# Patient Record
Sex: MALE | Race: WHITE | NOT HISPANIC OR LATINO | Employment: OTHER | URBAN - NONMETROPOLITAN AREA
[De-identification: names, ages, dates, MRNs, and addresses within clinical notes are randomized per-mention and may not be internally consistent; named-entity substitution may affect disease eponyms.]

---

## 2017-12-03 ENCOUNTER — APPOINTMENT (OUTPATIENT)
Dept: GENERAL RADIOLOGY | Facility: HOSPITAL | Age: 62
DRG: 292 | End: 2017-12-03
Attending: INTERNAL MEDICINE
Payer: MEDICARE

## 2017-12-03 ENCOUNTER — APPOINTMENT (OUTPATIENT)
Dept: ULTRASOUND IMAGING | Facility: HOSPITAL | Age: 62
DRG: 292 | End: 2017-12-03
Attending: INTERNAL MEDICINE
Payer: MEDICARE

## 2017-12-03 ENCOUNTER — HOSPITAL ENCOUNTER (INPATIENT)
Facility: HOSPITAL | Age: 62
LOS: 5 days | Discharge: HOME | DRG: 292 | End: 2017-12-08
Attending: INTERNAL MEDICINE | Admitting: INTERNAL MEDICINE
Payer: MEDICARE

## 2017-12-03 DIAGNOSIS — I48.91 ATRIAL FIBRILLATION WITH RAPID VENTRICULAR RESPONSE (H): ICD-10-CM

## 2017-12-03 DIAGNOSIS — I50.21 ACUTE SYSTOLIC CONGESTIVE HEART FAILURE (H): Primary | ICD-10-CM

## 2017-12-03 LAB
ALBUMIN SERPL-MCNC: 3.6 G/DL (ref 3.4–5)
ALBUMIN UR-MCNC: 30 MG/DL
ALP SERPL-CCNC: 134 U/L (ref 40–150)
ALT SERPL W P-5'-P-CCNC: 23 U/L (ref 0–70)
AMPHETAMINES UR QL SCN: NEGATIVE
AMYLASE SERPL-CCNC: 28 U/L (ref 30–110)
ANION GAP SERPL CALCULATED.3IONS-SCNC: 12 MMOL/L (ref 3–14)
APPEARANCE UR: CLEAR
AST SERPL W P-5'-P-CCNC: 23 U/L (ref 0–45)
BACTERIA #/AREA URNS HPF: ABNORMAL /HPF
BARBITURATES UR QL: NEGATIVE
BASOPHILS # BLD AUTO: 0.1 10E9/L (ref 0–0.2)
BASOPHILS NFR BLD AUTO: 0.5 %
BENZODIAZ UR QL: NEGATIVE
BILIRUB SERPL-MCNC: 1.2 MG/DL (ref 0.2–1.3)
BILIRUB UR QL STRIP: NEGATIVE
BUN SERPL-MCNC: 8 MG/DL (ref 7–30)
CALCIUM SERPL-MCNC: 8.9 MG/DL (ref 8.5–10.1)
CANNABINOIDS UR QL SCN: NEGATIVE
CHLORIDE SERPL-SCNC: 97 MMOL/L (ref 94–109)
CK SERPL-CCNC: 77 U/L (ref 30–300)
CO2 SERPL-SCNC: 22 MMOL/L (ref 20–32)
COCAINE UR QL: NEGATIVE
COLOR UR AUTO: YELLOW
CREAT SERPL-MCNC: 0.86 MG/DL (ref 0.66–1.25)
CRP SERPL-MCNC: 17.7 MG/L (ref 0–8)
DIFFERENTIAL METHOD BLD: ABNORMAL
EOSINOPHIL # BLD AUTO: 0.1 10E9/L (ref 0–0.7)
EOSINOPHIL NFR BLD AUTO: 0.9 %
ERYTHROCYTE [DISTWIDTH] IN BLOOD BY AUTOMATED COUNT: 13.6 % (ref 10–15)
ERYTHROCYTE [SEDIMENTATION RATE] IN BLOOD BY WESTERGREN METHOD: 10 MM/H (ref 0–20)
ETHANOL SERPL-MCNC: <0.01 G/DL
GFR SERPL CREATININE-BSD FRML MDRD: 90 ML/MIN/1.7M2
GLUCOSE SERPL-MCNC: 114 MG/DL (ref 70–99)
GLUCOSE UR STRIP-MCNC: NEGATIVE MG/DL
HCT VFR BLD AUTO: 42.4 % (ref 40–53)
HGB BLD-MCNC: 14.9 G/DL (ref 13.3–17.7)
HGB UR QL STRIP: NEGATIVE
HYALINE CASTS #/AREA URNS LPF: 4 /LPF
IMM GRANULOCYTES # BLD: 0.1 10E9/L (ref 0–0.4)
IMM GRANULOCYTES NFR BLD: 0.5 %
KETONES UR STRIP-MCNC: 5 MG/DL
LACTATE SERPL-SCNC: 2.3 MMOL/L (ref 0.4–2)
LACTATE SERPL-SCNC: 2.4 MMOL/L (ref 0.4–2)
LEUKOCYTE ESTERASE UR QL STRIP: NEGATIVE
LIPASE SERPL-CCNC: 164 U/L (ref 73–393)
LYMPHOCYTES # BLD AUTO: 1.1 10E9/L (ref 0.8–5.3)
LYMPHOCYTES NFR BLD AUTO: 10.6 %
MAGNESIUM SERPL-MCNC: 1.5 MG/DL (ref 1.6–2.3)
MAGNESIUM SERPL-MCNC: 3 MG/DL (ref 1.6–2.3)
MCH RBC QN AUTO: 31.4 PG (ref 26.5–33)
MCHC RBC AUTO-ENTMCNC: 35.1 G/DL (ref 31.5–36.5)
MCV RBC AUTO: 89 FL (ref 78–100)
METHADONE UR QL SCN: NEGATIVE
MONOCYTES # BLD AUTO: 1.4 10E9/L (ref 0–1.3)
MONOCYTES NFR BLD AUTO: 13.7 %
MUCOUS THREADS #/AREA URNS LPF: PRESENT /LPF
NEUTROPHILS # BLD AUTO: 7.6 10E9/L (ref 1.6–8.3)
NEUTROPHILS NFR BLD AUTO: 73.8 %
NITRATE UR QL: NEGATIVE
NRBC # BLD AUTO: 0 10*3/UL
NRBC BLD AUTO-RTO: 0 /100
NT-PROBNP SERPL-MCNC: 2021 PG/ML (ref 0–900)
OPIATES UR QL SCN: NEGATIVE
PCP UR QL SCN: NEGATIVE
PH UR STRIP: 5.5 PH (ref 4.7–8)
PLATELET # BLD AUTO: 272 10E9/L (ref 150–450)
POTASSIUM SERPL-SCNC: 4.2 MMOL/L (ref 3.4–5.3)
PROCALCITONIN SERPL-MCNC: <0.05 NG/ML
PROT SERPL-MCNC: 7.8 G/DL (ref 6.8–8.8)
RBC # BLD AUTO: 4.75 10E12/L (ref 4.4–5.9)
RBC #/AREA URNS AUTO: 2 /HPF (ref 0–2)
SODIUM SERPL-SCNC: 131 MMOL/L (ref 133–144)
SOURCE: ABNORMAL
SP GR UR STRIP: 1.01 (ref 1–1.03)
TROPONIN I SERPL-MCNC: 0.02 UG/L (ref 0–0.04)
TROPONIN I SERPL-MCNC: <0.015 UG/L (ref 0–0.04)
TSH SERPL DL<=0.005 MIU/L-ACNC: 1.45 MU/L (ref 0.4–4)
UROBILINOGEN UR STRIP-MCNC: 2 MG/DL (ref 0–2)
WBC # BLD AUTO: 10.3 10E9/L (ref 4–11)
WBC #/AREA URNS AUTO: 2 /HPF (ref 0–2)

## 2017-12-03 PROCEDURE — 25000125 ZZHC RX 250: Performed by: INTERNAL MEDICINE

## 2017-12-03 PROCEDURE — 84484 ASSAY OF TROPONIN QUANT: CPT | Performed by: INTERNAL MEDICINE

## 2017-12-03 PROCEDURE — 84145 PROCALCITONIN (PCT): CPT | Performed by: INTERNAL MEDICINE

## 2017-12-03 PROCEDURE — 94640 AIRWAY INHALATION TREATMENT: CPT

## 2017-12-03 PROCEDURE — 40000275 ZZH STATISTIC RCP TIME EA 10 MIN

## 2017-12-03 PROCEDURE — 96374 THER/PROPH/DIAG INJ IV PUSH: CPT

## 2017-12-03 PROCEDURE — 82150 ASSAY OF AMYLASE: CPT | Performed by: INTERNAL MEDICINE

## 2017-12-03 PROCEDURE — 83605 ASSAY OF LACTIC ACID: CPT | Performed by: INTERNAL MEDICINE

## 2017-12-03 PROCEDURE — 83880 ASSAY OF NATRIURETIC PEPTIDE: CPT | Performed by: INTERNAL MEDICINE

## 2017-12-03 PROCEDURE — 94640 AIRWAY INHALATION TREATMENT: CPT | Mod: 76

## 2017-12-03 PROCEDURE — 93005 ELECTROCARDIOGRAM TRACING: CPT

## 2017-12-03 PROCEDURE — 99285 EMERGENCY DEPT VISIT HI MDM: CPT | Mod: 25

## 2017-12-03 PROCEDURE — 71020 XR CHEST 2 VW: CPT | Mod: TC

## 2017-12-03 PROCEDURE — 25000128 H RX IP 250 OP 636: Performed by: INTERNAL MEDICINE

## 2017-12-03 PROCEDURE — 80307 DRUG TEST PRSMV CHEM ANLYZR: CPT | Performed by: INTERNAL MEDICINE

## 2017-12-03 PROCEDURE — 93970 EXTREMITY STUDY: CPT | Mod: TC

## 2017-12-03 PROCEDURE — 80320 DRUG SCREEN QUANTALCOHOLS: CPT | Performed by: INTERNAL MEDICINE

## 2017-12-03 PROCEDURE — 83690 ASSAY OF LIPASE: CPT | Performed by: INTERNAL MEDICINE

## 2017-12-03 PROCEDURE — A9270 NON-COVERED ITEM OR SERVICE: HCPCS | Mod: GY | Performed by: INTERNAL MEDICINE

## 2017-12-03 PROCEDURE — 99285 EMERGENCY DEPT VISIT HI MDM: CPT | Performed by: INTERNAL MEDICINE

## 2017-12-03 PROCEDURE — 99222 1ST HOSP IP/OBS MODERATE 55: CPT | Mod: AI | Performed by: INTERNAL MEDICINE

## 2017-12-03 PROCEDURE — 85652 RBC SED RATE AUTOMATED: CPT | Performed by: INTERNAL MEDICINE

## 2017-12-03 PROCEDURE — 80053 COMPREHEN METABOLIC PANEL: CPT | Performed by: INTERNAL MEDICINE

## 2017-12-03 PROCEDURE — 86140 C-REACTIVE PROTEIN: CPT | Performed by: INTERNAL MEDICINE

## 2017-12-03 PROCEDURE — 81001 URINALYSIS AUTO W/SCOPE: CPT | Performed by: INTERNAL MEDICINE

## 2017-12-03 PROCEDURE — 93010 ELECTROCARDIOGRAM REPORT: CPT | Performed by: INTERNAL MEDICINE

## 2017-12-03 PROCEDURE — 20000003 ZZH R&B ICU

## 2017-12-03 PROCEDURE — 36415 COLL VENOUS BLD VENIPUNCTURE: CPT | Performed by: INTERNAL MEDICINE

## 2017-12-03 PROCEDURE — 25000132 ZZH RX MED GY IP 250 OP 250 PS 637: Mod: GY | Performed by: INTERNAL MEDICINE

## 2017-12-03 PROCEDURE — 83735 ASSAY OF MAGNESIUM: CPT | Performed by: INTERNAL MEDICINE

## 2017-12-03 PROCEDURE — 85025 COMPLETE CBC W/AUTO DIFF WBC: CPT | Performed by: INTERNAL MEDICINE

## 2017-12-03 PROCEDURE — 76700 US EXAM ABDOM COMPLETE: CPT | Mod: TC

## 2017-12-03 PROCEDURE — 82550 ASSAY OF CK (CPK): CPT | Performed by: INTERNAL MEDICINE

## 2017-12-03 PROCEDURE — 84443 ASSAY THYROID STIM HORMONE: CPT | Performed by: INTERNAL MEDICINE

## 2017-12-03 RX ORDER — FUROSEMIDE 10 MG/ML
40 INJECTION INTRAMUSCULAR; INTRAVENOUS EVERY 6 HOURS
Status: COMPLETED | OUTPATIENT
Start: 2017-12-03 | End: 2017-12-05

## 2017-12-03 RX ORDER — METOPROLOL TARTRATE 25 MG/1
25 TABLET, FILM COATED ORAL 2 TIMES DAILY
Status: DISCONTINUED | OUTPATIENT
Start: 2017-12-03 | End: 2017-12-05

## 2017-12-03 RX ORDER — AMOXICILLIN 250 MG
2 CAPSULE ORAL 2 TIMES DAILY PRN
Status: DISCONTINUED | OUTPATIENT
Start: 2017-12-03 | End: 2017-12-08 | Stop reason: HOSPADM

## 2017-12-03 RX ORDER — IPRATROPIUM BROMIDE AND ALBUTEROL SULFATE 2.5; .5 MG/3ML; MG/3ML
3 SOLUTION RESPIRATORY (INHALATION) EVERY 4 HOURS PRN
Status: DISCONTINUED | OUTPATIENT
Start: 2017-12-03 | End: 2017-12-08 | Stop reason: HOSPADM

## 2017-12-03 RX ORDER — ONDANSETRON 2 MG/ML
4 INJECTION INTRAMUSCULAR; INTRAVENOUS EVERY 6 HOURS PRN
Status: DISCONTINUED | OUTPATIENT
Start: 2017-12-03 | End: 2017-12-08 | Stop reason: HOSPADM

## 2017-12-03 RX ORDER — ONDANSETRON 4 MG/1
4 TABLET, ORALLY DISINTEGRATING ORAL EVERY 6 HOURS PRN
Status: DISCONTINUED | OUTPATIENT
Start: 2017-12-03 | End: 2017-12-08 | Stop reason: HOSPADM

## 2017-12-03 RX ORDER — POTASSIUM CHLORIDE 7.45 MG/ML
10 INJECTION INTRAVENOUS
Status: DISCONTINUED | OUTPATIENT
Start: 2017-12-03 | End: 2017-12-08 | Stop reason: HOSPADM

## 2017-12-03 RX ORDER — NICOTINE 21 MG/24HR
1 PATCH, TRANSDERMAL 24 HOURS TRANSDERMAL DAILY
Status: DISCONTINUED | OUTPATIENT
Start: 2017-12-03 | End: 2017-12-08 | Stop reason: HOSPADM

## 2017-12-03 RX ORDER — METOPROLOL TARTRATE 25 MG/1
25 TABLET, FILM COATED ORAL 2 TIMES DAILY
Status: DISCONTINUED | OUTPATIENT
Start: 2017-12-03 | End: 2017-12-03

## 2017-12-03 RX ORDER — DILTIAZEM HYDROCHLORIDE 5 MG/ML
20 INJECTION INTRAVENOUS ONCE
Status: COMPLETED | OUTPATIENT
Start: 2017-12-03 | End: 2017-12-03

## 2017-12-03 RX ORDER — SODIUM CHLORIDE 9 MG/ML
INJECTION, SOLUTION INTRAVENOUS CONTINUOUS
Status: DISCONTINUED | OUTPATIENT
Start: 2017-12-03 | End: 2017-12-05

## 2017-12-03 RX ORDER — MORPHINE SULFATE 2 MG/ML
1 INJECTION, SOLUTION INTRAMUSCULAR; INTRAVENOUS
Status: DISCONTINUED | OUTPATIENT
Start: 2017-12-03 | End: 2017-12-05

## 2017-12-03 RX ORDER — IPRATROPIUM BROMIDE AND ALBUTEROL SULFATE 2.5; .5 MG/3ML; MG/3ML
3 SOLUTION RESPIRATORY (INHALATION) ONCE
Status: COMPLETED | OUTPATIENT
Start: 2017-12-03 | End: 2017-12-03

## 2017-12-03 RX ORDER — SODIUM CHLORIDE 9 MG/ML
1000 INJECTION, SOLUTION INTRAVENOUS CONTINUOUS
Status: DISCONTINUED | OUTPATIENT
Start: 2017-12-03 | End: 2017-12-04

## 2017-12-03 RX ORDER — POTASSIUM CL/LIDO/0.9 % NACL 10MEQ/0.1L
10 INTRAVENOUS SOLUTION, PIGGYBACK (ML) INTRAVENOUS
Status: DISCONTINUED | OUTPATIENT
Start: 2017-12-03 | End: 2017-12-08 | Stop reason: HOSPADM

## 2017-12-03 RX ORDER — DIAZEPAM 5 MG
5-20 TABLET ORAL EVERY 30 MIN PRN
Status: DISCONTINUED | OUTPATIENT
Start: 2017-12-03 | End: 2017-12-08 | Stop reason: HOSPADM

## 2017-12-03 RX ORDER — POTASSIUM CHLORIDE 1.5 G/1.58G
20-40 POWDER, FOR SOLUTION ORAL
Status: DISCONTINUED | OUTPATIENT
Start: 2017-12-03 | End: 2017-12-08 | Stop reason: HOSPADM

## 2017-12-03 RX ORDER — AMOXICILLIN 250 MG
1 CAPSULE ORAL 2 TIMES DAILY PRN
Status: DISCONTINUED | OUTPATIENT
Start: 2017-12-03 | End: 2017-12-08 | Stop reason: HOSPADM

## 2017-12-03 RX ORDER — PANTOPRAZOLE SODIUM 40 MG/1
40 TABLET, DELAYED RELEASE ORAL EVERY MORNING
Status: DISCONTINUED | OUTPATIENT
Start: 2017-12-03 | End: 2017-12-05

## 2017-12-03 RX ORDER — POTASSIUM CHLORIDE 1500 MG/1
20-40 TABLET, EXTENDED RELEASE ORAL
Status: DISCONTINUED | OUTPATIENT
Start: 2017-12-03 | End: 2017-12-08 | Stop reason: HOSPADM

## 2017-12-03 RX ORDER — FUROSEMIDE 10 MG/ML
40 INJECTION INTRAMUSCULAR; INTRAVENOUS ONCE
Status: COMPLETED | OUTPATIENT
Start: 2017-12-03 | End: 2017-12-03

## 2017-12-03 RX ORDER — MAGNESIUM SULFATE HEPTAHYDRATE 40 MG/ML
4 INJECTION, SOLUTION INTRAVENOUS EVERY 4 HOURS PRN
Status: DISCONTINUED | OUTPATIENT
Start: 2017-12-03 | End: 2017-12-08 | Stop reason: HOSPADM

## 2017-12-03 RX ORDER — NALOXONE HYDROCHLORIDE 0.4 MG/ML
.1-.4 INJECTION, SOLUTION INTRAMUSCULAR; INTRAVENOUS; SUBCUTANEOUS
Status: DISCONTINUED | OUTPATIENT
Start: 2017-12-03 | End: 2017-12-08 | Stop reason: HOSPADM

## 2017-12-03 RX ADMIN — SODIUM CHLORIDE 1000 ML: 9 INJECTION, SOLUTION INTRAVENOUS at 08:41

## 2017-12-03 RX ADMIN — FUROSEMIDE 40 MG: 10 INJECTION, SOLUTION INTRAVENOUS at 11:42

## 2017-12-03 RX ADMIN — FUROSEMIDE 40 MG: 10 INJECTION, SOLUTION INTRAVENOUS at 14:20

## 2017-12-03 RX ADMIN — THIAMINE HYDROCHLORIDE 250 MG: 100 INJECTION, SOLUTION INTRAMUSCULAR; INTRAVENOUS at 14:20

## 2017-12-03 RX ADMIN — METOPROLOL TARTRATE 25 MG: 25 TABLET ORAL at 20:35

## 2017-12-03 RX ADMIN — FUROSEMIDE 40 MG: 10 INJECTION, SOLUTION INTRAVENOUS at 20:35

## 2017-12-03 RX ADMIN — METOPROLOL TARTRATE 12.5 MG: 25 TABLET, FILM COATED ORAL at 12:53

## 2017-12-03 RX ADMIN — IPRATROPIUM BROMIDE AND ALBUTEROL SULFATE 3 ML: .5; 3 SOLUTION RESPIRATORY (INHALATION) at 06:51

## 2017-12-03 RX ADMIN — DILTIAZEM HYDROCHLORIDE 10 MG/HR: 5 INJECTION INTRAVENOUS at 20:02

## 2017-12-03 RX ADMIN — PANTOPRAZOLE SODIUM 40 MG: 40 TABLET, DELAYED RELEASE ORAL at 11:42

## 2017-12-03 RX ADMIN — NICOTINE 1 PATCH: 21 PATCH, EXTENDED RELEASE TRANSDERMAL at 12:54

## 2017-12-03 RX ADMIN — MAGNESIUM SULFATE IN WATER 4 G: 40 INJECTION, SOLUTION INTRAVENOUS at 12:00

## 2017-12-03 RX ADMIN — ENOXAPARIN SODIUM 40 MG: 40 INJECTION SUBCUTANEOUS at 11:42

## 2017-12-03 RX ADMIN — IPRATROPIUM BROMIDE AND ALBUTEROL SULFATE 3 ML: .5; 3 SOLUTION RESPIRATORY (INHALATION) at 19:54

## 2017-12-03 RX ADMIN — DILTIAZEM HYDROCHLORIDE 20 MG: 5 INJECTION INTRAVENOUS at 07:44

## 2017-12-03 RX ADMIN — DILTIAZEM HYDROCHLORIDE 5 MG/HR: 5 INJECTION INTRAVENOUS at 08:40

## 2017-12-03 NOTE — IP AVS SNAPSHOT
[facility]    [address] [place] [id number]    Phone:  [phone]    Fax:  [phone]                                       After Visit Summary   12/3/2017    Ramírez Hernández    MRN: [id number]           After Visit Summary Signature Page     I have received my discharge instructions, and my questions have been answered. I have discussed any challenges I see with this plan with the nurse or doctor.    ..........................................................................................................................................  Patient/Patient Representative Signature      ..........................................................................................................................................  Patient Representative Print Name and Relationship to Patient    ..................................................               ................................................  Date                                            Time    ..........................................................................................................................................  Reviewed by Signature/Title    ...................................................              ..............................................  Date                                                            Time

## 2017-12-03 NOTE — ED NOTES
DATE:  12/3/2017   TIME OF RECEIPT FROM LAB:  0658  LAB TEST: lactate  LAB VALUE: 2.4  RESULTS GIVEN WITH READ-BACK TO (PROVIDER):  Yoshi  TIME LAB VALUE REPORTED TO PROVIDER:  0659

## 2017-12-03 NOTE — PLAN OF CARE
[facility] Inpatient Admission Note:    Patient admitted to 3122/3122-1 at approximately 1000 via cart accompanied by nurse from emergency room . Report received from Melia TURCIOS  in SBAR format at 0945 via telephone. Patient transferred to bed via self.. Patient is alert and oriented X 3, denies pain; rates at 0 on 0-10 scale.  Patient oriented to room, unit, hourly rounding, and plan of care. Explained admission packet and patient handbook with patient bill of rights brochure. Will continue to monitor and document as needed.     Inpatient Nursing criteria listed below was met:      Health care directives status obtained and documented: Yes      Care Everywhere authorization obtained Yes      MRSA swab completed for patient 65 years and older: N/A      Patient identifies a surrogate decision maker: Yes If yes, who:Brother Chavez Contact Information:[phone]      Core Measure diagnosis present:No. If yes, state diagnosis: N/A       Is initial lactic acid >2.0? Yes.       Vaccination assessment and education completed: Yes   Vaccinations received prior to admission: Pneumovax no  Influenza(seasonal)  NO   Vaccination(s) ordered: influenza vaccine, pneumococcal vaccine      Clergy visit ordered if patient requests: N/A      Skin issues/needs documented: N/A      Isolation Patient: no Education given, correct sign in place and documentation row added to PCS:  No      Fall Prevention N/A: Care plan updated, education given and documented, sticker and magnet in place: Yes      Care Plan initiated: Yes      Education Documented (including assessment): Yes      Patient has discharge needs : Yes If yes, please explain:May need extra services at home

## 2017-12-03 NOTE — IP AVS SNAPSHOT
MRN:[id number]                      After Visit Summary   12/3/2017    Ramírez Hernández    MRN: [id number]           Thank you!     Thank you for choosing [facility] for your care. Our goal is always to provide you with excellent care. Hearing back from our patients is one way we can continue to improve our services. Please take a few minutes to complete the written survey that you may receive in the mail after you visit with us. Thank you!        Patient Information     Date Of Birth          1955        Designated Caregiver       Most Recent Value    Caregiver    Will someone help with your care after discharge? yes    Name of designated caregiver brother Rizzo    Phone number of caregiver chart    Caregiver address [place]      About your hospital stay     You were admitted on:  December 3, 2017 You last received care in the:  [facility]    You were discharged on:  December 8, 2017        Reason for your hospital stay       Congestive heart failure                  Who to Call     For medical emergencies, please call 911.  For non-urgent questions about your medical care, please call your primary care provider or clinic, None          Attending Provider     Provider Specialty    Godwin Belle MD Emergency Medicine    Carl South MD Internal Medicine    Megan Mccullough, NP Nurse Practitioner       Primary Care Provider Fax #    Physician No Ref-Primary [phone]       When to contact your care team       Call your primary doctor if you have any of the following: worsening shortness of breath, swelling of the legs, muscle cramping, chest pain, palpitations, bleeding.                  After Care Instructions     Activity       Your activity upon discharge: activity as tolerated, elevate legs while at rest.            Diet       Follow this diet upon discharge: Orders Placed This Encounter     2 gram sodium diet            Monitor and record       weight every day. Notify  your doctor if you gain over 2 pounds overnight or 5 pounds in a week. Take log of daily weights into doctor's appointment.                  Follow-up Appointments     Follow-up and recommended labs and tests        Follow up with primary care provider, Physician No Ref-Primary, within 7 days for hospital follow- up.  The following labs/tests are recommended: CBC,BMP.    Follow-up with [facility] on Monday for lab and Coumadin dosing.                  Your next 10 appointments already scheduled     Dec 11, 2017  2:00 PM CST   Anticoagulation Visit with  ANTI COAGULATION   [facility] ([facility] [place] )    [address] [address]   [phone]            Dec 14, 2017  1:45 PM CST   (Arrive by 1:30 PM)   Office Visit with CLAUDIO Crum MD   [facility] ([facility] [place] )    [address]  [place] [address]   [phone]           Bring a current list of meds and any records pertaining to this visit.  For Physicals, please bring immunization records and any forms needing to be filled out.  Please arrive 15 minutes early to complete paperwork and register.            Dec 18, 2017  1:15 PM CST   (Arrive by 1:00 PM)   Office Visit with CLAUDIO Crum MD   [facility] ([facility] [place] )    [address] [address]   [phone]           Bring a current list of meds and any records pertaining to this visit.  For Physicals, please bring immunization records and any forms needing to be filled out.  Please arrive 15 minutes early to complete paperwork and register.              Additional Services     INR CLINIC REFERRAL       Your provider has referred you to INR Services.    Please be aware that coverage of these services is subject to the terms and limitations of your health insurance plan.  Call member services at your health plan with any benefit or coverage questions.      Patient prefers to get  lab checks at [facility].    Indication for Anticoagulation: Atrial Fibrillation  If nonstandard INR is desired, indicate goal range and explanation:   Expected Duration of Therapy: Other: Potential lifetime depending on A-fib status                  Future tests that were ordered for you     INR                 Further instructions from your care team       You have an appointment at the [facility] on Monday, December 11th @ 2:00pm. This is an hour long visit.    You have a hospital follow up appointment scheduled with Dr. Crum on Thursday, December 14th @ 1:45PM. Please arrive 15 minutes early for registration.    You also have an appointment to establish care with your new primary Dr. Crum on Monday, December 18th @ 1:15PM. Please arrive 15 minutes early for registration.    Warfarin Instruction     You have started taking a medicine called warfarin. This is a blood-thinning medicine (anticoagulant). It helps prevent and treat blood clots.      Before leaving the hospital, make sure you know how much to take and how long to take it.      You will need regular blood tests to make sure your blood is clotting safely. It is very important to see your doctor for regular blood tests.    Talk to your doctor before taking any new medicine (this includes over-the-counter drugs and herbal products). Many medicines can interact with warfarin. This may cause more bleeding or too much clotting.     Eating a lot of vitamin K--found in green, leafy vegetables--can change the way warfarin works in your body. Do NOT avoid these foods. Instead, try to eat the same amount each day.     Bleeding is the most common side-effect of warfarin. You may notice bleeding gums, a bloody nose, bruises and bleeding longer when you cut yourself. See a doctor at once if:   o You cough up blood  o You find blood in your stool (poop)  o You have a deep cut, or a cut that bleeds longer than 10 minutes   o You have a bad cut,  "hard fall, accident or hit your head (go to urgent care or the emergency room).    For women who can get pregnant: This medicine can harm an unborn baby. Be very careful not to get pregnant while taking this medicine. If you think you might be pregnant, call your doctor right away.    For more information, read \"Guide to Warfarin Therapy,  the booklet you received in the hospital.        General Recommendations To Control Heart Failure When You Get Home     Instructions To Patients and Families: Please read and check off each of these important instructions as you do them when you get home.           Weight and symptoms      ___ Put a scale in your bathroom  ___ Post a weight chart or calendar next to the scale  ___Weigh yourself every day as soon as you get up in the morning. You should only be wearing your pajamas. Write your weight on the chart/calendar.  ___ Bring your weight chart/calendar with you to all appointments    ___Call your doctor if you gain 2 pounds in 1 day or 5 pounds in 1 week from your \"dry\" weight (baseline weight). Also call your doctor if you have shortness of breath that gets worse over time, leg swelling or fatigue.         Medicines and diet     ___ Make sure to take your medicines as prescribed.    ___Bring a current list of your medicines and all of your medicine bottles with you to all appointments.    ___ Limit fluids if you still have swelling or shortness of breath, or if your doctor tells you to do so.  ___ Eat less than 2000 mg of sodium (salt) every day. Read food labels, and do not add salt to meals.   ___ Heart healthy diet with low fat and low cholesterol          Activity and suggested lifestyle changes    ___ Stay active. Talk to your doctor about an exercise program that is safe for your heart.    ___ Stop smoking. Reduce alcohol use.      ___ Lose weight if you are overweight. Extra weight puts a lot of stress on the heart.          Control for Leg Swelling   ___ Keep your " "legs elevated (raised) as needed for swelling. If swelling is uncomfortable or elevation doesn t help, ask your doctor about using ACE wrap or Jobst stockings.          Follow-up appointments   ____ Follow up with your doctor within 7-10 days after discharge.    ___ Make sure to take your medications as prescribed and bring an accurate list of your medications and your weight chart/calendar to your follow up appointment.           Pending Results     No orders found from 2017 to 2017.            Statement of Approval     Ordered          17 1233  I have reviewed and agree with all the recommendations and orders detailed in this document.  EFFECTIVE NOW     Approved and electronically signed by:  Megan Mccullough NP             Admission Information     Date & Time Department Dept. Phone    12/3/2017 [facility] [phone]      Your Vitals Were     Blood Pressure Pulse Temperature Respirations Height Weight    137/87 92 96.6  F (35.9  C) (Tympanic) 16 1.727 m (5' 8\") 113.4 kg (250 lb)    Pulse Oximetry BMI (Body Mass Index)                94% 38.01 kg/m2          [facility] Information     [facility] lets you send messages to your doctor, view your test results, renew your prescriptions, schedule appointments and more. To sign up, go to [email or web address] . Click on \"Log in\" on the left side of the screen, which will take you to the Welcome page. Then click on \"Sign up Now\" on the right side of the page.     You will be asked to enter the access code listed below, as well as some personal information. Please follow the directions to create your username and password.     Your access code is: [email or web address]  Expires: 3/8/2018 12:32 PM     Your access code will  in 90 days. If you need help or a new code, please call your [place] clinic or [phone].        Care EveryWhere ID     This is your Care EveryWhere ID. This could be used by other organizations to access your [place] " medical records  [id number]        Equal Access to Services     JHON BILLINGSLEY : Hadii nolan doss tyrese Soraulali, waaxda luqadaha, qaybta kalukasjean isbelldamonjean, georgina sanchezjaradgema finn. So [facility] [phone].    ATENCIÓN: Si habla español, tiene a kay disposición servicios gratuitos de asistencia lingüística. Llame al [phone].    We comply with applicable federal civil rights laws and [place] laws. We do not discriminate on the basis of race, color, national origin, age, disability, sex, sexual orientation, or gender identity.               Review of your medicines      START taking        Dose / Directions    furosemide 40 MG tablet   Commonly known as:  LASIX        Dose:  40 mg   Start taking on:  12/9/2017   Take 1 tablet (40 mg) by mouth daily   Quantity:  30 tablet   Refills:  0       lisinopril 10 MG tablet   Commonly known as:  PRINIVIL/ZESTRIL        Dose:  10 mg   Start taking on:  12/9/2017   Take 1 tablet (10 mg) by mouth daily   Quantity:  30 tablet   Refills:  0       metoprolol 50 MG tablet   Commonly known as:  LOPRESSOR   Used for:  Atrial fibrillation with rapid ventricular response (H)        Dose:  50 mg   Take 1 tablet (50 mg) by mouth 2 times daily   Quantity:  60 tablet   Refills:  0       warfarin 5 MG tablet   Commonly known as:  COUMADIN   Used for:  Atrial fibrillation with rapid ventricular response (H)        Dose:  5 mg   Take 1 tablet (5 mg) by mouth daily Until otherwise instructed by coumadin clinic.   Quantity:  30 tablet   Refills:  0            Where to get your medicines      These medications were sent to [id number] - [place], [place] - [address] AT [facility] [address]  [address], [place] [address]     Phone:  [phone]     furosemide 40 MG tablet    lisinopril 10 MG tablet    metoprolol 50 MG tablet    warfarin 5 MG tablet                Protect others around you: Learn how to safely use, store and throw away your medicines at  www.disposemymeds.org.             Medication List: This is a list of all your medications and when to take them. Check marks below indicate your daily home schedule. Keep this list as a reference.      Medications           Morning Afternoon Evening Bedtime As Needed    furosemide 40 MG tablet   Commonly known as:  LASIX   Take 1 tablet (40 mg) by mouth daily   Start taking on:  12/9/2017   Last time this was given:  40 mg on 12/8/2017  9:53 AM                                lisinopril 10 MG tablet   Commonly known as:  PRINIVIL/ZESTRIL   Take 1 tablet (10 mg) by mouth daily   Start taking on:  12/9/2017   Last time this was given:  10 mg on 12/8/2017  9:53 AM                                metoprolol 50 MG tablet   Commonly known as:  LOPRESSOR   Take 1 tablet (50 mg) by mouth 2 times daily   Last time this was given:  50 mg on 12/8/2017  9:53 AM                                warfarin 5 MG tablet   Commonly known as:  COUMADIN   Take 1 tablet (5 mg) by mouth daily Until otherwise instructed by coumadin clinic.   Last time this was given:  7.5 mg on 12/7/2017  6:31 PM                                          More Information        Understanding Atrial Fibrillation    An arrhythmia is any problem with the speed or pattern of the heartbeat. Atrial fibrillation (AFib) is a common type of arrhythmia. It causes fast, chaotic electrical signals in the atria. This leads to poor functioning of the heart. It also affects how much blood your heart can pump out to the body.  Afib may occur once in a while and go away on its own. Or it may continue for longer periods and need treatment.  AFib can lead to serious problems, such as stroke. Your healthcare provider will need to monitor and manage it.  What happens during atrial fibrillation?   The heart has an electrical system that sends signals to control the heartbeat. As signals move through the heart, they tell the heart s upper chambers (atria) and lower chambers  (ventricles) when to squeeze (contract) and relax. This lets blood move through the heart and out to the body and lungs.  With AFib, the atria receive abnormal signals. This causes them to contract in a fast and irregular way, and out of sync with the ventricles. When this happens, the atria also have a harder time moving blood into the ventricles. Blood may then pool in the atria, which increases the risk for blood clots and stroke. The ventricles also may contract too quickly and irregularly. As a result, they may not pump blood to the body and lungs as well as they should. This can weaken the heart muscle over time and cause heart failure.  What causes atrial fibrillation?  AFib is more common in older adults. It has many possible causes including:    Coronary artery disease    Heart valve disease    Heart attack    Heart surgery    High blood pressure    Thyroid disease    Diabetes    Lung disease    Sleep apnea    Heavy alcohol use  In some cases of AFib, doctors do not know the cause.  What are the symptoms of atrial fibrillation?  AFib may or may not cause symptoms. If symptoms do occur, they may include:    A fast, pounding, irregular heartbeat    Shortness of breath    Tiredness    Dizziness or fainting    Chest pain  How is atrial fibrillation treated?  Treatments for AFib can include any of the options below.    Medicines. You may be prescribed:    Heart rate medicines to help slow down the heartbeat    Heart rhythm medicines to help the heart beat more regularly    Anti-clotting medicines to help reduce the risk for blood clots and stroke    Electrical cardioversion. Your healthcare provider uses special pads or paddles to send one or more brief electrical shocks to the heart. This can help reset the heartbeat to normal.    Ablation. Long, thin tubes called catheters are threaded through a blood vessel to the heart. There, the catheters send out hot or cold energy to the areas causing the abnormal  signals. This energy destroys the problem tissue or cells. This improves the chances that your heart will stay in normal rhythm without using medicines. If your heart rate and rhythm can t be controlled, you may need ablation and a pacemaker. These will help control the heart rate and regularity of the heartbeat.    Surgery. During surgery, your healthcare provider may use different methods to create scar tissue in the areas of the heart causing the abnormal signals. The scar tissue disrupts the abnormal signals and may stop AFib from occurring.  What are the complications of atrial fibrillation?  These can include:    Blood clots    Stroke    Heart failure. This problem occurs when the heart muscle weakens so much that it can no longer pump blood well.  When should I call my healthcare provider?  Call your healthcare provider right away if you have any of these:    Symptoms that don t get better with treatment, or get worse    New symptoms   Date Last Reviewed: 5/1/2016 2000-2017 [employer]. [address] [place] [address]. All rights reserved. This information is not intended as a substitute for professional medical care. Always follow your healthcare professional's instructions.                What Is Atrial Flutter/Atrial Fibrillation?      The heart has its own electrical system. This system makes the signals that start each heartbeat. The heartbeat begins in 1 of the 2 upper chambers of the heart (atria). A problem can make the atria beat faster than normal. The atria may beat fast but still evenly. This problem is called atrial flutter. If the atria beat very fast and also unevenly, it is called atrial fibrillation (AFib).  Causes of Atrial Flutter and Atrial Fibrillation  Causes of these problems can include:    Previous heart attack    High blood pressure    Thyroid problems  In many cases, the cause is unknown.  When the Atria Beat Too Fast  The atria may beat fast only once in  a while. This is called a paroxysmal heart rhythm problem. If they beat fast all the time, it is a chronic problem.  Atrial Flutter  With atrial flutter, electrical signals travel around and around inside the atria. These circling signals make the atria beat too fast:    Atrial flutter can cause symptoms similar to AFib. It can also lead to the even faster, uneven rhythms of AFib.  Atrial Fibrillation (AFib)  With AFib, cells in the atria send extra electrical signals. These extra signals make the atria beat very fast. They also beat unevenly:    The atria beat so fast and unevenly that they may quiver instead of derrick. If the atria don t contract, they don t move enough blood into the 2 lower chambers of the heart (ventricles). This can cause you to feel dizzy or weak.    Blood that doesn t keep moving can pool and form clots in the atria. These clots can move into other parts of the body and cause serious problems such as a stroke.   Symptoms of Atrial Flutter and AFib  These symptoms include the following:    Palpitations (a fluttering, fast heartbeat)    Weakness or tiredness    Shortness of breath    Chest pain or tightness    Dizziness or lightheadedness    Fainting spells   Date Last Reviewed: 2/26/2014 2000-2017 [employer]. [address] [place] [address]. All rights reserved. This information is not intended as a substitute for professional medical care. Always follow your healthcare professional's instructions.              Coping with Edema  What is edema?  Edema is the build-up of fluid in the body, which causes swelling. Swelling most commonly occurs in the feet, ankles, lower legs or hands.  Swelling can occur in the belly or chest may be a sign of a more severe problem.  Certain medicines or conditions can make the swelling worse.  Symptoms include:    Feet and lower legs get larger when you sit or walk.    Hands feel tight when you make a fist.    When you push on  the skin, skin stays dented.    Shiny, tight skin.    Fast weight gain.  How is it treated?  Your care team may give you a medicine to reduce the swelling.  They may also suggest that you meet with a dietitian. He or she can help with food choices to reduce the swelling.  What can I do about the swelling?    Place your feet above your heart 3 times a day: Sit with your feet up on a stool with a pillow. Sit on the bed or couch with two pillows under your feet.    Do not stand for long periods of time.    Wear loose-fitting clothes.    Do not cross your legs.    Reduce the salt in your diet.   These foods are high in salt:    Chips, soup    Frozen meals, TV dinners    Gutierrez, lunch meat, ham    Sauces (soy, canned spaghetti sauce)    Walk or do other exercise.    Wear compression stockings.    Drink water as normal.    Weigh yourself every day at the same time to keep track of weight gain.  When should I call my care team?  Call your care team if:    You have a hard time breathing.    You gained 5 pounds or more in 1 week.    Your hands or feet feel cold when you touch them.    You are peeing very little or not at all.    Swelling is moving up your arms or legs.    Your tongue is swelling.    You cannot eat for more than a day  If you have any side effects, call us. We can help you manage these problems.  For more information, see:  www.[facility].com  www.cancer.org/treatment/treatmentsandsideeffects/physicalsideeffects/dealingwithsymptomsathome  www.cancer.gov/cancertopics/coping/chemotherapy-and-you  Comments:  __________________________________________  __________________________________________  __________________________________________  __________________________________________  __________________________________________  __________________________________________  __________________________________________  For informational purposes only. Not to replace the advice of your health care provider.  Copyright    2014 [facility]. All rights reserved. [employer] 393211 - REV 03/16.          Diet Guidelines for Patients Taking Warfarin (Coumadin)  The foods you eat and drink can affect how your medicine works. Here is what you should know about warfarin and your diet.  Vitamin K in foods  While you are on warfarin, your intake of vitamin K should stay the same from day to day. Your warfarin dose is based on your daily intake of vitamin K foods. So it is important to get the same amount of vitamin K each day.  Warfarin helps to thin your blood. Vitamin K helps to clot your blood. When there is a sudden increase or decrease in vitamin K intake, the warfarin may not work as well.  If you ate vitamin K foods before starting warfarin, you should continue to eat them. If you plan to eat more or less of the vitamin K foods, you must see your doctor. Your doctor will change your dose to match your vitamin K intake.   Dietary supplements  Some dietary supplements have vitamin K. We do not know how these interact with warfarin. To be safe, don't take dietary supplements (including your daily multi-vitamin) unless your doctor approves.  Vitamin E and fish oil are often taken by people with heart problems. Both of these have blood-thinning effects. If you take these, be sure to tell your doctor.  These foods are high in vitamin K   (serving size is   cup) These foods are medium-high in Vitamin K  (serving size is 1 cup, except brussels sprouts =   cup)   Collards (cooked) Adams sprouts (cooked)   Kale (cooked) Broccoli   Mustard greens (cooked) Endive (raw)   Parsley (raw) Green leaf lettuce   Spinach (cooked) Jacky lettuce   Swiss chard (cooked) Spinach (raw)   Turnip greens (cooked) Turnip greens (raw)   Alcohol   If you choose to drink, have no more than 1 to 2 drinks in 24 hours. One drink equals:     5 ounces of wine    12 ounces of beer    1 1/2 ounces of hard liquor  Drinking too much alcohol will increase your  risk for bleeding. Ask your doctor how much alcohol is safe for you. Some doctors advise no alcohol while taking warfarin.  Cranberry juice  You may wish to limit how much cranberry juice you drink each day.  The makers of warfarin state that cranberry juice may increase your risk of bleeding. Studies do not support this. If you have questions about cranberry juice, please speak with your doctor or pharmacist.  Green tea  Green tea is often found on lists of foods that are high in Vitamin K. The tea leaves themselves are high in Vitamin K, but the tea provides only a small amount. You can drink a couple cups of green tea--just do not drink gallons of it.  Resources  American Dietetic Association. ADA Nutrition Care Manual. Available at http://nutritioncaremanual.org. Accessed 2005.  [employer] Patient's Guide to Using Coumadin, [place], [place]: 2003.  [employer] [place] [employer]. Drug-nutrient interactions: Coumadin (warfarin) and vitamin K. Last updated: December 2003. Accessed October 2006.  [employer], Human Nutrition Information Service. Provisional table on the vitamin K content of foods. HNIS/PT-104. Revised 1994.  For informational purposes only. Not to replace the advice of your health care provider.   Copyright   2006 [facility]. All rights reserved. [employer] 671600 - REV 09/15.            Taking Coumadin - CORE MEASURES  Coumadin (warfarin) helps keep your blood from clotting. It is used to reduce the risk for stroke, heart attack, or a blood clot passing to your lung. Coumadin also increases your risk of bleeding. Because of this, it must be taken exactly as directed by your doctor. You also need to protect yourself from injury.    Follow These Tips    Take this medicine at the same time each day. Take it with a full glass of water, with or without food. If you miss a dose, contact your doctor immediately to find out how much to take. Never take a double dose.    Warfarin is an  effective drug, but it can be dangerous if not taken properly. It makes your blood less likely to form clots. If you take too much, it can cause too much internal or external bleeding.    Be sure to tell all of your doctors that you take Coumadin. If you will be taking Coumadin for quite a while, carry an ID card or get a Medic-Alert bracelet. This will alert medical staff in case you aren t able to do so yourself. Also carry with you the name and number of the person to contact in case of an emergency.    You will need to have regular blood tests to measure the effects of the warfarin. Keep your scheduled test appointment and be sure to talk with your doctor afterward to find out your results. Your doctor may need to change your dose. Follow your doctor s advice exactly about how to take this medicine. Do not stop the medicine without talking with your doctor.  Monitoring Your PT/INR Blood Levels After Discharge  Two tests are used to find out how your blood is clotting. One is protime (PT) and the other is the international normalized ratio (INR).    Go for your blood (PT/INR) tests as often as directed. Note that diet and medication can affect your PT/INR level.    Your INR was between _____ and _____ .    Ask your doctor what your goal INR is. My goal INR is between _____ and _____.    My next PT/INR blood draw is due on _________________ (date) at ________________ (time) by ____________________ (name of doctor or clinic).    The name of the doctor who is monitoring my anticoagulation therapy is ______________________ and the phone number is ___________________.    Follow up with your doctor or as advised by his or her staff. It usually takes a few hours for your doctor to get the results of your clotting tests. Please call to get your lab results and find out if your doctor needs to make further changes to your Coumadin dose.    If your labs (PT/INR) are drawn at a location other than your doctor's office,  please remember to tell your doctor as soon as you get your lab results.      What to Do at Home  1. Adjust your Coumadin dose as directed by your doctor, ____________________ (name of doctor).  2. Have another clotting test done every _______ days at _____________________ (name of clinic) by ____________________ (name of doctor).  3. Do not go barefoot. Always wear shoes.  4. Do not trim corns or calluses yourself.  5. Always talk with your doctor before taking any herbs, vitamins, or prescription or over-the-counter (OTC) medications, especially aspirin and nonsteroidal anti-inflammatory drugs.  6. Always talk with your doctor before stopping any medication or changing the dose of any of your medications.  7. Use an electric razor instead of a manual one.  8. Use a soft-bristled toothbrush and waxed floss.  9. Avoid major changes in your diet.      When to Call Your Health Care Provider  Coumadin increases your risk of bleeding. Call your health care provider right away before you take your next dose of Coumadin if you have any of these problems:    Bleeding that doesn t stop in 10 minutes    A heavier-than-normal period or bleeding between periods    Coughing or throwing up blood or something that looks like coffee grounds    Nausea, bloating, diarrhea, or bleeding hemorrhoids    Bleeding hemorrhoids    Dark red or brown urine    Red or black tarry stools    Red or black-and-blue marks on the skin that get larger    A fever or an illness that gets worse    Dizziness, headache, weakness, or fatigue    Chest pain or trouble breathing    A serious fall or a blow to the head    Swelling or pain after an injury or at an injection site    Bleeding gums after brushing your teeth  Keep Your Diet Steady  Keep your diet pretty much the same each day. That s because many foods contain vitamin K. Vitamin K helps your blood clot. So eating foods that contain vitamin K can affect the way Coumadin works. You don t need to avoid  foods that have vitamin K. But you do need to keep the amount of them you eat steady (about the same day to day). If you change your diet for any reason, such as due to illness or to lose weight, be sure to tell your doctor.    Examples of foods high in vitamin K are asparagus, avocado, broccoli, cabbage, kale, spinach, and some other leafy green vegetables. Oils, such as soybean, canola, and olive oils, are also high in vitamin K.    Other food products can affect the way Coumadin works in your body:    Food products that may affect blood clotting include cranberries and cranberry juice, fish oil supplements, garlic, kendall, licorice, and turmeric.    Herbs used in herbal teas or supplements can also affect blood clotting. Keep the amount of herbal teas and supplements you use steady.    Alcohol can increase the effect of Coumadin in your body.  Talk with your health care provider if you have concerns about these or other food products and their effects on Coumadin.  What to Watch For  If you have any of these signs or reactions, call your doctor right away or go to the hospital.  Signs of too much bleeding:    More bruising than normal    Prolonged bleeding from cuts    Bleeding from the nose or gums    Blood in your urine, vomit, or stools (red or black color)    Coughing up blood    Unusually heavy menstrual bleeding    Sudden change to a dark or purplish color in your toes or any other area of your body  Allergic Reactions:    Rash    Itching    Swelling    Trouble swallowing or breathing  ---------- IMPORTANT ----------  Medical Conditions  Before starting this medicine, be sure your doctor knows if you have any of these conditions:    Stomach ulcer now or in the past    Vomited blood or had bloody stools (black or red color)    Aneurysm, pericarditis, or pericardial effusion    Blood disorder    Recent surgery, stroke, mini-stroke, or spinal puncture    Kidney or liver disease, uncontrolled high blood  pressure, diabetes, vasculitis, congestive heart failure, lupus or other collagen-vascular disease, or high cholesterol    Pregnancy or breastfeeding    Younger than 18 years old    Recent or planned dental procedure  Drug Interactions  Many medicines interfere with the effect of Coumadin. Before starting this medicine, be sure your doctor knows about any prescription, OTC, or herbal drugs you are taking. This is especially true if you are taking:    Antibiotics    Heart medicines    Cimetidine (Tagamet)    Aspirin or other anti-inflammatory drugs such as ibuprofen (Advil, Motrin, or Nuprin), naproxen (Aleve, Naprosyn, Anaprox), ketoprofen (Orudis, Oruvail), or other arthritis medicines    Drugs for depression, cancer, HIV (protease inhibitors), diabetes, seizures, gout, high cholesterol, or thyroid replacement    Vitamins containing Vitamin K or herbal products such as ginkgo, Q10, garlic, or Gen's wort  [NOTE: This information topic may not include all directions, precautions, medical conditions, drug/food interactions, and warnings for this drug. Check with your doctor, nurse, or pharmacist for any questions that you may have.]          4551-4349 [employer]. [address] [place] [address]. All rights reserved. This information is not intended as a substitute for professional medical care. Always follow your healthcare professional's instructions.  This information has been modified by your health care provider with permission from the publisher.                Patient Education    Warfarin Sodium Oral tablet    Warfarin Sodium Solution for injection  Warfarin Sodium Oral tablet  What is this medicine?  WARFARIN (WAR far in) is an anticoagulant. It is used to treat or prevent clots in the veins, arteries, lungs, or heart.  This medicine may be used for other purposes; ask your health care provider or pharmacist if you have questions.  What should I tell my health care provider before  I take this medicine?  They need to know if you have any of these conditions:    alcoholism    anemia    bleeding disorders    cancer    diabetes    heart disease    high blood pressure    history of bleeding in the gastrointestinal tract    history of stroke or other brain injury or disease    kidney or liver disease    protein C deficiency    protein S deficiency    psychosis or dementia    recent injury, recent or planned surgery or procedure    an unusual or allergic reaction to warfarin, other medicines, foods, dyes, or preservatives    pregnant or trying to get pregnant    breast-feeding  How should I use this medicine?  Take this medicine by mouth with a glass of water. Follow the directions on the prescription label. You can take this medicine with or without food. Take your medicine at the same time each day. Do not take it more often than directed. Do not stop taking except on your doctor's advice. Stopping this medicine may increase your risk of a blood clot. Be sure to refill your prescription before you run out of medicine.  If your doctor or healthcare professional calls to change your dose, write down the dose and any other instructions. Always read the dose and instructions back to him or her to make sure you understand them. Tell your doctor or healthcare professional what strength of tablets you have on hand. Ask how many tablets you should take to equal your new dose. Write the date on the new instructions and keep them near your medicine. If you are told to stop taking your medicine until your next blood test, call your doctor or healthcare professional if you do not hear anything within 24 hours of the test to find out your new dose or when to restart your prior dose.  A special MedGuide will be given to you by the pharmacist with each prescription and refill. Be sure to read this information carefully each time.  Talk to your pediatrician regarding the use of this medicine in children. Special  care may be needed.  Overdosage: If you think you have taken too much of this medicine contact a poison control center or emergency room at once.  NOTE: This medicine is only for you. Do not share this medicine with others.  What if I miss a dose?  It is important not to miss a dose. If you miss a dose, call your healthcare provider. Take the dose as soon as possible on the same day. If it is almost time for your next dose, take only that dose. Do not take double or extra doses to make up for a missed dose.  What may interact with this medicine?  Do not take this medicine with any of the following medications:    agents that prevent or dissolve blood clots    aspirin or other salicylates    danshen    dextrothyroxine    mifepristone    Long Ne[place]'s Wort    red yeast rice  This medicine may also interact with the following medications:    acetaminophen    agents that lower cholesterol    alcohol    allopurinol    amiodarone    antibiotics or medicines for treating bacterial, fungal or viral infections    azathioprine    barbiturate medicines for inducing sleep or treating seizures    certain medicines for diabetes    certain medicines for heart rhythm problems    certain medicines for high blood pressure    chloral hydrate    cisapride    disulfiram    female hormones, including contraceptive or birth control pills    general anesthetics    herbal or dietary products like garlic, ginkgo, ginseng, green tea, or kava kava    influenza virus vaccine    male hormones    medicines for mental depression or psychosis    medicines for some types of cancer    medicines for stomach problems    methylphenidate    NSAIDs, medicines for pain and inflammation, like ibuprofen or naproxen    propoxyphene    quinidine, quinine    raloxifene    seizure or epilepsy medicine like carbamazepine, phenytoin, and valproic acid    steroids like cortisone and prednisone    tamoxifen    thyroid medicine    tramadol    vitamin c, vitamin e, and  vitamin K    kaye ochoa  This list may not describe all possible interactions. Give your health care provider a list of all the medicines, herbs, non-prescription drugs, or dietary supplements you use. Also tell them if you smoke, drink alcohol, or use illegal drugs. Some items may interact with your medicine.  What should I watch for while using this medicine?  Visit your doctor or health care professional for regular checks on your progress. You will need to have a blood test called a PT/INR regularly. The PT/INR blood test is done to make sure you are getting the right dose of this medicine. It is important to not miss your appointment for the blood tests. When you first start taking this medicine, these tests are done often. Once the correct dose is determined and you take your medicine properly, these tests can be done less often.  Notify your doctor or health care professional and seek emergency treatment if you develop breathing problems; changes in vision; chest pain; severe, sudden headache; pain, swelling, warmth in the leg; trouble speaking; sudden numbness or weakness of the face, arm or leg. These can be signs that your condition has gotten worse.  While you are taking this medicine, carry an identification card with your name, the name and dose of medicine(s) being used, and the name and phone number of your doctor or health care professional or person to contact in an emergency.  Do not start taking or stop taking any medicines or over-the-counter medicines except on the advice of your doctor or health care professional.  You should discuss your diet with your doctor or health care professional. Do not make major changes in your diet. Vitamin K can affect how well this medicine works. Many foods contain vitamin K. It is important to eat a consistent amount of foods with vitamin K. Other foods with vitamin K that you should eat in consistent amounts are asparagus, basil, beef or pork  liver, black eyed peas, broccoli, brussel sprouts, cabbage, chickpeas, cucumber with peel, green onions, green tea, okra, parsley, peas, thyme, and green leafy vegetables like beet greens, dixie greens, endive, kale, mustard greens, spinach, turnip greens, watercress, or certain lettuces like green leaf or talia.  This medicine can cause birth defects or bleeding in an unborn child. Women of childbearing age should use effective birth control while taking this medicine. If a woman becomes pregnant while taking this medicine, she should discuss the potential risks and her options with her health care professional.  Avoid sports and activities that might cause injury while you are using this medicine. Severe falls or injuries can cause unseen bleeding. Be careful when using sharp tools or knives. Consider using an electric razor. Take special care brushing or flossing your teeth. Report any injuries, bruising, or red spots on the skin to your doctor or health care professional.  If you have an illness that causes vomiting, diarrhea, or fever for more than a few days, contact your doctor. Also check with your doctor if you are unable to eat for several days. These problems can change the effect of this medicine.  Even after you stop taking this medicine, it takes several days before your body recovers its normal ability to clot blood. Ask your doctor or health care professional how long you need to be careful. If you are going to have surgery or dental work, tell your doctor or health care professional that you have been taking this medicine.  What side effects may I notice from receiving this medicine?  Side effects that you should report to your doctor or health care professional as soon as possible:    back pain    chills    dizziness    fever    heavy menstrual bleeding or vaginal bleeding    painful, blue, or purple toes    painful, prolonged erection    signs and symptoms of bleeding such as bloody or black,  tarry stools; red or dark-brown urine; spitting up blood or brown material that looks like coffee grounds; red spots on the skin; unusual bruising or bleeding from the eye, gums, or noseskin rash, itching or skin damage    stomach pain    unusually weak or tired    yellowing of skin or eyes  Side effects that usually do not require medical attention (report to your doctor or health care professional if they continue or are bothersome):    diarrhea    hair loss  This list may not describe all possible side effects. Call your doctor for medical advice about side effects. You may report side effects to FDA at 7-194-NYN-8801.  Where should I keep my medicine?  Keep out of the reach of children.  Store at room temperature between 15 and 30 degrees C (59 and 86 degrees F). Protect from light. Throw away any unused medicine after the expiration date. Do not flush down the toilet.  NOTE: This sheet is a summary. It may not cover all possible information. If you have questions about this medicine, talk to your doctor, pharmacist, or health care provider.  NOTE:This sheet is a summary. It may not cover all possible information. If you have questions about this medicine, talk to your doctor, pharmacist, or health care provider. Copyright  2016 [employer]                Left- or Right- Side Congestive Heart Failure (CHF)    The heart is a large muscle. It is a pump that circulates blood throughout the body. Blood carries oxygen to all of the organs, including the brain, muscles, and skin. After your body takes the oxygen out of the blood, the blood returns to the heart. The right side of the heart collects the blood from the body and pumps it to the lungs. In the lungs, it gets fresh oxygen and gives up carbon dioxide. The oxygen-rich blood from the lungs then returns to the left side of the heart, where it is pumped back out to the rest of your body, starting the process all over.  Congestive heart failure (CHF) occurs when  the heart muscle is weakened. This affects the pumping action of the heart. Heart failure can affect the right side of the heart or the left side. But heart failure may affect not only the right side of the heart or only the left side. Although it may have started on one side, it can and often eventually does affect both sides.  Right-side heart failure  When the right side of the heart is weakened, it can t handle the blood it is getting from the rest of the body. This blood returns to the heart through veins. When too much pressure builds up in the veins, fluid leaks out into the tissues. Gravity then causes that fluid to move to those parts of the body that are the lowest. So one of the first symptoms of right-side CHF can include swelling in the feet and ankles. If the condition gets worse, the swelling can even go up past the knees. Sometimes it gets so severe, the liver can get congested as well.  Left-side heart failure  When the left side of the heart is weakened, it can t handle the blood it gets from the lungs. Pressure then builds up in the veins of the lungs, causing fluid to leak into the lung tissues. This may cause CHF and pulmonary edema. This causes you to feel short of breath, weak, or dizzy. These symptoms are often worse with exertion, such as when climbing stairs or walking up hills. Lying with your head flat is uncomfortable and can make your breathing worse. This may make sleeping difficult. You may need to use extra pillows to elevate your upper body to sleep well. The same is true when just resting during the daytime.  There are many causes of heart failure including:    Coronary artery disease    Past heart attack (also known as acute myocardial infarction, or AMI)    High blood pressure    Damaged heart valve    Diabetes    Obesity    Cigarette smoking    Alcohol abuse  Heart failure is a chronic condition. There is no cure. The purpose of medical treatment is to improve the pumping action  of the heart. The main way to do this is to remove excess water from the body. A number of medicines can help reach this goal, improve symptoms, and prevent the heart from becoming weaker. Sometimes, heart failure can become so severe that a device is placed in the heart to help with pumping. Another major goal is to better treat the causes of heart failure, such as diabetes and high blood pressure, by making changes in your lifestyle and maximizing medical control when needed.  Home care  Follow these guidelines when caring for yourself at home:    Check your weight every day. This is very important because a sudden increase in weight gain could mean worsening heart failure. Keep these things in mind:    Use the same scale every day.    Weigh yourself at the same time every day.    Make sure the scale is on a hard floor surface, not on a rug or carpet.    Keep a record of your weight every day so your healthcare provider can see it. If you are not given a log sheet for this, keep a separate journal for this purpose.     Cut back on the amount of salt (sodium) you eat. Follow your healthcare provider's recommendation on how much salt or sodium you should have each day.    Avoid high-salt foods. These include olives, pickles, smoked meats, salted potato chips, and most prepared foods.    Don't add salt to your food at the table. Use only small amounts of salt when cooking.    Read the labels carefully on food packages to learn how much salt or sodium is in each serving in the package. Remember, a can or package of food may contain more than 1 serving. So if you eat all the food in the package, you may be getting more salt than you think.    Follow your healthcare provider's recommendations about how much fluid you should have. Be aware that some foods, such as soup, pudding, and juicy fruits like oranges or melons, contain liquid. You'll need to count the liquid in those foods as part of your daily fluid intake. Your  provider can help you with this.    Stop smoking.    Cut back on how much alcohol you drink.    Lose weight if you are overweight. The excess weight adds a lot of stress on the workload of the heart.    Stay active. Talk with your provider about an exercise program that is safe for your heart.    Keep your feet elevated to reduce swelling. Ask your provider about support hose as a preventive treatment for daytime leg swelling.  Besides taking your medicine as instructed, an important part of treatment is lifestyle changes. These include diet, physical activity, stopping smoking, and weight control.  Improve your diet by including more fresh foods, cutting back on how much sugar and saturated fat you eat, and eating fewer processed foods and less salt.  Follow-up care  Follow up with your healthcare provider, or as advised.  Make sure to keep any appointments that were made for you. These can help better control your congestive heart failure. You will need to follow up with your provider on a routine basis to make sure your heart failure is well managed.  If an X-ray, ECG, or other tests were done, you will be told of any new findings that may affect your care.  Call 911  Call 911 if you:    Become severely short of breath    Feel lightheaded, or feel like you might pass out or faint    Have chest pain or discomfort that is different than usual, the medicines your doctor told you to use for this don't help, or the pain lasts longer than 10 to 15 minutes    You suddenly develop a rapid heart rate  When to seek medical advice  The following may be signs that your heart failure is getting worse. Call your healthcare provider right away if any of these happen:    Sudden weight gain. This means 3 or more pounds in one day, or 5 or more pounds in 1 week.    Trouble breathing not related to being active    New or increased swelling of your legs or ankles    Swelling or pain in your abdomen    Breathing trouble at night.  This means waking up short of breath or needing more pillows to breathe.    Frequent coughing that doesn t go away    Feeling much more tired than usual  Date Last Reviewed: 1/4/2016 2000-2017 [employer]. [address] [place] [address], [place] [address]. All rights reserved. This information is not intended as a substitute for professional medical care. Always follow your healthcare professional's instructions.                Discharge Instructions for Heart Failure  The heart is a muscle that pumps oxygen-rich blood to all parts of the body. When you have heart failure, the heart is not able to pump as well as it should. Blood and fluid may back up into the lungs (congestive heart failure), and some parts of the body don t get enough oxygen-rich blood to work normally. These problems lead to the symptoms of heart failure. Heart failure can occur due to an injury to the heart or from natural processes.  You can control symptoms of heart failure with some lifestyle changes and by following your doctor's advice.  Home care  Activity  Ask your healthcare provider about an exercise program. You can benefit from simple activities such as walking or gardening. Exercising most days of the week can make you feel better. Don't be discouraged if your progress is slow at first. Rest as needed. Stop activity if you develop symptoms such as chest pain, lightheadedness, or significant shortness of breath. Find activities that you enjoy, such as brisk walking, dancing, swimming, or gardening. These will help you stay active and strengthen your heart.  Diet  Follow a heart healthy diet. And make sure to limit the salt (sodium) in your diet. Salt causes your body to hold water. This makes your heart work harder as there is more fluid for the heart to pump. Limit your salt by doing the following:    Limit canned, dried, packaged, and fast foods.    Don't add salt to your food.    Season foods with herbs instead of  salt.    Watch how much liquids you drink. Drinking too much can make heart failure worse. Talk with your health care provider about how much you should drink each day.    Limit the amount of alcohol you drink. It may harm your heart. Women should have no more than 1 drink a day and men should have no more than 2.    When you eat out, request that your meals have no added salt.  Tobacco  If you smoke, it's very important to quit. Smoking increases your chances of having a heart attack by harming the blood vessels that provide oxygen to your heart. This makes heart failure worse. Quitting smoking is the number one thing you can do to improve your health. Enroll in a stop-smoking program to improve your chances of success. Talk with your healthcare provider about medicines or nicotine replacement therapy to help you quit smoking. Ask your healthcare provider about smoking cessation support groups.  Medicine  Take your medicines exactly as prescribed. Learn the names and purpose of each of your medicines. Keep an accurate medicine list and current dosages with you at all times. Don't skip doses. If you miss a dose of your medicine, take it as soon as you remember. If you miss a dose and it's almost time for your next dose, just wait and take your next dose at the normal time. Don't take a double dose. If you are unsure, call your doctor's office. Make sure not to mix up your medicines or forget what you've taken the same day.  Weight monitoring  Weigh yourself every day. A sudden weight gain can mean your heart failure is getting worse. Weigh yourself at the same time of day and in the same kind of clothes. Ideally, weigh yourself first thing in the morning after you empty your bladder, but before you eat breakfast. Your healthcare provider will show you how to track your weight. He or she will also discuss with you when you should call if you have a sudden, unexpected increase in your weight.  In general, your  healthcare provider may ask you to report if your weight goes up by more than 2 pounds in 1 day,  5 pounds in 1 week, or whatever weight gain you were told by your doctor. This is a sign that you are retaining more fluid than you should be. Clues to weight gain include checking your ankles for swelling, or noticing you are short of breath when you lie down.  Follow-up care  Make a follow-up appointment as directed. Depending on the type and severity of heart failure you have, you may need follow-up as early as 7 days from hospital discharge. Keep appointments for checkups and lab tests that are needed to check your medicines and condition.  Recognize that your health and even survival depend on your following medical recommendations.  Symptoms  Heart failure can cause a variety of symptoms, including:    Shortness of breath    Trouble breathing at night, especially when you lie down    Swelling in the legs and feet or in the belly (abdomen)    Becoming easily fatigued    Irregular or rapid heartbeat    Weakness or lightheadedness    Swelling of the neck veins  It is important to know what to do if symptoms get worse or if you develop signs of worsening heart failure.     When to see your healthcare provider  Call your doctor right away if you have any of these signs of worsening heart failure:    Sudden weight gain (more than 2 pounds in 1 day or 5 pounds in 1 week, or whatever weight gain you were told to report by your doctor)    Trouble breathing not related to being active    New or increased swelling of your legs or ankles    Swelling or pain in your abdomen    Breathing trouble at night (waking up short of breath, needing more pillows to breathe)    Frequent coughing that doesn't go away    Feeling much more tired than usual  Call 911  Call 911 right away if you have:    Severe shortness of breath, such that you can't catch your breath even while resting    Severe chest pain that does not resolve with rest or  nitroglycerin    Pink, foamy mucus with cough and shortness of breath    A continuous rapid or irregular heartbeat    Passing out or fainting    Stroke symptoms such as sudden numbness or weakness on one side of your face, arm, or leg or sudden confusion, trouble speaking or vision changes   Date Last Reviewed: 3/21/2016    9112-7378 [employer]. [address] [place] [address]. All rights reserved. This information is not intended as a substitute for professional medical care. Always follow your healthcare professional's instructions.                Patient Education    Furosemide Oral solution    Furosemide Oral tablet    Furosemide Solution for injection  Furosemide Oral tablet  What is this medicine?  FUROSEMIDE (fyoor [place] se mide) is a diuretic. It helps you make more urine and to lose salt and excess water from your body. This medicine is used to treat high blood pressure, and edema or swelling from heart, kidney, or liver disease.  This medicine may be used for other purposes; ask your health care provider or pharmacist if you have questions.  What should I tell my health care provider before I take this medicine?  They need to know if you have any of these conditions:    abnormal blood electrolytes    diarrhea or vomiting    gout    heart disease    kidney disease, small amounts of urine, or difficulty passing urine    liver disease    an unusual or allergic reaction to furosemide, sulfa drugs, other medicines, foods, dyes, or preservatives    pregnant or trying to get pregnant    breast-feeding  How should I use this medicine?  Take this medicine by mouth with a glass of water. Follow the directions on the prescription label. You may take this medicine with or without food. If it upsets your stomach, take it with food or milk. Do not take your medicine more often than directed. Remember that you will need to pass more urine after taking this medicine. Do not take your medicine at a  time of day that will cause you problems. Do not take at bedtime.  Talk to your pediatrician regarding the use of this medicine in children. While this drug may be prescribed for selected conditions, precautions do apply.  Overdosage: If you think you have taken too much of this medicine contact a poison control center or emergency room at once.  NOTE: This medicine is only for you. Do not share this medicine with others.  What if I miss a dose?  If you miss a dose, take it as soon as you can. If it is almost time for your next dose, take only that dose. Do not take double or extra doses.  What may interact with this medicine?    aspirin and aspirin-like medicines    certain antibiotics    chloral hydrate    cisplatin    cyclosporine    digoxin    diuretics    laxatives    lithium    medicines for blood pressure    medicines that relax muscles for surgery    methotrexate    NSAIDs, medicines for pain and inflammation like ibuprofen, naproxen, or indomethacin    phenytoin    steroid medicines like prednisone or cortisone    sucralfate  This list may not describe all possible interactions. Give your health care provider a list of all the medicines, herbs, non-prescription drugs, or dietary supplements you use. Also tell them if you smoke, drink alcohol, or use illegal drugs. Some items may interact with your medicine.  What should I watch for while using this medicine?  Visit your doctor or health care professional for regular checks on your progress. Check your blood pressure regularly. Ask your doctor or health care professional what your blood pressure should be, and when you should contact him or her. If you are a diabetic, check your blood sugar as directed.  You may need to be on a special diet while taking this medicine. Check with your doctor. Also, ask how many glasses of fluid you need to drink a day. You must not get dehydrated.  You may get drowsy or dizzy. Do not drive, use machinery, or do anything that  needs mental alertness until you know how this drug affects you. Do not stand or sit up quickly, especially if you are an older patient. This reduces the risk of dizzy or fainting spells. Alcohol can make you more drowsy and dizzy. Avoid alcoholic drinks.  This medicine can make you more sensitive to the sun. Keep out of the sun. If you cannot avoid being in the sun, wear protective clothing and use sunscreen. Do not use sun lamps or tanning beds/booths.  What side effects may I notice from receiving this medicine?  Side effects that you should report to your doctor or health care professional as soon as possible:    blood in urine or stools    dry mouth    fever or chills    hearing loss or ringing in the ears    irregular heartbeat    muscle pain or weakness, cramps    skin rash    stomach upset, pain, or nausea    tingling or numbness in the hands or feet    unusually weak or tired    vomiting or diarrhea    yellowing of the eyes or skin  Side effects that usually do not require medical attention (report to your doctor or health care professional if they continue or are bothersome):    headache    loss of appetite    unusual bleeding or bruising  This list may not describe all possible side effects. Call your doctor for medical advice about side effects. You may report side effects to FDA at 0-912-FDA-4803.  Where should I keep my medicine?  Keep out of the reach of children.  Store at room temperature between 15 and 30 degrees C (59 and 86 degrees F). Protect from light. Throw away any unused medicine after the expiration date.  NOTE:This sheet is a summary. It may not cover all possible information. If you have questions about this medicine, talk to your doctor, pharmacist, or health care provider. Copyright  2016 [employer]

## 2017-12-03 NOTE — H&P
[facility]    History and Physical  Hospitalist       Date of Admission:  12/3/2017  Date of Service (when I saw the patient): 12/03/17    Assessment & Plan   Ramírez Hernández is a 62 year old man who presentllower extremity and abdominal swelling for at least the past several weeks.  He recalls this worsens somewhat abruptly.  Evaluation in emergency department confirmed abdominal wall edema and lower extremity edema.  Laboratories relatively unrevealing in emergency department.  Atrial fibrillation with rapid ventricular response was noted.  He responded to a bolus dose of intravenous diltiazem.  After my discussion with Dr. Ross infusion was begun.  He is admitted to the ICU for further evaluation and management with a clinical impression of atrial fibrillation and acute heart failure with likely portal hypertension.    1. Probable acute heart failure.  Lower extremity edema and abdominal wall edema is quite suggestive.  Ultrasound does not confirm the presence of any significant ascitic fluid.  He has orthopneic and has been for some time.  No PND.  Some degree of cough and difficulty in secretion clearance.  History is a bit difficult to obtain with any precision, but it appears that while symptoms have been markedly worse over the past 3-4 weeks.  They have been present for a longer period of time.  Marked edema is suggestive of diastolic heart failure and epidemiologically he would certainly be a candidate for this.  Other considerations include pericardial disease.  Trials of diuresis with furosemide.  Close monitoring.  Electrolyte repletion as needed.  Echocardiogram when this is available in the morning.  As below.  Atrial fibrillation is not markedly responsive to diltiazem infusion.  On the basis that heart failure likely is present.  We will begin oral metoprolol in addition.  Withhold lisinopril as he is undergoing active diuresis, although renal function.  Currently, is  preserved.  In addition to usual sources of heart failure.  Could consider acute heart failure on the basis of continuous ethanol abuse.  Thyroid axis is within expected limits.  2. Atrial fibrillation with rapid ventricular response.  Fair response to diltiazem, although heart rate remains elevated.  I am hopeful that this may improve further with diuresis and improvement in his excessive preload.  As above.  Add metoprolol with close monitoring on an empiric basis.  Echocardiogram in the morning to assess valvular function and RV and RV size and function.  Electrolyte repletion as needed.  Continuous ethanol abuse and atrial fibrillation.  On this basis must be considered..  He is does not report recent use of what had been regular, although not daily use of beer as he reports.  Nevertheless, high output heart failure could be considered.  Intravenous thiamine.  Anticipate that he will show response to combination of diltiazem and beta blockade, although if weight continues to be poorly controlled, may need to consider other options.  3.  Portal hypertension.  Few spider angiomata on the anterior chest.  No palmar erythema.  Ultrasound does show a nodular liver and some degree of cirrhosis may be present.  Depending on other findings on echocardiogram, may need Doppler investigation of portal and hepatic blood flow.  Abdominal wall edema is quite present, but on ultrasound imaging.  No significant ascites.  Unlikely that a diagnostic paracentesis will be of benefit.  4.  Continuous ethanol abuse.  Regular use of beer.  2/4 on CAGE questionaire.  As above, he does show some evidence of portal hypertension, quite consistent with alcoholic Cirrhosis. Usual withdrawal protocol.  Hypomagnesemia supports continuous ethanol use.  I discussed with him at some length harmful effects of alcohol on both hepatic and heart function.  He is somewhat skeptical that alcohol can account for this.  5.  Tobacco abuse.  Usual use of  alcohol with a 50-70-pack-year history.  Nicotine replacement.  Discussed with him harmful effects of tobacco.  He acknowledges this, but currently does not believe he can stop smoking.  He now is his understanding that he is not able to smoke while in hospital.  Unfortunately, given multiple other issues, perhaps tobacco abuse is not the most prominent issue to address at the present time.    DVT Prophylaxis: enoxaparin subcutaneously  Code Status: full resuscitation at the present time.  Extensive discussion with him.  He is not considered these issues in the past.  No POA.  He believes his brother would be the most appropriate person.  I discussed with him that sign documentation of this would be advantageous.  Also discussed with him multiple issues in terms of resuscitation and life support intervention.  He would be willing to have life support interventions such as mechanical ventilation for up to several weeks, but not for an extended period of time. He would not wish resuscitation if he would not be expected to return to his current level of functioning, although does acknowledge that if absolutely required placement such as in a nursing home would not necessarily be an option he would reject out of hand.  Further discussion will be needed.  [facility] social workers to provide the patient with appropriate information, handouts, and further discussion.    Disposition: Expected discharge in 2-4 days depending on his course.    Carl Olivier    Primary Care Physician   No physician for many years    Chief Complaint   Increasing lower extremity edema    History is obtained from the patient    History of Present Illness   Ramírez Hernández is a 62 year old man who presents with 3-4 weeks of increasing lower extremity and abdominal wall swelling.  He relates that he came to the emergency department today on the urging of his brother.  As the patient relates he had a little lower extremity edema and not any  significant abdominal swelling until 3-4 weeks ago, although on further discussion with him.  It is more likely it has been present for a longer period of time.  He also notes some cough and difficulty in clearing secretions.  Approximately a month ago he tried Mucinex, which in fact he is concerned may have caused his symptoms.  He also recently took what he believed to be an antibiotic, although he recalls the name to be Sherry.  He did not seek medical attention for this.  He has not had any medical attention for approximately 10 years when he underwent lumbar spinal surgery, probably including both bone graft and hardware placement approximately 1995.  He recalls some mention of cadiac dysfunction but was told, as he recalls, that no meaningful findings were iidentified.  In other respects.  He has not had any medical attention for many years.  Regular smoker, usually approximately 1 pack per day.  Regular use of beer, which he indicates is less than daily.  Alcohol level below detectable level on presentation to emergency Department.  Appetite has not been changed.  He has noted occasionally that his urine becomes dark, but not consistently.  No change in stool color, although he does not examine it closely.  No dysuria.  No unilateral weakness or paresthesias.  No fat intolerance.    Past Medical History    I have reviewed this patient's medical history and updated it with pertinent information if needed.   Back pain with prior lumbar spinal procedures including placement of hardware approximately 10 years ago.  [facility].  Unsuccessful placements of subcutaneous nerve stimulation for back pain.  Possible evaluation for cardiac condition.  Prior to back surgery 10 years ago apparently without significant findings    Past Surgical History   I have reviewed this patient's surgical history and updated it with pertinent information if needed.  See above  Prior to Admission Medications   None     Allergies    No Known Allergies    Social History   I have reviewed this patient's social history and updated it with pertinent information if needed. Ramírez Hernández  27-21-nzhg-year history of cigarettes, approximately one pack per day currently, began at age 10.  Regular use of beer on a nearly daily basis.  CAGE questionaire 2/4.  No hospitalizations for ethanol use.  No other drug use or injection drug use.  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  No overseas travel.    Family History   I have reviewed this patient's family history and updated it with pertinent information if needed.   No good recall of any family health issues.  Not aware of cause of death of either mother or father.  No known cardiac, pulmonary, endocrine, renal, or malignancies.  Review of Systems   The 10 point Review of Systems is negative other than noted in the HPI.    Physical Exam   Temp: 98  F (36.7  C) Temp src: Oral BP: 143/99 Pulse: 115 Heart Rate: 96 Resp: 20 SpO2: 95 % O2 Device: None (Room air)    Vital Signs with Ranges  Temp:  [97  F (36.1  C)-98  F (36.7  C)] 98  F (36.7  C)  Pulse:  [115] 115  Heart Rate:  [] 96  Resp:  [18-38] 20  BP: (135-183)/() 143/99  SpO2:  [92 %-95 %] 95 %  0 lbs 0 oz      Awake, alert, fatigued, mildly distractible man lying in bed on ICU.  Speech is clear.  Oriented ×3.  Conversational dyspnea.   HEENT: Pupils equal, conjugate. No icterus with mildly dirty sclera.  No nystagmus. Oral mucosa moist. No facial asymmetry.   Neck: Supple, jugular veins difficult to determine. Trachea midline   Chest: No chest wall movement asymmetry. Aeration diminished at bases. Accessory muscles not in use. Expiratory time not increased. No tidal wheezes. Basilar medium to coarse. Scattered basilar medium crackles.   Cardiac: PMI not displaced. S1, S2 unremarkable. No S3, question S4. P2 questionably accentuated. No murmurs. No rubs.Carotid upstroke  preserved. Carotid amplitude preserved.   Abdomen: Soft, obese. No palpation or percussion tenderness. Pitting edema to level of mid abdomen. No distention. Hypo-active bowel sounds. Liver and spleen difficult to palpate but not apparently increased in size. No bruits, masses, or pulsations.   Extremities: moderate bilaterally equal lower extremity edema with bilaterally equal.  Leg stasis dermatitis. No eccymoses, equivocal clubbing.   Neurologic: Mental state above. Motor 5/5 and bilaterally equal. Tone preserved. No fasiculations or tremors. Sensation intact to light touch. DTR 2/4 and bilaterally equal.   Data   Data reviewed today:  I personally reviewed electrocardiogram shows atrial fibrillation with rapid ventricular response.  Chest radiograph shows right basilar small effusion and atelectasis.  No airspace infiltrate. Lower extremity duplex reviewed on computer.  No evidence of thromboembolic disease.  Ultrasound of abdomen reviewed on computer.  Nodular liver.  Small amounts of ascites with prominent edema of abdominal wall.  Renal size unremarkable.  I do not appreciate any hydronephrosis.    Recent Labs  Lab 12/03/17  0926 12/03/17  0630   WBC  --  10.3   HGB  --  14.9   MCV  --  89   PLT  --  272   NA  --  131*   POTASSIUM  --  4.2   CHLORIDE  --  97   CO2  --  22   BUN  --  8   CR  --  0.86   ANIONGAP  --  12   HCETOR  --  8.9   GLC  --  114*   ALBUMIN  --  3.6   PROTTOTAL  --  7.8   BILITOTAL  --  1.2   ALKPHOS  --  134   ALT  --  23   AST  --  23   LIPASE  --  164   TROPI <0.015 <0.015       Lactic Acid   Date Value Ref Range Status   12/03/2017 2.3 (H) 0.4 - 2.0 mmol/L Final     Comment:     Significant value called to and read back by  BRYLEIGH STARICH ON 12/3/17 AT 0959 BY [facility]     12/03/2017 2.4 (H) 0.4 - 2.0 mmol/L Final     Comment:     Significant value called to and read back by  PEPE PRITCHARD ON 12/3/17 AT 0657 BY [facility]         Recent Results (from the past 24 hour(s))   XR Chest 2 Views     Narrative    PROCEDURE:  XR CHEST 2 VW    HISTORY:  sob; .     COMPARISON:  None.    FINDINGS:   The cardiac silhouette is enlarged. The pulmonary vasculature is  prominent. There are airspace opacities in the right mid and lower  lung and a small right pleural effusion. The left lung is clear. No  pneumothorax.      Impression    IMPRESSION:  Right lower lung infiltrate and pleural effusion.      LLOYD TORRES MD   US Lower Extremity Venous Duplex Bilateral    Narrative    PROCEDURE: US LOWER EXTREMITY VENOUS DUPLEX BILATERAL 12/3/2017 10:50  AM    HISTORY: subacute LE edema, clinical suspicion for DVT;     COMPARISONS: None.    TECHNIQUE: Venous ultrasound of both lower extremities    FINDINGS: Is normal compressibility augmentation and respiratory  phasicity seen in the common femoral superficial femoral and popliteal  veins. There is no evidence of deep venous thrombosis in either lower  extremity.         Impression    IMPRESSION: No deep venous thrombosis    KLAUS MANCIA MD   US Abdomen Complete    Narrative    PROCEDURE: US ABDOMEN COMPLETE 12/3/2017 11:41 AM    HISTORY: clinical ascites ?portal hypertension. Eval Aorta, kidney  size/hydrnephrosis;     COMPARISONS: None.    TECHNIQUE: Ultrasound of the abdomen    FINDINGS: There is a small right-sided pleural effusion. There is  ascites seen. The liver is free of masses. There is no biliary ductal  enlargement. Gallbladder kidneys were not well visualized. Spleen  length is normal.         Impression    IMPRESSION: Ascites. Largely nondiagnostic exam of the abdomen.    KLAUS MANCIA MD

## 2017-12-03 NOTE — ED NOTES
"Pt states he has not been to see a doctor \"in years and years...there's been nothing wrong with me\". Dr. Bean was been a provider in the past. Is very dyspneic with minimal exertion. Had to rest between removing articles of clothing. I. and e. wheezing bilaterally. Sclera are yellow. States he is \"a big beer drinker\", also smokes.  Has a good appetite. Abdomen is obese and taut, denies nausea or vomiting. Lower extremities are with swelling, redness and scaling skin. Soles of feet are caked with stool. Pt was brought here by his brother, since he does not drive.  Report to ANDRIA Rahman RN.  "

## 2017-12-03 NOTE — ED PROVIDER NOTES
History     Chief Complaint   Patient presents with     Leg Swelling     States his legs have been sweling up for months, but it's not getting better so he thought he should come in. Does not have a regular provider.     Patient is a 62 year old male presenting with shortness of breath. The history is provided by the patient.   Shortness of Breath   Severity:  Mild  Onset quality:  Gradual  Timing:  Constant  Progression:  Worsening  Chronicity:  New  Context: activity    Associated symptoms: cough    Associated symptoms: no abdominal pain, no chest pain, no diaphoresis, no fever, no headaches, no neck pain, no PND, no rash, no sputum production, no vomiting and no wheezing          Problem List:    Patient Active Problem List    Diagnosis Date Noted     Heart failure with acute decompensation, type unknown (H) 12/03/2017     Priority: Medium        Past Medical History:    No past medical history on file.    Past Surgical History:    No past surgical history on file.    Family History:    No family history on file.    Social History:  Marital Status:   [2]  Social History   Substance Use Topics     Smoking status: Not on file     Smokeless tobacco: Not on file     Alcohol use Not on file        Medications:      No current outpatient prescriptions on file.      Review of Systems   Constitutional: Negative for chills, diaphoresis and fever.   HENT: Negative for voice change.    Eyes: Negative for visual disturbance.   Respiratory: Positive for cough and shortness of breath. Negative for sputum production, chest tightness and wheezing.    Cardiovascular: Positive for leg swelling. Negative for chest pain, palpitations and PND.   Gastrointestinal: Positive for abdominal distention. Negative for abdominal pain, anal bleeding, blood in stool, nausea and vomiting.   Genitourinary: Negative for decreased urine volume, dysuria, flank pain and frequency.   Musculoskeletal: Negative for back pain, gait problem,  "myalgias and neck pain.   Skin: Negative for color change, pallor and rash.   Neurological: Negative for dizziness, syncope, weakness, light-headedness, numbness and headaches.   Psychiatric/Behavioral: Negative for confusion, sleep disturbance and suicidal ideas.       Physical Exam   BP: (!) 170/104  Pulse: 115  Heart Rate: (!) 136  Temp: 97  F (36.1  C)  Resp: (!) 38  Height: 172.7 cm (5' 8\")  Weight: 122.7 kg (270 lb 8.1 oz)  SpO2: 94 %      Physical Exam   Constitutional: He is oriented to person, place, and time. He appears well-developed and well-nourished.   HENT:   Head: Normocephalic and atraumatic.   Mouth/Throat: No oropharyngeal exudate.   Eyes: Conjunctivae are normal. Pupils are equal, round, and reactive to light.   Neck: Normal range of motion. Neck supple. No JVD present. No tracheal deviation present. No thyromegaly present.   Cardiovascular: Normal heart sounds and intact distal pulses.  An irregularly irregular rhythm present. Tachycardia present.  Exam reveals no gallop and no friction rub.    No murmur heard.  Pulmonary/Chest: Effort normal. No stridor. No respiratory distress. He has decreased breath sounds in the right upper field and the right middle field. He has no wheezes. He has rales. He exhibits no tenderness.   Abdominal: Soft. Bowel sounds are normal. He exhibits distension and ascites. He exhibits no mass. There is no tenderness. There is no rebound and no guarding.       Musculoskeletal: Normal range of motion. He exhibits edema. He exhibits no tenderness.   B/l leg edema, 3+  To above knees   Lymphadenopathy:     He has no cervical adenopathy.   Neurological: He is alert and oriented to person, place, and time.   Skin: Skin is warm and dry. No rash noted. No erythema. No pallor.   Psychiatric: His behavior is normal.   Nursing note and vitals reviewed.      ED Course     ED Course     Procedures                   Labs Ordered and Resulted from Time of ED Arrival Up to the Time of " Departure from the ED   CBC WITH PLATELETS DIFFERENTIAL - Abnormal; Notable for the following:        Result Value    Absolute Monocytes 1.4 (*)     All other components within normal limits   COMPREHENSIVE METABOLIC PANEL - Abnormal; Notable for the following:     Sodium 131 (*)     Glucose 114 (*)     All other components within normal limits   LACTIC ACID - Abnormal; Notable for the following:     Lactic Acid 2.4 (*)     All other components within normal limits   AMYLASE - Abnormal; Notable for the following:     Amylase 28 (*)     All other components within normal limits   NT PROBNP INPATIENT - Abnormal; Notable for the following:     N-Terminal Pro BNP Inpatient 2021 (*)     All other components within normal limits   CRP INFLAMMATION - Abnormal; Notable for the following:     CRP Inflammation 17.7 (*)     All other components within normal limits   TROPONIN I   LIPASE   CK TOTAL   ERYTHROCYTE SEDIMENTATION RATE AUTO   ALCOHOL ETHYL       Assessments & Plan (with Medical Decision Making)   Came for progressive b/l leg and abominal edema over past 2 month, also feeling SOB due pressure from abdoman  Hx of ETOH abuse , smoker  Has not seek medical attention for about 10 years  EKG: Afib with RVR @ 140    CXR: R pleural effuson with atelectasia, elevated R hemidiaphragm    Likely  CHF  cardizem IV push and drip started  Spoke to DR Stratton, accepted for admision to ICU  I have reviewed the nursing notes.    I have reviewed the findings, diagnosis, plan and need for follow up with the patient.      There are no discharge medications for this patient.      Final diagnoses:   Atrial fibrillation with rapid ventricular response (H)       12/3/2017   [facility]     Godwin Belle MD  12/04/17 0151

## 2017-12-03 NOTE — PROVIDER NOTIFICATION
SIGNIFICANT LAB VALUE    DATE: 12/3/2017    TIME OF RECEIPT FROM LAB: 0958    Lactic Acid   Date Value Ref Range Status   12/03/2017 2.3 (H) 0.4 - 2.0 mmol/L Final     Comment:     Significant value called to and read back by  BRYLEIGH STARICH ON 12/3/17 AT 0959 BY [facility]         RESULTS GIVEN WITH READ BACK TO (PROVIDER): Carl South MD    TIME LAB VALUE REPORTED TO PROVIDER: 1001

## 2017-12-04 ENCOUNTER — HOSPITAL ENCOUNTER (INPATIENT)
Dept: CARDIOLOGY | Facility: HOSPITAL | Age: 62
Discharge: HOME | DRG: 292 | End: 2017-12-04
Attending: INTERNAL MEDICINE
Payer: MEDICARE

## 2017-12-04 LAB
ANION GAP SERPL CALCULATED.3IONS-SCNC: 11 MMOL/L (ref 3–14)
BASOPHILS # BLD AUTO: 0.1 10E9/L (ref 0–0.2)
BASOPHILS NFR BLD AUTO: 0.6 %
BUN SERPL-MCNC: 10 MG/DL (ref 7–30)
CALCIUM SERPL-MCNC: 8.5 MG/DL (ref 8.5–10.1)
CHLORIDE SERPL-SCNC: 98 MMOL/L (ref 94–109)
CHOLEST SERPL-MCNC: 121 MG/DL
CO2 SERPL-SCNC: 25 MMOL/L (ref 20–32)
CREAT SERPL-MCNC: 0.96 MG/DL (ref 0.66–1.25)
DIFFERENTIAL METHOD BLD: NORMAL
EOSINOPHIL # BLD AUTO: 0.1 10E9/L (ref 0–0.7)
EOSINOPHIL NFR BLD AUTO: 0.6 %
ERYTHROCYTE [DISTWIDTH] IN BLOOD BY AUTOMATED COUNT: 13.7 % (ref 10–15)
GFR SERPL CREATININE-BSD FRML MDRD: 79 ML/MIN/1.7M2
GLUCOSE SERPL-MCNC: 109 MG/DL (ref 70–99)
HCT VFR BLD AUTO: 40.5 % (ref 40–53)
HDLC SERPL-MCNC: 53 MG/DL
HGB BLD-MCNC: 13.9 G/DL (ref 13.3–17.7)
IMM GRANULOCYTES # BLD: 0 10E9/L (ref 0–0.4)
IMM GRANULOCYTES NFR BLD: 0.4 %
INR PPP: 1.34 (ref 0.8–1.2)
LDLC SERPL CALC-MCNC: 53 MG/DL
LYMPHOCYTES # BLD AUTO: 1.2 10E9/L (ref 0.8–5.3)
LYMPHOCYTES NFR BLD AUTO: 14.9 %
MAGNESIUM SERPL-MCNC: 2.5 MG/DL (ref 1.6–2.3)
MCH RBC QN AUTO: 31.1 PG (ref 26.5–33)
MCHC RBC AUTO-ENTMCNC: 34.3 G/DL (ref 31.5–36.5)
MCV RBC AUTO: 91 FL (ref 78–100)
MONOCYTES # BLD AUTO: 1.3 10E9/L (ref 0–1.3)
MONOCYTES NFR BLD AUTO: 16 %
NEUTROPHILS # BLD AUTO: 5.6 10E9/L (ref 1.6–8.3)
NEUTROPHILS NFR BLD AUTO: 67.5 %
NONHDLC SERPL-MCNC: 68 MG/DL
NRBC # BLD AUTO: 0 10*3/UL
NRBC BLD AUTO-RTO: 0 /100
PLATELET # BLD AUTO: 249 10E9/L (ref 150–450)
POTASSIUM SERPL-SCNC: 3.6 MMOL/L (ref 3.4–5.3)
RBC # BLD AUTO: 4.47 10E12/L (ref 4.4–5.9)
SODIUM SERPL-SCNC: 134 MMOL/L (ref 133–144)
TRIGL SERPL-MCNC: 73 MG/DL
WBC # BLD AUTO: 8.3 10E9/L (ref 4–11)

## 2017-12-04 PROCEDURE — 25000125 ZZHC RX 250: Performed by: INTERNAL MEDICINE

## 2017-12-04 PROCEDURE — 20000003 ZZH R&B ICU

## 2017-12-04 PROCEDURE — 25000132 ZZH RX MED GY IP 250 OP 250 PS 637: Mod: GY | Performed by: INTERNAL MEDICINE

## 2017-12-04 PROCEDURE — 93306 TTE W/DOPPLER COMPLETE: CPT | Mod: TC

## 2017-12-04 PROCEDURE — 85610 PROTHROMBIN TIME: CPT | Performed by: INTERNAL MEDICINE

## 2017-12-04 PROCEDURE — A9270 NON-COVERED ITEM OR SERVICE: HCPCS | Mod: GY | Performed by: INTERNAL MEDICINE

## 2017-12-04 PROCEDURE — 85025 COMPLETE CBC W/AUTO DIFF WBC: CPT | Performed by: INTERNAL MEDICINE

## 2017-12-04 PROCEDURE — 80048 BASIC METABOLIC PNL TOTAL CA: CPT | Performed by: INTERNAL MEDICINE

## 2017-12-04 PROCEDURE — 94640 AIRWAY INHALATION TREATMENT: CPT

## 2017-12-04 PROCEDURE — 93306 TTE W/DOPPLER COMPLETE: CPT | Mod: 26 | Performed by: INTERNAL MEDICINE

## 2017-12-04 PROCEDURE — 40000275 ZZH STATISTIC RCP TIME EA 10 MIN

## 2017-12-04 PROCEDURE — 36415 COLL VENOUS BLD VENIPUNCTURE: CPT | Performed by: INTERNAL MEDICINE

## 2017-12-04 PROCEDURE — 99232 SBSQ HOSP IP/OBS MODERATE 35: CPT | Mod: 25 | Performed by: INTERNAL MEDICINE

## 2017-12-04 PROCEDURE — 90686 IIV4 VACC NO PRSV 0.5 ML IM: CPT | Performed by: INTERNAL MEDICINE

## 2017-12-04 PROCEDURE — 94640 AIRWAY INHALATION TREATMENT: CPT | Mod: 76

## 2017-12-04 PROCEDURE — 83735 ASSAY OF MAGNESIUM: CPT | Performed by: INTERNAL MEDICINE

## 2017-12-04 PROCEDURE — 80061 LIPID PANEL: CPT | Performed by: INTERNAL MEDICINE

## 2017-12-04 PROCEDURE — 25000128 H RX IP 250 OP 636: Performed by: INTERNAL MEDICINE

## 2017-12-04 PROCEDURE — 90732 PPSV23 VACC 2 YRS+ SUBQ/IM: CPT | Performed by: INTERNAL MEDICINE

## 2017-12-04 RX ORDER — LANOLIN ALCOHOL/MO/W.PET/CERES
100 CREAM (GRAM) TOPICAL DAILY
Status: DISCONTINUED | OUTPATIENT
Start: 2017-12-04 | End: 2017-12-08 | Stop reason: HOSPADM

## 2017-12-04 RX ORDER — LISINOPRIL 2.5 MG/1
2.5 TABLET ORAL DAILY
Status: DISCONTINUED | OUTPATIENT
Start: 2017-12-04 | End: 2017-12-04

## 2017-12-04 RX ORDER — WARFARIN SODIUM 5 MG/1
5 TABLET ORAL
Status: COMPLETED | OUTPATIENT
Start: 2017-12-04 | End: 2017-12-04

## 2017-12-04 RX ORDER — LISINOPRIL 2.5 MG/1
2.5 TABLET ORAL DAILY
Status: DISCONTINUED | OUTPATIENT
Start: 2017-12-05 | End: 2017-12-05

## 2017-12-04 RX ADMIN — FUROSEMIDE 40 MG: 10 INJECTION, SOLUTION INTRAVENOUS at 13:52

## 2017-12-04 RX ADMIN — FUROSEMIDE 40 MG: 10 INJECTION, SOLUTION INTRAVENOUS at 02:50

## 2017-12-04 RX ADMIN — INFLUENZA A VIRUS A/MICHIGAN/45/2015 X-275 (H1N1) ANTIGEN (FORMALDEHYDE INACTIVATED), INFLUENZA A VIRUS A/HONG KONG/4801/2014 X-263B (H3N2) ANTIGEN (FORMALDEHYDE INACTIVATED), INFLUENZA B VIRUS B/PHUKET/3073/2013 ANTIGEN (FORMALDEHYDE INACTIVATED), AND INFLUENZA B VIRUS B/BRISBANE/60/2008 ANTIGEN (FORMALDEHYDE INACTIVATED) 0.5 ML: 15; 15; 15; 15 INJECTION, SUSPENSION INTRAMUSCULAR at 10:28

## 2017-12-04 RX ADMIN — METOPROLOL TARTRATE 25 MG: 25 TABLET ORAL at 20:42

## 2017-12-04 RX ADMIN — IPRATROPIUM BROMIDE AND ALBUTEROL SULFATE 3 ML: .5; 3 SOLUTION RESPIRATORY (INHALATION) at 15:24

## 2017-12-04 RX ADMIN — FUROSEMIDE 40 MG: 10 INJECTION, SOLUTION INTRAVENOUS at 08:29

## 2017-12-04 RX ADMIN — NICOTINE 1 PATCH: 21 PATCH, EXTENDED RELEASE TRANSDERMAL at 08:29

## 2017-12-04 RX ADMIN — SODIUM CHLORIDE: 9 INJECTION, SOLUTION INTRAVENOUS at 04:10

## 2017-12-04 RX ADMIN — FUROSEMIDE 40 MG: 10 INJECTION, SOLUTION INTRAVENOUS at 20:42

## 2017-12-04 RX ADMIN — IPRATROPIUM BROMIDE AND ALBUTEROL SULFATE 3 ML: .5; 3 SOLUTION RESPIRATORY (INHALATION) at 07:32

## 2017-12-04 RX ADMIN — IPRATROPIUM BROMIDE AND ALBUTEROL SULFATE 3 ML: .5; 3 SOLUTION RESPIRATORY (INHALATION) at 11:05

## 2017-12-04 RX ADMIN — PNEUMOCOCCAL VACCINE POLYVALENT 0.5 ML
25; 25; 25; 25; 25; 25; 25; 25; 25; 25; 25; 25; 25; 25; 25; 25; 25; 25; 25; 25; 25; 25; 25 INJECTION, SOLUTION INTRAMUSCULAR; SUBCUTANEOUS at 10:29

## 2017-12-04 RX ADMIN — WARFARIN SODIUM 5 MG: 5 TABLET ORAL at 20:42

## 2017-12-04 RX ADMIN — ENOXAPARIN SODIUM 40 MG: 40 INJECTION SUBCUTANEOUS at 12:28

## 2017-12-04 RX ADMIN — PANTOPRAZOLE SODIUM 40 MG: 40 TABLET, DELAYED RELEASE ORAL at 08:29

## 2017-12-04 RX ADMIN — METOPROLOL TARTRATE 25 MG: 25 TABLET ORAL at 08:29

## 2017-12-04 RX ADMIN — Medication 100 MG: at 15:54

## 2017-12-04 ASSESSMENT — ENCOUNTER SYMPTOMS
COLOR CHANGE: 0
VOMITING: 0
BLOOD IN STOOL: 0
NUMBNESS: 0
FREQUENCY: 0
ANAL BLEEDING: 0
ABDOMINAL DISTENTION: 1
CHEST TIGHTNESS: 0
FLANK PAIN: 0
ABDOMINAL PAIN: 0
NECK PAIN: 0
DIZZINESS: 0
SHORTNESS OF BREATH: 1
WEAKNESS: 0
DIAPHORESIS: 0
COUGH: 1
FEVER: 0
MYALGIAS: 0
DYSURIA: 0
CHILLS: 0
WHEEZING: 0
LIGHT-HEADEDNESS: 0
SLEEP DISTURBANCE: 0
BACK PAIN: 0
HEADACHES: 0
CONFUSION: 0
SPUTUM PRODUCTION: 0
PND: 0
NAUSEA: 0
PALPITATIONS: 0
VOICE CHANGE: 0

## 2017-12-04 NOTE — PLAN OF CARE
Face to face report given with opportunity to observe patient.    Report given to Adrian B. RN Bryleigh O. Starich   12/3/2017  8:44 PM

## 2017-12-04 NOTE — PLAN OF CARE
"Problem: Cardiac: Heart Failure (Adult)  Goal: Signs and Symptoms of Listed Potential Problems Will be Absent, Minimized or Managed (Cardiac: Heart Failure)  Signs and symptoms of listed potential problems will be absent, minimized or managed by discharge/transition of care (reference Cardiac: Heart Failure (Adult) CPG).  Pt admitted to ICU after increased swelling in legs and a \"chest cold\" not getting better. Pt arrived with cardizem drip at 5 mg/hr, 's. Through morning drip increased to 15 mg, then titrated back down to 7.5mg/hr. Pt A&O, exhibits minmal knowledge of heart failure or other cardiac conditions. Education on going through shift. Pt standing at bedside to void using walker. Pt states that with the increased swelling going up to his abdomen, his appetite has decreased. Pt admits to smoking about 1 ppd, and regular Beer consumption. Pt denies issues with alcohol withdrawal. 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 Abd U/S performed at bedside today, no indication for paracentesis at the moment. Pt's dyspnea and edema improving after IV lasix doses. Awaiting Echo for further evaluation.      "

## 2017-12-04 NOTE — PLAN OF CARE
Problem: Patient Care Overview  Goal: Plan of Care/Patient Progress Review  Outcome: No Change   12/04/17 1510   OTHER   Plan Of Care Reviewed With patient   Plan of Care Review   Progress improving       Pt alert and oriented, making needs known. Using call light. Up independent at bedside to use urinal. Output adequate this shift. Lung sounds clear. Bowels active. Received flu and pneumonia vaccine this shift. Eating 100% of meals. Continue to monitor this shift. Face to face report given with opportunity to observe patient.    Report given to Deshawn Aguilar   12/4/2017  3:19 PM        Problem: Cardiac: Heart Failure (Adult)  Goal: Signs and Symptoms of Listed Potential Problems Will be Absent, Minimized or Managed (Cardiac: Heart Failure)  Signs and symptoms of listed potential problems will be absent, minimized or managed by discharge/transition of care (reference Cardiac: Heart Failure (Adult) CPG).   Outcome: No Change   12/04/17 1510   Cardiac: Heart Failure   Problems Assessed (Heart Failure) all   Problems Present (Heart Failure) dysrhythmia/arrhythmia;functional decline/self-care deficit;situational response       Problem: Arrhythmia/Dysrhythmia (Symptomatic) (Adult)  Goal: Signs and Symptoms of Listed Potential Problems Will be Absent, Minimized or Managed (Arrhythmia/Dysrhythmia)  Signs and symptoms of listed potential problems will be absent, minimized or managed by discharge/transition of care (reference Arrhythmia/Dysrhythmia (Symptomatic) (Adult) CPG).   Outcome: No Change   12/04/17 1510   Arrhythmia/Dysrhythmia (Symptomatic)   Problems Assessed (Arrhythmia/Dysrhythmia) all   Problems Present (Dysrhythmia) electrophysiological conduction

## 2017-12-04 NOTE — PROGRESS NOTES
[facility]    Hospitalist Progress Note    Date of Service (when I saw the patient): 12/04/2017    Assessment & Plan   Ramírez Hernández is a 62 year old man who presented with lower extremity and abdominal swelling for at least the past several weeks up to perhaps several months.  He recalls this worsened somewhat abruptly in the past several weeks. He presented to ED on urging of his brother.  Evaluation in emergency department confirmed abdominal wall edema and lower extremity edema.  Laboratories relatively unrevealing in emergency department.  Atrial fibrillation with rapid ventricular response was noted.  He responded to a bolus dose of intravenous diltiazem.  After my discussion with ED staff, diltiazem infusion was begun.  Clinical impression is of atrial fibrillation and acute heart failure with likely portal hypertension.    1. Acute systolic and diastolic heart failure.  Possible cor pulmonale  Review of echocardiogram at bedside shows mildly reduced left ventricular systolic function, probably 35-40 percent.  Difficult to assess echocardiographic diastolic function in atrial fibrillation, but suspect there is some degree of this present as well.  Right chamber size and function.  On my review appears reasonable.  No pericardial effusion and pericardial metastases as well as left/right chambers did not suggest pericardial disease strongly. Right chamber size on my review of several windows highly unremarkable, but suspect, given morbid obesity, even in consideration of cardiac explanation for his edema, as well as probable chronic airflow obstruction.  He likely has at least a degree of right heart failure as well.  Over the day since admission.  He does have slight decrease in the degree of edema, although it still severe.  Dyspnea is improved.  We will continue diuresis.  Continuing low-dose beta-blockade, particularly in the context of his systolic heart failure.  Will not accelerate this at  the present time, however.  Cautious low-dose of lisinopril beginning tomorrow.  Although with continuing active diuresis not accelerating at this dose at the present.  Anticipate he is going to require several days of continued IV diuresis with careful attention to and points including renal function, blood pressure, and metabolic alkalosis.    Lower extremity edema and abdominal wall edema is quite suggestive.  Ultrasound does not confirm the presence of any significant ascitic fluid.  He has orthopneic and has been for some time.  No PND.  Some degree of cough and difficulty in secretion clearance.  History is a bit difficult to obtain with any precision, but it appears that while symptoms have been markedly worse over the past 3-4 weeks.  They have been present for a longer period of time.  Marked edema is suggestive of diastolic heart failure and epidemiologically he would certainly be a candidate for this.  Other considerations include pericardial disease.  Trials of diuresis with furosemide.  Close monitoring.  Electrolyte repletion as needed.  Echocardiogram when this is available in the morning.  As below.  Atrial fibrillation is not markedly responsive to diltiazem infusion.  On the basis that heart failure likely is present.  We will begin oral metoprolol in addition.  Withhold lisinopril as he is undergoing active diuresis, although renal function.  Currently, is preserved.  In addition to usual sources of heart failure.  Could consider acute heart failure on the basis of continuous ethanol abuse.  Thyroid axis is within expected limits.  2. Atrial fibrillation with rapid ventricular response.  Intravenous diltiazem discontinued this afternoon.  Currently heart rate is preserved, although he continues to be in persistent atrial fibrillation.  He will need a period of observation to determine whether this is persistent/permanent or may improve with improvement in his heart failure.  In any event, rate  control is adequate now.  It is not unlikely he will require further periods of intravenous diltiazem.  However, an observation in the ICU would be reasonable.  Discussed with the patient.  Anticoagulation.  He is a regular user of alcohol, but indicates no significant falls.  At least initially.  We will begin anticoagulation with warfarin.  When he is closer to discharge.  We will investigate  Other options, although I be inclined to believe that  Ready reversal of anticoagulation may be valuable for this patient.  3.  Portal hypertension.  Abdominal ultrasound yesterday suggesting only lobular liver, quite consistent with cirrhosis.  Doubt any intrahepatic thrombotic process. Few spider angiomata on the anterior chest.  No palmar erythema. iin any event, it does not appear the portal hypertension is severe, but likely a degree is present.  On initial ultrasound not enough ascites to prompt paracentesis.  4.  Continuous ethanol abuse.  Regular use of beer.  2/4 on CAGE questionaire.  He does show some evidence of portal hypertension, quite consistent with alcoholic Cirrhosis. Usual withdrawal protocol.  Hypomagnesemia supports continuous ethanol use.  I discussed with him at some length harmful effects of alcohol on both hepatic and heart function.  He is somewhat skeptical that alcohol can account for this.  5.  Hypomagnesemia.  Borderline hypokalemia.  On presentation in large part related to ethanol abuse.  Active diuresis will also contribute.  He is responding to usual repletion.  5.  Tobacco abuse.  Usual use of alcohol with a 50-70-pack-year history.  Nicotine replacement.  Discussed with him harmful effects of tobacco.  He acknowledges this, but currently does not believe he can stop smoking.  He now is his understanding that he is not able to smoke while in hospital.  Unfortunately, given multiple other issues, perhaps tobacco abuse is not the most prominent issue to address at the present time.    Active  Problems:    Heart failure with acute decompensation, type unknown (H)    DVT Prophylaxis: Continue subcutaneous enoxaparin until anticoagulated with warfarin  Code Status: Full Code    Disposition: Expected discharge in 2-4 days depending on his course    Carl Olivier    Interval History   Awake and alert.  Less dyspnea.improved rate control of atrial fibrillation    -Data reviewed today: I reviewed all new labs and imaging results over the last 24 hours.     Peripheral IV 12/03/17 Left Lower forearm (Active)   Site Assessment [facility] 12/4/2017 12:00 PM   Line Status Infusing 12/4/2017 12:00 PM   Phlebitis Scale 0-->no symptoms 12/4/2017 12:00 PM   Infiltration Scale 0 12/4/2017 12:00 PM   Extravasation? No 12/4/2017  4:00 AM   Number of days:1       Peripheral IV 12/03/17 Left Lower forearm (Active)   Site Assessment [facility] 12/4/2017 12:00 PM   Line Status Saline locked 12/4/2017 12:00 PM   Phlebitis Scale 0-->no symptoms 12/4/2017 12:00 PM   Infiltration Scale 0 12/4/2017 12:00 PM   Extravasation? No 12/4/2017  4:00 AM   Number of days:1     Line/device assessment(s) completed for medical necessity December 4    Physical Exam   Temp: 97.4  F (36.3  C) Temp src: Tympanic BP: 121/93 Pulse: 108 Heart Rate: 95 Resp: 13 SpO2: 96 % O2 Device: None (Room air)    Vitals:    12/03/17 1015 12/04/17 0609   Weight: 122.7 kg (270 lb 8.1 oz) 121.2 kg (267 lb 3.2 oz)     Vital Signs with Ranges  Temp:  [97.4  F (36.3  C)-98.8  F (37.1  C)] 97.4  F (36.3  C)  Pulse:  [108-121] 108  Heart Rate:  [] 95  Resp:  [11-32] 13  BP: (111-165)/() 121/93  SpO2:  [95 %-100 %] 96 %  I/O last 3 completed shifts:  In: 510 [P.O.:460; I.V.:50]  Out: 4275 [Urine:4275]  Awake, alert, obese man lying in bed on ICU.  HEENT: Pupils equal, conjugate. No icterus or nystagmus. Oral mucosa moist. No facial asymmetry.   Neck: Supple, jugular veins difficult to determine. Trachea midline   Chest: few spider angiomata on anterior chest wall. No  chest wall movement asymmetry. Aeration diminished at bases with aeration, slightly improved over the past 24 hours.. Accessory muscles not in use. Expiratory time not increased. No tidal wheezes. Predominantly basilar medium to coarse rhonchi. Few basilar medium crackles.   Cardiac: PMI not displaced. S1, S2 unremarkable. No S3, S4. P2 questionably accentuated. No murmurs. Carotid upstroke preserved.   Abdomen: Soft, obese. Marked but improved abdominal wall edema extending half way up the abdominal wall. No palpation or percussion tenderness. Bowel sounds in all quadrants. Liver and spleen difficult to palpate but not clearly increased in size. No bruits, masses, or pulsations.   Extremities: brawny edema to legs bilaterally with stasis dermatitis.  Pedal edema to thighs and extending to abdominal wall. eextremities warm distally.   Neurologic: Mental state above. Motor 5/5 and bilaterally equal. Tone preserved. No fasiculations or tremors.     Medications     Warfarin Therapy Reminder       diltiazem (CARDIZEM) infusion ADULT Stopped (12/04/17 1144)     NaCl 50 mL/hr at 12/04/17 0410       thiamine  100 mg Oral Daily     [START ON 12/5/2017] lisinopril  2.5 mg Oral Daily     sodium chloride 0.9%  1,000 mL Intravenous Once     enoxaparin  40 mg Subcutaneous Q24H     pantoprazole  40 mg Oral QAM     furosemide  40 mg Intravenous Q6H     metoprolol  25 mg Oral BID     nicotine  1 patch Transdermal Daily     nicotine   Transdermal Q8H     nicotine   Transdermal Daily           Data     Recent Labs  Lab 12/04/17  0430 12/03/17  2155 12/03/17  1605 12/03/17  0926 12/03/17  0630   WBC 8.3  --   --   --  10.3   HGB 13.9  --   --   --  14.9   MCV 91  --   --   --  89     --   --   --  272     --   --   --  131*   POTASSIUM 3.6  --   --   --  4.2   CHLORIDE 98  --   --   --  97   CO2 25  --   --   --  22   BUN 10  --   --   --  8   CR 0.96  --   --   --  0.86   ANIONGAP 11  --   --   --  12   HECTOR 8.5  --   --    --  8.9   *  --   --   --  114*   ALBUMIN  --   --   --   --  3.6   PROTTOTAL  --   --   --   --  7.8   BILITOTAL  --   --   --   --  1.2   ALKPHOS  --   --   --   --  134   ALT  --   --   --   --  23   AST  --   --   --   --  23   LIPASE  --   --   --   --  164   TROPI  --  0.020 <0.015 <0.015 <0.015       Lactic Acid   Date Value Ref Range Status   12/03/2017 2.3 (H) 0.4 - 2.0 mmol/L Final     Comment:     Significant value called to and read back by  BRYLEIGH STARICH ON 12/3/17 AT 0959 BY [facility]     12/03/2017 2.4 (H) 0.4 - 2.0 mmol/L Final     Comment:     Significant value called to and read back by  PEPE PRITCHARD ON 12/3/17 AT 0657 BY [facility]         No results found for this or any previous visit (from the past 24 hour(s)).

## 2017-12-04 NOTE — PROGRESS NOTES
Assessment completed see flow sheet.    Trip was sitting on the edge of the bed. He does not have a PCP, an establish care and hospital follow up appointment has been set up with Dr Joe Crum. He does not have a dentist, a list was provided, he goes to [facility] for his eyecare. He does not have a POA or a Healthcare Directive, information has been provided. He gets [employer] heating assistance. He uses [facility]. He is not a .     Trip lives at home alone, he states he feels safe and his home is well maintained. He has to go down the basement 1X monthly to do laundry. His neighbor drives him to go grocery shopping and Trip does his own food prep. He does not drive, his transportation is provided by his brother or neighbor. He does not work.     Trip sleeps well and has no mood concerns. He smokes a pack of cigarettes a day and does not consumed alcohol. He will speak to Dr Crum about smoking cessation. His vj is not important to him. His support system is his brothers, neighbor and friend. His stressors are his current illness. He enjoys listening to the radio, watching TV, fishing and working in the yard.     Trip plans to go home at discharge. Discussed home vs. SNF, Trip does not want to go to rehab. CM will remain available.     LOC: alert    Others present: Patient    Dx: acute heart failure    Lives with: Lives With: alone    Living at: Living Arrangements: house    Equipment used:  Nebulizer    Support System: brothers, neighbors, friends    Primary PCP:     POA/Guardian: No    Health Care Directive: None    Pharmacy: Joe    :  No    Homecare/County Services:  [employer] for heating assistance    Adequate Resources for needs (housing, utilities, food/med): YES    Meds and appointments management: YES    Work: NO    Transportation: YES     ADLs: independent    Falls: No    Able to Return to Prior Living Arrangements: NO    Nash: NO    Goals: get rid of the swelling    Barriers:  unknown    Needs: Needs Instruction/Review    SILVANO: Low    ED Visits: 0

## 2017-12-04 NOTE — PHARMACY-ANTICOAGULATION SERVICE
Clinical Pharmacy - Warfarin Dosing Consult  Pharmacy has been consulted to manage this patient s warfarin therapy.  Indication: Atrial Fibrillation  Therapy Goal: INR 2-3  Warfarin Prior to Admission: No  Significant drug interactions: Alcohol (increases INR)    INR   Date Value Ref Range Status   12/04/2017 1.34 (H) 0.80 - 1.20 Final     Recommend warfarin 5 mg today.  Pharmacy will monitor Ramírez D Edgar daily and order warfarin doses to achieve specified goal.      Please contact pharmacy as soon as possible if the warfarin needs to be held for a procedure or if the warfarin goals change.

## 2017-12-05 LAB
ANION GAP SERPL CALCULATED.3IONS-SCNC: 9 MMOL/L (ref 3–14)
BASOPHILS # BLD AUTO: 0.1 10E9/L (ref 0–0.2)
BASOPHILS NFR BLD AUTO: 0.7 %
BUN SERPL-MCNC: 13 MG/DL (ref 7–30)
CALCIUM SERPL-MCNC: 8.1 MG/DL (ref 8.5–10.1)
CHLORIDE SERPL-SCNC: 99 MMOL/L (ref 94–109)
CO2 SERPL-SCNC: 28 MMOL/L (ref 20–32)
CREAT SERPL-MCNC: 0.89 MG/DL (ref 0.66–1.25)
DIFFERENTIAL METHOD BLD: ABNORMAL
EOSINOPHIL # BLD AUTO: 0.1 10E9/L (ref 0–0.7)
EOSINOPHIL NFR BLD AUTO: 0.8 %
ERYTHROCYTE [DISTWIDTH] IN BLOOD BY AUTOMATED COUNT: 13.7 % (ref 10–15)
GFR SERPL CREATININE-BSD FRML MDRD: 87 ML/MIN/1.7M2
GLUCOSE SERPL-MCNC: 99 MG/DL (ref 70–99)
HCT VFR BLD AUTO: 38 % (ref 40–53)
HGB BLD-MCNC: 13.1 G/DL (ref 13.3–17.7)
IMM GRANULOCYTES # BLD: 0 10E9/L (ref 0–0.4)
IMM GRANULOCYTES NFR BLD: 0.2 %
INR PPP: 1.26 (ref 0.8–1.2)
LYMPHOCYTES # BLD AUTO: 1.2 10E9/L (ref 0.8–5.3)
LYMPHOCYTES NFR BLD AUTO: 13.5 %
MAGNESIUM SERPL-MCNC: 2.3 MG/DL (ref 1.6–2.3)
MCH RBC QN AUTO: 31.2 PG (ref 26.5–33)
MCHC RBC AUTO-ENTMCNC: 34.5 G/DL (ref 31.5–36.5)
MCV RBC AUTO: 91 FL (ref 78–100)
MONOCYTES # BLD AUTO: 1.4 10E9/L (ref 0–1.3)
MONOCYTES NFR BLD AUTO: 15.7 %
NEUTROPHILS # BLD AUTO: 6.1 10E9/L (ref 1.6–8.3)
NEUTROPHILS NFR BLD AUTO: 69.1 %
NRBC # BLD AUTO: 0 10*3/UL
NRBC BLD AUTO-RTO: 0 /100
PLATELET # BLD AUTO: 228 10E9/L (ref 150–450)
POTASSIUM SERPL-SCNC: 3.1 MMOL/L (ref 3.4–5.3)
POTASSIUM SERPL-SCNC: 3.2 MMOL/L (ref 3.4–5.3)
POTASSIUM SERPL-SCNC: 3.3 MMOL/L (ref 3.4–5.3)
RBC # BLD AUTO: 4.2 10E12/L (ref 4.4–5.9)
SODIUM SERPL-SCNC: 136 MMOL/L (ref 133–144)
WBC # BLD AUTO: 8.9 10E9/L (ref 4–11)

## 2017-12-05 PROCEDURE — 85610 PROTHROMBIN TIME: CPT | Performed by: INTERNAL MEDICINE

## 2017-12-05 PROCEDURE — 94640 AIRWAY INHALATION TREATMENT: CPT | Mod: 76

## 2017-12-05 PROCEDURE — A9270 NON-COVERED ITEM OR SERVICE: HCPCS | Mod: GY | Performed by: INTERNAL MEDICINE

## 2017-12-05 PROCEDURE — 85025 COMPLETE CBC W/AUTO DIFF WBC: CPT | Performed by: INTERNAL MEDICINE

## 2017-12-05 PROCEDURE — 80048 BASIC METABOLIC PNL TOTAL CA: CPT | Performed by: INTERNAL MEDICINE

## 2017-12-05 PROCEDURE — 25000132 ZZH RX MED GY IP 250 OP 250 PS 637: Mod: GY | Performed by: INTERNAL MEDICINE

## 2017-12-05 PROCEDURE — 83735 ASSAY OF MAGNESIUM: CPT | Performed by: INTERNAL MEDICINE

## 2017-12-05 PROCEDURE — 25000125 ZZHC RX 250: Performed by: INTERNAL MEDICINE

## 2017-12-05 PROCEDURE — 25000128 H RX IP 250 OP 636: Performed by: INTERNAL MEDICINE

## 2017-12-05 PROCEDURE — 25000128 H RX IP 250 OP 636

## 2017-12-05 PROCEDURE — 40000275 ZZH STATISTIC RCP TIME EA 10 MIN

## 2017-12-05 PROCEDURE — 94640 AIRWAY INHALATION TREATMENT: CPT

## 2017-12-05 PROCEDURE — 84132 ASSAY OF SERUM POTASSIUM: CPT | Performed by: INTERNAL MEDICINE

## 2017-12-05 PROCEDURE — 36415 COLL VENOUS BLD VENIPUNCTURE: CPT | Performed by: INTERNAL MEDICINE

## 2017-12-05 PROCEDURE — 20000003 ZZH R&B ICU

## 2017-12-05 RX ORDER — LISINOPRIL 5 MG/1
5 TABLET ORAL DAILY
Status: DISCONTINUED | OUTPATIENT
Start: 2017-12-06 | End: 2017-12-06

## 2017-12-05 RX ORDER — FUROSEMIDE 10 MG/ML
40 INJECTION INTRAMUSCULAR; INTRAVENOUS EVERY 6 HOURS
Status: COMPLETED | OUTPATIENT
Start: 2017-12-05 | End: 2017-12-06

## 2017-12-05 RX ORDER — WARFARIN SODIUM 5 MG/1
5 TABLET ORAL
Status: COMPLETED | OUTPATIENT
Start: 2017-12-05 | End: 2017-12-05

## 2017-12-05 RX ORDER — FUROSEMIDE 10 MG/ML
INJECTION INTRAMUSCULAR; INTRAVENOUS
Status: COMPLETED
Start: 2017-12-05 | End: 2017-12-05

## 2017-12-05 RX ADMIN — METOPROLOL TARTRATE 37.5 MG: 25 TABLET, FILM COATED ORAL at 19:54

## 2017-12-05 RX ADMIN — FUROSEMIDE 40 MG: 10 INJECTION, SOLUTION INTRAVENOUS at 23:48

## 2017-12-05 RX ADMIN — PANTOPRAZOLE SODIUM 40 MG: 40 TABLET, DELAYED RELEASE ORAL at 06:23

## 2017-12-05 RX ADMIN — SODIUM CHLORIDE: 9 INJECTION, SOLUTION INTRAVENOUS at 00:34

## 2017-12-05 RX ADMIN — IPRATROPIUM BROMIDE AND ALBUTEROL SULFATE 3 ML: .5; 3 SOLUTION RESPIRATORY (INHALATION) at 19:53

## 2017-12-05 RX ADMIN — POTASSIUM CHLORIDE 20 MEQ: 20 TABLET, EXTENDED RELEASE ORAL at 16:37

## 2017-12-05 RX ADMIN — Medication 100 MG: at 08:08

## 2017-12-05 RX ADMIN — IPRATROPIUM BROMIDE AND ALBUTEROL SULFATE 3 ML: .5; 3 SOLUTION RESPIRATORY (INHALATION) at 11:04

## 2017-12-05 RX ADMIN — POTASSIUM CHLORIDE 20 MEQ: 20 TABLET, EXTENDED RELEASE ORAL at 23:53

## 2017-12-05 RX ADMIN — FUROSEMIDE 40 MG: 10 INJECTION, SOLUTION INTRAVENOUS at 11:28

## 2017-12-05 RX ADMIN — FUROSEMIDE 40 MG: 10 INJECTION, SOLUTION INTRAVENOUS at 03:47

## 2017-12-05 RX ADMIN — POTASSIUM CHLORIDE 40 MEQ: 20 TABLET, EXTENDED RELEASE ORAL at 06:23

## 2017-12-05 RX ADMIN — POTASSIUM CHLORIDE 40 MEQ: 20 TABLET, EXTENDED RELEASE ORAL at 13:39

## 2017-12-05 RX ADMIN — POTASSIUM CHLORIDE 20 MEQ: 20 TABLET, EXTENDED RELEASE ORAL at 08:08

## 2017-12-05 RX ADMIN — IPRATROPIUM BROMIDE AND ALBUTEROL SULFATE 3 ML: .5; 3 SOLUTION RESPIRATORY (INHALATION) at 07:11

## 2017-12-05 RX ADMIN — WARFARIN SODIUM 5 MG: 5 TABLET ORAL at 19:54

## 2017-12-05 RX ADMIN — LISINOPRIL 2.5 MG: 2.5 TABLET ORAL at 08:08

## 2017-12-05 RX ADMIN — ENOXAPARIN SODIUM 40 MG: 40 INJECTION SUBCUTANEOUS at 11:27

## 2017-12-05 RX ADMIN — METOPROLOL TARTRATE 25 MG: 25 TABLET ORAL at 08:08

## 2017-12-05 RX ADMIN — POTASSIUM CHLORIDE 40 MEQ: 20 TABLET, EXTENDED RELEASE ORAL at 21:43

## 2017-12-05 RX ADMIN — IPRATROPIUM BROMIDE AND ALBUTEROL SULFATE 3 ML: .5; 3 SOLUTION RESPIRATORY (INHALATION) at 15:09

## 2017-12-05 RX ADMIN — FUROSEMIDE 40 MG: 10 INJECTION, SOLUTION INTRAVENOUS at 16:32

## 2017-12-05 RX ADMIN — NICOTINE 1 PATCH: 21 PATCH, EXTENDED RELEASE TRANSDERMAL at 08:08

## 2017-12-05 NOTE — PLAN OF CARE
Ambulated patient approximately 100 feet in hallway. HR reached a highest of 130's briefly but maintained 110's-120's with activity, sat's maintained mid 90's. Shortness of breath with activity minimal.

## 2017-12-05 NOTE — PLAN OF CARE
Patient has remained off of cardizem drip this afternoon. HR 90s-110s at rest, 120s-130s with activity. Continues to receive PO metoprolol. BP hypertensive. RR 20s, non labored. O2 sats 96% on RA. Lungs clear bilaterally, diminished in bases. Mild edema to bilateral feet and legs. Bilateral legs red/kaitlynn and skin hard.     Face to face report given with opportunity to observe patient.    Report given to Sarah P. RN Annelyse J. Stromberg   12/4/2017  6:57 PM

## 2017-12-05 NOTE — PROGRESS NOTES
[facility]    Hospitalist Progress Note    Date of Service (when I saw the patient): 12/05/2017    Assessment & Plan   Ramírez Hernández is a 62 year old man who presented with lower extremity and abdominal swelling for at least the past several weeks up to perhaps several months.  He recalls this worsened somewhat abruptly in the past several weeks. He presented to ED on urging of his brother.  Evaluation in emergency department confirmed abdominal wall edema and lower extremity edema.  Laboratories relatively unrevealing in emergency department.  Atrial fibrillation with rapid ventricular response was noted.  He responded to a bolus dose of intravenous diltiazem.  After my discussion with ED staff, diltiazem infusion was begun.  Clinical impression is of atrial fibrillation and acute heart failure with likely portal hypertension.    1. Acute systolic and diastolic heart failure.  Possible cor pulmonale  Continued good diuresis, 2-3L in last 24 hours with some discrepancy between weight and I/O balance.  Echocardiogram shows LV dilation, moderate reduction in systolic function, and RV dilation. Consistent with left systolic and diastolic heart failure with some cor pulmonale.  No pericardial effusion and pericardial metastases as well as left/right chambers did not suggest pericardial disease strongly.   Dyspnea continues to improve, with some increase in mobilization.  He is able to walk about in his room at least several times in the last day.  We will continue diuresis continuing IV furosemide.  Given his persistent abdominal wall edema.  He likely has some got edema, which will limit absorption as well..  Cautious increase in dose of metoprolol to 75 mg, slight increase in lisinopril beginning tomorrow, with close monitoring for effect on blood pressure and renal function.  Anticipate he is going to require several days of continued IV diuresis with careful attention to and points including renal  function, blood pressure, and metabolic alkalosis.    Lower extremity edema and abdominal wall edema is quite suggestive of combined left and right sided heart failure.  Ultrasound does not confirm the presence of any significant ascitic fluid.  He has orthopneic and has been for some time.  No PND.  Some degree of cough and difficulty in secretion clearance.  History is a bit difficult to obtain with any precision, but it appears that while symptoms have been markedly worse over the past 3-4 weeks.  They have been present for a longer period of time.  Marked edema is suggestive of diastolic heart failure and epidemiologically he would certainly be a candidate for this.  Other considerations include pericardial disease.  Trials of diuresis with furosemide.  Close monitoring.  Electrolyte repletion as needed.  Echocardiogram when this is available in the morning.  As below.  Atrial fibrillation is not markedly responsive to diltiazem infusion.  On the basis that heart failure likely is present.  We will begin oral metoprolol in addition.  Withhold lisinopril as he is undergoing active diuresis, although renal function.  Currently, is preserved.  In addition to usual sources of heart failure.  Could consider acute heart failure on the basis of continuous ethanol abuse.  Thyroid axis is within expected limits.  2. Atrial fibrillation with rapid ventricular response.  No intravenous diltiazem for 24 hours. Continue to try rate control with metoprolol alone. Some increase in rate with activity.  He will need a period of observation to determine whether this is persistent/permanent or may improve with improvement in his heart failure.  In any event, rate control is adequate now.    At least initially.  We will begin anticoagulation with warfarin.  When he is closer to discharge.  We will investigate  Other options, although I be inclined to believe that  Ready reversal of anticoagulation may be valuable for this  patient.  3.  Portal hypertension.  Abdominal ultrasound yesterday suggesting only lobular liver, quite consistent with cirrhosis.  Doubt any intrahepatic thrombotic process. Few spider angiomata on the anterior chest.  No palmar erythema. iin any event, it does not appear the portal hypertension is severe, but likely a degree is present.  On initial ultrasound not enough ascites to prompt paracentesis.  4.  Continuous ethanol abuse.  No evidence of withdrawal.  Continue oral thiamine. Regular use of beer.  2/4 on CAGE questionaire.  He does show some evidence of portal hypertension, quite consistent with alcoholic Cirrhosis. Usual withdrawal protocol.  Hypomagnesemia supports continuous ethanol use.  I discussed with him at some length harmful effects of alcohol on both hepatic and heart function.  He is somewhat skeptical that alcohol can account for this.  5.  Hypomagnesemia.  hypokalemia.  Hypomagnesemia, resolved.  Likely largely related to ethanol abuse.  Hypokalemia may be more related to continued diuresis.  Easily repleted with usual protocol.  5.  Tobacco abuse.  Usual use of alcohol with a 50-70-pack-year history.  Nicotine replacement.  Discussed with him harmful effects of tobacco.  He acknowledges this, but currently does not believe he can stop smoking.  He now is his understanding that he is not able to smoke while in hospital.  Unfortunately, given multiple other issues, perhaps tobacco abuse is not the most prominent issue to address at the present time.    Active Problems:    Heart failure with acute decompensation, type unknown (H)    DVT Prophylaxis: Continue subcutaneous enoxaparin until anticoagulated with warfarin  Code Status: Full Code    Disposition: Expected discharge in 2-3 days depending on his course    Carl Olivier    Interval History   Awake and alert.  Less dyspnea. No chest discomfort.  Fair appetite.    -Data reviewed today: I reviewed all new labs and imaging results over  the last 24 hours.     Peripheral IV 12/03/17 Left Lower forearm (Active)   Site Assessment WDL 12/5/2017  8:00 AM   Line Status Saline locked 12/5/2017  8:00 AM   Phlebitis Scale 0-->no symptoms 12/5/2017  8:00 AM   Infiltration Scale 0 12/5/2017  8:00 AM   Extravasation? No 12/4/2017  4:00 AM   Number of days:2     Line/device assessment(s) completed for medical necessity December 5    Physical Exam   Temp: 98  F (36.7  C) Temp src: Tympanic BP: 130/82   Heart Rate: (!) 126 Resp: 22 SpO2: 100 % O2 Device: None (Room air)    Vitals:    12/03/17 1015 12/04/17 0609 12/05/17 0500   Weight: 122.7 kg (270 lb 8.1 oz) 121.2 kg (267 lb 3.2 oz) 118.3 kg (260 lb 12.9 oz)     Vital Signs with Ranges  Temp:  [97.8  F (36.6  C)-98.5  F (36.9  C)] 98  F (36.7  C)  Heart Rate:  [] 126  Resp:  [13-25] 22  BP: (115-151)/() 130/82  SpO2:  [94 %-100 %] 100 %  I/O last 3 completed shifts:  In: 3020 [P.O.:900; I.V.:2120]  Out: 5950 [Urine:5950]  Awake, alert, obese man lying in bed on ICU.  HEENT: Pupils equal, conjugate. No icterus or nystagmus. Oral mucosa moist. No facial asymmetry.   Neck: Supple, jugular veins difficult to determine. Trachea midline   Chest: few spider angiomata on anterior chest wall. No chest wall movement asymmetry. Aeration diminished at bases with aeration, slightly improved over the past 24 hours.. Accessory muscles not in use. Expiratory time not increased. No tidal wheezes. Basilar medium to coarse rhonchi. Few basilar medium crackles.   Cardiac: PMI not displaced. S1, S2 unremarkable. No S3, S4. P2 questionably accentuated. No murmurs. Carotid upstroke preserved.   Abdomen: Soft, obese. Marked but improved abdominal wall edema extending half way up the abdominal wall. No palpation or percussion tenderness. Bowel sounds in all quadrants. Liver and spleen difficult to palpate but not clearly increased in size. No bruits, masses, or pulsations.   Extremities: brawny edema to legs bilaterally with  stasis dermatitis.  Pedal edema to thighs and extending to abdominal wall. eextremities warm distally.   Neurologic: Mental state above. Motor 5/5 and bilaterally equal. Tone preserved. No fasiculations or tremors.     Medications     Warfarin Therapy Reminder         warfarin  5 mg Oral ONCE at 18:00     [START ON 12/6/2017] lisinopril  5 mg Oral Daily     metoprolol  37.5 mg Oral BID     furosemide  40 mg Intravenous Q6H     thiamine  100 mg Oral Daily     enoxaparin  40 mg Subcutaneous Q24H     nicotine  1 patch Transdermal Daily     nicotine   Transdermal Q8H     nicotine   Transdermal Daily           Data     Recent Labs  Lab 12/05/17  1210 12/05/17  0441 12/04/17  1508 12/04/17  0430 12/03/17  2155 12/03/17  1605 12/03/17  0926 12/03/17  0630   WBC  --  8.9  --  8.3  --   --   --  10.3   HGB  --  13.1*  --  13.9  --   --   --  14.9   MCV  --  91  --  91  --   --   --  89   PLT  --  228  --  249  --   --   --  272   INR  --  1.26* 1.34*  --   --   --   --   --    NA  --  136  --  134  --   --   --  131*   POTASSIUM 3.3* 3.1*  --  3.6  --   --   --  4.2   CHLORIDE  --  99  --  98  --   --   --  97   CO2  --  28  --  25  --   --   --  22   BUN  --  13  --  10  --   --   --  8   CR  --  0.89  --  0.96  --   --   --  0.86   ANIONGAP  --  9  --  11  --   --   --  12   HECTOR  --  8.1*  --  8.5  --   --   --  8.9   GLC  --  99  --  109*  --   --   --  114*   ALBUMIN  --   --   --   --   --   --   --  3.6   PROTTOTAL  --   --   --   --   --   --   --  7.8   BILITOTAL  --   --   --   --   --   --   --  1.2   ALKPHOS  --   --   --   --   --   --   --  134   ALT  --   --   --   --   --   --   --  23   AST  --   --   --   --   --   --   --  23   LIPASE  --   --   --   --   --   --   --  164   TROPI  --   --   --   --  0.020 <0.015 <0.015 <0.015       Lactic Acid   Date Value Ref Range Status   12/03/2017 2.3 (H) 0.4 - 2.0 mmol/L Final     Comment:     Significant value called to and read back by  BRYLEIGH STARICH ON 12/3/17  AT 0959 BY [facility]     12/03/2017 2.4 (H) 0.4 - 2.0 mmol/L Final     Comment:     Significant value called to and read back by  PEPE PRITCHARD ON 12/3/17 AT 0657 BY [facility]         No results found for this or any previous visit (from the past 24 hour(s)).

## 2017-12-05 NOTE — PROGRESS NOTES
Met with Ramírez, he states he has [employer] Medicare part D, his brother will bring in the card so he has it to fill medications at discharge. Advised that F/U and establish care appointments have been set up with Dr Joe Crum. Ramírez is planning to have his lab draws done at [facility] for his INR. CM will remain available.

## 2017-12-05 NOTE — PHARMACY-ANTICOAGULATION SERVICE
INR = 1.26. Will give another 5 mg dose tonight. If there is a <0.3 increase in INR by tomorrow, will increase dose by 25-50%.

## 2017-12-05 NOTE — PROVIDER NOTIFICATION
Dr. Olivier updated HR with activity highest of 130's, sitting at edge of bed 110's-120's, 80's-100's while sleeping. No new orders at this time.

## 2017-12-05 NOTE — PLAN OF CARE
"Problem: Patient Care Overview  Goal: Plan of Care/Patient Progress Review  Outcome: No Change  Pt has remained stable and off of Cardizem drip this shift. Remains in A-Fib, HR has been 's while at rest and 100's-120's with exertion. Lasix IV continues with good diuresis. BLE's remain red/kaitlynn, taut and shiny. Edema persists but is improving. Pt has a general red/kaitlynn appearance. Sats adequate on RA. Mild LIU persists, LS dim.  Alert and oriented, pleasant and cooperative. MSSA scores have been minimal, pt does admit to drinking \"probably about 6 or 10 beers every day or so,\" at home. Potassium replacement initiated this AM as per orders.       Face to face report given with opportunity to observe patient.    Report given to LAURY Sultana   12/5/2017  7:25 AM      "

## 2017-12-05 NOTE — PROGRESS NOTES
Pt. Has taken 3 nebs this shift- BS- crackles in right base- states the nebs help him to breath bettter- BCO225% on RA

## 2017-12-06 LAB
CREAT SERPL-MCNC: 0.94 MG/DL (ref 0.66–1.25)
GFR SERPL CREATININE-BSD FRML MDRD: 81 ML/MIN/1.7M2
INR PPP: 1.33 (ref 0.8–1.2)
PLATELET # BLD AUTO: 230 10E9/L (ref 150–450)
POTASSIUM SERPL-SCNC: 3.3 MMOL/L (ref 3.4–5.3)
POTASSIUM SERPL-SCNC: 3.7 MMOL/L (ref 3.4–5.3)
POTASSIUM SERPL-SCNC: 3.7 MMOL/L (ref 3.4–5.3)

## 2017-12-06 PROCEDURE — 82565 ASSAY OF CREATININE: CPT | Performed by: INTERNAL MEDICINE

## 2017-12-06 PROCEDURE — 36415 COLL VENOUS BLD VENIPUNCTURE: CPT | Performed by: INTERNAL MEDICINE

## 2017-12-06 PROCEDURE — 36415 COLL VENOUS BLD VENIPUNCTURE: CPT | Performed by: HOSPITALIST

## 2017-12-06 PROCEDURE — 25000128 H RX IP 250 OP 636: Performed by: INTERNAL MEDICINE

## 2017-12-06 PROCEDURE — 40000275 ZZH STATISTIC RCP TIME EA 10 MIN

## 2017-12-06 PROCEDURE — 12000000 ZZH R&B MED SURG/OB

## 2017-12-06 PROCEDURE — 84132 ASSAY OF SERUM POTASSIUM: CPT | Performed by: HOSPITALIST

## 2017-12-06 PROCEDURE — 25000125 ZZHC RX 250: Performed by: INTERNAL MEDICINE

## 2017-12-06 PROCEDURE — 84132 ASSAY OF SERUM POTASSIUM: CPT | Performed by: INTERNAL MEDICINE

## 2017-12-06 PROCEDURE — 94640 AIRWAY INHALATION TREATMENT: CPT

## 2017-12-06 PROCEDURE — A9270 NON-COVERED ITEM OR SERVICE: HCPCS | Mod: GY | Performed by: INTERNAL MEDICINE

## 2017-12-06 PROCEDURE — 85610 PROTHROMBIN TIME: CPT | Performed by: INTERNAL MEDICINE

## 2017-12-06 PROCEDURE — 99232 SBSQ HOSP IP/OBS MODERATE 35: CPT | Performed by: INTERNAL MEDICINE

## 2017-12-06 PROCEDURE — 25000132 ZZH RX MED GY IP 250 OP 250 PS 637: Mod: GY | Performed by: INTERNAL MEDICINE

## 2017-12-06 PROCEDURE — 85049 AUTOMATED PLATELET COUNT: CPT | Performed by: INTERNAL MEDICINE

## 2017-12-06 PROCEDURE — 94640 AIRWAY INHALATION TREATMENT: CPT | Mod: 76

## 2017-12-06 RX ORDER — LISINOPRIL 10 MG/1
10 TABLET ORAL DAILY
Status: DISCONTINUED | OUTPATIENT
Start: 2017-12-07 | End: 2017-12-08 | Stop reason: HOSPADM

## 2017-12-06 RX ORDER — FUROSEMIDE 10 MG/ML
40 INJECTION INTRAMUSCULAR; INTRAVENOUS EVERY 6 HOURS
Status: COMPLETED | OUTPATIENT
Start: 2017-12-06 | End: 2017-12-06

## 2017-12-06 RX ORDER — METOPROLOL TARTRATE 50 MG
50 TABLET ORAL 2 TIMES DAILY
Status: DISCONTINUED | OUTPATIENT
Start: 2017-12-06 | End: 2017-12-08 | Stop reason: HOSPADM

## 2017-12-06 RX ADMIN — WARFARIN SODIUM 7.5 MG: 2.5 TABLET ORAL at 17:19

## 2017-12-06 RX ADMIN — METOPROLOL TARTRATE 37.5 MG: 25 TABLET, FILM COATED ORAL at 08:53

## 2017-12-06 RX ADMIN — IPRATROPIUM BROMIDE AND ALBUTEROL SULFATE 3 ML: .5; 3 SOLUTION RESPIRATORY (INHALATION) at 08:45

## 2017-12-06 RX ADMIN — FUROSEMIDE 40 MG: 10 INJECTION, SOLUTION INTRAVENOUS at 11:18

## 2017-12-06 RX ADMIN — IPRATROPIUM BROMIDE AND ALBUTEROL SULFATE 3 ML: .5; 3 SOLUTION RESPIRATORY (INHALATION) at 13:20

## 2017-12-06 RX ADMIN — LISINOPRIL 5 MG: 5 TABLET ORAL at 08:54

## 2017-12-06 RX ADMIN — NICOTINE 1 PATCH: 21 PATCH, EXTENDED RELEASE TRANSDERMAL at 08:55

## 2017-12-06 RX ADMIN — ENOXAPARIN SODIUM 40 MG: 40 INJECTION SUBCUTANEOUS at 11:18

## 2017-12-06 RX ADMIN — METOPROLOL TARTRATE 50 MG: 50 TABLET, FILM COATED ORAL at 21:13

## 2017-12-06 RX ADMIN — Medication 100 MG: at 08:53

## 2017-12-06 RX ADMIN — FUROSEMIDE 40 MG: 10 INJECTION, SOLUTION INTRAVENOUS at 05:56

## 2017-12-06 RX ADMIN — FUROSEMIDE 40 MG: 10 INJECTION, SOLUTION INTRAVENOUS at 17:21

## 2017-12-06 ASSESSMENT — ACTIVITIES OF DAILY LIVING (ADL)
AMBULATION: 0-->INDEPENDENT
DRESS: 0-->INDEPENDENT
FALL_HISTORY_WITHIN_LAST_SIX_MONTHS: NO
BATHING: 0-->INDEPENDENT
RETIRED_EATING: 0-->INDEPENDENT
SWALLOWING: 0-->SWALLOWS FOODS/LIQUIDS WITHOUT DIFFICULTY
COGNITION: 0 - NO COGNITION ISSUES REPORTED
TOILETING: 0-->INDEPENDENT
WHICH_OF_THE_ABOVE_FUNCTIONAL_RISKS_HAD_A_RECENT_ONSET_OR_CHANGE?: AMBULATION
RETIRED_COMMUNICATION: 0-->UNDERSTANDS/COMMUNICATES WITHOUT DIFFICULTY
TRANSFERRING: 0-->INDEPENDENT

## 2017-12-06 NOTE — PROGRESS NOTES
[facility]    Hospitalist Progress Note    Date of Service (when I saw the patient): 12/06/2017    Assessment & Plan   Ramírez Hernández is a 62 year old man who presented with lower extremity and abdominal swelling for at least the past several weeks up to perhaps several months.  He recalls this worsened somewhat abruptly in the past several weeks. He presented to ED on urging of his brother.  Evaluation in emergency department confirmed abdominal wall edema and lower extremity edema.  Laboratories relatively unrevealing in emergency department.  Atrial fibrillation with rapid ventricular response was noted.  He responded to a bolus dose of intravenous diltiazem.  After my discussion with ED staff, diltiazem infusion was begun.  Clinical impression is of atrial fibrillation and acute heart failure with likely portal hypertension.    1. Acute systolic and diastolic heart failure.  Possible cor pulmonale  Continued good diuresis. Declining both weight and fluid balance, although some discrepancy between the 2.  Decrease intensity of IV furosemide to 3 times daily but continue IV.  He still has significant abdominal wall edema and I suspect  That wall edema.  Echocardiogram shows LV dilation, moderate reduction in systolic function, and RV dilation. Consistent with left systolic and diastolic heart failure with some cor pulmonale. No pericardial effusion and pericardial images as well as left/right chambers did not suggest pericardial disease strongly. Because of the right heart failure, even though he has a markedly improved pulmonary exam, his persistent edema, suggests that with caution continued diuresis may be possible.      Dyspnea continues to improve, with some increase in mobilization.  Still walking a minimal amount but has been over walking the halls at least daily.  Encouraged increasing his activity.  Increase in dose of metoprolol to 150 mg daily, as well as further increase in lisinopril,  with close monitoring for effect on blood pressure and renal function.  Anticipate he is going to require several days of continued IV diuresis with careful attention to and points including renal function, blood pressure, and metabolic alkalosis.    Lower extremity edema and abdominal wall edema is quite suggestive of combined left and right sided heart failure.  Ultrasound does not confirm the presence of any significant ascitic fluid.  He has orthopneic and has been for some time.  No PND.  Some degree of cough and difficulty in secretion clearance.  History is a bit difficult to obtain with any precision, but it appears that while symptoms have been markedly worse over the past 3-4 weeks.  They have been present for a longer period of time.  Marked edema is suggestive of diastolic heart failure and epidemiologically he would certainly be a candidate for this.  Other considerations include pericardial disease.  Trials of diuresis with furosemide.  Close monitoring.  Electrolyte repletion as needed.  Echocardiogram when this is available in the morning.  As below.  Atrial fibrillation is not markedly responsive to diltiazem infusion.  On the basis that heart failure likely is present.  We will begin oral metoprolol in addition.  Withhold lisinopril as he is undergoing active diuresis, although renal function.  Currently, is preserved.  In addition to usual sources of heart failure.  Could consider acute heart failure on the basis of continuous ethanol abuse.  Thyroid axis is within expected limits.  2. Atrial fibrillation with rapid ventricular response.  No need for intravenous diltiazem for several days.  Rate control is reasonable, although increases with activity.  As above, increased metoprolol, both for treatment of heart failure as well as rate control.  He will need a period of observation to determine whether this is persistent/permanent or may improve with improvement in his heart failure.  In any event,  rate control is adequate now.    At least initially.  We will begin anticoagulation with warfarin.  When he is closer to discharge.  We will investigate  Other options, although I be inclined to believe that  Ready reversal of anticoagulation may be valuable for this patient.  3.  Portal hypertension.  Abdominal ultrasound yesterday suggesting only lobular liver, quite consistent with cirrhosis.  Doubt any intrahepatic thrombotic process. Few spider angiomata on the anterior chest.  No palmar erythema. iin any event, it does not appear the portal hypertension is severe, but likely a degree is present.  On initial ultrasound not enough ascites to prompt paracentesis.  4.  Continuous ethanol abuse.  No evidence of withdrawal.  Continue oral thiamine. Regular use of beer.  2/4 on CAGE questionaire.  He does show some evidence of portal hypertension, quite consistent with alcoholic Cirrhosis. Usual withdrawal protocol.  Hypomagnesemia supports continuous ethanol use.  I discussed with him at some length harmful effects of alcohol on both hepatic and heart function.  He is somewhat skeptical that alcohol can account for this.  5.  Hypomagnesemia.  hypokalemia.  Hypomagnesemia, resolved.  Likely largely related to ethanol abuse.  Hypokalemia may be more related to continued diuresis.  Easily repleted with usual protocol.  5.  Tobacco abuse.  Usual use of alcohol with a 50-70-pack-year history.  Nicotine replacement.  Discussed with him harmful effects of tobacco.  He acknowledges this, but currently does not believe he can stop smoking.  He now is his understanding that he is not able to smoke while in hospital.  Unfortunately, given multiple other issues, perhaps tobacco abuse is not the most prominent issue to address at the present time.    Active Problems:    Heart failure with acute decompensation, type unknown (H)    DVT Prophylaxis: Continue subcutaneous enoxaparin until anticoagulated with warfarin  Code Status:  Full Code    Disposition: Expected discharge in 2-3 days depending on his course    Carl Olivier    Interval History   Awake, alert interactive. Little resting dyspnea.    -Data reviewed today: I reviewed all new labs and imaging results over the last 24 hours.     Peripheral IV 12/03/17 Left Lower forearm (Active)   Site Assessment WDL 12/6/2017  9:00 AM   Line Status Infusing;Checked every 1-2 hour 12/6/2017  9:00 AM   Phlebitis Scale 0-->no symptoms 12/6/2017  9:00 AM   Infiltration Scale 0 12/6/2017  9:00 AM   Extravasation? No 12/4/2017  4:00 AM   Number of days:3     Line/device assessment(s) completed for medical necessity December 5    Physical Exam   Temp: 98  F (36.7  C) Temp src: Tympanic BP: 120/70   Heart Rate: 115 Resp: 18 SpO2: 98 % O2 Device: None (Room air)    Vitals:    12/03/17 1015 12/04/17 0609 12/05/17 0500   Weight: 122.7 kg (270 lb 8.1 oz) 121.2 kg (267 lb 3.2 oz) 118.3 kg (260 lb 12.9 oz)     Vital Signs with Ranges  Temp:  [98  F (36.7  C)-98.1  F (36.7  C)] 98  F (36.7  C)  Heart Rate:  [106-144] 115  Resp:  [18-22] 18  BP: (120-127)/(70-95) 120/70  SpO2:  [95 %-98 %] 98 %  I/O last 3 completed shifts:  In: 890 [P.O.:890]  Out: 3725 [Urine:3725]  Awake, alert, obese man lying in bed on ICU.  HEENT: Pupils equal, conjugate. No icterus or nystagmus. Oral mucosa moist. No facial asymmetry.   Neck: Supple, jugular veins difficult to determine. Trachea midline   Chest: few spider angiomata on anterior chest wall. No chest wall movement asymmetry. Aeration diminished at bases with aeration, slightly improved over the past 24 hours.. Accessory muscles not in use. Expiratory time not increased. No tidal wheezes. Basilar medium to coarse rhonchi. Few basilar medium crackles.   Cardiac: PMI not displaced. S1, S2 unremarkable. No S3, S4. P2 questionably accentuated. No murmurs. Carotid upstroke preserved.   Abdomen: Soft, obese. Improved abdominal wall edema extending half way up the abdominal  wall. No palpation or percussion tenderness. Bowel sounds in all quadrants. Liver and spleen difficult to palpate but not clearly increased in size. No bruits, masses, or pulsations.   Extremities: brawny edema to legs bilaterally with stasis dermatitis.  Pitting edema to thighs and extending to abdominal wall. eextremities warm distally.   Neurologic: Mental state above. Motor 5/5 and bilaterally equal. Tone preserved. No fasiculations or tremors.     Medications     Warfarin Therapy Reminder         warfarin  7.5 mg Oral ONCE at 18:00     furosemide  40 mg Intravenous Q6H     metoprolol  50 mg Oral BID     [START ON 12/7/2017] lisinopril  10 mg Oral Daily     thiamine  100 mg Oral Daily     enoxaparin  40 mg Subcutaneous Q24H     nicotine  1 patch Transdermal Daily     nicotine   Transdermal Q8H     nicotine   Transdermal Daily           Data     Recent Labs  Lab 12/06/17  0913 12/06/17  0234 12/05/17 2019 12/05/17  0441 12/04/17  1508 12/04/17  0430 12/03/17  2155 12/03/17  1605 12/03/17  0926 12/03/17  0630   WBC  --   --   --   --  8.9  --  8.3  --   --   --  10.3   HGB  --   --   --   --  13.1*  --  13.9  --   --   --  14.9   MCV  --   --   --   --  91  --  91  --   --   --  89   PLT  --  230  --   --  228  --  249  --   --   --  272   INR  --  1.33*  --   --  1.26* 1.34*  --   --   --   --   --    NA  --   --   --   --  136  --  134  --   --   --  131*   POTASSIUM 3.3* 3.7 3.2*  < > 3.1*  --  3.6  --   --   --  4.2   CHLORIDE  --   --   --   --  99  --  98  --   --   --  97   CO2  --   --   --   --  28  --  25  --   --   --  22   BUN  --   --   --   --  13  --  10  --   --   --  8   CR  --  0.94  --   --  0.89  --  0.96  --   --   --  0.86   ANIONGAP  --   --   --   --  9  --  11  --   --   --  12   HECTOR  --   --   --   --  8.1*  --  8.5  --   --   --  8.9   GLC  --   --   --   --  99  --  109*  --   --   --  114*   ALBUMIN  --   --   --   --   --   --   --   --   --   --  3.6   PROTTOTAL  --   --   --   --    --   --   --   --   --   --  7.8   BILITOTAL  --   --   --   --   --   --   --   --   --   --  1.2   ALKPHOS  --   --   --   --   --   --   --   --   --   --  134   ALT  --   --   --   --   --   --   --   --   --   --  23   AST  --   --   --   --   --   --   --   --   --   --  23   LIPASE  --   --   --   --   --   --   --   --   --   --  164   TROPI  --   --   --   --   --   --   --  0.020 <0.015 <0.015 <0.015   < > = values in this interval not displayed.    Lactic Acid   Date Value Ref Range Status   12/03/2017 2.3 (H) 0.4 - 2.0 mmol/L Final     Comment:     Significant value called to and read back by  BRYLEIGH STARICH ON 12/3/17 AT 0959 BY [facility]     12/03/2017 2.4 (H) 0.4 - 2.0 mmol/L Final     Comment:     Significant value called to and read back by  PEPE PRITCHARD ON 12/3/17 AT 0657 BY [facility]         No results found for this or any previous visit (from the past 24 hour(s)).

## 2017-12-06 NOTE — PHARMACY-ANTICOAGULATION SERVICE
Warfarin Education Note  Spoke with the patient at length yesterday regarding warfarin therapy, afib and prevention of stroke, compliance, follow-up monitoring, drug-drug and drug-alcohol interactions, diet interactions, adverse effects, and provided him with a packet of educational material. Patient was very receptive to education. He told me that he will likely still drink more than 2 alcoholic drinks/day but is aware of the risks of combining alcohol and warfarin. He also mentioned he takes Aleve occasionally but discussed the increased bleeding and cardiovascular risks when taking Aleve. Patient had no further questions/concerns.     Anticoagulation Dose History     Recent Dosing and Labs Latest Ref Rng & Units 12/4/2017 12/5/2017 12/6/2017    Warfarin 5 mg - 5 mg 5 mg -    INR 0.80 - 1.20 1.34(H) 1.26(H) 1.33(H)          Pina Colunga [facility]

## 2017-12-06 NOTE — PLAN OF CARE
Writer of this note off floor from [id number], patient was cared for, monitored, and assess by other qualified floor staff at that time.

## 2017-12-06 NOTE — PLAN OF CARE
"Problem: Patient Care Overview  Goal: Plan of Care/Patient Progress Review  Outcome: No Change  VSS this shift, voids in urinal at bedside. Continues to diurese from IV lasix. Potassium replaced this shift as per protocol. Sats remain adequate on RA. HR continues to vary depending on activity level, 100's at rest and 120-130's with activity. Denies any pain. Pt has a general kaitlynn appearance. BLE's and abdomen continue with edema but are slowly improving. Pt has refused any hygiene care this shift despite encouragement. Alert and oriented, pleasant, but does become frustrated with frequent voiding due to lasix and with swallowing potassium tablets. Pt does verbalize understanding of importance of treatment plan and states he would rather do this than \"feel like I did before.\" Uses call light appropriately.     Face to face report given with opportunity to observe patient.    Report given to LAURY Redmond   12/6/2017  7:37 AM      "

## 2017-12-06 NOTE — PLAN OF CARE
Problem: Patient Care Overview  Goal: Plan of Care/Patient Progress Review  Outcome: Improving  Patient made step down today. Telemetry monitoring continues to show A-fib. HR continues to fluctuate with activity. Currently patient remains tachycardic up to the 130's with activity. PO meds adjusted per MD for tonight. Ambulated in hallway x2 today. Patient is steady on feet. HR did jump to a highest of 150's briefly on second walk of the day. Majority of walking 's-130's. Remains on RA but is LIU. Lungs clear bilaterally. Lasix q6h IV continues, urine output adequate. Abdominal and lower leg edema continues. K replaced twice this shift, recheck scheduled for 2030 tonight. Makes needs known.     Face to face report given with opportunity to observe patient.    Report given to Ashley Balderas   12/5/2017  8:10 PM

## 2017-12-07 LAB
ALBUMIN SERPL-MCNC: 3.1 G/DL (ref 3.4–5)
ALP SERPL-CCNC: 124 U/L (ref 40–150)
ALT SERPL W P-5'-P-CCNC: 22 U/L (ref 0–70)
ANION GAP SERPL CALCULATED.3IONS-SCNC: 7 MMOL/L (ref 3–14)
AST SERPL W P-5'-P-CCNC: 26 U/L (ref 0–45)
BASOPHILS # BLD AUTO: 0.1 10E9/L (ref 0–0.2)
BASOPHILS NFR BLD AUTO: 0.9 %
BILIRUB SERPL-MCNC: 1 MG/DL (ref 0.2–1.3)
BUN SERPL-MCNC: 19 MG/DL (ref 7–30)
CALCIUM SERPL-MCNC: 8.7 MG/DL (ref 8.5–10.1)
CHLORIDE SERPL-SCNC: 100 MMOL/L (ref 94–109)
CO2 SERPL-SCNC: 30 MMOL/L (ref 20–32)
CREAT SERPL-MCNC: 1.03 MG/DL (ref 0.66–1.25)
DIFFERENTIAL METHOD BLD: ABNORMAL
EOSINOPHIL # BLD AUTO: 0.1 10E9/L (ref 0–0.7)
EOSINOPHIL NFR BLD AUTO: 1.2 %
ERYTHROCYTE [DISTWIDTH] IN BLOOD BY AUTOMATED COUNT: 14 % (ref 10–15)
GFR SERPL CREATININE-BSD FRML MDRD: 73 ML/MIN/1.7M2
GLUCOSE SERPL-MCNC: 96 MG/DL (ref 70–99)
HCT VFR BLD AUTO: 38 % (ref 40–53)
HGB BLD-MCNC: 12.8 G/DL (ref 13.3–17.7)
IMM GRANULOCYTES # BLD: 0 10E9/L (ref 0–0.4)
IMM GRANULOCYTES NFR BLD: 0.2 %
INR PPP: 1.57 (ref 0.8–1.2)
LYMPHOCYTES # BLD AUTO: 1.4 10E9/L (ref 0.8–5.3)
LYMPHOCYTES NFR BLD AUTO: 16.8 %
MAGNESIUM SERPL-MCNC: 2.3 MG/DL (ref 1.6–2.3)
MCH RBC QN AUTO: 30.8 PG (ref 26.5–33)
MCHC RBC AUTO-ENTMCNC: 33.7 G/DL (ref 31.5–36.5)
MCV RBC AUTO: 92 FL (ref 78–100)
MONOCYTES # BLD AUTO: 1.4 10E9/L (ref 0–1.3)
MONOCYTES NFR BLD AUTO: 17 %
NEUTROPHILS # BLD AUTO: 5.1 10E9/L (ref 1.6–8.3)
NEUTROPHILS NFR BLD AUTO: 63.9 %
NRBC # BLD AUTO: 0 10*3/UL
NRBC BLD AUTO-RTO: 0 /100
PLATELET # BLD AUTO: 234 10E9/L (ref 150–450)
POTASSIUM SERPL-SCNC: 3.5 MMOL/L (ref 3.4–5.3)
PROT SERPL-MCNC: 7.2 G/DL (ref 6.8–8.8)
RBC # BLD AUTO: 4.15 10E12/L (ref 4.4–5.9)
SODIUM SERPL-SCNC: 137 MMOL/L (ref 133–144)
WBC # BLD AUTO: 8 10E9/L (ref 4–11)

## 2017-12-07 PROCEDURE — 85025 COMPLETE CBC W/AUTO DIFF WBC: CPT | Performed by: INTERNAL MEDICINE

## 2017-12-07 PROCEDURE — 99232 SBSQ HOSP IP/OBS MODERATE 35: CPT | Performed by: INTERNAL MEDICINE

## 2017-12-07 PROCEDURE — 94640 AIRWAY INHALATION TREATMENT: CPT | Mod: 76

## 2017-12-07 PROCEDURE — 40000275 ZZH STATISTIC RCP TIME EA 10 MIN

## 2017-12-07 PROCEDURE — 80053 COMPREHEN METABOLIC PANEL: CPT | Performed by: INTERNAL MEDICINE

## 2017-12-07 PROCEDURE — 94640 AIRWAY INHALATION TREATMENT: CPT

## 2017-12-07 PROCEDURE — 12000000 ZZH R&B MED SURG/OB

## 2017-12-07 PROCEDURE — 25000128 H RX IP 250 OP 636: Performed by: INTERNAL MEDICINE

## 2017-12-07 PROCEDURE — A9270 NON-COVERED ITEM OR SERVICE: HCPCS | Mod: GY | Performed by: INTERNAL MEDICINE

## 2017-12-07 PROCEDURE — 94664 DEMO&/EVAL PT USE INHALER: CPT

## 2017-12-07 PROCEDURE — 83735 ASSAY OF MAGNESIUM: CPT | Performed by: INTERNAL MEDICINE

## 2017-12-07 PROCEDURE — 85610 PROTHROMBIN TIME: CPT | Performed by: INTERNAL MEDICINE

## 2017-12-07 PROCEDURE — 36415 COLL VENOUS BLD VENIPUNCTURE: CPT | Performed by: INTERNAL MEDICINE

## 2017-12-07 PROCEDURE — 25000132 ZZH RX MED GY IP 250 OP 250 PS 637: Mod: GY | Performed by: INTERNAL MEDICINE

## 2017-12-07 RX ORDER — ALBUTEROL SULFATE 90 UG/1
2 AEROSOL, METERED RESPIRATORY (INHALATION) EVERY 6 HOURS PRN
Status: DISCONTINUED | OUTPATIENT
Start: 2017-12-07 | End: 2017-12-08 | Stop reason: HOSPADM

## 2017-12-07 RX ORDER — FUROSEMIDE 10 MG/ML
40 INJECTION INTRAMUSCULAR; INTRAVENOUS EVERY 6 HOURS
Status: COMPLETED | OUTPATIENT
Start: 2017-12-07 | End: 2017-12-07

## 2017-12-07 RX ADMIN — WARFARIN SODIUM 7.5 MG: 2.5 TABLET ORAL at 18:31

## 2017-12-07 RX ADMIN — ENOXAPARIN SODIUM 40 MG: 40 INJECTION SUBCUTANEOUS at 12:38

## 2017-12-07 RX ADMIN — METOPROLOL TARTRATE 50 MG: 50 TABLET, FILM COATED ORAL at 08:06

## 2017-12-07 RX ADMIN — METOPROLOL TARTRATE 50 MG: 50 TABLET, FILM COATED ORAL at 21:22

## 2017-12-07 RX ADMIN — FUROSEMIDE 40 MG: 10 INJECTION, SOLUTION INTRAVENOUS at 14:13

## 2017-12-07 RX ADMIN — FLUTICASONE FUROATE AND VILANTEROL TRIFENATATE 1 PUFF: 200; 25 POWDER RESPIRATORY (INHALATION) at 09:05

## 2017-12-07 RX ADMIN — ALBUTEROL SULFATE 2 PUFF: 90 AEROSOL, METERED RESPIRATORY (INHALATION) at 11:12

## 2017-12-07 RX ADMIN — LISINOPRIL 10 MG: 10 TABLET ORAL at 08:16

## 2017-12-07 RX ADMIN — Medication 100 MG: at 08:16

## 2017-12-07 RX ADMIN — FUROSEMIDE 40 MG: 10 INJECTION, SOLUTION INTRAVENOUS at 08:07

## 2017-12-07 RX ADMIN — NICOTINE 1 PATCH: 21 PATCH, EXTENDED RELEASE TRANSDERMAL at 08:16

## 2017-12-07 NOTE — PROGRESS NOTES
"CLINICAL NUTRITION SERVICES  -  ASSESSMENT NOTE    REASON FOR ASSESSMENT  Ramírez Hernández is a seen by Registered Dietitian for Provider Order - Heart failure diet education and LOS    Patient reports good appetite and no recent weight changes that are not fluid related.     CURRENT NUTRITION ORDERS  Diet Order:  Regular   Current Intake/Tolerance: 100% of most meals      ANTHROPOMETRICS  Height: 5' 8\"  Weight: 251 lbs 12.25 oz  Body mass index is 38.28 kg/(m^2).  Weight Status:  Obesity Grade II BMI 35-39.9  IBW: 154 lbs  Weight History: Weight loss fluid related-  Per I/Os -12 liter  12/3/17- 270 lbs  12/4/17 - 267 lbs  12/5/17 - 260 lbs      ASSESSED NUTRITION NEEDS PER APPROVED PRACTICE GUIDELINES:  Estimated Energy Needs: 1900- 2100 kcals ([place] St Jeor- stress factor 1-1.1)  Estimated Protein Needs:  55-70 grams protein (0.8-1 g pro/Kg using IBW)      Malnutrition Diagnosis:  None suspected      NUTRITION DIAGNOSIS:  Food and nutrition related knowledge deficit related to heart failure/low sodium diet as evidenced by heart failure diet education.      NUTRITION INTERVENTIONS  Heart failure diet education with handout. Looking at the nutrition facts label for the amount of sodium in food. Seasoning foods without salt. Patient does his own cooking at home. Doesn't eat fast food or at restaurants often.      MONITORING AND EVALUATION:  Weight, intake, labs      Diandra Sam RD, LD                "

## 2017-12-07 NOTE — PLAN OF CARE
Problem: Patient Care Overview  Goal: Plan of Care/Patient Progress Review  Outcome: No Change  A-fib 90's-110's.  Lungs are clear to auscultation.  Afebrile.  2+ edema to RLE and LLE, red/kaitlynn.  Denies pain.      Face to face report given with opportunity to observe patient.    Report given to LAURY Downey.    Nicolasa Yang   12/7/2017  7:32 AM

## 2017-12-07 NOTE — PROGRESS NOTES
[facility]    Spiritual Health Progress Note    Date of Service (when I saw the patient): 12/07/2017     Assessment & Plan   Ramírez Hernández is a 62 year old male who was admitted on 12/3/2017.  Introduced the patient as an initial visit to Spiritual Health Services and to see if I could connect the person into his identified vj community. We had a short visit. Ramírez did not have any Spiritual needs at this time.    Rev. Niles Garcia  Volunteer

## 2017-12-07 NOTE — PROGRESS NOTES
[facility]    Hospitalist Progress Note    Date of Service (when I saw the patient): 12/07/2017    Assessment & Plan   Ramírez Hernández is a 62 year old man who presented with lower extremity and abdominal swelling for at least the past several weeks up to perhaps several months.  He recalls this worsened somewhat abruptly in the past several weeks. He presented to ED on urging of his brother.  Evaluation in emergency department confirmed abdominal wall edema and lower extremity edema.  Laboratories relatively unrevealing in emergency department.  Atrial fibrillation with rapid ventricular response was noted.  He responded to a bolus dose of intravenous diltiazem.  After my discussion with ED staff, diltiazem infusion was begun.  Clinical impression is of atrial fibrillation and acute heart failure with likely portal hypertension.    1. Acute systolic and diastolic heart failure.  Possible cor pulmonale  Continued diuresis. Mild metabolic else closes but no evidence of ischemia.  Continue diuresis with intravenous furosemide as he likely still has some gut wall edema.  Decrease frequency to twice daily.  If he continues to show progress over the next 1-2 days may be able to make a transition to oral dosing. Declining both weight and fluid balance, although some discrepancy between the 2.  Echocardiogram showed LV dilation, moderate reduction in systolic function, and RV dilation. Consistent with left systolic and diastolic heart failure with some cor pulmonale. No pericardial effusion and pericardial images as well as left/right chambers did not suggest pericardial disease strongly. Because of the right heart failure, even though he has a markedly improved pulmonary exam, his persistent edema, suggests that with caution continued diuresis may be possible.      Dyspnea continues to improve, with some increase in mobilization.  Still walking with encouragement.  Encouraged at least 3-4 walks daily with  nursing staff.  Increase in dose of metoprolol to 100 mg daily, as well as further increase in lisinopril, with close monitoring for effect on blood pressure and renal function.  Anticipate he is going to require several days of continued IV diuresis with careful attention to and points including renal function, blood pressure, and metabolic alkalosis.    Lower extremity edema and abdominal wall edema is quite suggestive of combined left and right sided heart failure.  Ultrasound does not confirm the presence of any significant ascitic fluid.  He has orthopneic and has been for some time.  No PND.  Some degree of cough and difficulty in secretion clearance.  History is a bit difficult to obtain with any precision, but it appears that while symptoms have been markedly worse over the past 3-4 weeks.  They have been present for a longer period of time.  Marked edema is suggestive of diastolic heart failure and epidemiologically he would certainly be a candidate for this.  Other considerations include pericardial disease.  Trials of diuresis with furosemide.  Close monitoring.  Electrolyte repletion as needed.  Echocardiogram when this is available in the morning.  As below.  Atrial fibrillation is not markedly responsive to diltiazem infusion.  On the basis that heart failure likely is present.  We will begin oral metoprolol in addition.  Withhold lisinopril as he is undergoing active diuresis, although renal function.  Currently, is preserved.  In addition to usual sources of heart failure.  Could consider acute heart failure on the basis of continuous ethanol abuse.  Thyroid axis is within expected limits.  2. Atrial fibrillation with rapid ventricular response.  Overall reasonable rate control.  Some worsening with activity.  Would not aim for tight rate control, at least initially. No need for intravenous diltiazem for several days.    As above, increased metoprolol 2/6, both for treatment of heart failure as well  as rate control.  He will need a period of observation to determine whether this is persistent/permanent or may improve with improvement in his heart failure.  In any event, rate control is adequate now.    At least initially.  We will begin anticoagulation with warfarin.  When he is closer to discharge.  We will investigate  Other options, although I be inclined to believe that  Ready reversal of anticoagulation may be valuable for this patient.  3.  Portal hypertension.  Abdominal ultrasound yesterday suggesting only lobular liver, quite consistent with cirrhosis.  Doubt any intrahepatic thrombotic process. Few spider angiomata on the anterior chest.  No palmar erythema. iin any event, it does not appear the portal hypertension is severe, but likely a degree is present.  On initial ultrasound not enough ascites to prompt paracentesis.  4.  Continuous ethanol abuse.  No evidence of withdrawal.  Continue oral thiamine. Regular use of beer.  2/4 on CAGE questionaire.  He does show some evidence of portal hypertension, quite consistent with alcoholic Cirrhosis. Usual withdrawal protocol.  Hypomagnesemia supports continuous ethanol use.  I discussed with him at some length harmful effects of alcohol on both hepatic and heart function.  He is somewhat skeptical that alcohol can account for this.  5.  Hypomagnesemia.  hypokalemia.  Hypomagnesemia resolved.  Likely largely related to ethanol abuse.  Hypokalemia may be more related to continued diuresis.  Easily repleted with usual protocol.  5.  Tobacco abuse.  Usual use of alcohol with a 50-70-pack-year history.  Nicotine replacement.  Discussed with him harmful effects of tobacco.  He acknowledges this, but currently does not believe he can stop smoking.  He now is his understanding that he is not able to smoke while in hospital.  Unfortunately, given multiple other issues, perhaps tobacco abuse is not the most prominent issue to address at the present time.  6.   Warfarin anticoagulation.  Continuing warfarin anticoagulation. INR 1.6 today.  Even if still slightly therapeutic in the next 1-2 days may be reasonable to discontinue enoxaparin at that time.  Active Problems:    Heart failure with acute decompensation, type unknown (H)    DVT Prophylaxis: Continue subcutaneous enoxaparin until anticoagulated with warfarin  Code Status: Full Code    Disposition: Expected discharge in 2-3 days depending on his course    Carl Olivier    Interval History   Awake, alert interactive. No resting dyspnea. Still poor exercise tolerance    -Data reviewed today: I reviewed all new labs and imaging results over the last 24 hours.     Peripheral IV 12/03/17 Left Lower forearm (Active)   Site Assessment WDL 12/7/2017  8:04 AM   Line Status Infusing;Checked every 1-2 hour 12/7/2017  8:04 AM   Phlebitis Scale 0-->no symptoms 12/7/2017  8:04 AM   Infiltration Scale 0 12/7/2017  8:04 AM   Extravasation? No 12/4/2017  4:00 AM   Number of days:4     Line/device assessment(s) completed for medical necessity December 5    Physical Exam   Temp: 97.9  F (36.6  C) Temp src: Tympanic BP: 120/74 Pulse: 92 Heart Rate: 111 Resp: 20 SpO2: 95 % O2 Device: None (Room air)    Vitals:    12/04/17 0609 12/05/17 0500 12/07/17 0500   Weight: 121.2 kg (267 lb 3.2 oz) 118.3 kg (260 lb 12.9 oz) 114.2 kg (251 lb 12.3 oz)     Vital Signs with Ranges  Temp:  [97.9  F (36.6  C)-99  F (37.2  C)] 97.9  F (36.6  C)  Pulse:  [92] 92  Heart Rate:  [] 111  Resp:  [18-20] 20  BP: (116-122)/(65-74) 120/74  SpO2:  [92 %-97 %] 95 %  I/O last 3 completed shifts:  In: 240 [P.O.:240]  Out: 2875 [Urine:2875]  Awake, alert, obese man lying in bed on medical wards.  HEENT: Pupils equal, conjugate. No icterus or nystagmus. Oral mucosa moist. No facial asymmetry.   Neck: Supple, jugular veins difficult to determine. Trachea midline   Chest: few spider angiomata on anterior chest wall. No chest wall movement asymmetry. Aeration  diminished at bases with aeration, improved over past several days. Accessory muscles not in use. Expiratory time not increased. No tidal wheezes. Basilar medium to coarse rhonchi. Few basilar medium crackles.   Cardiac: PMI not displaced. S1, S2 unremarkable. No S3, S4. P2 questionably accentuated. No murmurs. Carotid upstroke preserved.   Abdomen: Soft, obese. Persistent abdominal wall edema extending half way up the abdominal wall. No palpation or percussion tenderness. Bowel sounds in all quadrants. Liver and spleen difficult to palpate but not clearly increased in size. No bruits, masses, or pulsations.   Extremities: brawny edema to legs bilaterally with stasis dermatitis.  Pitting edema to thighs and extending to abdominal wall. eextremities warm distally.   Neurologic: Mental state above. Motor 5/5 and bilaterally equal. Tone preserved. No fasiculations or tremors.     Medications     Warfarin Therapy Reminder         furosemide  40 mg Intravenous Q6H     fluticasone-vilanterol  1 puff Inhalation Daily     warfarin  7.5 mg Oral ONCE at 18:00     metoprolol  50 mg Oral BID     lisinopril  10 mg Oral Daily     thiamine  100 mg Oral Daily     enoxaparin  40 mg Subcutaneous Q24H     nicotine  1 patch Transdermal Daily     nicotine   Transdermal Q8H     nicotine   Transdermal Daily           Data     Recent Labs  Lab 12/07/17  0525 12/06/17  2120 12/06/17  0913 12/06/17  0234  12/05/17  0441  12/04/17  0430 12/03/17  2155 12/03/17  1605 12/03/17  0926 12/03/17  0630   WBC 8.0  --   --   --   --  8.9  --  8.3  --   --   --  10.3   HGB 12.8*  --   --   --   --  13.1*  --  13.9  --   --   --  14.9   MCV 92  --   --   --   --  91  --  91  --   --   --  89     --   --  230  --  228  --  249  --   --   --  272   INR 1.57*  --   --  1.33*  --  1.26*  < >  --   --   --   --   --      --   --   --   --  136  --  134  --   --   --  131*   POTASSIUM 3.5 3.7 3.3* 3.7  < > 3.1*  --  3.6  --   --   --  4.2    CHLORIDE 100  --   --   --   --  99  --  98  --   --   --  97   CO2 30  --   --   --   --  28  --  25  --   --   --  22   BUN 19  --   --   --   --  13  --  10  --   --   --  8   CR 1.03  --   --  0.94  --  0.89  --  0.96  --   --   --  0.86   ANIONGAP 7  --   --   --   --  9  --  11  --   --   --  12   HECTOR 8.7  --   --   --   --  8.1*  --  8.5  --   --   --  8.9   GLC 96  --   --   --   --  99  --  109*  --   --   --  114*   ALBUMIN 3.1*  --   --   --   --   --   --   --   --   --   --  3.6   PROTTOTAL 7.2  --   --   --   --   --   --   --   --   --   --  7.8   BILITOTAL 1.0  --   --   --   --   --   --   --   --   --   --  1.2   ALKPHOS 124  --   --   --   --   --   --   --   --   --   --  134   ALT 22  --   --   --   --   --   --   --   --   --   --  23   AST 26  --   --   --   --   --   --   --   --   --   --  23   LIPASE  --   --   --   --   --   --   --   --   --   --   --  164   TROPI  --   --   --   --   --   --   --   --  0.020 <0.015 <0.015 <0.015   < > = values in this interval not displayed.    Lactic Acid   Date Value Ref Range Status   12/03/2017 2.3 (H) 0.4 - 2.0 mmol/L Final     Comment:     Significant value called to and read back by  BRYLEIGH STARICH ON 12/3/17 AT 0959 BY [facility]     12/03/2017 2.4 (H) 0.4 - 2.0 mmol/L Final     Comment:     Significant value called to and read back by  PEPE PRITCHARD ON 12/3/17 AT 0657 BY [facility]         No results found for this or any previous visit (from the past 24 hour(s)).

## 2017-12-07 NOTE — PLAN OF CARE
Problem: Arrhythmia/Dysrhythmia (Symptomatic) (Adult)  Goal: Signs and Symptoms of Listed Potential Problems Will be Absent, Minimized or Managed (Arrhythmia/Dysrhythmia)  Signs and symptoms of listed potential problems will be absent, minimized or managed by discharge/transition of care (reference Arrhythmia/Dysrhythmia (Symptomatic) (Adult) CPG).   Outcome: Improving  VSS. Afebrile. Alert and oriented x4. Edema continues to legs and feet. Lasix given with good urine output. Up stand by assist.  Using call light appropriately.

## 2017-12-07 NOTE — PLAN OF CARE
"Problem: Patient Care Overview  Goal: Plan of Care/Patient Progress Review  Outcome: Improving  Reason for hospital stay:  Heart Failure    Most recent vitals: /64 (BP Location: Right arm)  Pulse 92  Temp 98  F (36.7  C) (Tympanic)  Resp 20  Ht 1.727 m (5' 8\")  Wt 114.2 kg (251 lb 12.3 oz)  SpO2 97%  BMI 38.28 kg/m2  Pain Management:  Denies pain  Orientation:  A&O  Cardiac:  Tele reading A-fib 110's per ICU. Irr HR noted.  Respiratory:  LS diminished. Initally reports dyspnea on exertion and then declines any breathing difficulty during ambulation  GI:  BS active. Abd round and firm  :  Voids without difficulty in urinal for I&0  Diet: 3gm NA  Skin Issues:  Redness noted to BLE and abd. +2 BLE edema noted  IVF:  Saline locked  ABX:  NOne  Mobility:  Ind in room. Stand by assist ambulating in hallway x2 this shift  Safety:  Call light with in reach    Comments: Potassium 3.5 and Magnesium 2.3, no replacement per protocol    12/7/2017  2:41 PM  Nasreen Asher    Face to face report given with opportunity to observe patient.    Report given to LAURY Talley   12/7/2017  3:28 PM      "

## 2017-12-07 NOTE — PROGRESS NOTES
Checked in with Trip.  He denies questions or concerns.  Feeling well today.  CTS will remain available.

## 2017-12-08 VITALS
RESPIRATION RATE: 16 BRPM | TEMPERATURE: 96.6 F | OXYGEN SATURATION: 94 % | HEIGHT: 68 IN | WEIGHT: 250 LBS | SYSTOLIC BLOOD PRESSURE: 137 MMHG | HEART RATE: 92 BPM | DIASTOLIC BLOOD PRESSURE: 87 MMHG | BODY MASS INDEX: 37.89 KG/M2

## 2017-12-08 LAB
ALBUMIN SERPL-MCNC: 3.1 G/DL (ref 3.4–5)
ALP SERPL-CCNC: 124 U/L (ref 40–150)
ALT SERPL W P-5'-P-CCNC: 24 U/L (ref 0–70)
ANION GAP SERPL CALCULATED.3IONS-SCNC: 8 MMOL/L (ref 3–14)
AST SERPL W P-5'-P-CCNC: 26 U/L (ref 0–45)
BILIRUB SERPL-MCNC: 1.1 MG/DL (ref 0.2–1.3)
BUN SERPL-MCNC: 22 MG/DL (ref 7–30)
CALCIUM SERPL-MCNC: 9 MG/DL (ref 8.5–10.1)
CHLORIDE SERPL-SCNC: 99 MMOL/L (ref 94–109)
CO2 SERPL-SCNC: 29 MMOL/L (ref 20–32)
CREAT SERPL-MCNC: 1.03 MG/DL (ref 0.66–1.25)
ERYTHROCYTE [DISTWIDTH] IN BLOOD BY AUTOMATED COUNT: 13.9 % (ref 10–15)
GFR SERPL CREATININE-BSD FRML MDRD: 73 ML/MIN/1.7M2
GLUCOSE SERPL-MCNC: 125 MG/DL (ref 70–99)
HCT VFR BLD AUTO: 37.5 % (ref 40–53)
HGB BLD-MCNC: 12.7 G/DL (ref 13.3–17.7)
INR PPP: 1.89 (ref 0.8–1.2)
MCH RBC QN AUTO: 31.1 PG (ref 26.5–33)
MCHC RBC AUTO-ENTMCNC: 33.9 G/DL (ref 31.5–36.5)
MCV RBC AUTO: 92 FL (ref 78–100)
NT-PROBNP SERPL-MCNC: 4160 PG/ML (ref 0–900)
PLATELET # BLD AUTO: 240 10E9/L (ref 150–450)
POTASSIUM SERPL-SCNC: 3.6 MMOL/L (ref 3.4–5.3)
PROT SERPL-MCNC: 7.1 G/DL (ref 6.8–8.8)
RBC # BLD AUTO: 4.09 10E12/L (ref 4.4–5.9)
SODIUM SERPL-SCNC: 136 MMOL/L (ref 133–144)
WBC # BLD AUTO: 7.3 10E9/L (ref 4–11)

## 2017-12-08 PROCEDURE — 83880 ASSAY OF NATRIURETIC PEPTIDE: CPT | Performed by: INTERNAL MEDICINE

## 2017-12-08 PROCEDURE — A9270 NON-COVERED ITEM OR SERVICE: HCPCS | Mod: GY | Performed by: INTERNAL MEDICINE

## 2017-12-08 PROCEDURE — 94640 AIRWAY INHALATION TREATMENT: CPT

## 2017-12-08 PROCEDURE — 99239 HOSP IP/OBS DSCHRG MGMT >30: CPT | Performed by: NURSE PRACTITIONER

## 2017-12-08 PROCEDURE — 80053 COMPREHEN METABOLIC PANEL: CPT | Performed by: INTERNAL MEDICINE

## 2017-12-08 PROCEDURE — 36415 COLL VENOUS BLD VENIPUNCTURE: CPT | Performed by: INTERNAL MEDICINE

## 2017-12-08 PROCEDURE — A9270 NON-COVERED ITEM OR SERVICE: HCPCS | Mod: GY | Performed by: NURSE PRACTITIONER

## 2017-12-08 PROCEDURE — 85610 PROTHROMBIN TIME: CPT | Performed by: INTERNAL MEDICINE

## 2017-12-08 PROCEDURE — 25000132 ZZH RX MED GY IP 250 OP 250 PS 637: Mod: GY | Performed by: NURSE PRACTITIONER

## 2017-12-08 PROCEDURE — 25000132 ZZH RX MED GY IP 250 OP 250 PS 637: Mod: GY | Performed by: INTERNAL MEDICINE

## 2017-12-08 PROCEDURE — 85027 COMPLETE CBC AUTOMATED: CPT | Performed by: INTERNAL MEDICINE

## 2017-12-08 PROCEDURE — 40000275 ZZH STATISTIC RCP TIME EA 10 MIN

## 2017-12-08 RX ORDER — WARFARIN SODIUM 5 MG/1
5 TABLET ORAL
Status: DISCONTINUED | OUTPATIENT
Start: 2017-12-08 | End: 2017-12-08 | Stop reason: HOSPADM

## 2017-12-08 RX ORDER — METOPROLOL TARTRATE 50 MG
50 TABLET ORAL 2 TIMES DAILY
Qty: 60 TABLET | Refills: 0 | Status: SHIPPED | OUTPATIENT
Start: 2017-12-08 | End: 2018-01-10

## 2017-12-08 RX ORDER — FUROSEMIDE 40 MG
40 TABLET ORAL DAILY
Status: DISCONTINUED | OUTPATIENT
Start: 2017-12-08 | End: 2017-12-08 | Stop reason: HOSPADM

## 2017-12-08 RX ORDER — FUROSEMIDE 40 MG
40 TABLET ORAL DAILY
Qty: 30 TABLET | Refills: 0 | Status: SHIPPED | OUTPATIENT
Start: 2017-12-09 | End: 2018-01-10

## 2017-12-08 RX ORDER — WARFARIN SODIUM 5 MG/1
5 TABLET ORAL DAILY
Qty: 30 TABLET | Refills: 0 | Status: SHIPPED | OUTPATIENT
Start: 2017-12-08 | End: 2017-12-19

## 2017-12-08 RX ORDER — LISINOPRIL 10 MG/1
10 TABLET ORAL DAILY
Qty: 30 TABLET | Refills: 0 | Status: SHIPPED | OUTPATIENT
Start: 2017-12-09 | End: 2018-01-10

## 2017-12-08 RX ADMIN — LISINOPRIL 10 MG: 10 TABLET ORAL at 09:53

## 2017-12-08 RX ADMIN — METOPROLOL TARTRATE 50 MG: 50 TABLET, FILM COATED ORAL at 09:53

## 2017-12-08 RX ADMIN — FUROSEMIDE 40 MG: 40 TABLET ORAL at 09:53

## 2017-12-08 RX ADMIN — FLUTICASONE FUROATE AND VILANTEROL TRIFENATATE 1 PUFF: 200; 25 POWDER RESPIRATORY (INHALATION) at 08:08

## 2017-12-08 RX ADMIN — NICOTINE 1 PATCH: 21 PATCH, EXTENDED RELEASE TRANSDERMAL at 09:53

## 2017-12-08 RX ADMIN — Medication 100 MG: at 09:53

## 2017-12-08 NOTE — PROGRESS NOTES
Offered home care to [facility], denies need for that.  Feels confident in setting up his own medications and getting out to get INR checks.  Updated Megan Mccullough NP.

## 2017-12-08 NOTE — DISCHARGE INSTRUCTIONS
You have an appointment at the [facility] on Monday, December 11th @ 2:00pm. This is an hour long visit.    You have a hospital follow up appointment scheduled with Dr. Crum on Thursday, December 14th @ 1:45PM. Please arrive 15 minutes early for registration.    You also have an appointment to establish care with your new primary Dr. Crum on Monday, December 18th @ 1:15PM. Please arrive 15 minutes early for registration.

## 2017-12-08 NOTE — DISCHARGE SUMMARY
[facility]    Discharge Summary  Hospitalist    Date of Admission:  12/3/2017  Date of Discharge:  12/8/2017  2:30 PM  Discharging Provider: Megan Mccullough CNP  Date of Service (when I saw the patient): 12/8/17    Discharge Diagnoses   Principal Problem:    Acute systolic congestive heart failure (H)    A-fib with RVR  Active Problems:    Persistent atrial fibrillation (H)    Alcohol abuse    Tobacco abuse    History of Present Illness   Ramírez Hernández is a 62 year old man who presents with 3-4 weeks of increasing lower extremity and abdominal wall swelling.  He relates that he came to the emergency department today on the urging of his brother.  As the patient relates he had a little lower extremity edema and not any significant abdominal swelling until 3-4 weeks ago, although on further discussion with him, it is more likely it has been present for a longer period of time.  He also notes some cough and difficulty in clearing secretions.  Approximately a month ago he tried Mucinex, which in fact he is concerned may have caused his symptoms.  He also recently took what he believed to be an antibiotic, although he recalls the name to be Aleve.  He did not seek medical attention for this.  He has not had any medical attention for approximately 10 years when he underwent lumbar spinal surgery, probably including both bone graft and hardware placement approximately 1995.  He recalls some mention of cadiac dysfunction but was told, as he recalls, that no meaningful findings were identified. He has not had any medical attention for many years.  Regular smoker, usually approximately 1 pack per day.  Regular use of beer, which he indicates is less than daily.  Alcohol level below detectable level on presentation to emergency Department.  Appetite has not changed.  He has noted occasionally that his urine becomes dark, but not consistently.  No change in stool color, although he does not examine it  closely.  No dysuria.  No unilateral weakness or paresthesias.  No fat intolerance.    Hospital Course   Ramírez Hernández was admitted on 12/3/2017.  The following problems were addressed during his hospitalization:      Acute systolic congestive heart failure (H): On admission patient BNP >2000, he was started on aggressive IV diuresis and responded well to Lasix. ECHO confirmed systolic congestive heart failure with EF of 40-45%, diastolic function could not be assessed due to A-fib. He also has mild to moderate valve disease of all four valves with possible apical hypokinesis as well. During his stay he diuresed nearly 13 liters and 20 pounds. His lungs are clear, his edema is now limited to lower extremities and has improved significantly and is now non-pitting. His BUN/creatinine have started to creep up (though still normal), urine has darkened and output decreased so he is likely at dry weight now. He did not need or receive any potassium supplementation even with high dose diuresis. He is discharged on 40mg oral lasix daily and will need recheck in the next 5 days with BMP as he may tolerate lower dose. He is set up with an appointment to establish and follow with Dr. Crum.  I discussed wit him importance of daily weights, when to notify provider of weight gain, watching sodium intake etc. He was also started on Metoprolol and Lisinopril and was titrated up to current dosages without any adverse effects. Due to his multiple new medications and new diagnoses I do think he would benefit from home health for instruction and close monitoring particularly in the next few weeks but he refused any services.       Persistent atrial fibrillation (H): Noted to be in A-fib with RVR in ED with rates in the 130's and no palpitations or chest pain. He was given IV bolus cardizem and placed on cardizem gtt. His rates were controlled and cardizem discontinued in favor of starting beta blocker therapy. During the rest of  his stay his rates have been controlled in the 70-90's with increases to the low 110's with activity though persistently in A-fib. Blood pressures have tolerated BB well. He was started on Coumadin 12/4 and will continue with coumadin clinic for close monitoring with next INR set up for 12/11. Today INR was 1.89      Alcohol abuse: Advised to abstain from alcohol, of particular importance considering evidence of possible portal hypertension, mld abdominal ascites and new chronic anticoagulation.       Tobacco abuse: Advised on smoking cessation and effects on heart health. He state he is not ready to quit at this time but will think about it.       Megan Mccullough, CNP    Code Status   Full Code       Primary Care Physician   Physician No Ref-Primary      Discharge Disposition   Discharged to home  Condition at discharge: Stable    Consultations This Hospital Stay   SOCIAL WORK IP CONSULT  PHARMACY TO DOSE WARFARIN  NUTRITION SERVICES ADULT IP CONSULT    Time Spent on this Encounter   IMegan, personally saw the patient today and spent greater than 30 minutes discharging this patient.    Discharge Orders     INR     INR CLINIC REFERRAL     Reason for your hospital stay   Congestive heart failure     Follow-up and recommended labs and tests    Follow up with primary care provider, Physician No Ref-Primary, within 7 days for hospital follow- up.  The following labs/tests are recommended: CBC,BMP.    Follow-up with Coumadin Clinic on Monday for lab and Coumadin dosing.     Activity   Your activity upon discharge: activity as tolerated, elevate legs while at rest.     Monitor and record   weight every day. Notify your doctor if you gain over 2 pounds overnight or 5 pounds in a week. Take log of daily weights into doctor's appointment.     When to contact your care team   Call your primary doctor if you have any of the following: worsening shortness of breath, swelling of the legs, muscle cramping, chest  pain, palpitations, bleeding.     Full Code     Diet   Follow this diet upon discharge: Orders Placed This Encounter    2 gram sodium diet       Discharge Medications   Current Discharge Medication List      START taking these medications    Details   warfarin (COUMADIN) 5 MG tablet Take 1 tablet (5 mg) by mouth daily Until otherwise instructed by coumadin clinic.  Qty: 30 tablet, Refills: 0    Associated Diagnoses: Atrial fibrillation with rapid ventricular response (H)      lisinopril (PRINIVIL/ZESTRIL) 10 MG tablet Take 1 tablet (10 mg) by mouth daily  Qty: 30 tablet, Refills: 0    Associated Diagnoses: Acute systolic congestive heart failure (H)      metoprolol (LOPRESSOR) 50 MG tablet Take 1 tablet (50 mg) by mouth 2 times daily  Qty: 60 tablet, Refills: 0    Associated Diagnoses: Atrial fibrillation with rapid ventricular response (H); Acute systolic congestive heart failure (H)      furosemide (LASIX) 40 MG tablet Take 1 tablet (40 mg) by mouth daily  Qty: 30 tablet, Refills: 0    Associated Diagnoses: Acute systolic congestive heart failure (H)           Allergies   No Known Allergies  Data   Most Recent 3 CBC's:  Recent Labs   Lab Test  12/08/17 0519 12/07/17 0525 12/06/17 0234  12/05/17   0441   WBC  7.3  8.0   --   8.9   HGB  12.7*  12.8*   --   13.1*   MCV  92  92   --   91   PLT  240  234  230  228      Most Recent 3 BMP's:  Recent Labs   Lab Test  12/08/17 0519 12/07/17 0525 12/06/17 2120   12/06/17   0234   12/05/17   0441   NA  136  137   --    --    --    --   136   POTASSIUM  3.6  3.5  3.7   < >  3.7   < >  3.1*   CHLORIDE  99  100   --    --    --    --   99   CO2  29  30   --    --    --    --   28   BUN  22  19   --    --    --    --   13   CR  1.03  1.03   --    --   0.94   --   0.89   ANIONGAP  8  7   --    --    --    --   9   HECTOR  9.0  8.7   --    --    --    --   8.1*   GLC  125*  96   --    --    --    --   99    < > = values in this interval not displayed.     Most Recent  2 LFT's:  Recent Labs   Lab Test  12/08/17   0519  12/07/17   0525   AST  26  26   ALT  24  22   ALKPHOS  124  124   BILITOTAL  1.1  1.0     Most Recent INR's and Anticoagulation Dosing History:  Anticoagulation Dose History     Recent Dosing and Labs Latest Ref Rng & Units 12/4/2017 12/5/2017 12/6/2017 12/7/2017 12/8/2017    ZZ IMS TEMPLATE - - - 7.5 mg 7.5 mg -    Warfarin 5 mg - 5 mg 5 mg - - -    INR 0.80 - 1.20 1.34(H) 1.26(H) 1.33(H) 1.57(H) 1.89(H)        Most Recent 3 Troponin's:  Recent Labs   Lab Test  12/03/17   2155  12/03/17   1605  12/03/17   0926   TROPI  0.020  <0.015  <0.015     Most Recent Cholesterol Panel:  Recent Labs   Lab Test  12/04/17   0430   CHOL  121   LDL  53   HDL  53   TRIG  73     Most Recent 6 Bacteria Isolates From Any Culture (See EPIC Reports for Culture Details):No lab results found.  Most Recent TSH, T4 and A1c Labs:  Recent Labs   Lab Test  12/03/17   0926   TSH  1.45     Results for orders placed or performed during the hospital encounter of 12/03/17   XR Chest 2 Views    Narrative    PROCEDURE:  XR CHEST 2 VW    HISTORY:  sob; .     COMPARISON:  None.    FINDINGS:   The cardiac silhouette is enlarged. The pulmonary vasculature is  prominent. There are airspace opacities in the right mid and lower  lung and a small right pleural effusion. The left lung is clear. No  pneumothorax.      Impression    IMPRESSION:  Right lower lung infiltrate and pleural effusion.      LLOYD TORRES MD   US Abdomen Complete    Narrative    PROCEDURE: US ABDOMEN COMPLETE 12/3/2017 11:41 AM    HISTORY: clinical ascites ?portal hypertension. Eval Aorta, kidney  size/hydrnephrosis;     COMPARISONS: None.    TECHNIQUE: Ultrasound of the abdomen    FINDINGS: There is a small right-sided pleural effusion. There is  ascites seen. The liver is free of masses. There is no biliary ductal  enlargement. Gallbladder kidneys were not well visualized. Spleen  length is normal.         Impression    IMPRESSION:  Ascites. Largely nondiagnostic exam of the abdomen.    KLAUS MANCIA MD   US Lower Extremity Venous Duplex Bilateral    Narrative    PROCEDURE: US LOWER EXTREMITY VENOUS DUPLEX BILATERAL 12/3/2017 10:50  AM    HISTORY: subacute LE edema, clinical suspicion for DVT;     COMPARISONS: None.    TECHNIQUE: Venous ultrasound of both lower extremities    FINDINGS: Is normal compressibility augmentation and respiratory  phasicity seen in the common femoral superficial femoral and popliteal  veins. There is no evidence of deep venous thrombosis in either lower  extremity.         Impression    IMPRESSION: No deep venous thrombosis    KLAUS MANCIA MD

## 2017-12-08 NOTE — PLAN OF CARE
Problem: Cardiac: Heart Failure (Adult)  Goal: Signs and Symptoms of Listed Potential Problems Will be Absent, Minimized or Managed (Cardiac: Heart Failure)  Signs and symptoms of listed potential problems will be absent, minimized or managed by discharge/transition of care (reference Cardiac: Heart Failure (Adult) CPG).   Outcome: No Change  VSS. Alert and oriented. Slept most of night. 2+ edema to BLE. Lung sounds clear and equal bilaterally. BS + Up to bathroom SBA.  Using call light appropriately.     Face to face report given with opportunity to observe patient.    Report given to LAURY Gloria   12/8/2017  7:16 AM

## 2017-12-08 NOTE — PLAN OF CARE
Problem: Patient Care Overview  Goal: Plan of Care/Patient Progress Review  Outcome: No Change  Pt A/O x3. VSS. Denies pain. HR irregular r/t A. Fib, on telemetry. Lungs clear.  MSSA score of 4. 2+ edema in LE's with kaitlynn/red discoloration. 1+ edema on abdomen red discoloration.  Pencil eraser sized blister on left calf.      12/07/17 2229   OTHER   Plan Of Care Reviewed With patient   Plan of Care Review   Progress no change       Problem: Cardiac: Heart Failure (Adult)  Goal: Signs and Symptoms of Listed Potential Problems Will be Absent, Minimized or Managed (Cardiac: Heart Failure)  Signs and symptoms of listed potential problems will be absent, minimized or managed by discharge/transition of care (reference Cardiac: Heart Failure (Adult) CPG).   Outcome: No Change   12/07/17 2229   Cardiac: Heart Failure   Problems Assessed (Heart Failure) all   Problems Present (Heart Failure) dysrhythmia/arrhythmia       Problem: Arrhythmia/Dysrhythmia (Symptomatic) (Adult)  Goal: Signs and Symptoms of Listed Potential Problems Will be Absent, Minimized or Managed (Arrhythmia/Dysrhythmia)  Signs and symptoms of listed potential problems will be absent, minimized or managed by discharge/transition of care (reference Arrhythmia/Dysrhythmia (Symptomatic) (Adult) CPG).   Outcome: No Change   12/07/17 2229   Arrhythmia/Dysrhythmia (Symptomatic)   Problems Assessed (Arrhythmia/Dysrhythmia) all   Problems Present (Dysrhythmia) electrophysiological conduction       Face to face report given with opportunity to observe patient.    Report given to Nyla Jean-Baptiste   12/7/2017  11:10 PM

## 2017-12-08 NOTE — PLAN OF CARE
Problem: Patient Care Overview  Goal: Plan of Care/Patient Progress Review  Outcome: Adequate for Discharge Date Met: 12/08/17  Patient A&O. VSS. Remains on room air, O2 sats >92%, pt denies SOB. Lungs clear bilaterally. Trace edema to bilateral legs, much improved. Pt received PO lasix this AM, voided 275ml since. Both writer and pharmacist provided patient with education on new medications, heart failure and Afib maintenance at home. Patient able to repeat education back to writer, able to state new medications and times he will take them at home, and states understanding of follow up appointments. Discharge home at 1430 accompanied by brother. Pt states he is going straight to [facility] to  prescriptions and buy a scale.

## 2017-12-08 NOTE — PLAN OF CARE
Patient discharged at 2:30 PM via wheel chair accompanied by brother and staff. Prescriptions sent to patients preferred pharmacy. All belongings sent with patient.     Discharge instructions reviewed with patient. Listed belongings gathered and returned to patient.     Patient discharged to home.   Report called to N/A    Core Measures and Vaccines  Core Measures applicable during stay: Yes. If yes, state diagnosis: Heart Failure  Pneumonia and Influenza given prior to discharge, if indicated: N/A    Surgical Patient   Surgical Procedures during stay: No  Did patient receive discharge instruction on wound care and recognition of infection symptoms? N/A    MISC  Follow up appointment made:  Yes  Home and hospital aquired medications returned to patient: Yes  Patient reports pain was well managed at discharge: Yes

## 2017-12-08 NOTE — PROGRESS NOTES
Checked in with Trip.  He is eager to return home this afternoon.  His brother will be in to pick him up.  Denies questions or concerns.      Name: Ramírez Hernández    MRN#: [id number]    Reason for Hospitalization: Atrial fibrillation with rapid ventricular response (H) [I48.91]    SILVANO: low    Discharge Date: 12/8/2017    Patient / Family response to discharge plan: agreeable     Follow-Up Appt: Future Appointments  Date Time Provider Department Center   12/14/2017 1:45 PM CLAUDIO Crum MD [facility]   12/18/2017 1:15 PM CLAUDIO Crum MD [facility]       Other Providers (Care Coordinator, County Services, PCA services etc): No    Discharge Disposition: home    Nasreen Lee

## 2017-12-08 NOTE — PHARMACY-ANTICOAGULATION SERVICE
INR = 1.89 today. According to the [facility] Oral Anticoagulation reference, INR has increased > 0.5 in the last two days on 7.5 mg doses. Would recommend 5 mg warfarin today.

## 2017-12-09 ENCOUNTER — TELEPHONE (OUTPATIENT)
Dept: CASE MANAGEMENT | Facility: HOSPITAL | Age: 62
End: 2017-12-09

## 2017-12-11 ENCOUNTER — ANTICOAGULATION THERAPY VISIT (OUTPATIENT)
Dept: ANTICOAGULATION | Facility: OTHER | Age: 62
End: 2017-12-11
Attending: SOCIAL WORKER
Payer: MEDICARE

## 2017-12-11 DIAGNOSIS — I48.19 PERSISTENT ATRIAL FIBRILLATION (H): ICD-10-CM

## 2017-12-11 DIAGNOSIS — Z79.01 LONG-TERM (CURRENT) USE OF ANTICOAGULANTS: ICD-10-CM

## 2017-12-11 LAB — INR POINT OF CARE: 3.4 (ref 0.86–1.14)

## 2017-12-11 PROCEDURE — 85610 PROTHROMBIN TIME: CPT | Mod: QW,ZL

## 2017-12-11 NOTE — MR AVS SNAPSHOT
Ramírez Hernández   12/11/2017 2:00 PM   Anticoagulation Therapy Visit    Description:  62 year old male   Provider:   ANTI COAGULATION   Department:  Hc Anti Coagulation           INR as of 12/11/2017     Today's INR 3.4!      Anticoagulation Summary as of 12/11/2017     INR goal 2.0-3.0   Today's INR 3.4!   Full instructions 12/11: 2.5 mg; 12/12: 5 mg; 12/13: 5 mg; 12/14: 5 mg   Next INR check 12/15/2017    Indications   Persistent atrial fibrillation (H) [I48.1]  Long-term (current) use of anticoagulants [Z79.01] [Z79.01]         December 2017 Details    Sun Mon Tue Wed Thu Fri Sat          1               2                 3               4               5               6               7               8               9                 10               11      2.5 mg   See details      12      5 mg         13      5 mg         14      5 mg         15            16                 17               18               19               20               21               22               23                 24               25               26               27               28               29               30                 31                      Date Details   12/11 This INR check       Date of next INR:  12/15/2017         How to take your warfarin dose     To take:  2.5 mg Take 0.5 of a 5 mg tablet.    To take:  5 mg Take 1 of the 5 mg tablets.

## 2017-12-11 NOTE — PROGRESS NOTES
ANTICOAGULATION INITIAL CLINIC VISIT    Patient Name:  Ramírez Hernández  Date:  12/11/2017  Referred by: Dr. Belle, Dr. SHADI Crum  Contact Type:  Face to Face    SUBJECTIVE:  Coumadin education was completed today.  Topics covered include:  -Introduction to coumadin.    -Proper Administration. We discussed that warfarin should be taken in the evening  -INR Testing. We discussed importance of follow up INR testing  -Sign/Symptoms of Bleeding. We discussed how to stop minor bleeding with pressure and when to go to ER for bleeding  -Signs/Symptoms of Clot Formation or Stroke. We discussed symptoms of head bleed/stroke and venous and arterial clot symptoms.  -Dietary Intake of Vitamin K. We discussed diet and vit K intake, food list given to patient  -Drug Interactions. We discussed any time he adds a new medication or takes on away to call me  -Anticoagulation Identification (bracelet, necklace or wallet card) given wallet card  -Future Surgery. We discussed what constitutes surgery, procedures. Warfarin needs to be held  -Effects of Alcohol, Tobacco, and Exercise on Coumadin    Coumadin Education Booklet and Coumadin Identification Wallet Card were given to the patient.         OBJECTIVE    INR Protime   Date Value Ref Range Status   12/11/2017 3.4 (A) 0.86 - 1.14 Final       ASSESSMENT / PLAN  INR assessment SUPRA    Recheck INR In: 4 DAYS    INR Location Clinic      Anticoagulation Summary as of 12/11/2017     INR goal 2.0-3.0   Today's INR 3.4!   Maintenance plan No maintenance plan   Full instructions 12/11: 2.5 mg; 12/12: 5 mg; 12/13: 5 mg; 12/14: 5 mg   Plan last modified Edel Irvin RN (12/11/2017)   Next INR check 12/15/2017   Target end date Indefinite    Indications   Persistent atrial fibrillation (H) [I48.1]  Long-term (current) use of anticoagulants [Z79.01] [Z79.01]         Anticoagulation Episode Summary     INR check location     Preferred lab     Send INR reminders to [facility] POOL     Comments       Anticoagulation Care Providers     Provider Role Specialty Phone number    CLAUDIO Crum MD [facility] [phone]            See the Encounter Report to view Anticoagulation Flowsheet and Dosing Calendar (Go to Encounters tab in chart review, and find the Anticoagulation Therapy Visit)        Edel Irvin RN

## 2017-12-11 NOTE — PHARMACY-ANTICOAGULATION SERVICE
Clinical Pharmacy- Warfarin Discharge Note  This patient is currently on warfarin for the treatment of Atrial fibrillation.  INR Goal= 2-3  Expected length of therapy lifetime.    Anticoagulation Dose History     Recent Dosing and Labs Latest Ref Rng & Units 12/4/2017 12/5/2017 12/6/2017 12/7/2017 12/8/2017    YASH IMS TEMPLATE - - - 7.5 mg 7.5 mg -    Warfarin 5 mg - 5 mg 5 mg - - -    INR 0.80 - 1.20 1.34(H) 1.26(H) 1.33(H) 1.57(H) 1.89(H)          Vitamin K doses administered during the last 7 days: none  FFP administered during the last 7 days: none   Recommend discharging the patient on a warfarin regimen of 5 mg with a prescription for warfarin until seen by Coumadin clinic    The patient should have an INR checked 12/11/2017.  Called the [facility] coumadin clinic and spoke to Edel.  I gave her patient's diagnosis plus INR goal, since he was a new start while inpatient.  I shared the INRs and doses from 12/4/17 to 12/8/17.  Patient is being seen today at the Coumadin clinic for follow-up

## 2017-12-15 ENCOUNTER — OFFICE VISIT (OUTPATIENT)
Dept: FAMILY MEDICINE | Facility: OTHER | Age: 62
End: 2017-12-15
Attending: FAMILY MEDICINE
Payer: MEDICARE

## 2017-12-15 ENCOUNTER — ANTICOAGULATION THERAPY VISIT (OUTPATIENT)
Dept: ANTICOAGULATION | Facility: OTHER | Age: 62
End: 2017-12-15
Attending: FAMILY MEDICINE
Payer: MEDICARE

## 2017-12-15 VITALS
TEMPERATURE: 97.8 F | BODY MASS INDEX: 37.89 KG/M2 | HEIGHT: 68 IN | WEIGHT: 250 LBS | OXYGEN SATURATION: 97 % | SYSTOLIC BLOOD PRESSURE: 118 MMHG | DIASTOLIC BLOOD PRESSURE: 76 MMHG | HEART RATE: 98 BPM

## 2017-12-15 DIAGNOSIS — I48.19 PERSISTENT ATRIAL FIBRILLATION (H): ICD-10-CM

## 2017-12-15 DIAGNOSIS — Z72.0 TOBACCO ABUSE: ICD-10-CM

## 2017-12-15 DIAGNOSIS — Z79.01 LONG-TERM (CURRENT) USE OF ANTICOAGULANTS: ICD-10-CM

## 2017-12-15 DIAGNOSIS — Z71.6 TOBACCO ABUSE COUNSELING: ICD-10-CM

## 2017-12-15 DIAGNOSIS — I50.23 ACUTE ON CHRONIC SYSTOLIC HEART FAILURE (H): Primary | ICD-10-CM

## 2017-12-15 LAB
ANION GAP SERPL CALCULATED.3IONS-SCNC: 5 MMOL/L (ref 3–14)
BASOPHILS # BLD AUTO: 0.1 10E9/L (ref 0–0.2)
BASOPHILS NFR BLD AUTO: 0.6 %
BUN SERPL-MCNC: 19 MG/DL (ref 7–30)
CALCIUM SERPL-MCNC: 9.2 MG/DL (ref 8.5–10.1)
CHLORIDE SERPL-SCNC: 104 MMOL/L (ref 94–109)
CO2 SERPL-SCNC: 29 MMOL/L (ref 20–32)
CREAT SERPL-MCNC: 1.08 MG/DL (ref 0.66–1.25)
DIFFERENTIAL METHOD BLD: ABNORMAL
EOSINOPHIL # BLD AUTO: 0 10E9/L (ref 0–0.7)
EOSINOPHIL NFR BLD AUTO: 0.4 %
ERYTHROCYTE [DISTWIDTH] IN BLOOD BY AUTOMATED COUNT: 13.8 % (ref 10–15)
GFR SERPL CREATININE-BSD FRML MDRD: 69 ML/MIN/1.7M2
GLUCOSE SERPL-MCNC: 103 MG/DL (ref 70–99)
HCT VFR BLD AUTO: 40 % (ref 40–53)
HGB BLD-MCNC: 13.6 G/DL (ref 13.3–17.7)
IMM GRANULOCYTES # BLD: 0 10E9/L (ref 0–0.4)
IMM GRANULOCYTES NFR BLD: 0.3 %
INR POINT OF CARE: 4.6 (ref 0.86–1.14)
LYMPHOCYTES # BLD AUTO: 1.3 10E9/L (ref 0.8–5.3)
LYMPHOCYTES NFR BLD AUTO: 12.6 %
MCH RBC QN AUTO: 31.1 PG (ref 26.5–33)
MCHC RBC AUTO-ENTMCNC: 34 G/DL (ref 31.5–36.5)
MCV RBC AUTO: 91 FL (ref 78–100)
MONOCYTES # BLD AUTO: 1.2 10E9/L (ref 0–1.3)
MONOCYTES NFR BLD AUTO: 12 %
NEUTROPHILS # BLD AUTO: 7.5 10E9/L (ref 1.6–8.3)
NEUTROPHILS NFR BLD AUTO: 74.1 %
NRBC # BLD AUTO: 0 10*3/UL
NRBC BLD AUTO-RTO: 0 /100
PLATELET # BLD AUTO: 291 10E9/L (ref 150–450)
POTASSIUM SERPL-SCNC: 3.8 MMOL/L (ref 3.4–5.3)
RBC # BLD AUTO: 4.38 10E12/L (ref 4.4–5.9)
SODIUM SERPL-SCNC: 138 MMOL/L (ref 133–144)
WBC # BLD AUTO: 10.1 10E9/L (ref 4–11)

## 2017-12-15 PROCEDURE — 99214 OFFICE O/P EST MOD 30 MIN: CPT | Performed by: FAMILY MEDICINE

## 2017-12-15 PROCEDURE — 80048 BASIC METABOLIC PNL TOTAL CA: CPT | Mod: ZL | Performed by: FAMILY MEDICINE

## 2017-12-15 PROCEDURE — 85025 COMPLETE CBC W/AUTO DIFF WBC: CPT | Mod: ZL | Performed by: FAMILY MEDICINE

## 2017-12-15 PROCEDURE — 85610 PROTHROMBIN TIME: CPT | Mod: QW,ZL

## 2017-12-15 PROCEDURE — 99212 OFFICE O/P EST SF 10 MIN: CPT

## 2017-12-15 RX ORDER — NICOTINE 21 MG/24HR
1 PATCH, TRANSDERMAL 24 HOURS TRANSDERMAL EVERY 24 HOURS
Qty: 30 PATCH | Refills: 1 | Status: SHIPPED | OUTPATIENT
Start: 2017-12-15 | End: 2018-01-10

## 2017-12-15 ASSESSMENT — PAIN SCALES - GENERAL: PAINLEVEL: NO PAIN (0)

## 2017-12-15 ASSESSMENT — ANXIETY QUESTIONNAIRES
4. TROUBLE RELAXING: NOT AT ALL
1. FEELING NERVOUS, ANXIOUS, OR ON EDGE: NOT AT ALL
2. NOT BEING ABLE TO STOP OR CONTROL WORRYING: NOT AT ALL
GAD7 TOTAL SCORE: 0
5. BEING SO RESTLESS THAT IT IS HARD TO SIT STILL: NOT AT ALL
3. WORRYING TOO MUCH ABOUT DIFFERENT THINGS: NOT AT ALL
7. FEELING AFRAID AS IF SOMETHING AWFUL MIGHT HAPPEN: NOT AT ALL
6. BECOMING EASILY ANNOYED OR IRRITABLE: NOT AT ALL

## 2017-12-15 ASSESSMENT — PATIENT HEALTH QUESTIONNAIRE - PHQ9: SUM OF ALL RESPONSES TO PHQ QUESTIONS 1-9: 0

## 2017-12-15 NOTE — MR AVS SNAPSHOT
Ramírez Hernández   12/15/2017 10:15 AM   Anticoagulation Therapy Visit    Description:  62 year old male   Provider:   ANTI COAGULATION   Department:  Hc Anti Coagulation           INR as of 12/15/2017     Today's INR 4.6!      Anticoagulation Summary as of 12/15/2017     INR goal 2.0-3.0   Today's INR 4.6!   Full instructions 12/15: Hold; 12/16: Hold; 12/17: 2.5 mg; 12/18: 2.5 mg   Next INR check 12/19/2017    Indications   Persistent atrial fibrillation (H) [I48.1]  Long-term (current) use of anticoagulants [Z79.01] [Z79.01]         December 2017 Details    Sun Mon Tue Wed Thu Fri Sat          1               2                 3               4               5               6               7               8               9                 10               11               12               13               14               15      Hold   See details      16      Hold           17      2.5 mg         18      2.5 mg         19            20               21               22               23                 24               25               26               27               28               29               30                 31                      Date Details   12/15 This INR check       Date of next INR:  12/19/2017         How to take your warfarin dose     To take:  2.5 mg Take 0.5 of a 5 mg tablet.    Hold Do not take your warfarin dose. See the Details table to the right for additional instructions.

## 2017-12-15 NOTE — PROGRESS NOTES
SUBJECTIVE:   Ramírez Hernández is a 62 year old male who presents to clinic today for the following health issues:          Hospital Follow-up Visit:    Hospital/Nursing Home/IP Rehab Facility: [facility]  Date of Admission: 12/03/17  Date of Discharge: 12/8/17  Reason(s) for Admission: Acute systolic heart failure             Problems taking medications regularly:  None       Medication changes since discharge: None       Problems adhering to non-medication therapy:  None    Summary of hospitalization:  [facility] discharge summary reviewed  Diagnostic Tests/Treatments reviewed.  Follow up needed: He has an appointment today with the Coumadin clinic and he lives closer to the [facility] and would like to see a provider there.  Other Healthcare Providers Involved in Patient s Care:         Care Coordination  Update since discharge: improved.     Post Discharge Medication Reconciliation: discharge medications reconciled, continue medications without change.  Plan of care communicated with patient     Coding guidelines for this visit:  Type of Medical   Decision Making Face-to-Face Visit       within 7 Days of discharge Face-to-Face Visit        within 14 days of discharge   Moderate Complexity [id number]   High Complexity [id number]              Tobacco Cessation      Duration: 45 years    Description (location/character/radiation): down to less then a pack a day    Intensity:  mild    Accompanying signs and symptoms: none    History (similar episodes/previous evaluation): None    Precipitating or alleviating factors: None    Therapies tried and outcome: had patches in the hospital. Would like to be on them again.          Problem list and histories reviewed & adjusted, as indicated.  Additional history: as documented          PAST MEDICAL HISTORY:  History reviewed. No pertinent past medical history.    PAST SURGICAL HISTORY:  History reviewed. No pertinent surgical  "history.    MEDICATIONS:  Prior to Admission medications    Medication Sig Start Date End Date Taking? Authorizing Provider   nicotine (NICODERM CQ) 21 MG/24HR 24 hr patch Place 1 patch onto the skin every 24 hours 12/15/17  Yes CLAUDIO Crum MD   warfarin (COUMADIN) 5 MG tablet Take 1 tablet (5 mg) by mouth daily Until otherwise instructed by coumadin clinic. 12/8/17  Yes Megan Mccullough NP   lisinopril (PRINIVIL/ZESTRIL) 10 MG tablet Take 1 tablet (10 mg) by mouth daily 12/9/17  Yes Megan Mccullough NP   metoprolol (LOPRESSOR) 50 MG tablet Take 1 tablet (50 mg) by mouth 2 times daily 12/8/17  Yes Megan Mccullough NP   furosemide (LASIX) 40 MG tablet Take 1 tablet (40 mg) by mouth daily 12/9/17  Yes Megan Mccullough NP       ALLERGIES:   No Known Allergies    ROS:  Constitutional, neuro, ENT, endocrine, pulmonary, cardiac, gastrointestinal, genitourinary, musculoskeletal, integument and psychiatric systems are negative, except as otherwise noted.        EXAM:/76 (BP Location: Left arm, Patient Position: Chair, Cuff Size: Adult Large)  Pulse 98  Temp 97.8  F (36.6  C) (Tympanic)  Ht 5' 8\" (1.727 m)  Wt 250 lb (113.4 kg)  SpO2 97%  BMI 38.01 kg/m2 Body mass index is 38.01 kg/(m^2).   GENERAL APPEARANCE: healthy, alert and no distress  EYES: Eyes grossly normal to inspection, PERRL and conjunctivae and sclerae normal  NECK: no adenopathy, no asymmetry, masses, or scars and thyroid normal to palpation  RESP: lungs clear to auscultation - no rales, rhonchi or wheezes  CV: Irregularly irregular without S3-S4 murmur.  Does have 1-2+ lower extremity edema.  He feels his legs are improving.  ABDOMEN: soft, nontender, without hepatosplenomegaly or masses and bowel sounds normal  SKIN: no suspicious lesions or rashes, does have some dry scaly from the leg swelling but his legs he feels are getting better.  NEURO: Normal strength and tone, mentation intact and speech normal  Lab/ " X-ray  Pending  CORINNA-7 SCORE 12/15/2017   Total Score 0     PHQ-9 SCORE 12/15/2017   Total Score 0       ASSESSMENT/PLAN:    ICD-10-CM    1. Acute on chronic systolic heart failure (H) I50.23 CARE COORDINATION REFERRAL made because the recent hospitalization for heart failure.  Also he smokes quit plan set up in [facility] patches given.  Will check a BMP and CBC for follow-up from the hospitalization.  He feels the swelling is improved and his breathing is better.  He would really like to try to quit smoking.  He states he lives closer to the [facility] and would like to see a provider there.  We will arrange for him for follow-up next month with Dr. Delvalle.  Discussed the importance for him to follow a low-salt diet.  Stay with the same medications.     Basic metabolic panel     CBC with platelets differential   2. Tobacco abuse Z72.0 Tobacco Cessation - Order to Satisfy Health Maintenance     nicotine (NICODERM CQ) 21 MG/24HR 24 hr patch   3. Tobacco abuse counseling Z71.6    4. Persistent atrial fibrillation (H) I48.1  he will be seen in the Coumadin clinic today.         LUZ Crum MD  December 15, 2017

## 2017-12-15 NOTE — NURSING NOTE
"Chief Complaint   Patient presents with     Hospital F/U       Initial /76 (BP Location: Left arm, Patient Position: Chair, Cuff Size: Adult Large)  Pulse 98  Temp 97.8  F (36.6  C) (Tympanic)  Ht 5' 8\" (1.727 m)  Wt 250 lb (113.4 kg)  SpO2 97%  BMI 38.01 kg/m2 Estimated body mass index is 38.01 kg/(m^2) as calculated from the following:    Height as of this encounter: 5' 8\" (1.727 m).    Weight as of this encounter: 250 lb (113.4 kg).  Medication Reconciliation: complete     Ale Duarte     "

## 2017-12-15 NOTE — PATIENT INSTRUCTIONS

## 2017-12-15 NOTE — PROGRESS NOTES
ANTICOAGULATION FOLLOW-UP CLINIC VISIT    Patient Name:  Ramírez Hernández  Date:  12/15/2017  Contact Type:  Face to Face    SUBJECTIVE:     Patient Findings     Positives No Problem Findings           OBJECTIVE    INR Protime   Date Value Ref Range Status   12/15/2017 4.6 (A) 0.86 - 1.14 Final       ASSESSMENT / PLAN  INR assessment SUPRA    Recheck INR In: 4 DAYS    INR Location Clinic      Anticoagulation Summary as of 12/15/2017     INR goal 2.0-3.0   Today's INR 4.6!   Maintenance plan No maintenance plan   Full instructions 12/15: Hold; 12/16: Hold; 12/17: 2.5 mg; 12/18: 2.5 mg   Plan last modified Edel Irvin, RN (12/11/2017)   Next INR check 12/19/2017   Target end date Indefinite    Indications   Persistent atrial fibrillation (H) [I48.1]  Long-term (current) use of anticoagulants [Z79.01] [Z79.01]         Anticoagulation Episode Summary     INR check location     Preferred lab     Send INR reminders to [facility] POOL    Comments       Anticoagulation Care Providers     Provider Role Specialty Phone number    CLAUDIO Crum MD [facility] [phone]            See the Encounter Report to view Anticoagulation Flowsheet and Dosing Calendar (Go to Encounters tab in chart review, and find the Anticoagulation Therapy Visit)        Edel Irvin, RN

## 2017-12-15 NOTE — MR AVS SNAPSHOT
After Visit Summary   12/15/2017    Ramírez Hernández    MRN: [id number]           Patient Information     Date Of Birth          1955        Visit Information        Provider Department      12/15/2017 10:30 AM CLAUDIO Crum MD [facility]        Today's Diagnoses     Acute on chronic systolic heart failure (H)    -  1    Tobacco abuse        Tobacco abuse counseling        Persistent atrial fibrillation (H)          Care Instructions      HOW TO QUIT SMOKING  Smoking is one of the hardest habits to break. About half of all those who have ever smoked have been able to quit, and most of those (about 70%) who still smoke want to quit. Here are some of the best ways to stop smoking.     KEEP TRYING:  It takes most smokers about 8 tries before they are finally able to fully quit. So, the more often you try and fail, the better your chance of quitting the next time! So, don't give up!    GO COLD TURKEY:  Most ex-smokers quit cold turkey. Trying to cut back gradually doesn't seem to work as well, perhaps because it continues the smoking habit. Also, it is possible to fool yourself by inhaling more while smoking fewer cigarettes. This results in the same amount of nicotine in your body!    GET SUPPORT:  Support programs can make an important difference, especially for the heavy smoker. These groups offer lectures, methods to change your behavior and peer support. Call the free national Quitline for more information. 800-QUIT-NOW (039-260-7221). Low-cost or free programs are offered by many hospitals, local chapters of the American Lung Association (874-388-4113) and the American Cancer Society (509-487-0501). Support at home is important too. Non-smokers can help by offering praise and encouragement. If the smoker fails to quit, encourage them to try again!    OVER-THE-COUNTER MEDICINES:  For those who can't quit on their own, Nicotine Replacement Therapy (NRT) may make quitting much  easier. Certain aids such as the nicotine patch, gum and lozenge are available without a prescription. However, it is best to use these under the guidance of your doctor. The skin patch provides a steady supply of nicotine to the body. Nicotine gum and lozenge gives temporary bursts of low levels of nicotine. Both methods take the edge off the craving for cigarettes. WARNING: If you feel symptoms of nicotine overdose, such as nausea, vomiting, dizziness, weakness, or fast heartbeat, stop using these and see your doctor.    PRESCRIPTION MEDICINES:  After evaluating your smoking patterns and prior attempts at quitting, your doctor may offer a prescription medicine such as bupropion (Zyban, Wellbutrin), varenicline (Chantix, Champix), a niocotine inhaler or nasal spray. Each has its unique advantage and side effects which your doctor can review with you.    HEALTH BENEFITS OF QUITTING:  The benefits of quitting start right away and keep improving the longer you go without smokin minutes: blood pressure and pulse return to normal  8 hours: oxygen levels return to normal  2 days: ability to smell and taste begins to improve as damaged nerves start to regrow  2-3 weeks: circulation and lung function improves  1-9 months: decreased cough, congestion and shortness of breath; less tired  1 year: risk of heart attack decreases by half  5 years: risk of lung cancer decreases by half; risk of stroke becomes the same as a non-smoker  For information about how to quit smoking, visit the following links:  National Cancer [place] ,   Clearing the Air, Quit Smoking Today   - an online booklet. http://www.smokefree.gov/pubs/clearing_the_air.pdf  Smokefree.gov http://smokefree.gov/  QuitNet http://www.quitnet.com/    2000-0658 Deonna Noble, [address] [place] [address], [place] [address]. All rights reserved. This information is not intended as a substitute for professional medical care. Always follow your healthcare  professional's instructions.    The Benefits of Living Smoke Free  What do you want to gain from quitting? Check off some reasons to quit.  Health Benefits  ___ Reduce my risk of lung cancer, heart disease, chronic lung disease  ___ Have fewer wrinkles and softer skin  ___ Improve my sense of taste and smell  ___ For pregnant women--reduce the risk of having a miscarriage, stillbirth, premature birth, or low-birth-weight baby  Personal Benefits  ___ Feel more in control of my life  ___ Have better-smelling hair, breath, clothes, home, and car  ___ Save time by not having to take smoke breaks, buy cigarettes, or hunt for a light  ___ Have whiter teeth  Family Benefits  ___ Reduce my children s respiratory tract infections  ___ Set a good example for my children  ___ Reduce my family s cancer risk  Financial Benefits  ___ Save hundreds of dollars each year that would be spent on cigarettes  ___ Save money on medical bills  ___ Save on life, health, and car insurance premiums    Those Dollars Add Up!  Cigarettes are expensive, and getting more expensive all the time. Do you realize how much money you are spending on cigarettes per year? What is the average amount you spend on a pack of cigarettes? What is the average number of packs that you smoke per day? Using your answers to these questions, fill in this formula to help you find out:  ($ _____ per pack) ×  ( _____ number of packs per day) × (365 days) =  $ _____ yearly cost of smoking  Besides tobacco, there are other costs, including extra cleaning bills and replacement costs for clothing and furniture; medical expenses for smoking-related illnesses; and higher health, life, and car insurance premiums.    Cigars and Pipes Count Too!  Cigars and pipes are also dangerous. So are smokeless (chewing) tobacco and snuff. All of these products contain nicotine, a highly addictive substance that has harmful effects on your body. Quitting smoking means giving up all tobacco  products.      3856-1970 Deonna Noble, [address] [place] [address]. All rights reserved. This information is not intended as a substitute for professional medical care. Always follow your healthcare professional's instructions.          Follow-ups after your visit        Additional Services     CARE COORDINATION REFERRAL       Services are provided by a Care Coordinator for people with complex needs such as: medical, social, or financial troubles.  The Care Coordinator works with the patient and their Primary Care Provider to determine health goals, obtain resources, achieve outcomes, and develop care plans that help coordinate the patient's care.     Reason for Referral: Medication/Treatment Adherence Issues    Provide additional details for Care Coordination to best meet the patient's current needs:     Clinical Staff have discussed the Care Coordination Referral with the patient and/or caregiver: yes            QUITPLAN  Referral       [place] [employer]AC[employer] QUITLINES FAX FORM  Fax form to: [phone]    The clinic will facilitate the referral to the quitline.    Provider Information:  ===============================================================  R Joe Crum MD  ID#: [id number] - [facility] [place] [phone] Fax: [phone]   [email or web address]  Payor: MEDICARE / Plan: MEDICARE / Product Type: Medicare /   ===============================================================    The Public Health Service Guideline does not recommend providing over-the-counter nicotine replacement therapy products without physician authorization to patients with the following conditions: pregnancy, uncontrolled high blood pressure, or cardiovascular diseases.     I authorize the [place] Tobacco Quitlines to provide over-the-counter nicotine replacement products for the patient listed below if the patient's health plan benefits cover NRT or if the  patient is eligible for QUITPLAN services.    Patient Consented to:  ===============================================================  - YES - I am ready to quit tobacco and request the above information be given to the quitline so they may contact me.  I understand that one of [place]'s Tobacco Quitlines will inform my provider about my participation.  ===============================================================  Please check the BEST 3-hour call window for them to reach you: 7am - 11am  May we leave a message?  YES  Language Preference:  English  Phone Number: Home Phone      [phone]  Mobile          Not on file.     E-mail Address: No e-mail address on record    ========================================================================  FOR QUITLINE USE ONLY:  THIS INFORMATION WILL BE PROVIDED BACK TO THE PROVIDER  Contact date: __/ __/__ or ____ Did not reach after three attempts.    Outcome:  __ Enrolled in telephone counseling program  __ Declined  __ Not Reached    Stage of readiness: _______________________  Planned Quit Date: ___/ ___/ ___  Comments:      [facility]   This message funded by [employer] [place], an independent licensee of the Blue Cross and Blue Shield Association. Rev. 11/1/12                  Follow-up notes from your care team     Return for Routine Visit.      Your next 10 appointments already scheduled     Blayne 10, 2018  1:45 PM CST   (Arrive by 1:30 PM)   Office Visit with Aron Delvalle MD   [facility]    [address]  [facility] [address]   [phone]           Bring a current list of meds and any records pertaining to this visit.  For Physicals, please bring immunization records and any forms needing to be filled out.  Please arrive 15 minutes early to complete paperwork and register.              Future tests that were ordered for you today     Open Future Orders        Priority  "Expected Expires Ordered    QUITPLAN  Referral Routine  2018 12/15/2017            Who to contact     If you have questions or need follow up information about today's clinic visit or your schedule please contact [facility] directly at [phone].  Normal or non-critical lab and imaging results will be communicated to you by MyChart, letter or phone within 4 business days after the clinic has received the results. If you do not hear from us within 7 days, please contact the clinic through MyChart or phone. If you have a critical or abnormal lab result, we will notify you by phone as soon as possible.  Submit refill requests through [employer] or call your pharmacy and they will forward the refill request to us. Please allow 3 business days for your refill to be completed.          Additional Information About Your Visit        [employer] Information     [employer] lets you send messages to your doctor, view your test results, renew your prescriptions, schedule appointments and more. To sign up, go to [email or web address] . Click on \"Log in\" on the left side of the screen, which will take you to the Welcome page. Then click on \"Sign up Now\" on the right side of the page.     You will be asked to enter the access code listed below, as well as some personal information. Please follow the directions to create your username and password.     Your access code is: [email or web address]  Expires: 3/8/2018 12:32 PM     Your access code will  in 90 days. If you need help or a new code, please call your [facility] clinic or [phone].        Care EveryWhere ID     This is your Care EveryWhere ID. This could be used by other organizations to access your [facility] medical records  [id number]        Your Vitals Were     Pulse Temperature Height Pulse Oximetry BMI (Body Mass Index)       98 97.8  F (36.6  C) (Tympanic) 5' 8\" (1.727 m) 97% 38.01 kg/m2        Blood Pressure from Last 3 Encounters:   12/15/17 118/76 "   12/08/17 137/87    Weight from Last 3 Encounters:   12/15/17 250 lb (113.4 kg)   12/08/17 250 lb (113.4 kg)              We Performed the Following     Basic metabolic panel     CARE COORDINATION REFERRAL     CBC with platelets differential     Tobacco Cessation - Order to Satisfy Health Maintenance          Today's Medication Changes          These changes are accurate as of: 12/15/17 10:31 AM.  If you have any questions, ask your nurse or doctor.               Start taking these medicines.        Dose/Directions    nicotine 21 MG/24HR 24 hr patch   Commonly known as:  NICODERM CQ   Used for:  Tobacco abuse   Started by:  CLAUDIO Crum MD        Dose:  1 patch   Place 1 patch onto the skin every 24 hours   Quantity:  30 patch   Refills:  1            Where to get your medicines      These medications were sent to [facility] [address] [facility], [place] - [address] AT [facility] [address], [facility] [address]     Phone:  [phone]     nicotine 21 MG/24HR 24 hr patch                Primary Care Provider Fax #    Physician No Ref-Primary [phone]       No address on file        Equal Access to Services     [facility] : Hadii nolna doss hadasho Socharles, waaxda luqadaha, qaybta kaalmada adetrevon, georgina hay . So [facility] [phone].    ATENCIÓN: Si habla español, tiene a kay disposición servicios gratuitos de asistencia lingüística. [facility] [phone].    We comply with applicable federal civil rights laws and [place] laws. We do not discriminate on the basis of race, color, national origin, age, disability, sex, sexual orientation, or gender identity.            Thank you!     Thank you for choosing [facility]  for your care. Our goal is always to provide you with excellent care. Hearing back from our patients is one way we can continue to improve our services. Please take a few minutes to complete the written survey that you may receive  in the mail after your visit with us. Thank you!             Your Updated Medication List - Protect others around you: Learn how to safely use, store and throw away your medicines at www.disposemymeds.org.          This list is accurate as of: 12/15/17 10:31 AM.  Always use your most recent med list.                   Brand Name Dispense Instructions for use Diagnosis    furosemide 40 MG tablet    LASIX    30 tablet    Take 1 tablet (40 mg) by mouth daily    Acute systolic congestive heart failure (H)       lisinopril 10 MG tablet    PRINIVIL/ZESTRIL    30 tablet    Take 1 tablet (10 mg) by mouth daily    Acute systolic congestive heart failure (H)       metoprolol 50 MG tablet    LOPRESSOR    60 tablet    Take 1 tablet (50 mg) by mouth 2 times daily    Atrial fibrillation with rapid ventricular response (H), Acute systolic congestive heart failure (H)       nicotine 21 MG/24HR 24 hr patch    NICODERM CQ    30 patch    Place 1 patch onto the skin every 24 hours    Tobacco abuse       warfarin 5 MG tablet    COUMADIN    30 tablet    Take 1 tablet (5 mg) by mouth daily Until otherwise instructed by coumadin clinic.    Atrial fibrillation with rapid ventricular response (H)

## 2017-12-16 ASSESSMENT — ANXIETY QUESTIONNAIRES: GAD7 TOTAL SCORE: 0

## 2017-12-19 ENCOUNTER — ANTICOAGULATION THERAPY VISIT (OUTPATIENT)
Dept: ANTICOAGULATION | Facility: OTHER | Age: 62
End: 2017-12-19
Payer: MEDICARE

## 2017-12-19 DIAGNOSIS — I48.19 PERSISTENT ATRIAL FIBRILLATION (H): ICD-10-CM

## 2017-12-19 DIAGNOSIS — I48.91 ATRIAL FIBRILLATION WITH RAPID VENTRICULAR RESPONSE (H): ICD-10-CM

## 2017-12-19 DIAGNOSIS — Z79.01 LONG-TERM (CURRENT) USE OF ANTICOAGULANTS: ICD-10-CM

## 2017-12-19 LAB — INR BLD: 1.7 (ref 0.86–1.14)

## 2017-12-19 PROCEDURE — 36416 COLLJ CAPILLARY BLOOD SPEC: CPT | Mod: ZL | Performed by: FAMILY MEDICINE

## 2017-12-19 PROCEDURE — 85610 PROTHROMBIN TIME: CPT | Mod: QW,ZL | Performed by: FAMILY MEDICINE

## 2017-12-19 RX ORDER — OMEGA-3 FATTY ACIDS/FISH OIL 300-1000MG
200 CAPSULE ORAL EVERY 4 HOURS PRN
Qty: 120 CAPSULE | COMMUNITY
Start: 2017-12-19 | End: 2018-06-11 | Stop reason: ALTCHOICE

## 2017-12-19 RX ORDER — WARFARIN SODIUM 5 MG/1
TABLET ORAL
Qty: 30 TABLET | Refills: 0 | COMMUNITY
Start: 2017-12-19 | End: 2018-01-05 | Stop reason: DRUGHIGH

## 2017-12-19 NOTE — MR AVS SNAPSHOT
Ramírez Hernández   12/19/2017   Anticoagulation Therapy Visit    Description:  62 year old male   Provider:  Nicky Ref-Primary, Physician   Department:   Anti Coagulation           INR as of 12/19/2017     Today's INR 1.7!      Anticoagulation Summary as of 12/19/2017     INR goal 2.0-3.0   Today's INR 1.7!   Full instructions 2.5 mg on Wed, Fri; 5 mg all other days   Next INR check 12/26/2017    Indications   Persistent atrial fibrillation (H) [I48.1]  Long-term (current) use of anticoagulants [Z79.01] [Z79.01]         December 2017 Details    Sun Mon Tue Wed Thu Fri Sat          1               2                 3               4               5               6               7               8               9                 10               11               12               13               14               15               16                 17               18               19      5 mg   See details      20      2.5 mg         21      5 mg         22      2.5 mg         23      5 mg           24      5 mg         25      5 mg         26            27               28               29               30                 31                      Date Details   12/19 This INR check       Date of next INR:  12/26/2017         How to take your warfarin dose     To take:  2.5 mg Take 0.5 of a 5 mg tablet.    To take:  5 mg Take 1 of the 5 mg tablets.

## 2017-12-19 NOTE — PROGRESS NOTES
ANTICOAGULATION FOLLOW-UP CLINIC VISIT    Patient Name:  Ramírez Hernández  Date:  12/19/2017  Contact Type:  Telephone    SUBJECTIVE:     Patient Findings     Positives Initiation of therapy    Comments INR results/Coumadin dosing instructions and INR recheck information discussed by phone w/pt today.  Pt reports stiffness in the am and sometimes during the day; was inquiring about meds he can take.  Pt stated he has Advil at home; pt informed Tylenol/Acetaminofen is better as it does not affect the INR.  Pt stated he would like to try 1 Ibuprofen in the AM daily to see if this helps.  Pt denies bleeding/bruising or other medication changes.  Pt accurately repeated the Coumadin dosing instructions.  Call ended with questions answered and understanding stated.           OBJECTIVE    INR Point of Care   Date Value Ref Range Status   12/19/2017 1.7 (H) 0.86 - 1.14 Final     Comment:     This test is intended for monitoring Coumadin therapy.  Results are not   accurate in patients with prolonged INR due to factor deficiency.         ASSESSMENT / PLAN  INR assessment THER    Recheck INR In: 1 WEEK    INR Location Clinic      Anticoagulation Summary as of 12/19/2017     INR goal 2.0-3.0   Today's INR 1.7!   Maintenance plan 2.5 mg (5 mg x 0.5) on Wed, Fri; 5 mg (5 mg x 1) all other days   Full instructions 2.5 mg on Wed, Fri; 5 mg all other days   Weekly total 30 mg   Plan last modified Edel Porter RN (12/19/2017)   Next INR check 12/26/2017   Priority INR   Target end date Indefinite    Indications   Persistent atrial fibrillation (H) [I48.1]  Long-term (current) use of anticoagulants [Z79.01] [Z79.01]         Anticoagulation Episode Summary     INR check location     Preferred lab     Send INR reminders to  ANTICOAG POOL    Comments 12/19-Taking Advil 200mg daily-AM stiffness      Anticoagulation Care Providers     Provider Role Specialty Phone number    CLAUDIO Crum MD Responsible Family Practice  [phone]            See the Encounter Report to view Anticoagulation Flowsheet and Dosing Calendar (Go to Encounters tab in chart review, and find the Anticoagulation Therapy Visit)        Edel Porter RN

## 2017-12-26 ENCOUNTER — ANTICOAGULATION THERAPY VISIT (OUTPATIENT)
Dept: ANTICOAGULATION | Facility: OTHER | Age: 62
End: 2017-12-26
Payer: MEDICARE

## 2017-12-26 DIAGNOSIS — I48.19 PERSISTENT ATRIAL FIBRILLATION (H): ICD-10-CM

## 2017-12-26 DIAGNOSIS — Z79.01 LONG-TERM (CURRENT) USE OF ANTICOAGULANTS: ICD-10-CM

## 2017-12-26 LAB — INR BLD: 2.1 (ref 0.86–1.14)

## 2017-12-26 PROCEDURE — 85610 PROTHROMBIN TIME: CPT | Mod: QW,ZL | Performed by: FAMILY MEDICINE

## 2017-12-26 PROCEDURE — 36416 COLLJ CAPILLARY BLOOD SPEC: CPT | Mod: ZL | Performed by: FAMILY MEDICINE

## 2017-12-26 NOTE — PROGRESS NOTES
ANTICOAGULATION FOLLOW-UP CLINIC VISIT    Patient Name:  Ramírez Hernández  Date:  12/26/2017  Contact Type:  Telephone    SUBJECTIVE:     Patient Findings     Positives Other complaints    Comments Call placed to patient re: INR result, warfarin dosing and INR recheck date. He repeated back dosing accurately and has no questions. We discussed him taking the advil every morning and he said he didn't do that this past week. I told him that if he really needs it he should take it and we will dose his warfarin around it.             OBJECTIVE    INR Point of Care   Date Value Ref Range Status   12/26/2017 2.1 (H) 0.86 - 1.14 Final     Comment:     This test is intended for monitoring Coumadin therapy.  Results are not   accurate in patients with prolonged INR due to factor deficiency.         ASSESSMENT / PLAN  INR assessment THER    Recheck INR In: 10 DAYS    INR Location Clinic      Anticoagulation Summary as of 12/26/2017     INR goal 2.0-3.0   Today's INR 2.1   Maintenance plan 2.5 mg (5 mg x 0.5) on Mon, Wed, Fri; 5 mg (5 mg x 1) all other days   Full instructions 2.5 mg on Mon, Wed, Fri; 5 mg all other days   Weekly total 27.5 mg   Plan last modified Edel Irvin RN (12/26/2017)   Next INR check 1/5/2018   Priority INR   Target end date Indefinite    Indications   Persistent atrial fibrillation (H) [I48.1]  Long-term (current) use of anticoagulants [Z79.01] [Z79.01]         Anticoagulation Episode Summary     INR check location     Preferred lab     Send INR reminders to [facility] ANTICOAG POOL    Comments 12/19-Taking Advil 200mg daily-AM [facility]      Anticoagulation Care Providers     Provider Role Specialty Phone number    CLAUDIO Crum MD [facility] [phone]            See the Encounter Report to view Anticoagulation Flowsheet and Dosing Calendar (Go to Encounters tab in chart review, and find the Anticoagulation Therapy Visit)        Edel Irvin, RN

## 2017-12-26 NOTE — MR AVS SNAPSHOT
Ramírez PHILIPPE Hernández   12/26/2017   Anticoagulation Therapy Visit    Description:  62 year old male   Provider:  Nicky Ref-Primary, Physician   Department:   Anti Coagulation           INR as of 12/26/2017     Today's INR 2.1      Anticoagulation Summary as of 12/26/2017     INR goal 2.0-3.0   Today's INR 2.1   Full instructions 2.5 mg on Mon, Wed, Fri; 5 mg all other days   Next INR check 1/5/2018    Indications   Persistent atrial fibrillation (H) [I48.1]  Long-term (current) use of anticoagulants [Z79.01] [Z79.01]         December 2017 Details    Sun Mon Tue Wed Thu Fri Sat          1               2                 3               4               5               6               7               8               9                 10               11               12               13               14               15               16                 17               18               19               20               21               22               23                 24               25               26      5 mg   See details      27      2.5 mg         28      5 mg         29      2.5 mg         30      5 mg           31      5 mg                Date Details   12/26 This INR check               How to take your warfarin dose     To take:  2.5 mg Take 0.5 of a 5 mg tablet.    To take:  5 mg Take 1 of the 5 mg tablets.           January 2018 Details    Sun Mon Tue Wed Thu Fri Sat      1      2.5 mg         2      5 mg         3      2.5 mg         4      5 mg         5            6                 7               8               9               10               11               12               13                 14               15               16               17               18               19               20                 21               22               23               24               25               26               27                 28               29               30               31                    Date Details   No additional details    Date of next INR:  1/5/2018         How to take your warfarin dose     To take:  2.5 mg Take 0.5 of a 5 mg tablet.    To take:  5 mg Take 1 of the 5 mg tablets.

## 2018-01-05 ENCOUNTER — ANTICOAGULATION THERAPY VISIT (OUTPATIENT)
Dept: ANTICOAGULATION | Facility: OTHER | Age: 63
End: 2018-01-05
Payer: MEDICARE

## 2018-01-05 DIAGNOSIS — Z79.01 LONG-TERM (CURRENT) USE OF ANTICOAGULANTS: ICD-10-CM

## 2018-01-05 DIAGNOSIS — I48.19 PERSISTENT ATRIAL FIBRILLATION (H): ICD-10-CM

## 2018-01-05 LAB — INR BLD: 2.4 (ref 0.86–1.14)

## 2018-01-05 PROCEDURE — 36416 COLLJ CAPILLARY BLOOD SPEC: CPT | Mod: ZL | Performed by: FAMILY MEDICINE

## 2018-01-05 PROCEDURE — 85610 PROTHROMBIN TIME: CPT | Mod: QW,ZL | Performed by: FAMILY MEDICINE

## 2018-01-05 RX ORDER — WARFARIN SODIUM 5 MG/1
TABLET ORAL
Qty: 80 TABLET | Refills: 3 | Status: SHIPPED | OUTPATIENT
Start: 2018-01-05 | End: 2018-12-24

## 2018-01-05 NOTE — PROGRESS NOTES
ANTICOAGULATION FOLLOW-UP CLINIC VISIT    Patient Name:  Ramírez Hernández  Date:  1/5/2018  Contact Type:  Telephone    SUBJECTIVE:     Patient Findings     Positives No Problem Findings    Comments Call placed to patient cell phone and spoke to him re: INR result, warfarin dosing and INR recheck date. He verbalized understanding of instruction and has no questions.            OBJECTIVE    INR Point of Care   Date Value Ref Range Status   01/05/2018 2.4 (H) 0.86 - 1.14 Final     Comment:     This test is intended for monitoring Coumadin therapy.  Results are not   accurate in patients with prolonged INR due to factor deficiency.         ASSESSMENT / PLAN  INR assessment THER    Recheck INR In: 4 WEEKS    INR Location Clinic      Anticoagulation Summary as of 1/5/2018     INR goal 2.0-3.0   Today's INR 2.4   Maintenance plan 2.5 mg (5 mg x 0.5) on Mon, Wed, Fri; 5 mg (5 mg x 1) all other days   Full instructions 2.5 mg on Mon, Wed, Fri; 5 mg all other days   Weekly total 27.5 mg   No change documented Edel Irvin RN   Plan last modified Edel Irvin RN (12/26/2017)   Next INR check 2/2/2018   Priority INR   Target end date Indefinite    Indications   Persistent atrial fibrillation (H) [I48.1]  Long-term (current) use of anticoagulants [Z79.01] [Z79.01]         Anticoagulation Episode Summary     INR check location     Preferred lab     Send INR reminders to  ANTICO POOL    Comments 12/19-Taking Advil 200mg daily-AM stiffness      Anticoagulation Care Providers     Provider Role Specialty Phone number    CLAUDIO Crum MD [facility] [phone]            See the Encounter Report to view Anticoagulation Flowsheet and Dosing Calendar (Go to Encounters tab in chart review, and find the Anticoagulation Therapy Visit)        Edel Irvin RN

## 2018-01-05 NOTE — MR AVS SNAPSHOT
Ramírez Hernández   1/5/2018   Anticoagulation Therapy Visit    Description:  62 year old male   Provider:  Nicky Ref-Primary, Physician   Department:   Anti Coagulation           INR as of 1/5/2018     Today's INR 2.4      Anticoagulation Summary as of 1/5/2018     INR goal 2.0-3.0   Today's INR 2.4   Full instructions 2.5 mg on Mon, Wed, Fri; 5 mg all other days   Next INR check 2/2/2018    Indications   Persistent atrial fibrillation (H) [I48.1]  Long-term (current) use of anticoagulants [Z79.01] [Z79.01]         January 2018 Details    Sun Mon Tue Wed Thu Fri Sat      1               2               3               4               5      2.5 mg   See details      6      5 mg           7      5 mg         8      2.5 mg         9      5 mg         10      2.5 mg         11      5 mg         12      2.5 mg         13      5 mg           14      5 mg         15      2.5 mg         16      5 mg         17      2.5 mg         18      5 mg         19      2.5 mg         20      5 mg           21      5 mg         22      2.5 mg         23      5 mg         24      2.5 mg         25      5 mg         26      2.5 mg         27      5 mg           28      5 mg         29      2.5 mg         30      5 mg         31      2.5 mg             Date Details   01/05 This INR check               How to take your warfarin dose     To take:  2.5 mg Take 0.5 of a 5 mg tablet.    To take:  5 mg Take 1 of the 5 mg tablets.           February 2018 Details    Sun Mon Tue Wed Thu Fri Sat         1      5 mg         2            3                 4               5               6               7               8               9               10                 11               12               13               14               15               16               17                 18               19               20               21               22               23               24                 25               26               27                28                   Date Details   No additional details    Date of next INR:  2/2/2018         How to take your warfarin dose     To take:  2.5 mg Take 0.5 of a 5 mg tablet.    To take:  5 mg Take 1 of the 5 mg tablets.

## 2018-01-08 DIAGNOSIS — I50.21 ACUTE SYSTOLIC CONGESTIVE HEART FAILURE (H): ICD-10-CM

## 2018-01-08 DIAGNOSIS — I48.91 ATRIAL FIBRILLATION WITH RAPID VENTRICULAR RESPONSE (H): ICD-10-CM

## 2018-01-08 RX ORDER — FUROSEMIDE 40 MG
40 TABLET ORAL DAILY
Qty: 30 TABLET | Refills: 0 | Status: CANCELLED | OUTPATIENT
Start: 2018-01-08

## 2018-01-08 RX ORDER — LISINOPRIL 10 MG/1
10 TABLET ORAL DAILY
Qty: 30 TABLET | Refills: 0 | Status: CANCELLED | OUTPATIENT
Start: 2018-01-08

## 2018-01-08 RX ORDER — METOPROLOL TARTRATE 50 MG
50 TABLET ORAL 2 TIMES DAILY
Qty: 60 TABLET | Refills: 0 | Status: CANCELLED | OUTPATIENT
Start: 2018-01-08

## 2018-01-08 NOTE — TELEPHONE ENCOUNTER
Lisinopril       Last Written Prescription Date:  12/8/17  Last Fill Quantity: 30,   # refills: 0  Last Office Visit: 12/15/17  Future Office visit:    Next 5 appointments (look out 90 days)     Blayne 10, 2018  1:45 PM CST   (Arrive by 1:30 PM)   Office Visit with Aron Delvalle MD   [facility] ([facility] )    [address]  [facility] [address]   [phone]                   Lasix       Last Written Prescription Date:  12/8/17  Last Fill Quantity: 30,   # refills: 0  Last Office Visit: 12/15/17  Future Office visit:    Next 5 appointments (look out 90 days)     Blayne 10, 2018  1:45 PM CST   (Arrive by 1:30 PM)   Office Visit with Aron Delvalle MD   [facility] ([facility] )    [address]  [facility] [address]   [phone]                   Metoprolol       Last Written Prescription Date:  12/8/17  Last Fill Quantity: 60,   # refills: 0  Last Office Visit: 12/15/17  Future Office visit:    Next 5 appointments (look out 90 days)     Blayne 10, 2018  1:45 PM CST   (Arrive by 1:30 PM)   Office Visit with Aron Delvalle MD   [facility] ([facility] )    [address]  [facility] [address]   [phone]

## 2018-01-09 NOTE — TELEPHONE ENCOUNTER
Pt called to check on status of meds. [facility] week and he is out of meds. Advised we received request yesterday and we have a 72 business hour turn around time. Pt hoping these can be done ASAP

## 2018-01-10 ENCOUNTER — OFFICE VISIT (OUTPATIENT)
Dept: FAMILY MEDICINE | Facility: OTHER | Age: 63
End: 2018-01-10
Attending: FAMILY MEDICINE
Payer: MEDICARE

## 2018-01-10 VITALS
BODY MASS INDEX: 32.74 KG/M2 | DIASTOLIC BLOOD PRESSURE: 70 MMHG | OXYGEN SATURATION: 97 % | WEIGHT: 216 LBS | HEART RATE: 100 BPM | HEIGHT: 68 IN | TEMPERATURE: 98.7 F | SYSTOLIC BLOOD PRESSURE: 128 MMHG

## 2018-01-10 DIAGNOSIS — I50.21 ACUTE SYSTOLIC CONGESTIVE HEART FAILURE (H): ICD-10-CM

## 2018-01-10 DIAGNOSIS — Z72.0 TOBACCO ABUSE: ICD-10-CM

## 2018-01-10 DIAGNOSIS — Z11.59 NEED FOR HEPATITIS C SCREENING TEST: ICD-10-CM

## 2018-01-10 DIAGNOSIS — I48.91 ATRIAL FIBRILLATION WITH RAPID VENTRICULAR RESPONSE (H): Primary | ICD-10-CM

## 2018-01-10 PROCEDURE — 99214 OFFICE O/P EST MOD 30 MIN: CPT | Performed by: FAMILY MEDICINE

## 2018-01-10 PROCEDURE — G0472 HEP C SCREEN HIGH RISK/OTHER: HCPCS | Mod: ZL | Performed by: FAMILY MEDICINE

## 2018-01-10 PROCEDURE — 36415 COLL VENOUS BLD VENIPUNCTURE: CPT | Mod: ZL | Performed by: FAMILY MEDICINE

## 2018-01-10 PROCEDURE — G0463 HOSPITAL OUTPT CLINIC VISIT: HCPCS

## 2018-01-10 RX ORDER — METOPROLOL TARTRATE 50 MG
50 TABLET ORAL 2 TIMES DAILY
Qty: 180 TABLET | Refills: 3 | Status: SHIPPED | OUTPATIENT
Start: 2018-01-10 | End: 2018-12-24

## 2018-01-10 RX ORDER — LISINOPRIL 10 MG/1
10 TABLET ORAL DAILY
Qty: 90 TABLET | Refills: 3 | Status: SHIPPED | OUTPATIENT
Start: 2018-01-10 | End: 2018-12-24

## 2018-01-10 RX ORDER — FUROSEMIDE 40 MG
40 TABLET ORAL DAILY
Qty: 90 TABLET | Refills: 3 | Status: SHIPPED | OUTPATIENT
Start: 2018-01-10 | End: 2018-12-24

## 2018-01-10 ASSESSMENT — ANXIETY QUESTIONNAIRES
2. NOT BEING ABLE TO STOP OR CONTROL WORRYING: SEVERAL DAYS
7. FEELING AFRAID AS IF SOMETHING AWFUL MIGHT HAPPEN: SEVERAL DAYS
6. BECOMING EASILY ANNOYED OR IRRITABLE: SEVERAL DAYS
IF YOU CHECKED OFF ANY PROBLEMS ON THIS QUESTIONNAIRE, HOW DIFFICULT HAVE THESE PROBLEMS MADE IT FOR YOU TO DO YOUR WORK, TAKE CARE OF THINGS AT HOME, OR GET ALONG WITH OTHER PEOPLE: SOMEWHAT DIFFICULT
4. TROUBLE RELAXING: SEVERAL DAYS
1. FEELING NERVOUS, ANXIOUS, OR ON EDGE: SEVERAL DAYS
3. WORRYING TOO MUCH ABOUT DIFFERENT THINGS: SEVERAL DAYS
GAD7 TOTAL SCORE: 7
5. BEING SO RESTLESS THAT IT IS HARD TO SIT STILL: SEVERAL DAYS

## 2018-01-10 ASSESSMENT — PATIENT HEALTH QUESTIONNAIRE - PHQ9: SUM OF ALL RESPONSES TO PHQ QUESTIONS 1-9: 11

## 2018-01-10 ASSESSMENT — PAIN SCALES - GENERAL: PAINLEVEL: EXTREME PAIN (8)

## 2018-01-10 NOTE — LETTER
January 16, 2018      Ramírez Hernández  [address]  [facility] [address]        Dear ,    We are writing to inform you of your test results.    Your lab is negative.    Resulted Orders   Hepatitis C Screen Reflex to HCV RNA Quant and Genotype   Result Value Ref Range    Hepatitis C Antibody Nonreactive NR^Nonreactive      Comment:      Assay performance characteristics have not been established for newborns,   infants, and children         If you have any questions or concerns, please call the clinic at the number listed above.       Sincerely,        Aron Delvalle MD

## 2018-01-10 NOTE — NURSING NOTE
"Chief Complaint   Patient presents with     [facility]     Recheck Medication     Pt is requesting refills        Initial /70  Pulse 100  Temp 98.7  F (37.1  C)  Ht 5' 8\" (1.727 m)  Wt 216 lb (98 kg)  SpO2 97%  BMI 32.84 kg/m2 Estimated body mass index is 32.84 kg/(m^2) as calculated from the following:    Height as of this encounter: 5' 8\" (1.727 m).    Weight as of this encounter: 216 lb (98 kg).  Medication Reconciliation: complete   Nicolle Pantoja    "

## 2018-01-10 NOTE — LETTER
"My Heart Failure Action Plan   Name: Ramírez Hernández    YOB: 1955   Date: 1/10/2018    My doctor: No Ref-Primary, Physician     [address]  [facility] [address]  [phone]  My Diagnosis: Systolic Heart Failure   My Ejection Fraction: {EF%:441138}    My Exercise Goal: 30 minutes daily  .     My Weight Goal: ***  Wt Readings from Last 2 Encounters:   12/15/17 250 lb (113.4 kg)   12/08/17 250 lb (113.4 kg)     Weigh yourself daily using the same scale. If you gain more than 2 pounds in 24 hours or 5 pounds in a week {HF WEIGHT GOAL:297558}    My Diet Goal: {HF DIET GOAL:964361::\"No added salt\"}    Emergency Room Visits:    Our goal is to improve your quality of life and help you avoid a visit to the emergency room or hospital.  If we work together, we can achieve this goal. But, if you feel you need to call 911 or go to the emergency room, please do so.  If you go to the emergency room, please bring your list of medicines and your daily weight chart with you.       GREEN ZONE     Doing well today    Weight gained is no more than 2 pounds a day or 5 pounds a week.    No swelling in feet, ankles, legs or stomach.    No more swelling than usual.    No more trouble breathing than usual.    No change in my sleep.    No other problems. Actions:    I am doing fine.  I will take my medicine, follow my diet, see my doctor, exercise, and watch for symptoms.           YELLOW ZONE         Having a bad day or flare up    Weight gain of more than 2 pounds in one day or 5 pounds in one week.    New swelling in ankle, leg, knee or thigh.    Bloating in belly, pants feel tighter.    Swelling in hands or face.    Coughing or trouble breathing while walking or talking.    Harder to breathe last night.    Have trouble sleeping, wake up short of breath.    Much more tired than usual.    Not eating.    Pain in my chest or bad leg cramps.    Feel weak or dizzy. Actions:    I need to take " action and call my doctor or nurse today.                 RED ZONE         Need medical care now    Weight gain of 5 pounds overnight.    Chest pain or pressure that does not go away.    Feel less alert.    Wheezing or have trouble breathing when at rest.    Cannot sleep lying down.    Cannot take my water pill.    Pass out or faint. Actions:    I need to call my doctor or nurse now!    Call 911 if I have chest pain or cannot breathe.        Electronically signed by: Nicolle Pantoja, January 10, 2018

## 2018-01-10 NOTE — MR AVS SNAPSHOT
"              After Visit Summary   1/10/2018    Ramírez Hernández    MRN: [id number]           Patient Information     Date Of Birth          1955        Visit Information        Provider Department      1/10/2018 1:45 PM Aron Delvalle MD [facility]        Today's Diagnoses     Atrial fibrillation with rapid ventricular response (H)    -  1    Need for hepatitis C screening test        Acute systolic congestive heart failure (H)        Tobacco abuse          Care Instructions    F/u with ongoing concerns.           Follow-ups after your visit        Your next 10 appointments already scheduled     Jun 11, 2018  9:15 AM CDT   (Arrive by 9:00 AM)   SHORT with Aron Delvalle MD   [facility] ([facility] )    [address]  [facility] [address]   [phone]              Who to contact     If you have questions or need follow up information about today's clinic visit or your schedule please contact [facility] directly at [phone].  Normal or non-critical lab and imaging results will be communicated to you by MyChart, letter or phone within 4 business days after the clinic has received the results. If you do not hear from us within 7 days, please contact the clinic through MyChart or phone. If you have a critical or abnormal lab result, we will notify you by phone as soon as possible.  Submit refill requests through [employer] or call your pharmacy and they will forward the refill request to us. Please allow 3 business days for your refill to be completed.          Additional Information About Your Visit        Care EveryWhere ID     This is your Care EveryWhere ID. This could be used by other organizations to access your [facility] medical records  [id number]        Your Vitals Were     Pulse Temperature Height Pulse Oximetry BMI (Body Mass Index)       100 98.7  F (37.1  C) 5' 8\" (1.727 m) 97% 32.84 kg/m2        Blood Pressure from Last 3 " Encounters:   01/10/18 128/70   12/15/17 118/76   12/08/17 137/87    Weight from Last 3 Encounters:   01/10/18 216 lb (98 kg)   12/15/17 250 lb (113.4 kg)   12/08/17 250 lb (113.4 kg)              We Performed the Following     Hepatitis C Screen Reflex to HCV RNA Quant and Genotype          Today's Medication Changes          These changes are accurate as of: 1/10/18  2:00 PM.  If you have any questions, ask your nurse or doctor.               Stop taking these medicines if you haven't already. Please contact your care team if you have questions.     nicotine 21 MG/24HR 24 hr patch   Commonly known as:  NICODERM CQ   Stopped by:  Aron Delvalle MD                Where to get your medicines      These medications were sent to [id number] - [place], [place] - [address] AT [facility] [address]  [address], [facility] [address]     Phone:  [phone]     furosemide 40 MG tablet    lisinopril 10 MG tablet    metoprolol 50 MG tablet                Primary Care Provider Fax #    Physician No Ref-Primary [phone]       No address on file        Equal Access to Services     NOE BILLINGSLEY AH: Hadii nolan ku hadasho Soomaali, waaxda luqadaha, qaybta kaalmada adeegyada, georgina tim hayally finn. So [facility] [phone].    ATENCIÓN: Si habla español, tiene a kay disposición servicios gratuitos de asistencia lingüística. Addison al [phone].    We comply with applicable federal civil rights laws and [place] laws. We do not discriminate on the basis of race, color, national origin, age, disability, sex, sexual orientation, or gender identity.            Thank you!     Thank you for choosing [facility]  for your care. Our goal is always to provide you with excellent care. Hearing back from our patients is one way we can continue to improve our services. Please take a few minutes to complete the written survey that you may receive in the mail after your visit with us. Thank  you!             Your Updated Medication List - Protect others around you: Learn how to safely use, store and throw away your medicines at www.disposemymeds.org.          This list is accurate as of: 1/10/18  2:00 PM.  Always use your most recent med list.                   Brand Name Dispense Instructions for use Diagnosis    furosemide 40 MG tablet    LASIX    90 tablet    Take 1 tablet (40 mg) by mouth daily    Acute systolic congestive heart failure (H)       ibuprofen 200 MG capsule     120 capsule    Take 200 mg by mouth every 4 hours as needed for fever        lisinopril 10 MG tablet    PRINIVIL/ZESTRIL    90 tablet    Take 1 tablet (10 mg) by mouth daily    Acute systolic congestive heart failure (H)       metoprolol 50 MG tablet    LOPRESSOR    180 tablet    Take 1 tablet (50 mg) by mouth 2 times daily    Atrial fibrillation with rapid ventricular response (H), Acute systolic congestive heart failure (H)       warfarin 5 MG tablet    COUMADIN    80 tablet    1/2 pill Mon,Wed,Fri and 1 pill all other days or as directed by warfarin clinic    Long-term (current) use of anticoagulants, Persistent atrial fibrillation (H)

## 2018-01-10 NOTE — PROGRESS NOTES
Ramírez PHILIPPE Hernández    January 10, 2018    Chief Complaint   Patient presents with     Establish Care     Recheck Medication     Pt is requesting refills        SUBJECTIVE:  Here toestablish cares.  He is fine and has very few needs other than his meds.  Breathing is great right now.  Tobacco use ongoing and we discussed cessation and he's not quite ready right now.  No chest pains.  He is always in afib.  He is on the coumadin.  We reviewed and discussed at some length today and he seemed pleased.      History reviewed. No pertinent past medical history.    Past Surgical History:   Procedure Laterality Date     BACK SURGERY         Current Outpatient Prescriptions   Medication Sig Dispense Refill     furosemide (LASIX) 40 MG tablet Take 1 tablet (40 mg) by mouth daily 90 tablet 3     lisinopril (PRINIVIL/ZESTRIL) 10 MG tablet Take 1 tablet (10 mg) by mouth daily 90 tablet 3     metoprolol (LOPRESSOR) 50 MG tablet Take 1 tablet (50 mg) by mouth 2 times daily 180 tablet 3     warfarin (COUMADIN) 5 MG tablet 1/2 pill Mon,Wed,Fri and 1 pill all other days or as directed by warfarin clinic 80 tablet 3     ibuprofen 200 MG capsule Take 200 mg by mouth every 4 hours as needed for fever 120 capsule      [DISCONTINUED] lisinopril (PRINIVIL/ZESTRIL) 10 MG tablet Take 1 tablet (10 mg) by mouth daily 30 tablet 0     [DISCONTINUED] metoprolol (LOPRESSOR) 50 MG tablet Take 1 tablet (50 mg) by mouth 2 times daily 60 tablet 0     [DISCONTINUED] furosemide (LASIX) 40 MG tablet Take 1 tablet (40 mg) by mouth daily 30 tablet 0       No Known Allergies    History reviewed. No pertinent family history.    Social History     Social History     Marital status:      Spouse name: N/A     Number of children: N/A     Years of education: N/A     Occupational History     Not on file.     Social History Main Topics     Smoking status: Current Every Day Smoker     Packs/day: 1.00     Years: 40.00     Types: Cigarettes     Start date:  1/1/1975     Smokeless tobacco: Never Used     Alcohol use Yes      Comment: weekly     Drug use: No     Sexual activity: Not on file     Other Topics Concern     Not on file     Social History Narrative       5 point ROS negative except as noted above in HPI, including Gen., Resp., CV, GI &  system review.     OBJECTIVE:  B/P: 128/70, Temperature: 98.7, Pulse: 100, Respirations: Data Unavailable    GENERAL APPEARANCE: Alert, no acute distress  CV: irregular rate and rhythm, no murmur, rub or gallop  RESP: lungs clear to auscultation bilaterally  ABDOMEN: normal bowel sounds, soft, nontender, no hepatosplenomegaly or other masses  SKIN: no suspicious lesions or rashes to visualized skin  NEURO: Alert, oriented x 3, speech and mentation normal    ASSESSMENT and PLAN:  (I48.91) Atrial fibrillation with rapid ventricular response (H)  (primary encounter diagnosis)  Comment: stable.   Plan: metoprolol (LOPRESSOR) 50 MG tablet        Refill meds for the year.  See him q 6 months.  UTD on labs which is great.  F/u routine.     (Z11.59) Need for hepatitis C screening test  Comment: ordered.   Plan: Hepatitis C Screen Reflex to HCV RNA Quant and         Genotype        Ordered.     (I50.21) Acute systolic congestive heart failure (H)  Comment: stable.   Plan: furosemide (LASIX) 40 MG tablet, lisinopril         (PRINIVIL/ZESTRIL) 10 MG tablet, metoprolol         (LOPRESSOR) 50 MG tablet        No change.  Continue meds.  Labs stable.  Doing great.          (Z72.0) Tobacco abuse  Comment: reviewed.   Plan: as above.  I hope I can help him quit.

## 2018-01-11 ASSESSMENT — ANXIETY QUESTIONNAIRES: GAD7 TOTAL SCORE: 7

## 2018-01-12 LAB — HCV AB SERPL QL IA: NONREACTIVE

## 2018-01-22 ENCOUNTER — CARE COORDINATION (OUTPATIENT)
Dept: CARE COORDINATION | Facility: OTHER | Age: 63
End: 2018-01-22

## 2018-01-22 NOTE — PROGRESS NOTES
Clinic Care Coordination Contact  Care Team Conversations    Referral received Dec 2017 for Care Coordination.  Patient established care with Dr Delvalle Jan 2018.  Per Dr Delvalle's progress note, patient is stable and doing well, no concerns at this time.  Dr Delvalle will re-refer to Care Coordination should the patient need these services in the future.    Lauren Pipkin, BS-RN   CHF and General Care Coordinator  [phone] Option # 1

## 2018-02-02 ENCOUNTER — ANTICOAGULATION THERAPY VISIT (OUTPATIENT)
Dept: ANTICOAGULATION | Facility: OTHER | Age: 63
End: 2018-02-02
Payer: MEDICARE

## 2018-02-02 DIAGNOSIS — I48.19 PERSISTENT ATRIAL FIBRILLATION (H): ICD-10-CM

## 2018-02-02 DIAGNOSIS — Z79.01 LONG-TERM (CURRENT) USE OF ANTICOAGULANTS: ICD-10-CM

## 2018-02-02 LAB — INR BLD: 2.3 (ref 0.86–1.14)

## 2018-02-02 PROCEDURE — 36416 COLLJ CAPILLARY BLOOD SPEC: CPT | Mod: ZL | Performed by: FAMILY MEDICINE

## 2018-02-02 PROCEDURE — 85610 PROTHROMBIN TIME: CPT | Mod: QW,ZL | Performed by: FAMILY MEDICINE

## 2018-02-02 NOTE — PROGRESS NOTES
ANTICOAGULATION FOLLOW-UP CLINIC VISIT    Patient Name:  Ramírez Hernández  Date:  2/2/2018  Contact Type:  Telephone    SUBJECTIVE:     Patient Findings     Positives No Problem Findings    Comments Call placed to patient and spoke to him re: INR result, warfarin dosing and INR recheck date. He states he has no new meds and is doing well.  He verbalized understanding if warfarin dosing and INR recheck date and has no questions.            OBJECTIVE    INR Point of Care   Date Value Ref Range Status   02/02/2018 2.3 (H) 0.86 - 1.14 Final     Comment:     This test is intended for monitoring Coumadin therapy.  Results are not   accurate in patients with prolonged INR due to factor deficiency.         ASSESSMENT / PLAN  INR assessment THER    Recheck INR In: 6 WEEKS    INR Location Clinic      Anticoagulation Summary as of 2/2/2018     INR goal 2.0-3.0   Today's INR 2.3   Maintenance plan 2.5 mg (5 mg x 0.5) on Mon, Wed, Fri; 5 mg (5 mg x 1) all other days   Full instructions 2.5 mg on Mon, Wed, Fri; 5 mg all other days   Weekly total 27.5 mg   No change documented Edel Irvin RN   Plan last modified Edel Irvin RN (12/26/2017)   Next INR check 3/16/2018   Priority INR   Target end date Indefinite    Indications   Persistent atrial fibrillation (H) [I48.1]  Long-term (current) use of anticoagulants [Z79.01] [Z79.01]         Anticoagulation Episode Summary     INR check location     Preferred lab     Send INR reminders to [facility] ANTICOAG POOL    Comments 12/19-Taking Advil 200mg daily-AM [facility]      Anticoagulation Care Providers     Provider Role Specialty Phone number    CLAUDIO Crum MD [facility] [phone]            See the Encounter Report to view Anticoagulation Flowsheet and Dosing Calendar (Go to Encounters tab in chart review, and find the Anticoagulation Therapy Visit)        Edel Irvin RN

## 2018-02-02 NOTE — MR AVS SNAPSHOT
Ramírez Hernández   2/2/2018   Anticoagulation Therapy Visit    Description:  62 year old male   Provider:  Nicky Ref-Primary, Physician   Department:   Anti Coagulation           INR as of 2/2/2018     Today's INR 2.3      Anticoagulation Summary as of 2/2/2018     INR goal 2.0-3.0   Today's INR 2.3   Full instructions 2.5 mg on Mon, Wed, Fri; 5 mg all other days   Next INR check 3/16/2018    Indications   Persistent atrial fibrillation (H) [I48.1]  Long-term (current) use of anticoagulants [Z79.01] [Z79.01]         February 2018 Details    Sun Mon Tue Wed Thu Fri Sat         1               2      2.5 mg   See details      3      5 mg           4      5 mg         5      2.5 mg         6      5 mg         7      2.5 mg         8      5 mg         9      2.5 mg         10      5 mg           11      5 mg         12      2.5 mg         13      5 mg         14      2.5 mg         15      5 mg         16      2.5 mg         17      5 mg           18      5 mg         19      2.5 mg         20      5 mg         21      2.5 mg         22      5 mg         23      2.5 mg         24      5 mg           25      5 mg         26      2.5 mg         27      5 mg         28      2.5 mg             Date Details   02/02 This INR check               How to take your warfarin dose     To take:  2.5 mg Take 0.5 of a 5 mg tablet.    To take:  5 mg Take 1 of the 5 mg tablets.           March 2018 Details    Sun Mon Tue Wed Thu Fri Sat         1      5 mg         2      2.5 mg         3      5 mg           4      5 mg         5      2.5 mg         6      5 mg         7      2.5 mg         8      5 mg         9      2.5 mg         10      5 mg           11      5 mg         12      2.5 mg         13      5 mg         14      2.5 mg         15      5 mg         16            17                 18               19               20               21               22               23               24                 25               26                27               28               29               30               31                Date Details   No additional details    Date of next INR:  3/16/2018         How to take your warfarin dose     To take:  2.5 mg Take 0.5 of a 5 mg tablet.    To take:  5 mg Take 1 of the 5 mg tablets.

## 2018-03-16 ENCOUNTER — ANTICOAGULATION THERAPY VISIT (OUTPATIENT)
Dept: ANTICOAGULATION | Facility: OTHER | Age: 63
End: 2018-03-16
Payer: MEDICARE

## 2018-03-16 DIAGNOSIS — I48.19 PERSISTENT ATRIAL FIBRILLATION (H): ICD-10-CM

## 2018-03-16 DIAGNOSIS — Z79.01 LONG-TERM (CURRENT) USE OF ANTICOAGULANTS: ICD-10-CM

## 2018-03-16 LAB — INR BLD: 2.5 (ref 0.86–1.14)

## 2018-03-16 PROCEDURE — 36416 COLLJ CAPILLARY BLOOD SPEC: CPT | Mod: ZL | Performed by: FAMILY MEDICINE

## 2018-03-16 PROCEDURE — 85610 PROTHROMBIN TIME: CPT | Mod: QW,ZL | Performed by: FAMILY MEDICINE

## 2018-03-16 NOTE — PROGRESS NOTES
ANTICOAGULATION FOLLOW-UP CLINIC VISIT    Patient Name:  Ramírez Hernández  Date:  3/16/2018  Contact Type:  Telephone    SUBJECTIVE:     Patient Findings     Positives No Problem Findings    Comments INR done by lab and result noted by warfarin clinic. Call placed to patient and spoke to him re: INR result, warfarin dosing and INR recheck date. He states he has no bleeding/bruising and no med/diet/activity changes. I asked him who his primary care provider is and he states he sees Dr. Delvalle so INR referral with be changed to reflect current provider. Patient vebalized understanding of instructions and has no questions.            OBJECTIVE    INR Point of Care   Date Value Ref Range Status   03/16/2018 2.5 (H) 0.86 - 1.14 Final     Comment:     This test is intended for monitoring Coumadin therapy.  Results are not   accurate in patients with prolonged INR due to factor deficiency.         ASSESSMENT / PLAN  INR assessment THER    Recheck INR In: 6 WEEKS    INR Location Clinic      Anticoagulation Summary as of 3/16/2018     INR goal 2.0-3.0   Today's INR 2.5   Maintenance plan 2.5 mg (5 mg x 0.5) on Mon, Wed, Fri; 5 mg (5 mg x 1) all other days   Full instructions 2.5 mg on Mon, Wed, Fri; 5 mg all other days   Weekly total 27.5 mg   No change documented Edel Irvin RN   Plan last modified Edel Irvin, RN (12/26/2017)   Next INR check 4/26/2018   Priority INR   Target end date Indefinite    Indications   Persistent atrial fibrillation (H) [I48.1]  Long-term (current) use of anticoagulants [Z79.01] [Z79.01]         Anticoagulation Episode Summary     INR check location     Preferred lab     Send INR reminders to  MADDIE OBRIEN    Comments 12/19-Taking Advil 200mg daily-AM stiffness      Anticoagulation Care Providers     Provider Role Specialty Phone number    CLAUDIO Crum MD [facility] [phone]            See the Encounter Report to view Anticoagulation Flowsheet and Dosing  Calendar (Go to Encounters tab in chart review, and find the Anticoagulation Therapy Visit)        Edel Irvin RN

## 2018-03-16 NOTE — MR AVS SNAPSHOT
Ramírez Hernández   3/16/2018   Anticoagulation Therapy Visit    Description:  62 year old male   Provider:  Nicky Ref-Primary, Physician   Department:   Anti Coagulation           INR as of 3/16/2018     Today's INR 2.5      Anticoagulation Summary as of 3/16/2018     INR goal 2.0-3.0   Today's INR 2.5   Full instructions 2.5 mg on Mon, Wed, Fri; 5 mg all other days   Next INR check 4/26/2018    Indications   Persistent atrial fibrillation (H) [I48.1]  Long-term (current) use of anticoagulants [Z79.01] [Z79.01]         March 2018 Details    Sun Mon Tue Wed Thu Fri Sat         1               2               3                 4               5               6               7               8               9               10                 11               12               13               14               15               16      2.5 mg   See details      17      5 mg           18      5 mg         19      2.5 mg         20      5 mg         21      2.5 mg         22      5 mg         23      2.5 mg         24      5 mg           25      5 mg         26      2.5 mg         27      5 mg         28      2.5 mg         29      5 mg         30      2.5 mg         31      5 mg          Date Details   03/16 This INR check               How to take your warfarin dose     To take:  2.5 mg Take 0.5 of a 5 mg tablet.    To take:  5 mg Take 1 of the 5 mg tablets.           April 2018 Details    Sun Mon Tue Wed Thu Fri Sat     1      5 mg         2      2.5 mg         3      5 mg         4      2.5 mg         5      5 mg         6      2.5 mg         7      5 mg           8      5 mg         9      2.5 mg         10      5 mg         11      2.5 mg         12      5 mg         13      2.5 mg         14      5 mg           15      5 mg         16      2.5 mg         17      5 mg         18      2.5 mg         19      5 mg         20      2.5 mg         21      5 mg           22      5 mg         23      2.5 mg         24       5 mg         25      2.5 mg         26            27               28                 29               30                     Date Details   No additional details    Date of next INR:  4/26/2018         How to take your warfarin dose     To take:  2.5 mg Take 0.5 of a 5 mg tablet.    To take:  5 mg Take 1 of the 5 mg tablets.

## 2018-04-26 ENCOUNTER — ANTICOAGULATION THERAPY VISIT (OUTPATIENT)
Dept: ANTICOAGULATION | Facility: OTHER | Age: 63
End: 2018-04-26
Payer: MEDICARE

## 2018-04-26 DIAGNOSIS — I48.19 PERSISTENT ATRIAL FIBRILLATION (H): ICD-10-CM

## 2018-04-26 DIAGNOSIS — Z79.01 LONG-TERM (CURRENT) USE OF ANTICOAGULANTS: ICD-10-CM

## 2018-04-26 LAB — INR BLD: 1.8 (ref 0.86–1.14)

## 2018-04-26 PROCEDURE — 85610 PROTHROMBIN TIME: CPT | Mod: QW,ZL | Performed by: FAMILY MEDICINE

## 2018-04-26 PROCEDURE — 36416 COLLJ CAPILLARY BLOOD SPEC: CPT | Mod: ZL | Performed by: FAMILY MEDICINE

## 2018-04-26 NOTE — PROGRESS NOTES
ANTICOAGULATION FOLLOW-UP CLINIC VISIT    Patient Name:  Ramírez Hernández  Date:  4/26/2018  Contact Type:  Telephone    SUBJECTIVE:     Patient Findings     Positives No Problem Findings    Comments INR done by lab. Call placed to patient and spoke to him re: INR result, warfarin dosing and INR recheck date. He has no bleeding/bruising and no changes in diet/meds/activity. He verbalized understanding of instruction and has no questions.            OBJECTIVE    INR Point of Care   Date Value Ref Range Status   04/26/2018 1.8 (H) 0.86 - 1.14 Final     Comment:     This test is intended for monitoring Coumadin therapy.  Results are not   accurate in patients with prolonged INR due to factor deficiency.         ASSESSMENT / PLAN  INR assessment SUB    Recheck INR In: 3 WEEKS    INR Location Clinic      Anticoagulation Summary as of 4/26/2018     INR goal 2.0-3.0   Today's INR 1.8!   Maintenance plan 2.5 mg (5 mg x 0.5) on Mon, Wed, Fri; 5 mg (5 mg x 1) all other days   Full instructions 4/26: 7.5 mg; Otherwise 2.5 mg on Mon, Wed, Fri; 5 mg all other days   Weekly total 27.5 mg   Plan last modified Edel Irvin RN (12/26/2017)   Next INR check 5/17/2018   Priority INR   Target end date Indefinite    Indications   Persistent atrial fibrillation (H) [I48.1]  Long-term (current) use of anticoagulants [Z79.01] [Z79.01]         Anticoagulation Episode Summary     INR check location     Preferred lab     Send INR reminders to [facility] ANTICOAG POOL    Comments 12/19-Taking Advil 200mg daily-AM [facility]      Anticoagulation Care Providers     Provider Role Specialty Phone number    CLAUDIO Crum MD [facility] [phone]            See the Encounter Report to view Anticoagulation Flowsheet and Dosing Calendar (Go to Encounters tab in chart review, and find the Anticoagulation Therapy Visit)        Edel Irvin, RN

## 2018-04-26 NOTE — MR AVS SNAPSHOT
Ramírez Hernández   4/26/2018   Anticoagulation Therapy Visit    Description:  62 year old male   Provider:  Aron Delvalle MD   Department:  Hc Anti Coagulation           INR as of 4/26/2018     Today's INR 1.8!      Anticoagulation Summary as of 4/26/2018     INR goal 2.0-3.0   Today's INR 1.8!   Full instructions 4/26: 7.5 mg; Otherwise 2.5 mg on Mon, Wed, Fri; 5 mg all other days   Next INR check 5/17/2018    Indications   Persistent atrial fibrillation (H) [I48.1]  Long-term (current) use of anticoagulants [Z79.01] [Z79.01]         Your next Anticoagulation Clinic appointment(s)     May 17, 2018  9:30 AM CDT   Anticoagulation Visit with  ANTI COAGULATION   [facility] [place] ([facility] )    [address] [place]  [place] [address]   [phone]              April 2018 Details    Sun Mon Tue Wed Thu Fri Sat     1               2               3               4               5               6               7                 8               9               10               11               12               13               14                 15               16               17               18               19               20               21                 22               23               24               25               26      7.5 mg   See details      27      2.5 mg         28      5 mg           29      5 mg         30      2.5 mg               Date Details   04/26 This INR check               How to take your warfarin dose     To take:  2.5 mg Take 0.5 of a 5 mg tablet.    To take:  5 mg Take 1 of the 5 mg tablets.    To take:  7.5 mg Take 1.5 of the 5 mg tablets.           May 2018 Details    Sun Mon Tue Wed Thu Fri Sat       1      5 mg         2      2.5 mg         3      5 mg         4      2.5 mg         5      5 mg           6      5 mg         7      2.5 mg         8      5 mg         9      2.5 mg         10      5 mg         11      2.5 mg         12      5 mg            13      5 mg         14      2.5 mg         15      5 mg         16      2.5 mg         17            18               19                 20               21               22               23               24               25               26                 27               28               29               30               31                  Date Details   No additional details    Date of next INR:  5/17/2018         How to take your warfarin dose     To take:  2.5 mg Take 0.5 of a 5 mg tablet.    To take:  5 mg Take 1 of the 5 mg tablets.

## 2018-05-17 ENCOUNTER — ANTICOAGULATION THERAPY VISIT (OUTPATIENT)
Dept: ANTICOAGULATION | Facility: OTHER | Age: 63
End: 2018-05-17
Attending: FAMILY MEDICINE
Payer: MEDICARE

## 2018-05-17 DIAGNOSIS — I48.19 PERSISTENT ATRIAL FIBRILLATION (H): ICD-10-CM

## 2018-05-17 DIAGNOSIS — Z79.01 LONG-TERM (CURRENT) USE OF ANTICOAGULANTS: ICD-10-CM

## 2018-05-17 LAB — INR BLD: 1.7 (ref 0.86–1.14)

## 2018-05-17 PROCEDURE — 36416 COLLJ CAPILLARY BLOOD SPEC: CPT | Mod: ZL | Performed by: FAMILY MEDICINE

## 2018-05-17 PROCEDURE — 85610 PROTHROMBIN TIME: CPT | Mod: QW,ZL | Performed by: FAMILY MEDICINE

## 2018-05-17 NOTE — PROGRESS NOTES
ANTICOAGULATION FOLLOW-UP CLINIC VISIT    Patient Name:  Ramírez Hernández  Date:  5/17/2018  Contact Type:  Telephone    SUBJECTIVE:     Patient Findings     Positives Change in diet/appetite, Activity level change    Comments INR done by lab. Call placed to patient and spoke to him re: INR result, warfarin dosing and INR recheck date. He states he has had increased vit K intake and also increased activity. We discussed he should have a little less vit K. He verbalized understanding of all instructions and has no questions           OBJECTIVE    INR Point of Care   Date Value Ref Range Status   05/17/2018 1.7 (H) 0.86 - 1.14 Final     Comment:     This test is intended for monitoring Coumadin therapy.  Results are not   accurate in patients with prolonged INR due to factor deficiency.         ASSESSMENT / PLAN  INR assessment SUB    Recheck INR In: 3 WEEKS    INR Location Clinic      Anticoagulation Summary as of 5/17/2018     INR goal 2.0-3.0   Today's INR 1.7!   Maintenance plan 2.5 mg (5 mg x 0.5) on Mon, Fri; 5 mg (5 mg x 1) all other days   Full instructions 5/17: 7.5 mg; Otherwise 2.5 mg on Mon, Fri; 5 mg all other days   Weekly total 30 mg   Plan last modified Edel Irvin RN (5/17/2018)   Next INR check 6/11/2018   Priority INR   Target end date Indefinite    Indications   Persistent atrial fibrillation (H) [I48.1]  Long-term (current) use of anticoagulants [Z79.01] [Z79.01]         Anticoagulation Episode Summary     INR check location     Preferred lab     Send INR reminders to  ANTICO POOL    Comments 12/19-Taking Advil 200mg daily-AM stiffness      Anticoagulation Care Providers     Provider Role Specialty Phone number    CLAUIDO Crum MD [facility] [phone]            See the Encounter Report to view Anticoagulation Flowsheet and Dosing Calendar (Go to Encounters tab in chart review, and find the Anticoagulation Therapy Visit)        Edel Irvin, RN

## 2018-05-17 NOTE — MR AVS SNAPSHOT
Ramírez Hernández   5/17/2018 9:30 AM   Anticoagulation Therapy Visit    Description:  63 year old male   Provider:   ANTI COAGULATION   Department:  Hc Anti Coagulation           INR as of 5/17/2018     Today's INR 1.7!      Anticoagulation Summary as of 5/17/2018     INR goal 2.0-3.0   Today's INR 1.7!   Full instructions 5/17: 7.5 mg; Otherwise 2.5 mg on Mon, Fri; 5 mg all other days   Next INR check 6/11/2018    Indications   Persistent atrial fibrillation (H) [I48.1]  Long-term (current) use of anticoagulants [Z79.01] [Z79.01]         Your next Anticoagulation Clinic appointment(s)     Jun 11, 2018  8:45 AM CDT   Anticoagulation Visit with  ANTI COAGULATION   [facility] [place] ([facility] [place] )    [address] [place] [address]   [phone]              May 2018 Details    Sun Mon Tue Wed Thu Fri Sat       1               2               3               4               5                 6               7               8               9               10               11               12                 13               14               15               16               17      7.5 mg   See details      18      2.5 mg         19      5 mg           20      5 mg         21      2.5 mg         22      5 mg         23      5 mg         24      5 mg         25      2.5 mg         26      5 mg           27      5 mg         28      2.5 mg         29      5 mg         30      5 mg         31      5 mg            Date Details   05/17 This INR check               How to take your warfarin dose     To take:  2.5 mg Take 0.5 of a 5 mg tablet.    To take:  5 mg Take 1 of the 5 mg tablets.    To take:  7.5 mg Take 1.5 of the 5 mg tablets.           June 2018 Details    Sun Mon Tue Wed Thu Fri Sat          1      2.5 mg         2      5 mg           3      5 mg         4      2.5 mg         5      5 mg         6      5 mg         7      5 mg         8      2.5 mg         9      5 mg            10      5 mg         11            12               13               14               15               16                 17               18               19               20               21               22               23                 24               25               26               27               28               29               30                Date Details   No additional details    Date of next INR:  6/11/2018         How to take your warfarin dose     To take:  2.5 mg Take 0.5 of a 5 mg tablet.    To take:  5 mg Take 1 of the 5 mg tablets.

## 2018-06-11 ENCOUNTER — OFFICE VISIT (OUTPATIENT)
Dept: FAMILY MEDICINE | Facility: OTHER | Age: 63
End: 2018-06-11
Attending: FAMILY MEDICINE
Payer: MEDICARE

## 2018-06-11 ENCOUNTER — ANTICOAGULATION THERAPY VISIT (OUTPATIENT)
Dept: ANTICOAGULATION | Facility: OTHER | Age: 63
End: 2018-06-11
Attending: FAMILY MEDICINE
Payer: MEDICARE

## 2018-06-11 VITALS
SYSTOLIC BLOOD PRESSURE: 138 MMHG | WEIGHT: 218 LBS | TEMPERATURE: 98.3 F | HEART RATE: 108 BPM | DIASTOLIC BLOOD PRESSURE: 78 MMHG | HEIGHT: 68 IN | BODY MASS INDEX: 33.04 KG/M2 | OXYGEN SATURATION: 98 %

## 2018-06-11 DIAGNOSIS — I50.21 ACUTE SYSTOLIC CONGESTIVE HEART FAILURE (H): Primary | ICD-10-CM

## 2018-06-11 DIAGNOSIS — Z79.01 LONG-TERM (CURRENT) USE OF ANTICOAGULANTS: ICD-10-CM

## 2018-06-11 DIAGNOSIS — I48.19 PERSISTENT ATRIAL FIBRILLATION (H): ICD-10-CM

## 2018-06-11 DIAGNOSIS — Z72.0 TOBACCO ABUSE: ICD-10-CM

## 2018-06-11 DIAGNOSIS — Z87.891 PERSONAL HISTORY OF TOBACCO USE: ICD-10-CM

## 2018-06-11 DIAGNOSIS — Z71.6 TOBACCO ABUSE COUNSELING: ICD-10-CM

## 2018-06-11 LAB
ANION GAP SERPL CALCULATED.3IONS-SCNC: 7 MMOL/L (ref 3–14)
BUN SERPL-MCNC: 13 MG/DL (ref 7–30)
CALCIUM SERPL-MCNC: 9.5 MG/DL (ref 8.5–10.1)
CHLORIDE SERPL-SCNC: 101 MMOL/L (ref 94–109)
CO2 SERPL-SCNC: 27 MMOL/L (ref 20–32)
CREAT SERPL-MCNC: 1.06 MG/DL (ref 0.66–1.25)
GFR SERPL CREATININE-BSD FRML MDRD: 71 ML/MIN/1.7M2
GLUCOSE SERPL-MCNC: 106 MG/DL (ref 70–99)
INR BLD: 2.6 (ref 0.86–1.14)
POTASSIUM SERPL-SCNC: 4.1 MMOL/L (ref 3.4–5.3)
SODIUM SERPL-SCNC: 135 MMOL/L (ref 133–144)

## 2018-06-11 PROCEDURE — 36415 COLL VENOUS BLD VENIPUNCTURE: CPT | Mod: ZL | Performed by: FAMILY MEDICINE

## 2018-06-11 PROCEDURE — 85610 PROTHROMBIN TIME: CPT | Mod: QW,ZL | Performed by: FAMILY MEDICINE

## 2018-06-11 PROCEDURE — 80048 BASIC METABOLIC PNL TOTAL CA: CPT | Mod: ZL | Performed by: FAMILY MEDICINE

## 2018-06-11 PROCEDURE — G0463 HOSPITAL OUTPT CLINIC VISIT: HCPCS

## 2018-06-11 PROCEDURE — 36416 COLLJ CAPILLARY BLOOD SPEC: CPT | Mod: ZL | Performed by: FAMILY MEDICINE

## 2018-06-11 PROCEDURE — 87389 HIV-1 AG W/HIV-1&-2 AB AG IA: CPT | Mod: ZL | Performed by: FAMILY MEDICINE

## 2018-06-11 PROCEDURE — 99214 OFFICE O/P EST MOD 30 MIN: CPT | Performed by: FAMILY MEDICINE

## 2018-06-11 PROCEDURE — G0296 VISIT TO DETERM LDCT ELIG: HCPCS | Performed by: FAMILY MEDICINE

## 2018-06-11 ASSESSMENT — PAIN SCALES - GENERAL: PAINLEVEL: NO PAIN (0)

## 2018-06-11 NOTE — PROGRESS NOTES
ANTICOAGULATION FOLLOW-UP CLINIC VISIT    Patient Name:  Ramírez Hernández  Date:  6/11/2018  Contact Type:  Telephone    SUBJECTIVE:     Patient Findings     Positives No Problem Findings    Comments INR done by lab. Call placed to patient and spoke to him re: INR result, warfarin dosing and INR recheck date. He verbalized understanding and has no questions. He has no bleeding/bruising and no changes in diet/meds/activity. Will call warfarin clinic if any issues.           OBJECTIVE    INR Point of Care   Date Value Ref Range Status   06/11/2018 2.6 (H) 0.86 - 1.14 Final     Comment:     This test is intended for monitoring Coumadin therapy.  Results are not   accurate in patients with prolonged INR due to factor deficiency.         ASSESSMENT / PLAN  INR assessment THER    Recheck INR In: 6 WEEKS    INR Location Clinic      Anticoagulation Summary as of 6/11/2018     INR goal 2.0-3.0   Today's INR 2.6   Warfarin maintenance plan 2.5 mg (5 mg x 0.5) on Mon, Fri; 5 mg (5 mg x 1) all other days   Full warfarin instructions 2.5 mg on Mon, Fri; 5 mg all other days   Weekly warfarin total 30 mg   No change documented Edel Irvin RN   Plan last modified Edel Irvin RN (5/17/2018)   Next INR check 7/23/2018   Priority INR   Target end date Indefinite    Indications   Persistent atrial fibrillation (H) [I48.1]  Long-term (current) use of anticoagulants [Z79.01] [Z79.01]         Anticoagulation Episode Summary     INR check location     Preferred lab     Send INR reminders to HC ANTICOAG POOL    Comments 12/19-Taking Advil 200mg daily-AM [facility]      Anticoagulation Care Providers     Provider Role Specialty Phone number    CLAUDIO rCum MD [facility] [phone]            See the Encounter Report to view Anticoagulation Flowsheet and Dosing Calendar (Go to Encounters tab in chart review, and find the Anticoagulation Therapy Visit)        Edel Irvin RN

## 2018-06-11 NOTE — MR AVS SNAPSHOT
Ramírez Hernández   6/11/2018 8:45 AM   Anticoagulation Therapy Visit    Description:  63 year old male   Provider:   ANTI COAGULATION   Department:  Hc Anti Coagulation           INR as of 6/11/2018     Today's INR 2.6      Anticoagulation Summary as of 6/11/2018     INR goal 2.0-3.0   Today's INR 2.6   Full warfarin instructions 2.5 mg on Mon, Fri; 5 mg all other days   Next INR check 7/23/2018    Indications   Persistent atrial fibrillation (H) [I48.1]  Long-term (current) use of anticoagulants [Z79.01] [Z79.01]         Your next Anticoagulation Clinic appointment(s)     Jul 23, 2018  9:00 AM CDT   Anticoagulation Visit with  ANTI COAGULATION   [facility] [place] ([facility] [place] )    [address] [place] [address]   [phone]              June 2018 Details    Sun Mon Tue Wed Thu Fri Sat          1               2                 3               4               5               6               7               8               9                 10               11      2.5 mg   See details      12      5 mg         13      5 mg         14      5 mg         15      2.5 mg         16      5 mg           17      5 mg         18      2.5 mg         19      5 mg         20      5 mg         21      5 mg         22      2.5 mg         23      5 mg           24      5 mg         25      2.5 mg         26      5 mg         27      5 mg         28      5 mg         29      2.5 mg         30      5 mg          Date Details   06/11 This INR check               How to take your warfarin dose     To take:  2.5 mg Take 0.5 of a 5 mg tablet.    To take:  5 mg Take 1 of the 5 mg tablets.           July 2018 Details    Sun Mon Tue Wed Thu Fri Sat     1      5 mg         2      2.5 mg         3      5 mg         4      5 mg         5      5 mg         6      2.5 mg         7      5 mg           8      5 mg         9      2.5 mg         10      5 mg         11      5 mg         12      5 mg          13      2.5 mg         14      5 mg           15      5 mg         16      2.5 mg         17      5 mg         18      5 mg         19      5 mg         20      2.5 mg         21      5 mg           22      5 mg         23            24               25               26               27               28                 29               30               31                    Date Details   No additional details    Date of next INR:  7/23/2018         How to take your warfarin dose     To take:  2.5 mg Take 0.5 of a 5 mg tablet.    To take:  5 mg Take 1 of the 5 mg tablets.

## 2018-06-11 NOTE — PATIENT INSTRUCTIONS

## 2018-06-11 NOTE — NURSING NOTE
"Chief Complaint   Patient presents with     Heart Failure     Atrial Fib       Initial /78  Pulse 108  Temp 98.3  F (36.8  C)  Ht 5' 8\" (1.727 m)  Wt 218 lb (98.9 kg)  SpO2 98%  BMI 33.15 kg/m2 Estimated body mass index is 33.15 kg/(m^2) as calculated from the following:    Height as of this encounter: 5' 8\" (1.727 m).    Weight as of this encounter: 218 lb (98.9 kg).  Medication Reconciliation: complete    Nicolle Pantoja LPN  "

## 2018-06-11 NOTE — MR AVS SNAPSHOT
After Visit Summary   6/11/2018    Ramírez Hernández    MRN: [id number]           Patient Information     Date Of Birth          1955        Visit Information        Provider Department      6/11/2018 9:15 AM Aron Delvalle MD [facility]        Today's Diagnoses     Acute systolic congestive heart failure (H)    -  1    Persistent atrial fibrillation (H)        Tobacco abuse        Tobacco abuse counseling        Personal history of tobacco use          Care Instructions      HOW TO QUIT SMOKING  Smoking is one of the hardest habits to break. About half of all those who have ever smoked have been able to quit, and most of those (about 70%) who still smoke want to quit. Here are some of the best ways to stop smoking.     KEEP TRYING:  It takes most smokers about 8 tries before they are finally able to fully quit. So, the more often you try and fail, the better your chance of quitting the next time! So, don't give up!    GO COLD TURKEY:  Most ex-smokers quit cold turkey. Trying to cut back gradually doesn't seem to work as well, perhaps because it continues the smoking habit. Also, it is possible to fool yourself by inhaling more while smoking fewer cigarettes. This results in the same amount of nicotine in your body!    GET SUPPORT:  Support programs can make an important difference, especially for the heavy smoker. These groups offer lectures, methods to change your behavior and peer support. Call the free national Quitline for more information. 800-QUIT-NOW (635-369-6642). Low-cost or free programs are offered by many hospitals, local chapters of the American Lung Association (336-925-4603) and the American Cancer Society (152-119-4860). Support at home is important too. Non-smokers can help by offering praise and encouragement. If the smoker fails to quit, encourage them to try again!    OVER-THE-COUNTER MEDICINES:  For those who can't quit on their own, Nicotine Replacement  Therapy (NRT) may make quitting much easier. Certain aids such as the nicotine patch, gum and lozenge are available without a prescription. However, it is best to use these under the guidance of your doctor. The skin patch provides a steady supply of nicotine to the body. Nicotine gum and lozenge gives temporary bursts of low levels of nicotine. Both methods take the edge off the craving for cigarettes. WARNING: If you feel symptoms of nicotine overdose, such as nausea, vomiting, dizziness, weakness, or fast heartbeat, stop using these and see your doctor.    PRESCRIPTION MEDICINES:  After evaluating your smoking patterns and prior attempts at quitting, your doctor may offer a prescription medicine such as bupropion (Zyban, Wellbutrin), varenicline (Chantix, Champix), a niocotine inhaler or nasal spray. Each has its unique advantage and side effects which your doctor can review with you.    HEALTH BENEFITS OF QUITTING:  The benefits of quitting start right away and keep improving the longer you go without smokin minutes: blood pressure and pulse return to normal  8 hours: oxygen levels return to normal  2 days: ability to smell and taste begins to improve as damaged nerves start to regrow  2-3 weeks: circulation and lung function improves  1-9 months: decreased cough, congestion and shortness of breath; less tired  1 year: risk of heart attack decreases by half  5 years: risk of lung cancer decreases by half; risk of stroke becomes the same as a non-smoker  For information about how to quit smoking, visit the following links:  National Cancer [place] ,   Clearing the Air, Quit Smoking Today   - an online booklet. http://www.smokefree.gov/pubs/clearing_the_air.pdf  Smokefree.gov http://smokefree.gov/  QuitNet http://www.quitnet.com/    7137-1075 Deonna [place], [address] [place] [address], [place] [address]. All rights reserved. This information is not intended as a substitute for professional medical care.  Always follow your healthcare professional's instructions.    The Benefits of Living Smoke Free  What do you want to gain from quitting? Check off some reasons to quit.  Health Benefits  ___ Reduce my risk of lung cancer, heart disease, chronic lung disease  ___ Have fewer wrinkles and softer skin  ___ Improve my sense of taste and smell  ___ For pregnant women--reduce the risk of having a miscarriage, stillbirth, premature birth, or low-birth-weight baby  Personal Benefits  ___ Feel more in control of my life  ___ Have better-smelling hair, breath, clothes, home, and car  ___ Save time by not having to take smoke breaks, buy cigarettes, or hunt for a light  ___ Have whiter teeth  Family Benefits  ___ Reduce my children s respiratory tract infections  ___ Set a good example for my children  ___ Reduce my family s cancer risk  Financial Benefits  ___ Save hundreds of dollars each year that would be spent on cigarettes  ___ Save money on medical bills  ___ Save on life, health, and car insurance premiums    Those Dollars Add Up!  Cigarettes are expensive, and getting more expensive all the time. Do you realize how much money you are spending on cigarettes per year? What is the average amount you spend on a pack of cigarettes? What is the average number of packs that you smoke per day? Using your answers to these questions, fill in this formula to help you find out:  ($ _____ per pack) ×  ( _____ number of packs per day) × (365 days) =  $ _____ yearly cost of smoking  Besides tobacco, there are other costs, including extra cleaning bills and replacement costs for clothing and furniture; medical expenses for smoking-related illnesses; and higher health, life, and car insurance premiums.    Cigars and Pipes Count Too!  Cigars and pipes are also dangerous. So are smokeless (chewing) tobacco and snuff. All of these products contain nicotine, a highly addictive substance that has harmful effects on your body. Quitting  "smoking means giving up all tobacco products.      2775-4676 Deonna [place], [address] [facility], [place] [address]. All rights reserved. This information is not intended as a substitute for professional medical care. Always follow your healthcare professional's instructions.          Follow-ups after your visit        Who to contact     If you have questions or need follow up information about today's clinic visit or your schedule please contact [facility] directly at [phone].  Normal or non-critical lab and imaging results will be communicated to you by MyChart, letter or phone within 4 business days after the clinic has received the results. If you do not hear from us within 7 days, please contact the clinic through MyChart or phone. If you have a critical or abnormal lab result, we will notify you by phone as soon as possible.  Submit refill requests through [employer] or call your pharmacy and they will forward the refill request to us. Please allow 3 business days for your refill to be completed.          Additional Information About Your Visit        Care EveryWhere ID     This is your Care EveryWhere ID. This could be used by other organizations to access your [place] medical records  [id number]        Your Vitals Were     Pulse Temperature Height Pulse Oximetry BMI (Body Mass Index)       108 98.3  F (36.8  C) 5' 8\" (1.727 m) 98% 33.15 kg/m2        Blood Pressure from Last 3 Encounters:   06/11/18 138/78   01/10/18 128/70   12/15/17 118/76    Weight from Last 3 Encounters:   06/11/18 218 lb (98.9 kg)   01/10/18 216 lb (98 kg)   12/15/17 250 lb (113.4 kg)              We Performed the Following     Basic metabolic panel     HIV Antigen Antibody Combo     Prof Fee: Shared Decision Making Visit for Lung Cancer Screening     Tobacco Cessation - Order to Satisfy Health Maintenance          Today's Medication Changes          These changes are accurate as of 6/11/18  9:41 AM.  If you have " any questions, ask your nurse or doctor.               Stop taking these medicines if you haven't already. Please contact your care team if you have questions.     ibuprofen 200 MG capsule   Stopped by:  Aron Delvalle MD                    Primary Care Provider Office Phone # Fax #    Arno Delvalle [phone]       [address] [place] [address]        Equal Access to Services     [facility]: Hadii aad ku hadasho Soomaali, waaxda luqadaha, qaybta kaalmada adeegyada, waxay idiin hayaan adeeg kharash la'aan ah. So [facility] [phone].    ATENCIÓN: Si habla español, tiene a kay disposición servicios gratuitos de asistencia lingüística. Llame al [phone].    We comply with applicable federal civil rights laws and [place] laws. We do not discriminate on the basis of race, color, national origin, age, disability, sex, sexual orientation, or gender identity.            Thank you!     Thank you for choosing [facility]  for your care. Our goal is always to provide you with excellent care. Hearing back from our patients is one way we can continue to improve our services. Please take a few minutes to complete the written survey that you may receive in the mail after your visit with us. Thank you!             Your Updated Medication List - Protect others around you: Learn how to safely use, store and throw away your medicines at www.disposemymeds.org.          This list is accurate as of 6/11/18  9:41 AM.  Always use your most recent med list.                   Brand Name Dispense Instructions for use Diagnosis    furosemide 40 MG tablet    LASIX    90 tablet    Take 1 tablet (40 mg) by mouth daily    Acute systolic congestive heart failure (H)       lisinopril 10 MG tablet    PRINIVIL/ZESTRIL    90 tablet    Take 1 tablet (10 mg) by mouth daily    Acute systolic congestive heart failure (H)       metoprolol tartrate 50 MG tablet    LOPRESSOR    180 tablet    Take 1 tablet  (50 mg) by mouth 2 times daily    Atrial fibrillation with rapid ventricular response (H), Acute systolic congestive heart failure (H)       warfarin 5 MG tablet    COUMADIN    80 tablet    1/2 pill Mon,Wed,Fri and 1 pill all other days or as directed by warfarin clinic    Long-term (current) use of anticoagulants, Persistent atrial fibrillation (H)

## 2018-06-11 NOTE — PROGRESS NOTES
SUBJECTIVE:   Ramírez Hernández is a 63 year old male who presents to clinic today for the following health issues:      Vascular Disease Follow-up:  CHF and A-fib      Chest pain or pressure, left side neck or arm pain: No    Shortness of breath/increased sweats/nausea with exertion: Yes mainly from smoking    Pain in calves walking 1-2 blocks: Yes occasionally    Worsened or new symptoms since last visit: No    Nitroglycerin use: no    Daily aspirin use: No      Amount of exercise or physical activity: 2-3 days/week for an average of less than 15 minutes    Problems taking medications regularly: No    Medication side effects: none    Diet: regular (no restrictions) and low salt              Problem list and histories reviewed & adjusted, as indicated.  Additional history: as documented    Patient Active Problem List   Diagnosis     Heart failure with acute decompensation, type unknown (H)     Persistent atrial fibrillation (H)     Acute systolic congestive heart failure (H)     Alcohol abuse     Tobacco abuse     Long-term (current) use of anticoagulants [Z79.01]     Past Surgical History:   Procedure Laterality Date     BACK SURGERY         Social History   Substance Use Topics     Smoking status: Current Every Day Smoker     Packs/day: 1.00     Years: 40.00     Types: Cigarettes     Start date: 1/1/1975     Smokeless tobacco: Never Used     Alcohol use Yes      Comment: weekly     History reviewed. No pertinent family history.      Current Outpatient Prescriptions   Medication Sig Dispense Refill     furosemide (LASIX) 40 MG tablet Take 1 tablet (40 mg) by mouth daily 90 tablet 3     lisinopril (PRINIVIL/ZESTRIL) 10 MG tablet Take 1 tablet (10 mg) by mouth daily 90 tablet 3     metoprolol (LOPRESSOR) 50 MG tablet Take 1 tablet (50 mg) by mouth 2 times daily 180 tablet 3     warfarin (COUMADIN) 5 MG tablet 1/2 pill Mon,Wed,Fri and 1 pill all other days or as directed by warfarin clinic 80 tablet 3     No Known  "Allergies    Reviewed and updated as needed this visit by clinical staff       Reviewed and updated as needed this visit by Provider         ROS:  Constitutional, HEENT, cardiovascular, pulmonary, gi and gu systems are negative, except as otherwise noted.    OBJECTIVE:                                                    /78  Pulse 108  Temp 98.3  F (36.8  C)  Ht 5' 8\" (1.727 m)  Wt 218 lb (98.9 kg)  SpO2 98%  BMI 33.15 kg/m2  Body mass index is 33.15 kg/(m^2).  GENERAL APPEARANCE: Alert, no acute distress  CV: irregular rate and rhythm, no murmur, rub or gallop  RESP: lungs clear to auscultation bilaterally  ABDOMEN: normal bowel sounds, soft, nontender, no hepatosplenomegaly or other masses  SKIN: no suspicious lesions or rashes to visualized skin  NEURO: Alert, oriented x 3, speech and mentation normal      labd done today.      ASSESSMENT/PLAN:                                                    1. Acute systolic congestive heart failure (H)  Stable.  No worsened sx.  Stable exam.  Seems euvolemic today.  Continue current meds and update labs.    - Basic metabolic panel  - HIV Antigen Antibody Combo    2. Persistent atrial fibrillation (H)  Stable.  In afib right now on exam.  We discussed.    - Basic metabolic panel  - HIV Antigen Antibody Combo    3. Tobacco abuse  Ongoing.  He is not interested in CT scanning and refuses.  I told him to think about it.  I also encouraged cessation.   - Tobacco Cessation - Order to Satisfy Health Maintenance  - Prof Fee: Shared Decision Making Visit for Lung Cancer Screening    4. Tobacco abuse counseling  As above.   - Prof Fee: Shared Decision Making Visit for Lung Cancer Screening    5. Personal history of tobacco use  As above.      He also had a positive screen for depressive sx.  He absolutely denies that.  He is tired and frustrated with his health.  He absolutely denies suicidality.        Follow up with Provider - q 6 months.      Aron [place]le, " MD  [facility]    Lung Cancer Screening Shared Decision Making Visit     I discussed screening with the patient. Ramírez Hernández is eligible for lung cancer screening on the basis of his smoking history:  History   Smoking Status     Current Every Day Smoker     Packs/day: 1.00     Years: 40.00     Types: Cigarettes     Start date: 1/1/1975   Smokeless Tobacco     Never Used       I have discussed with patient the risks and benefits of screening for lung cancer with low-dose CT.         The benefit of early detection of lung cancer is contingent upon adherence to annual screening or more frequent follow up if indicated.     Furthermore, reaping the benefits of screening requires Ramírez Hernández to be willing and physically able to undergo diagnostic procedures, if indicated. Although no specific guide is available for determining severity of comorbidities, it is reasonable to withhold screening in patients who have greater mortality risk from other diseases.     We did discuss that the only way to prevent lung cancer is to not smoke. Smoking cessation assistance was offered.    I did not offer risk estimation using a calculator such as this one:    ShouldIScreen    Ramírez Hernández declines lung cancer screening on the basis of he isn't interested. .

## 2018-06-12 LAB — HIV 1+2 AB+HIV1 P24 AG SERPL QL IA: NONREACTIVE

## 2018-06-12 ASSESSMENT — PATIENT HEALTH QUESTIONNAIRE - PHQ9: SUM OF ALL RESPONSES TO PHQ QUESTIONS 1-9: 20

## 2018-07-23 ENCOUNTER — ANTICOAGULATION THERAPY VISIT (OUTPATIENT)
Dept: ANTICOAGULATION | Facility: OTHER | Age: 63
End: 2018-07-23
Attending: FAMILY MEDICINE
Payer: MEDICARE

## 2018-07-23 DIAGNOSIS — Z79.01 LONG-TERM (CURRENT) USE OF ANTICOAGULANTS: ICD-10-CM

## 2018-07-23 DIAGNOSIS — I48.19 PERSISTENT ATRIAL FIBRILLATION (H): ICD-10-CM

## 2018-07-23 LAB — INR BLD: 2.7 (ref 0.86–1.14)

## 2018-07-23 PROCEDURE — 85610 PROTHROMBIN TIME: CPT | Mod: QW,ZL | Performed by: FAMILY MEDICINE

## 2018-07-23 PROCEDURE — 36416 COLLJ CAPILLARY BLOOD SPEC: CPT | Mod: ZL | Performed by: FAMILY MEDICINE

## 2018-07-23 NOTE — MR AVS SNAPSHOT
Ramírez Hernández   7/23/2018 9:00 AM   Anticoagulation Therapy Visit    Description:  63 year old male   Provider:   ANTI COAGULATION   Department:  Hc Anti Coagulation           INR as of 7/23/2018     Today's INR 2.7      Anticoagulation Summary as of 7/23/2018     INR goal 2.0-3.0   Today's INR 2.7   Full warfarin instructions 2.5 mg on Mon, Fri; 5 mg all other days   Next INR check 9/10/2018    Indications   Persistent atrial fibrillation (H) [I48.1]  Long-term (current) use of anticoagulants [Z79.01] [Z79.01]         Your next Anticoagulation Clinic appointment(s)     Sep 10, 2018  9:00 AM CDT   Anticoagulation Visit with  ANTI COAGULATION   [facility] [place] ([facility] [place] )    [address] [place] [address]   [phone]              July 2018 Details    Sun Mon Tue Wed Thu Fri Sat     1               2               3               4               5               6               7                 8               9               10               11               12               13               14                 15               16               17               18               19               20               21                 22               23      2.5 mg   See details      24      5 mg         25      5 mg         26      5 mg         27      2.5 mg         28      5 mg           29      5 mg         30      2.5 mg         31      5 mg              Date Details   07/23 This INR check               How to take your warfarin dose     To take:  2.5 mg Take 0.5 of a 5 mg tablet.    To take:  5 mg Take 1 of the 5 mg tablets.           August 2018 Details    Sun Mon Tue Wed Thu Fri Sat        1      5 mg         2      5 mg         3      2.5 mg         4      5 mg           5      5 mg         6      2.5 mg         7      5 mg         8      5 mg         9      5 mg         10      2.5 mg         11      5 mg           12      5 mg         13      2.5 mg         14       5 mg         15      5 mg         16      5 mg         17      2.5 mg         18      5 mg           19      5 mg         20      2.5 mg         21      5 mg         22      5 mg         23      5 mg         24      2.5 mg         25      5 mg           26      5 mg         27      2.5 mg         28      5 mg         29      5 mg         30      5 mg         31      2.5 mg           Date Details   No additional details            How to take your warfarin dose     To take:  2.5 mg Take 0.5 of a 5 mg tablet.    To take:  5 mg Take 1 of the 5 mg tablets.           September 2018 Details    Sun Mon Tue Wed Thu Fri Sat           1      5 mg           2      5 mg         3      2.5 mg         4      5 mg         5      5 mg         6      5 mg         7      2.5 mg         8      5 mg           9      5 mg         10            11               12               13               14               15                 16               17               18               19               20               21               22                 23               24               25               26               27               28               29                 30                      Date Details   No additional details    Date of next INR:  9/10/2018         How to take your warfarin dose     To take:  2.5 mg Take 0.5 of a 5 mg tablet.    To take:  5 mg Take 1 of the 5 mg tablets.

## 2018-07-23 NOTE — PROGRESS NOTES
ANTICOAGULATION FOLLOW-UP CLINIC VISIT    Patient Name:  Ramírez Hernández  Date:  7/23/2018  Contact Type:  Telephone    SUBJECTIVE:     Patient Findings     Positives No Problem Findings    Comments INR done by lab. Call placed to patient and spoke to him re: INR result, warfarin dosing and INR recheck date. He verbalized understanding and has no questions. He states no bleeding/bruising and no changes in diet/meds/activity. He will call coumadin clinic if any problems or questions.            OBJECTIVE    INR Point of Care   Date Value Ref Range Status   07/23/2018 2.7 (H) 0.86 - 1.14 Final     Comment:     This test is intended for monitoring Coumadin therapy.  Results are not   accurate in patients with prolonged INR due to factor deficiency.         ASSESSMENT / PLAN  INR assessment THER    Recheck INR In: 7 WEEKS    INR Location Clinic      Anticoagulation Summary as of 7/23/2018     INR goal 2.0-3.0   Today's INR 2.7   Warfarin maintenance plan 2.5 mg (5 mg x 0.5) on Mon, Fri; 5 mg (5 mg x 1) all other days   Full warfarin instructions 2.5 mg on Mon, Fri; 5 mg all other days   Weekly warfarin total 30 mg   No change documented Edel Irvin RN   Plan last modified Edel Irvin RN (5/17/2018)   Next INR check 9/10/2018   Priority INR   Target end date Indefinite    Indications   Persistent atrial fibrillation (H) [I48.1]  Long-term (current) use of anticoagulants [Z79.01] [Z79.01]         Anticoagulation Episode Summary     INR check location     Preferred lab     Send INR reminders to  JULIUS MICAH    Comments 12/19-Taking Advil 200mg daily-AM [facility]      Anticoagulation Care Providers     Provider Role Specialty Phone number    CLAUDIO Crum MD [facility] [phone]            See the Encounter Report to view Anticoagulation Flowsheet and Dosing Calendar (Go to Encounters tab in chart review, and find the Anticoagulation Therapy Visit)        Edel Irvin RN

## 2018-09-10 ENCOUNTER — ANTICOAGULATION THERAPY VISIT (OUTPATIENT)
Dept: ANTICOAGULATION | Facility: OTHER | Age: 63
End: 2018-09-10
Attending: FAMILY MEDICINE
Payer: MEDICARE

## 2018-09-10 DIAGNOSIS — I48.19 PERSISTENT ATRIAL FIBRILLATION (H): ICD-10-CM

## 2018-09-10 DIAGNOSIS — Z79.01 LONG-TERM (CURRENT) USE OF ANTICOAGULANTS: ICD-10-CM

## 2018-09-10 LAB — INR BLD: 4.4 (ref 0.86–1.14)

## 2018-09-10 PROCEDURE — 36416 COLLJ CAPILLARY BLOOD SPEC: CPT | Mod: ZL | Performed by: FAMILY MEDICINE

## 2018-09-10 PROCEDURE — 85610 PROTHROMBIN TIME: CPT | Mod: QW,ZL | Performed by: FAMILY MEDICINE

## 2018-09-10 NOTE — PROGRESS NOTES
ANTICOAGULATION FOLLOW-UP CLINIC VISIT    Patient Name:  Ramírez Hernández  Date:  9/10/2018  Contact Type:  Telephone    SUBJECTIVE:     Patient Findings     Positives Change in diet/appetite, Other complaints    Comments INR done by lab. Call placed to patient and spoke to him re: INR result, warfarin dosing and INR recheck date. He verbalized understanding and has no questions. He does state he had significant decrease in vit K intake and increase in ETOH intake. We discussed that both those things can make INR increase. He has no bleeding/bruising and no changes in meds.           OBJECTIVE    INR Point of Care   Date Value Ref Range Status   09/10/2018 4.4 (H) 0.86 - 1.14 Final     Comment:     This test is intended for monitoring Coumadin therapy.  Results are not   accurate in patients with prolonged INR due to factor deficiency.         ASSESSMENT / PLAN  INR assessment SUPRA significant decrease in vit K intake, increase in ETOH intake   Recheck INR In: 1 WEEK    INR Location Clinic      Anticoagulation Summary as of 9/10/2018     INR goal 2.0-3.0   Today's INR 4.4!   Warfarin maintenance plan 2.5 mg (5 mg x 0.5) on Mon, Fri; 5 mg (5 mg x 1) all other days   Full warfarin instructions 9/10: Hold; 9/11: Hold; Otherwise 2.5 mg on Mon, Fri; 5 mg all other days   Weekly warfarin total 30 mg   Plan last modified Edel Irvin RN (5/17/2018)   Next INR check 9/17/2018   Priority INR   Target end date Indefinite    Indications   Persistent atrial fibrillation (H) [I48.1]  Long-term (current) use of anticoagulants [Z79.01] [Z79.01]         Anticoagulation Episode Summary     INR check location     Preferred lab     Send INR reminders to  MADDIE OBRIEN    Comments 12/19-Taking Advil 200mg daily-AM stiffness      Anticoagulation Care Providers     Provider Role Specialty Phone number    CLAUDIO Crum MD [facility] [phone]            See the Encounter Report to view Anticoagulation  Flowsheet and Dosing Calendar (Go to Encounters tab in chart review, and find the Anticoagulation Therapy Visit)        Edel Irvin RN

## 2018-09-10 NOTE — MR AVS SNAPSHOT
Ramírezchristine Hernández   9/10/2018 9:00 AM   Anticoagulation Therapy Visit    Description:  63 year old male   Provider:  HC ANTI COAGULATION   Department:  Hc Anti Coagulation           INR as of 9/10/2018     Today's INR 4.4!      Anticoagulation Summary as of 9/10/2018     INR goal 2.0-3.0   Today's INR 4.4!   Full warfarin instructions 9/10: Hold; 9/11: Hold; Otherwise 2.5 mg on Mon, Fri; 5 mg all other days   Next INR check 9/17/2018    Indications   Persistent atrial fibrillation (H) [I48.1]  Long-term (current) use of anticoagulants [Z79.01] [Z79.01]         Your next Anticoagulation Clinic appointment(s)     Sep 17, 2018  9:15 AM CDT   Anticoagulation Visit with  ANTI COAGULATION   [facility] [place] ([facility] [place] )    [address]  [place] [address]   [phone]              September 2018 Details    Sun Mon Tue Wed Thu Fri Sat           1                 2               3               4               5               6               7               8                 9               10      Hold   See details      11      Hold         12      5 mg         13      5 mg         14      2.5 mg         15      5 mg           16      5 mg         17            18               19               20               21               22                 23               24               25               26               27               28               29                 30                      Date Details   09/10 This INR check       Date of next INR:  9/17/2018         How to take your warfarin dose     To take:  2.5 mg Take 0.5 of a 5 mg tablet.    To take:  5 mg Take 1 of the 5 mg tablets.    Hold Do not take your warfarin dose. See the Details table to the right for additional instructions.

## 2018-09-17 ENCOUNTER — ANTICOAGULATION THERAPY VISIT (OUTPATIENT)
Dept: ANTICOAGULATION | Facility: OTHER | Age: 63
End: 2018-09-17
Attending: FAMILY MEDICINE
Payer: MEDICARE

## 2018-09-17 DIAGNOSIS — Z79.01 LONG-TERM (CURRENT) USE OF ANTICOAGULANTS: ICD-10-CM

## 2018-09-17 DIAGNOSIS — I48.19 PERSISTENT ATRIAL FIBRILLATION (H): ICD-10-CM

## 2018-09-17 LAB — INR BLD: 1.4 (ref 0.86–1.14)

## 2018-09-17 PROCEDURE — 36416 COLLJ CAPILLARY BLOOD SPEC: CPT | Mod: ZL | Performed by: FAMILY MEDICINE

## 2018-09-17 PROCEDURE — 85610 PROTHROMBIN TIME: CPT | Mod: QW,ZL | Performed by: FAMILY MEDICINE

## 2018-09-17 NOTE — MR AVS SNAPSHOT
Ramírez Hernández   9/17/2018 9:15 AM   Anticoagulation Therapy Visit    Description:  63 year old male   Provider:   ANTI COAGULATION   Department:  Hc Anti Coagulation           INR as of 9/17/2018     Today's INR 1.4!      Anticoagulation Summary as of 9/17/2018     INR goal 2.0-3.0   Today's INR 1.4!   Full warfarin instructions 9/17: 5 mg; Otherwise 2.5 mg on Mon, Fri; 5 mg all other days   Next INR check 9/26/2018    Indications   Persistent atrial fibrillation (H) [I48.1]  Long-term (current) use of anticoagulants [Z79.01] [Z79.01]         Your next Anticoagulation Clinic appointment(s)     Sep 26, 2018  9:30 AM CDT   Anticoagulation Visit with  ANTI COAGULATION   [facility] [place] ([facility] [place] )    [address] [place] [address]   [phone]              September 2018 Details    Sun Mon Tue Wed Thu Fri Sat           1                 2               3               4               5               6               7               8                 9               10               11               12               13               14               15                 16               17      5 mg   See details      18      5 mg         19      5 mg         20      5 mg         21      2.5 mg         22      5 mg           23      5 mg         24      2.5 mg         25      5 mg         26            27               28               29                 30                      Date Details   09/17 This INR check       Date of next INR:  9/26/2018         How to take your warfarin dose     To take:  2.5 mg Take 0.5 of a 5 mg tablet.    To take:  5 mg Take 1 of the 5 mg tablets.

## 2018-09-17 NOTE — PROGRESS NOTES
ANTICOAGULATION FOLLOW-UP CLINIC VISIT    Patient Name:  Ramírez Hernández  Date:  9/17/2018  Contact Type:  Telephone    SUBJECTIVE:     Patient Findings     Positives Change in diet/appetite, Other complaints    Comments INR done by lab. Call placed to patient and spoke to him. He states he has been eating increased vit K and decreased ETOH intake. States he drank more ETOH last month. We discussed warfarin dosing, INR recheck date. He verbalized understanding and has no questions.            OBJECTIVE    INR Point of Care   Date Value Ref Range Status   09/17/2018 1.4 (H) 0.86 - 1.14 Final     Comment:     This test is intended for monitoring Coumadin therapy.  Results are not   accurate in patients with prolonged INR due to factor deficiency.         ASSESSMENT / PLAN  INR assessment SUB increased vit K, decreased ETOH   Recheck INR In: 10 DAYS    INR Location Clinic      Anticoagulation Summary as of 9/17/2018     INR goal 2.0-3.0   Today's INR 1.4!   Warfarin maintenance plan 2.5 mg (5 mg x 0.5) on Mon, Fri; 5 mg (5 mg x 1) all other days   Full warfarin instructions 9/17: 5 mg; Otherwise 2.5 mg on Mon, Fri; 5 mg all other days   Weekly warfarin total 30 mg   Plan last modified Edel Irvin RN (5/17/2018)   Next INR check 9/26/2018   Priority INR   Target end date Indefinite    Indications   Persistent atrial fibrillation (H) [I48.1]  Long-term (current) use of anticoagulants [Z79.01] [Z79.01]         Anticoagulation Episode Summary     INR check location     Preferred lab     Send INR reminders to  ANTICOAG POOL    Comments 12/19-Taking Advil 200mg daily-AM stiffness      Anticoagulation Care Providers     Provider Role Specialty Phone number    CLAUDIO Crum MD [facility] [phone]            See the Encounter Report to view Anticoagulation Flowsheet and Dosing Calendar (Go to Encounters tab in chart review, and find the Anticoagulation Therapy Visit)        Edel Irvin, RN

## 2018-09-26 ENCOUNTER — ANTICOAGULATION THERAPY VISIT (OUTPATIENT)
Dept: ANTICOAGULATION | Facility: OTHER | Age: 63
End: 2018-09-26
Attending: FAMILY MEDICINE
Payer: MEDICARE

## 2018-09-26 DIAGNOSIS — I48.19 PERSISTENT ATRIAL FIBRILLATION (H): ICD-10-CM

## 2018-09-26 DIAGNOSIS — Z79.01 LONG-TERM (CURRENT) USE OF ANTICOAGULANTS: ICD-10-CM

## 2018-09-26 LAB — INR BLD: 3.1 (ref 0.86–1.14)

## 2018-09-26 PROCEDURE — 36416 COLLJ CAPILLARY BLOOD SPEC: CPT | Mod: ZL | Performed by: FAMILY MEDICINE

## 2018-09-26 PROCEDURE — 85610 PROTHROMBIN TIME: CPT | Mod: QW,ZL | Performed by: FAMILY MEDICINE

## 2018-09-26 NOTE — PROGRESS NOTES
ANTICOAGULATION FOLLOW-UP CLINIC VISIT    Patient Name:  Ramírez Hernández  Date:  9/26/2018  Contact Type:  Telephone    SUBJECTIVE:     Patient Findings     Positives No Problem Findings    Comments INR done by lab. Call placed to patient and reached him on second attempt. We discussed INR result, warfarin dosing and INR recheck date. He verbalized understanding and has no questions. He has no bleeding/bruising and no new changes in diet/meds/activity.            OBJECTIVE    INR Point of Care   Date Value Ref Range Status   09/26/2018 3.1 (H) 0.86 - 1.14 Final     Comment:     This test is intended for monitoring Coumadin therapy.  Results are not   accurate in patients with prolonged INR due to factor deficiency.         ASSESSMENT / PLAN  INR assessment THER    Recheck INR In: 3 WEEKS    INR Location Clinic      Anticoagulation Summary as of 9/26/2018     INR goal 2.0-3.0   Today's INR 3.1!   Warfarin maintenance plan 2.5 mg (5 mg x 0.5) on Mon, Fri; 5 mg (5 mg x 1) all other days   Full warfarin instructions 2.5 mg on Mon, Fri; 5 mg all other days   Weekly warfarin total 30 mg   No change documented Edel Irvin RN   Plan last modified Edel Irvin RN (5/17/2018)   Next INR check 10/17/2018   Priority INR   Target end date Indefinite    Indications   Persistent atrial fibrillation (H) [I48.1]  Long-term (current) use of anticoagulants [Z79.01] [Z79.01]         Anticoagulation Episode Summary     INR check location     Preferred lab     Send INR reminders to [facility] ANTICOAG POOL    Comments 12/19-Taking Advil 200mg daily-AM [facility]      Anticoagulation Care Providers     Provider Role Specialty Phone number    CLAUDIO Crum MD [facility] [phone]            See the Encounter Report to view Anticoagulation Flowsheet and Dosing Calendar (Go to Encounters tab in chart review, and find the Anticoagulation Therapy Visit)        Edel Irvin RN

## 2018-09-26 NOTE — MR AVS SNAPSHOT
Ramírez Hernández   9/26/2018 9:30 AM   Anticoagulation Therapy Visit    Description:  63 year old male   Provider:   ANTI COAGULATION   Department:  Hc Anti Coagulation           INR as of 9/26/2018     Today's INR 3.1!      Anticoagulation Summary as of 9/26/2018     INR goal 2.0-3.0   Today's INR 3.1!   Full warfarin instructions 2.5 mg on Mon, Fri; 5 mg all other days   Next INR check 10/17/2018    Indications   Persistent atrial fibrillation (H) [I48.1]  Long-term (current) use of anticoagulants [Z79.01] [Z79.01]         Your next Anticoagulation Clinic appointment(s)     Oct 17, 2018  9:30 AM CDT   Anticoagulation Visit with  ANTI COAGULATION   [facility] [place] ([facility] [place] )    [address] [place] [address]   [phone]              September 2018 Details    Sun Mon Tue Wed Thu Fri Sat           1                 2               3               4               5               6               7               8                 9               10               11               12               13               14               15                 16               17               18               19               20               21               22                 23               24               25               26      5 mg   See details      27      5 mg         28      2.5 mg         29      5 mg           30      5 mg                Date Details   09/26 This INR check               How to take your warfarin dose     To take:  2.5 mg Take 0.5 of a 5 mg tablet.    To take:  5 mg Take 1 of the 5 mg tablets.           October 2018 Details    Sun Mon Tue Wed Thu Fri Sat      1      2.5 mg         2      5 mg         3      5 mg         4      5 mg         5      2.5 mg         6      5 mg           7      5 mg         8      2.5 mg         9      5 mg         10      5 mg         11      5 mg         12      2.5 mg         13      5 mg           14      5 mg          15      2.5 mg         16      5 mg         17            18               19               20                 21               22               23               24               25               26               27                 28               29               30               31                   Date Details   No additional details    Date of next INR:  10/17/2018         How to take your warfarin dose     To take:  2.5 mg Take 0.5 of a 5 mg tablet.    To take:  5 mg Take 1 of the 5 mg tablets.

## 2018-10-17 ENCOUNTER — ANTICOAGULATION THERAPY VISIT (OUTPATIENT)
Dept: ANTICOAGULATION | Facility: OTHER | Age: 63
End: 2018-10-17
Attending: FAMILY MEDICINE
Payer: MEDICARE

## 2018-10-17 DIAGNOSIS — Z79.01 LONG TERM CURRENT USE OF ANTICOAGULANT THERAPY: ICD-10-CM

## 2018-10-17 DIAGNOSIS — I48.19 PERSISTENT ATRIAL FIBRILLATION (H): ICD-10-CM

## 2018-10-17 LAB — INR BLD: 4 (ref 0.86–1.14)

## 2018-10-17 PROCEDURE — 85610 PROTHROMBIN TIME: CPT | Mod: QW,ZL | Performed by: FAMILY MEDICINE

## 2018-10-17 PROCEDURE — 36416 COLLJ CAPILLARY BLOOD SPEC: CPT | Mod: ZL | Performed by: FAMILY MEDICINE

## 2018-10-17 NOTE — PROGRESS NOTES
ANTICOAGULATION FOLLOW-UP CLINIC VISIT    Patient Name:  Ramírez Hernández  Date:  10/17/2018  Contact Type:  Telephone    SUBJECTIVE:     Patient Findings     Positives Change in diet/appetite, Activity level change    Comments INR done by lab. Call placed to patient and spoke to him re: INR result, warfarin dosing and INR recheck date. He verbalized understanding and has no questions. He has no bleeding/bruising, vit K intake down somewhat. Activity a little bit increased.            OBJECTIVE    INR Point of Care   Date Value Ref Range Status   10/17/2018 4.0 (H) 0.86 - 1.14 Final     Comment:     This test is intended for monitoring Coumadin therapy.  Results are not   accurate in patients with prolonged INR due to factor deficiency.         ASSESSMENT / PLAN  INR assessment SUPRA    Recheck INR In: 2 WEEKS    INR Location Clinic      Anticoagulation Summary as of 10/17/2018     INR goal 2.0-3.0   Today's INR 4.0!   Warfarin maintenance plan 2.5 mg (5 mg x 0.5) on Mon, Fri; 5 mg (5 mg x 1) all other days   Full warfarin instructions 10/17: Hold; 10/18: 2.5 mg; Otherwise 2.5 mg on Mon, Fri; 5 mg all other days   Weekly warfarin total 30 mg   Plan last modified Edel Irvin RN (5/17/2018)   Next INR check 10/31/2018   Priority INR   Target end date Indefinite    Indications   Persistent atrial fibrillation (H) [I48.1]  Long-term (current) use of anticoagulants [Z79.01] [Z79.01]         Anticoagulation Episode Summary     INR check location     Preferred lab     Send INR reminders to [facility]    Comments 12/19-Taking Advil 200mg daily-AM [facility]      Anticoagulation Care Providers     Provider Role Specialty Phone number    CLAUDIO Crum MD [facility] [phone]            See the Encounter Report to view Anticoagulation Flowsheet and Dosing Calendar (Go to Encounters tab in chart review, and find the Anticoagulation Therapy Visit)        Edel Irvin, RN

## 2018-10-17 NOTE — MR AVS SNAPSHOT
Ramírez PHILIPPE Hernández   10/17/2018 9:30 AM   Anticoagulation Therapy Visit    Description:  63 year old male   Provider:   ANTI COAGULATION   Department:  Hc Anti Coagulation           INR as of 10/17/2018     Today's INR 4.0!      Anticoagulation Summary as of 10/17/2018     INR goal 2.0-3.0   Today's INR 4.0!   Full warfarin instructions 10/17: Hold; 10/18: 2.5 mg; Otherwise 2.5 mg on Mon, Fri; 5 mg all other days   Next INR check 10/31/2018    Indications   Persistent atrial fibrillation (H) [I48.1]  Long-term (current) use of anticoagulants [Z79.01] [Z79.01]         Your next Anticoagulation Clinic appointment(s)     Oct 31, 2018 10:00 AM CDT   Anticoagulation Visit with  ANTI COAGULATION   [facility] [place] ([facility] [place] )    [address]  [place] [address]   [phone]              October 2018 Details    Sun Mon Tue Wed Thu Fri Sat      1               2               3               4               5               6                 7               8               9               10               11               12               13                 14               15               16               17      Hold   See details      18      2.5 mg         19      2.5 mg         20      5 mg           21      5 mg         22      2.5 mg         23      5 mg         24      5 mg         25      5 mg         26      2.5 mg         27      5 mg           28      5 mg         29      2.5 mg         30      5 mg         31                Date Details   10/17 This INR check       Date of next INR:  10/31/2018         How to take your warfarin dose     To take:  2.5 mg Take 0.5 of a 5 mg tablet.    To take:  5 mg Take 1 of the 5 mg tablets.    Hold Do not take your warfarin dose. See the Details table to the right for additional instructions.

## 2018-10-31 ENCOUNTER — ANTICOAGULATION THERAPY VISIT (OUTPATIENT)
Dept: ANTICOAGULATION | Facility: OTHER | Age: 63
End: 2018-10-31
Attending: FAMILY MEDICINE
Payer: MEDICARE

## 2018-10-31 DIAGNOSIS — I48.19 PERSISTENT ATRIAL FIBRILLATION (H): ICD-10-CM

## 2018-10-31 DIAGNOSIS — Z79.01 LONG TERM CURRENT USE OF ANTICOAGULANT THERAPY: ICD-10-CM

## 2018-10-31 DIAGNOSIS — I48.91 ATRIAL FIBRILLATION WITH RAPID VENTRICULAR RESPONSE (H): ICD-10-CM

## 2018-10-31 LAB — INR BLD: 2.1 (ref 0.86–1.14)

## 2018-10-31 PROCEDURE — 36416 COLLJ CAPILLARY BLOOD SPEC: CPT | Mod: ZL | Performed by: FAMILY MEDICINE

## 2018-10-31 PROCEDURE — 36415 COLL VENOUS BLD VENIPUNCTURE: CPT | Mod: ZL | Performed by: NURSE PRACTITIONER

## 2018-10-31 PROCEDURE — 85610 PROTHROMBIN TIME: CPT | Mod: ZL | Performed by: NURSE PRACTITIONER

## 2018-10-31 PROCEDURE — 85610 PROTHROMBIN TIME: CPT | Mod: QW,ZL | Performed by: FAMILY MEDICINE

## 2018-10-31 NOTE — PROGRESS NOTES
ANTICOAGULATION FOLLOW-UP CLINIC VISIT    Patient Name:  Ramírez Hernández  Date:  10/31/2018  Contact Type:  Telephone    SUBJECTIVE:     Patient Findings     Positives No Problem Findings    Comments INR done by lab. Call placed to patient and spoke to him re: INR result, warfarin dosing and INR recheck date. He verbalized understanding and has no questions.  He has no bleeding/bruising. No changes in diet/meds/activity. Decreased ETOH use significantly           OBJECTIVE    INR Point of Care   Date Value Ref Range Status   10/31/2018 2.1 (H) 0.86 - 1.14 Final     Comment:     This test is intended for monitoring Coumadin therapy.  Results are not   accurate in patients with prolonged INR due to factor deficiency.         ASSESSMENT / PLAN  INR assessment THER    Recheck INR In: 4 WEEKS    INR Location Clinic      Anticoagulation Summary as of 10/31/2018     INR goal 2.0-3.0   Today's INR 2.1   Warfarin maintenance plan 2.5 mg (5 mg x 0.5) on Mon, Fri; 5 mg (5 mg x 1) all other days   Full warfarin instructions 2.5 mg on Mon, Fri; 5 mg all other days   Weekly warfarin total 30 mg   Plan last modified Edel Irvin RN (10/31/2018)   Next INR check 11/28/2018   Priority INR   Target end date Indefinite    Indications   Persistent atrial fibrillation (H) [I48.1]  Long-term (current) use of anticoagulants [Z79.01] [Z79.01]         Anticoagulation Episode Summary     INR check location     Preferred lab     Send INR reminders to  JULIUS MICAH    Comments 12/19-Taking Advil 200mg daily-AM stiffness      Anticoagulation Care Providers     Provider Role Specialty Phone number    CLAUDIO Crum MD [facility] [phone]            See the Encounter Report to view Anticoagulation Flowsheet and Dosing Calendar (Go to Encounters tab in chart review, and find the Anticoagulation Therapy Visit)        Edel Irvin RN

## 2018-10-31 NOTE — MR AVS SNAPSHOT
Ramírez Hernández   10/31/2018 10:00 AM   Anticoagulation Therapy Visit    Description:  63 year old male   Provider:   ANTI COAGULATION   Department:  Hc Anti Coagulation           INR as of 10/31/2018     Today's INR 2.1      Anticoagulation Summary as of 10/31/2018     INR goal 2.0-3.0   Today's INR 2.1   Full warfarin instructions 2.5 mg on Mon, Fri; 5 mg all other days   Next INR check 11/28/2018    Indications   Persistent atrial fibrillation (H) [I48.1]  Long-term (current) use of anticoagulants [Z79.01] [Z79.01]         Your next Anticoagulation Clinic appointment(s)     Nov 28, 2018 10:00 AM CST   Anticoagulation Visit with  ANTI COAGULATION   [facility] [place] ([facility] [place] )    [address] [place] [address]   [phone]              October 2018 Details    Sun Mon Tue Wed Thu Fri Sat      1               2               3               4               5               6                 7               8               9               10               11               12               13                 14               15               16               17               18               19               20                 21               22               23               24               25               26               27                 28               29               30               31      5 mg   See details          Date Details   10/31 This INR check               How to take your warfarin dose     To take:  5 mg Take 1 of the 5 mg tablets.           November 2018 Details    Sun Mon Tue Wed Thu Fri Sat         1      5 mg         2      2.5 mg         3      5 mg           4      5 mg         5      2.5 mg         6      5 mg         7      5 mg         8      5 mg         9      2.5 mg         10      5 mg           11      5 mg         12      2.5 mg         13      5 mg         14      5 mg         15      5 mg         16      2.5 mg          17      5 mg           18      5 mg         19      2.5 mg         20      5 mg         21      5 mg         22      5 mg         23      2.5 mg         24      5 mg           25      5 mg         26      2.5 mg         27      5 mg         28            29               30                 Date Details   No additional details    Date of next INR:  11/28/2018         How to take your warfarin dose     To take:  2.5 mg Take 0.5 of a 5 mg tablet.    To take:  5 mg Take 1 of the 5 mg tablets.

## 2018-11-28 ENCOUNTER — ANTICOAGULATION THERAPY VISIT (OUTPATIENT)
Dept: ANTICOAGULATION | Facility: OTHER | Age: 63
End: 2018-11-28
Attending: FAMILY MEDICINE
Payer: MEDICARE

## 2018-11-28 DIAGNOSIS — I48.19 PERSISTENT ATRIAL FIBRILLATION (H): ICD-10-CM

## 2018-11-28 DIAGNOSIS — Z79.01 LONG TERM CURRENT USE OF ANTICOAGULANT THERAPY: ICD-10-CM

## 2018-11-28 LAB — INR BLD: 3.2 (ref 0.86–1.14)

## 2018-11-28 PROCEDURE — 85610 PROTHROMBIN TIME: CPT | Mod: QW,ZL | Performed by: FAMILY MEDICINE

## 2018-11-28 PROCEDURE — 36416 COLLJ CAPILLARY BLOOD SPEC: CPT | Mod: ZL | Performed by: FAMILY MEDICINE

## 2018-11-28 NOTE — MR AVS SNAPSHOT
Ramírez Hernández   11/28/2018 10:00 AM   Anticoagulation Therapy Visit    Description:  63 year old male   Provider:   ANTI COAGULATION   Department:  Hc Anti Coagulation           INR as of 11/28/2018     Today's INR 3.2!      Anticoagulation Summary as of 11/28/2018     INR goal 2.0-3.0   Today's INR 3.2!   Full warfarin instructions 11/28: 2.5 mg; Otherwise 2.5 mg on Mon, Fri; 5 mg all other days   Next INR check 12/19/2018    Indications   Persistent atrial fibrillation (H) [I48.1]  Long-term (current) use of anticoagulants [Z79.01] [Z79.01]         Your next Anticoagulation Clinic appointment(s)     Dec 19, 2018 10:00 AM CST   Anticoagulation Visit with  ANTI COAGULATION   [facility] [place] ([facility] [place] )    [address] [place] [address]   [phone]              November 2018 Details    Sun Mon Tue Wed Thu Fri Sat         1               2               3                 4               5               6               7               8               9               10                 11               12               13               14               15               16               17                 18               19               20               21               22               23               24                 25               26               27               28      2.5 mg   See details      29      5 mg         30      2.5 mg           Date Details   11/28 This INR check               How to take your warfarin dose     To take:  2.5 mg Take 0.5 of a 5 mg tablet.    To take:  5 mg Take 1 of the 5 mg tablets.           December 2018 Details    Sun Mon Tue Wed Thu Fri Sat           1      5 mg           2      5 mg         3      2.5 mg         4      5 mg         5      5 mg         6      5 mg         7      2.5 mg         8      5 mg           9      5 mg         10      2.5 mg         11      5 mg         12      5 mg         13      5 mg          14      2.5 mg         15      5 mg           16      5 mg         17      2.5 mg         18      5 mg         19            20               21               22                 23               24               25               26               27               28               29                 30               31                     Date Details   No additional details    Date of next INR:  12/19/2018         How to take your warfarin dose     To take:  2.5 mg Take 0.5 of a 5 mg tablet.    To take:  5 mg Take 1 of the 5 mg tablets.

## 2018-11-28 NOTE — PROGRESS NOTES
ANTICOAGULATION FOLLOW-UP CLINIC VISIT    Patient Name:  Ramírez Hernández  Date:  11/28/2018  Contact Type:  Telephone    SUBJECTIVE:     Patient Findings     Positives Other complaints    Comments INR done by lab. Call placed to patient and spoke to him re: INR result, warfarin dosing and INR recheck date. He verbalized understanding and has no questions. He states he did have more ETOH over thanksgiving than usual.            OBJECTIVE    INR Point of Care   Date Value Ref Range Status   11/28/2018 3.2 (H) 0.86 - 1.14 Final     Comment:     This test is intended for monitoring Coumadin therapy.  Results are not   accurate in patients with prolonged INR due to factor deficiency.         ASSESSMENT / PLAN  INR assessment SUPRA increased ETOH use   Recheck INR In: 3 WEEKS    INR Location Clinic      Anticoagulation Summary as of 11/28/2018     INR goal 2.0-3.0   Today's INR 3.2!   Warfarin maintenance plan 2.5 mg (5 mg x 0.5) on Mon, Fri; 5 mg (5 mg x 1) all other days   Full warfarin instructions 11/28: 2.5 mg; Otherwise 2.5 mg on Mon, Fri; 5 mg all other days   Weekly warfarin total 30 mg   Plan last modified Edel Irvin RN (10/31/2018)   Next INR check 12/19/2018   Priority INR   Target end date Indefinite    Indications   Persistent atrial fibrillation (H) [I48.1]  Long-term (current) use of anticoagulants [Z79.01] [Z79.01]         Anticoagulation Episode Summary     INR check location     Preferred lab     Send INR reminders to  ANTICOAG POOL    Comments 12/19-Taking Advil 200mg daily-AM [facility]      Anticoagulation Care Providers     Provider Role Specialty Phone number    CLAUDIO Crum MD [facility] [phone]            See the Encounter Report to view Anticoagulation Flowsheet and Dosing Calendar (Go to Encounters tab in chart review, and find the Anticoagulation Therapy Visit)        Edel Irvin, RN

## 2018-12-19 ENCOUNTER — ANTICOAGULATION THERAPY VISIT (OUTPATIENT)
Dept: ANTICOAGULATION | Facility: OTHER | Age: 63
End: 2018-12-19
Attending: FAMILY MEDICINE
Payer: MEDICARE

## 2018-12-19 DIAGNOSIS — I48.19 PERSISTENT ATRIAL FIBRILLATION (H): ICD-10-CM

## 2018-12-19 DIAGNOSIS — Z79.01 LONG TERM CURRENT USE OF ANTICOAGULANT THERAPY: ICD-10-CM

## 2018-12-19 LAB — INR BLD: 4.4 (ref 0.86–1.14)

## 2018-12-19 PROCEDURE — 36416 COLLJ CAPILLARY BLOOD SPEC: CPT | Mod: ZL | Performed by: FAMILY MEDICINE

## 2018-12-19 PROCEDURE — 85610 PROTHROMBIN TIME: CPT | Mod: QW,ZL | Performed by: FAMILY MEDICINE

## 2018-12-19 NOTE — PROGRESS NOTES
ANTICOAGULATION FOLLOW-UP CLINIC VISIT    Patient Name:  Ramírez Hernández  Date:  2018  Contact Type:  Telephone/ spoke with patient    SUBJECTIVE:     Patient Findings     Positives:   Other complaints    Comments:   INR done by lab. Called and spoke to patient re: INR result, warfarin dosing (per consult with Edel Irvin RN) and INR recheck date. Patient verbalized understanding and has no questions. Patient states he drinks a 6 pack at a time about 2-3 times per week.  We discussed offsetting this with greens.  Patient was eating celery and I advised more peas and green beans.  Patient verbalized understanding.  Advised patient to call coumadin if he has a 6 pack and we can tell him what to do.  Patient verbalized understanding.  Patient not having any bleeding/bruising.  Instructed patient to watch for these symptoms and apply pressure and be seen if bleeding will not stop within 10-15 minutes.             OBJECTIVE    INR Point of Care   Date Value Ref Range Status   2018 4.4 (H) 0.86 - 1.14 Final     Comment:     This test is intended for monitoring Coumadin therapy.  Results are not   accurate in patients with prolonged INR due to factor deficiency.         ASSESSMENT / PLAN  INR assessment SUPRA    Recheck INR In: 9 DAYS    INR Location Clinic      Anticoagulation Summary  As of 2018    INR goal:   2.0-3.0   TTR:   59.3 % (1 y)   INR used for dosin.4! (2018)   Warfarin maintenance plan:   2.5 mg (5 mg x 0.5) every Mon, Fri; 5 mg (5 mg x 1) all other days   Full warfarin instructions:   : Hold; : 2.5 mg; Otherwise 2.5 mg every Mon, Fri; 5 mg all other days   Weekly warfarin total:   30 mg   Plan last modified:   Edel Irvin RN (10/31/2018)   Next INR check:   2018   Priority:   INR   Target end date:   Indefinite    Indications    Persistent atrial fibrillation (H) [I48.1]  Long-term (current) use of anticoagulants [Z79.01] [Z79.01]              Anticoagulation Episode Summary     INR check location:       Preferred lab:       Send INR reminders to:   [facility] ANTICOAG POOL    Comments:   12/19-Taking Advil 200mg daily-AM stiffness      Anticoagulation Care Providers     Provider Role Specialty Phone number    CLAUDIO Crum MD [facility] [phone]            See the Encounter Report to view Anticoagulation Flowsheet and Dosing Calendar (Go to Encounters tab in chart review, and find the Anticoagulation Therapy Visit)      Mary Ann Moreno RN

## 2018-12-24 DIAGNOSIS — Z79.01 LONG TERM CURRENT USE OF ANTICOAGULANT THERAPY: ICD-10-CM

## 2018-12-24 DIAGNOSIS — I48.19 PERSISTENT ATRIAL FIBRILLATION (H): ICD-10-CM

## 2018-12-24 DIAGNOSIS — I48.91 ATRIAL FIBRILLATION WITH RAPID VENTRICULAR RESPONSE (H): ICD-10-CM

## 2018-12-24 DIAGNOSIS — I50.21 ACUTE SYSTOLIC CONGESTIVE HEART FAILURE (H): ICD-10-CM

## 2018-12-26 NOTE — TELEPHONE ENCOUNTER
Lasix      Last Written Prescription Date:  1/10/2018  Last Fill Quantity: 90,   # refills: 3      Lisinopril       Last Written Prescription Date:  1/10/2018  Last Fill Quantity: 90,   # refills: 3    Lopressor       Last Written Prescription Date:  1/10/2018  Last Fill Quantity: 180,   # refills: 3      Coumadin       Last Written Prescription Date:  1/5/2018  Last Fill Quantity: 80,   # refills: 3  Last Office Visit: 6/11/2018  Future Office visit:

## 2018-12-27 RX ORDER — METOPROLOL TARTRATE 50 MG
TABLET ORAL
Qty: 180 TABLET | Refills: 1 | Status: SHIPPED | OUTPATIENT
Start: 2018-12-27 | End: 2019-07-01

## 2018-12-27 RX ORDER — FUROSEMIDE 40 MG
TABLET ORAL
Qty: 90 TABLET | Refills: 1 | Status: SHIPPED | OUTPATIENT
Start: 2018-12-27 | End: 2019-07-01

## 2018-12-27 RX ORDER — LISINOPRIL 10 MG/1
TABLET ORAL
Qty: 90 TABLET | Refills: 1 | Status: SHIPPED | OUTPATIENT
Start: 2018-12-27 | End: 2019-07-01

## 2018-12-27 RX ORDER — WARFARIN SODIUM 5 MG/1
TABLET ORAL
Qty: 80 TABLET | Refills: 1 | Status: SHIPPED | OUTPATIENT
Start: 2018-12-27 | End: 2019-07-01

## 2018-12-28 ENCOUNTER — ANTICOAGULATION THERAPY VISIT (OUTPATIENT)
Dept: ANTICOAGULATION | Facility: OTHER | Age: 63
End: 2018-12-28
Attending: FAMILY MEDICINE
Payer: MEDICARE

## 2018-12-28 DIAGNOSIS — Z79.01 LONG TERM CURRENT USE OF ANTICOAGULANT THERAPY: ICD-10-CM

## 2018-12-28 DIAGNOSIS — I48.19 PERSISTENT ATRIAL FIBRILLATION (H): ICD-10-CM

## 2018-12-28 LAB — INR BLD: 1.6 (ref 0.86–1.14)

## 2018-12-28 PROCEDURE — 36416 COLLJ CAPILLARY BLOOD SPEC: CPT | Mod: ZL | Performed by: FAMILY MEDICINE

## 2018-12-28 PROCEDURE — 85610 PROTHROMBIN TIME: CPT | Mod: QW,ZL | Performed by: FAMILY MEDICINE

## 2018-12-28 NOTE — PROGRESS NOTES
ANTICOAGULATION FOLLOW-UP CLINIC VISIT    Patient Name:  Ramírez Hernández  Date:  2018  Contact Type:  Telephone/ spoke with patient    SUBJECTIVE:     Patient Findings     Positives:   Change in diet/appetite    Comments:   INR done by lab. Called and spoke to patient re: INR result, warfarin dosing (per consult with Edel Porter RN) and INR recheck date. No bleeding/bruising or medication/activity change.  Patient stated he stopped drinking last week and that is why his number went down.  I reminded him to call coumadin clinic if he changes anything like that.  We discussed signs and symptoms of clotting and what to watch for. Patient stated he hasn't been shoveling with all this snow so no activity change.  Patient verbalized understanding and has no questions.            OBJECTIVE    INR Point of Care   Date Value Ref Range Status   2018 1.6 (H) 0.86 - 1.14 Final     Comment:     This test is intended for monitoring Coumadin therapy.  Results are not   accurate in patients with prolonged INR due to factor deficiency.         ASSESSMENT / PLAN  INR assessment SUB    Recheck INR In: 10 DAYS    INR Location Clinic      Anticoagulation Summary  As of 2018    INR goal:   2.0-3.0   TTR:   58.7 % (1 y)   INR used for dosin.6! (2018)   Warfarin maintenance plan:   2.5 mg (5 mg x 0.5) every Mon, Fri; 5 mg (5 mg x 1) all other days   Full warfarin instructions:   : 5 mg; Otherwise 2.5 mg every Mon, Fri; 5 mg all other days   Weekly warfarin total:   30 mg   Plan last modified:   Edel Irvin RN (10/31/2018)   Next INR check:   2019   Priority:   INR   Target end date:   Indefinite    Indications    Persistent atrial fibrillation (H) [I48.1]  Long-term (current) use of anticoagulants [Z79.01] [Z79.01]             Anticoagulation Episode Summary     INR check location:       Preferred lab:       Send INR reminders to:   [facility] ANTICOAG POOL    Comments:   -Taking Advil 200mg  daily-AM stiffness      Anticoagulation Care Providers     Provider Role Specialty Phone number    CLAUDIO Crum MD [facility] [phone]            See the Encounter Report to view Anticoagulation Flowsheet and Dosing Calendar (Go to Encounters tab in chart review, and find the Anticoagulation Therapy Visit)      Mary Ann Moreno RN

## 2019-01-09 ENCOUNTER — ANTICOAGULATION THERAPY VISIT (OUTPATIENT)
Dept: ANTICOAGULATION | Facility: OTHER | Age: 64
End: 2019-01-09
Attending: FAMILY MEDICINE
Payer: MEDICARE

## 2019-01-09 DIAGNOSIS — Z79.01 LONG TERM CURRENT USE OF ANTICOAGULANT THERAPY: ICD-10-CM

## 2019-01-09 DIAGNOSIS — I48.19 PERSISTENT ATRIAL FIBRILLATION (H): ICD-10-CM

## 2019-01-09 LAB — INR BLD: 1.9 (ref 0.86–1.14)

## 2019-01-09 PROCEDURE — 85610 PROTHROMBIN TIME: CPT | Mod: QW,ZL | Performed by: FAMILY MEDICINE

## 2019-01-09 PROCEDURE — 36416 COLLJ CAPILLARY BLOOD SPEC: CPT | Mod: ZL | Performed by: FAMILY MEDICINE

## 2019-01-09 NOTE — PROGRESS NOTES
ANTICOAGULATION FOLLOW-UP CLINIC VISIT    Patient Name:  Ramírez Hernández  Date:  2019  Contact Type:  Telephone/ spoke to patient    SUBJECTIVE:     Patient Findings     Positives:   No Problem Findings    Comments:   INR done by lab. Call placed to patient and spoke to him re: INR result, warfarin dosing (per consult with Edel Irvin RN) and INR recheck date. He verbalized understanding and has no questions. He has no bleeding/bruising. Patient states no activity/medication changes.  Patient stated he eats soup with veggies such as corn, carrots, peas and broccoli.  Educated patient again on calling the clinic if he consumes ETOH and we will instruct him on what to do.  Patient verbalizes understanding.              OBJECTIVE    INR Point of Care   Date Value Ref Range Status   2019 1.9 (H) 0.86 - 1.14 Final     Comment:     This test is intended for monitoring Coumadin therapy.  Results are not   accurate in patients with prolonged INR due to factor deficiency.         ASSESSMENT / PLAN  INR assessment THER    Recheck INR In: 3 WEEKS    INR Location Clinic      Anticoagulation Summary  As of 2019    INR goal:   2.0-3.0   TTR:   56.9 % (1.1 y)   INR used for dosin.9! (2019)   Warfarin maintenance plan:   2.5 mg (5 mg x 0.5) every Mon, Fri; 5 mg (5 mg x 1) all other days   Full warfarin instructions:   2.5 mg every Mon, Fri; 5 mg all other days   Weekly warfarin total:   30 mg   No change documented:   Mary Ann Moreno RN   Plan last modified:   Edel Irvin RN (10/31/2018)   Next INR check:   2019   Priority:   INR   Target end date:   Indefinite    Indications    Persistent atrial fibrillation (H) [I48.1]  Long-term (current) use of anticoagulants [Z79.01] [Z79.01]             Anticoagulation Episode Summary     INR check location:       Preferred lab:       Send INR reminders to:    ANTICOAG POOL    Comments:   -Taking Advil 200mg daily-AM stiffness      Anticoagulation Care  Providers     Provider Role Specialty Phone number    CLAUDIO Crum MD [facility] [phone]            See the Encounter Report to view Anticoagulation Flowsheet and Dosing Calendar (Go to Encounters tab in chart review, and find the Anticoagulation Therapy Visit)      Mary Ann Moreno RN

## 2019-02-01 ENCOUNTER — ANTICOAGULATION THERAPY VISIT (OUTPATIENT)
Dept: ANTICOAGULATION | Facility: OTHER | Age: 64
End: 2019-02-01
Attending: FAMILY MEDICINE
Payer: MEDICARE

## 2019-02-01 DIAGNOSIS — I48.19 PERSISTENT ATRIAL FIBRILLATION (H): ICD-10-CM

## 2019-02-01 DIAGNOSIS — Z79.01 LONG TERM CURRENT USE OF ANTICOAGULANT THERAPY: ICD-10-CM

## 2019-02-01 LAB — INR BLD: 2.2 (ref 0.86–1.14)

## 2019-02-01 PROCEDURE — 36416 COLLJ CAPILLARY BLOOD SPEC: CPT | Mod: ZL | Performed by: FAMILY MEDICINE

## 2019-02-01 PROCEDURE — 85610 PROTHROMBIN TIME: CPT | Mod: QW,ZL | Performed by: FAMILY MEDICINE

## 2019-02-01 NOTE — PROGRESS NOTES
ANTICOAGULATION FOLLOW-UP CLINIC VISIT    Patient Name:  Ramírez Hernández  Date:  2019  Contact Type:  Telephone/ spoke to patient    SUBJECTIVE:     Patient Findings     Positives:   No Problem Findings    Comments:   INR done by lab. Call placed to patient and spoke to him re: INR result, warfarin dosing and INR recheck date. He verbalized understanding and has no questions. He has no bleeding/bruising. Patient states no activity/medication changes.  Patient states he has cut down on his ETOH intake.           OBJECTIVE    INR Point of Care   Date Value Ref Range Status   2019 2.2 (H) 0.86 - 1.14 Final     Comment:     This test is intended for monitoring Coumadin therapy.  Results are not   accurate in patients with prolonged INR due to factor deficiency.         ASSESSMENT / PLAN  INR assessment THER    Recheck INR In: 4 WEEKS    INR Location Clinic      Anticoagulation Summary  As of 2019    INR goal:   2.0-3.0   TTR:   57.4 % (1.1 y)   INR used for dosin.2 (2019)   Warfarin maintenance plan:   2.5 mg (5 mg x 0.5) every Mon, Fri; 5 mg (5 mg x 1) all other days   Full warfarin instructions:   2.5 mg every Mon, Fri; 5 mg all other days   Weekly warfarin total:   30 mg   No change documented:   Mary Ann Moreno RN   Plan last modified:   Edel Irvin RN (10/31/2018)   Next INR check:   3/1/2019   Priority:   INR   Target end date:   Indefinite    Indications    Persistent atrial fibrillation (H) [I48.1]  Long-term (current) use of anticoagulants [Z79.01] [Z79.01]             Anticoagulation Episode Summary     INR check location:       Preferred lab:       Send INR reminders to:   [facility] ANTICOAG POOL    Comments:   -Taking Advil 200mg daily-AM stiffness      Anticoagulation Care Providers     Provider Role Specialty Phone number    CLAUDIO Crum MD [facility] [phone]            See the Encounter Report to view Anticoagulation Flowsheet and Dosing Calendar (Go to  Encounters tab in chart review, and find the Anticoagulation Therapy Visit)      Mary Ann Moreno RN

## 2019-03-01 ENCOUNTER — ANTICOAGULATION THERAPY VISIT (OUTPATIENT)
Dept: ANTICOAGULATION | Facility: OTHER | Age: 64
End: 2019-03-01
Attending: FAMILY MEDICINE
Payer: MEDICARE

## 2019-03-01 DIAGNOSIS — I48.19 PERSISTENT ATRIAL FIBRILLATION (H): ICD-10-CM

## 2019-03-01 DIAGNOSIS — Z79.01 LONG TERM CURRENT USE OF ANTICOAGULANT THERAPY: ICD-10-CM

## 2019-03-01 LAB — INR BLD: 2.9 (ref 0.86–1.14)

## 2019-03-01 PROCEDURE — 85610 PROTHROMBIN TIME: CPT | Mod: QW,ZL | Performed by: FAMILY MEDICINE

## 2019-03-01 PROCEDURE — 36416 COLLJ CAPILLARY BLOOD SPEC: CPT | Mod: ZL | Performed by: FAMILY MEDICINE

## 2019-03-01 NOTE — PROGRESS NOTES
ANTICOAGULATION FOLLOW-UP CLINIC VISIT    Patient Name:  Ramírez Hernández  Date:  3/1/2019  Contact Type:  Telephone    SUBJECTIVE:     Patient Findings     Positives:   No Problem Findings    Comments:   POCT INR drawn today by [facility] staff.  INR results/dosing/INR recheck information discussed by telephone with the pt today.  Patient's last 7 days of warfarin dosing reviewed and patient is therapeutic; therefore the weekly dosage of warfarin will remain the same.  Pt denies any new issues. Call ended with pt stating understanding and with questions answered.             OBJECTIVE    INR Point of Care   Date Value Ref Range Status   2019 2.9 (H) 0.86 - 1.14 Final     Comment:     This test is intended for monitoring Coumadin therapy.  Results are not   accurate in patients with prolonged INR due to factor deficiency.         ASSESSMENT / PLAN  No question data found.  Anticoagulation Summary  As of 3/1/2019    INR goal:   2.0-3.0   TTR:   60.2 % (1.2 y)   INR used for dosin.9 (3/1/2019)   Warfarin maintenance plan:   2.5 mg (5 mg x 0.5) every Mon, Fri; 5 mg (5 mg x 1) all other days   Full warfarin instructions:   2.5 mg every Mon, Fri; 5 mg all other days   Weekly warfarin total:   30 mg   No change documented:   Edel Porter RN   Plan last modified:   Edel Irvin RN (10/31/2018)   Next INR check:   3/29/2019   Priority:   INR   Target end date:   Indefinite    Indications    Persistent atrial fibrillation (H) [I48.1]  Long-term (current) use of anticoagulants [Z79.01] [Z79.01]             Anticoagulation Episode Summary     INR check location:       Preferred lab:       Send INR reminders to:   [facility] ANTICOAG POOL    Comments:   -Taking Advil 200mg daily-AM stiffness      Anticoagulation Care Providers     Provider Role Specialty Phone number    CLAUDIO Crum MD [facility] [phone]            See the Encounter Report to view Anticoagulation Flowsheet and Dosing  Calendar (Go to Encounters tab in chart review, and find the Anticoagulation Therapy Visit)        Edel Porter RN

## 2019-04-01 ENCOUNTER — ANTICOAGULATION THERAPY VISIT (OUTPATIENT)
Dept: ANTICOAGULATION | Facility: OTHER | Age: 64
End: 2019-04-01
Attending: FAMILY MEDICINE
Payer: MEDICARE

## 2019-04-01 DIAGNOSIS — I48.19 PERSISTENT ATRIAL FIBRILLATION (H): ICD-10-CM

## 2019-04-01 DIAGNOSIS — Z79.01 LONG TERM CURRENT USE OF ANTICOAGULANT THERAPY: ICD-10-CM

## 2019-04-01 LAB — INR BLD: 3.3 (ref 0.86–1.14)

## 2019-04-01 PROCEDURE — 85610 PROTHROMBIN TIME: CPT | Mod: QW,ZL | Performed by: FAMILY MEDICINE

## 2019-04-01 PROCEDURE — 36416 COLLJ CAPILLARY BLOOD SPEC: CPT | Mod: ZL | Performed by: FAMILY MEDICINE

## 2019-04-22 ENCOUNTER — ANTICOAGULATION THERAPY VISIT (OUTPATIENT)
Dept: ANTICOAGULATION | Facility: OTHER | Age: 64
End: 2019-04-22
Attending: FAMILY MEDICINE
Payer: MEDICARE

## 2019-04-22 DIAGNOSIS — I48.19 PERSISTENT ATRIAL FIBRILLATION (H): ICD-10-CM

## 2019-04-22 DIAGNOSIS — Z79.01 LONG TERM CURRENT USE OF ANTICOAGULANT THERAPY: ICD-10-CM

## 2019-04-22 LAB — INR BLD: 2.4 (ref 0.86–1.14)

## 2019-04-22 PROCEDURE — 36416 COLLJ CAPILLARY BLOOD SPEC: CPT | Mod: ZL | Performed by: FAMILY MEDICINE

## 2019-04-22 PROCEDURE — 85610 PROTHROMBIN TIME: CPT | Mod: QW,ZL | Performed by: FAMILY MEDICINE

## 2019-04-22 NOTE — PROGRESS NOTES
ANTICOAGULATION FOLLOW-UP CLINIC VISIT    Patient Name:  Ramírez Hernández  Date:  2019  Contact Type:  Telephone    SUBJECTIVE:     Patient Findings     Comments:   INR done by lab. Call placed to patient and spoke to him re: INR result, warfarin dosing and INR recheck date. He verbalized understanding and has no questions. He denies any bleeding/bruising and no changes in diet/meds/activity           OBJECTIVE    INR Point of Care   Date Value Ref Range Status   2019 2.4 (H) 0.86 - 1.14 Final     Comment:     This test is intended for monitoring Coumadin therapy.  Results are not   accurate in patients with prolonged INR due to factor deficiency.         ASSESSMENT / PLAN  INR assessment THER    Recheck INR In: 4 WEEKS    INR Location Clinic      Anticoagulation Summary  As of 2019    INR goal:   2.0-3.0   TTR:   58.2 % (1.3 y)   INR used for dosin.4 (2019)   Warfarin maintenance plan:   2.5 mg (5 mg x 0.5) every Mon, Fri; 5 mg (5 mg x 1) all other days   Full warfarin instructions:   2.5 mg every Mon, Fri; 5 mg all other days   Weekly warfarin total:   30 mg   No change documented:   Edel Irvin RN   Plan last modified:   Edel Irvin RN (10/31/2018)   Next INR check:   2019   Priority:   INR   Target end date:   Indefinite    Indications    Persistent atrial fibrillation (H) [I48.1]  Long-term (current) use of anticoagulants [Z79.01] [Z79.01]             Anticoagulation Episode Summary     INR check location:       Preferred lab:       Send INR reminders to:   [facility] ANTICOAG POOL    Comments:   -Taking Advil 200mg daily-AM stiffness      Anticoagulation Care Providers     Provider Role Specialty Phone number    CLAUDIO Crum MD [facility] [phone]            See the Encounter Report to view Anticoagulation Flowsheet and Dosing Calendar (Go to Encounters tab in chart review, and find the Anticoagulation Therapy Visit)        Edel Irvin RN

## 2019-05-20 ENCOUNTER — ANTICOAGULATION THERAPY VISIT (OUTPATIENT)
Dept: ANTICOAGULATION | Facility: OTHER | Age: 64
End: 2019-05-20
Attending: FAMILY MEDICINE
Payer: MEDICARE

## 2019-05-20 DIAGNOSIS — I48.19 PERSISTENT ATRIAL FIBRILLATION (H): ICD-10-CM

## 2019-05-20 DIAGNOSIS — Z79.01 LONG TERM CURRENT USE OF ANTICOAGULANT THERAPY: ICD-10-CM

## 2019-05-20 LAB — INR BLD: 2.2 (ref 0.86–1.14)

## 2019-05-20 PROCEDURE — 85610 PROTHROMBIN TIME: CPT | Mod: QW,ZL | Performed by: FAMILY MEDICINE

## 2019-05-20 PROCEDURE — 36416 COLLJ CAPILLARY BLOOD SPEC: CPT | Mod: ZL | Performed by: FAMILY MEDICINE

## 2019-05-20 NOTE — PROGRESS NOTES
ANTICOAGULATION FOLLOW-UP CLINIC VISIT    Patient Name:  Ramírez Hernández  Date:  2019  Contact Type:  Telephone    SUBJECTIVE:  Patient Findings     Comments:   INR done by lab. Call placed to patient and no answer to phone and no voicemail available. Will try to reach patient later.  Reached patient via phone. We discussed INR result, warfarin dosing and INR recheck date. He verbalized understanding and has no questions. He denies any bleeding/bruising and has no changes in diet/meds/activity.         Clinical Outcomes     Negatives:   Major bleeding event, Thromboembolic event, Anticoagulation-related hospital admission, Anticoagulation-related ED visit, Anticoagulation-related fatality    Comments:   INR done by lab. Call placed to patient and no answer to phone and no voicemail available. Will try to reach patient later.  Reached patient via phone. We discussed INR result, warfarin dosing and INR recheck date. He verbalized understanding and has no questions. He denies any bleeding/bruising and has no changes in diet/meds/activity.            OBJECTIVE    INR Point of Care   Date Value Ref Range Status   2019 2.2 (H) 0.86 - 1.14 Final     Comment:     This test is intended for monitoring Coumadin therapy.  Results are not   accurate in patients with prolonged INR due to factor deficiency.         ASSESSMENT / PLAN  INR assessment THER    Recheck INR In: 5 WEEKS    INR Location Clinic      Anticoagulation Summary  As of 2019    INR goal:   2.0-3.0   TTR:   60.5 % (1.4 y)   INR used for dosin.2 (2019)   Warfarin maintenance plan:   2.5 mg (5 mg x 0.5) every Mon, Fri; 5 mg (5 mg x 1) all other days   Full warfarin instructions:   2.5 mg every Mon, Fri; 5 mg all other days   Weekly warfarin total:   30 mg   No change documented:   Edel Irvin, RN   Plan last modified:   Edel Irvin RN (10/31/2018)   Next INR check:   2019   Priority:   INR   Target end date:   Indefinite     Indications    Persistent atrial fibrillation (H) [I48.1]  Long-term (current) use of anticoagulants [Z79.01] [Z79.01]             Anticoagulation Episode Summary     INR check location:       Preferred lab:       Send INR reminders to:    ANTICOAG POOL    Comments:   12/19-Taking Advil 200mg daily-AM stiffness      Anticoagulation Care Providers     Provider Role Specialty Phone number    CLAUDIO Crum MD [facility] [phone]            See the Encounter Report to view Anticoagulation Flowsheet and Dosing Calendar (Go to Encounters tab in chart review, and find the Anticoagulation Therapy Visit)        Edel Irvin RN

## 2019-06-17 PROBLEM — Z79.01 LONG TERM CURRENT USE OF ANTICOAGULANT THERAPY: Status: ACTIVE | Noted: 2017-12-11

## 2019-06-24 ENCOUNTER — ANTICOAGULATION THERAPY VISIT (OUTPATIENT)
Dept: ANTICOAGULATION | Facility: OTHER | Age: 64
End: 2019-06-24
Attending: FAMILY MEDICINE
Payer: MEDICARE

## 2019-06-24 DIAGNOSIS — I48.19 PERSISTENT ATRIAL FIBRILLATION (H): ICD-10-CM

## 2019-06-24 DIAGNOSIS — Z79.01 LONG TERM CURRENT USE OF ANTICOAGULANT THERAPY: ICD-10-CM

## 2019-06-24 LAB — INR BLD: 2.9 (ref 0.86–1.14)

## 2019-06-24 PROCEDURE — 85610 PROTHROMBIN TIME: CPT | Mod: QW,ZL | Performed by: FAMILY MEDICINE

## 2019-06-24 PROCEDURE — 36416 COLLJ CAPILLARY BLOOD SPEC: CPT | Mod: ZL | Performed by: FAMILY MEDICINE

## 2019-06-24 NOTE — PROGRESS NOTES
ANTICOAGULATION FOLLOW-UP CLINIC VISIT    Patient Name:  Ramírez Hernández  Date:  2019  Contact Type:  Telephone/ called and spoke to patient    SUBJECTIVE:  Patient Findings     Positives:   Change in diet/appetite    Comments:   INR done by lab. Call placed to patient and spoke to patient  We discussed INR result, warfarin dosing and INR recheck date. He verbalized understanding and has no questions. He denies any bleeding/bruising and has no changes in meds/activity. Patient states he has slowed down on the greens a bit.        Clinical Outcomes     Comments:   INR done by lab. Call placed to patient and spoke to patient  We discussed INR result, warfarin dosing and INR recheck date. He verbalized understanding and has no questions. He denies any bleeding/bruising and has no changes in meds/activity. Patient states he has slowed down on the greens a bit.           OBJECTIVE    INR Point of Care   Date Value Ref Range Status   2019 2.9 (H) 0.86 - 1.14 Final     Comment:     This test is intended for monitoring Coumadin therapy.  Results are not   accurate in patients with prolonged INR due to factor deficiency.         ASSESSMENT / PLAN  INR assessment THER    Recheck INR In: 5 WEEKS    INR Location Clinic      Anticoagulation Summary  As of 2019    INR goal:   2.0-3.0   TTR:   63.0 % (1.5 y)   INR used for dosin.9 (2019)   Warfarin maintenance plan:   2.5 mg (5 mg x 0.5) every Mon, Fri; 5 mg (5 mg x 1) all other days   Full warfarin instructions:   2.5 mg every Mon, Fri; 5 mg all other days   Weekly warfarin total:   30 mg   No change documented:   Mary Ann Moreno, RN   Plan last modified:   Edel Irvin RN (10/31/2018)   Next INR check:   2019   Priority:   INR   Target end date:   Indefinite    Indications    Persistent atrial fibrillation (H) [I48.1]  Long-term (current) use of anticoagulants [Z79.01] [Z79.01]             Anticoagulation Episode Summary     INR check location:        Preferred lab:       Send INR reminders to:   [facility] ANTICOAG POOL    Comments:   12/19-Taking Advil 200mg daily-AM stiffness      Anticoagulation Care Providers     Provider Role Specialty Phone number    CLAUDIO Crum MD [facility] [phone]            See the Encounter Report to view Anticoagulation Flowsheet and Dosing Calendar (Go to Encounters tab in chart review, and find the Anticoagulation Therapy Visit)      Mary Ann Moreno RN

## 2019-07-01 DIAGNOSIS — Z79.01 LONG TERM CURRENT USE OF ANTICOAGULANT THERAPY: ICD-10-CM

## 2019-07-01 DIAGNOSIS — I48.19 PERSISTENT ATRIAL FIBRILLATION (H): ICD-10-CM

## 2019-07-01 DIAGNOSIS — I50.21 ACUTE SYSTOLIC CONGESTIVE HEART FAILURE (H): ICD-10-CM

## 2019-07-01 DIAGNOSIS — I48.91 ATRIAL FIBRILLATION WITH RAPID VENTRICULAR RESPONSE (H): ICD-10-CM

## 2019-07-01 NOTE — TELEPHONE ENCOUNTER
lasix      Last Written Prescription Date:  12/27/18  Last Fill Quantity: 90,   # refills: 1  Last Office Visit: 6/11/18  Future Office visit:         coumadin      Last Written Prescription Date:  12/27/18  Last Fill Quantity: 80,   # refills: 1  Last Office Visit: 6/11/18  Future Office visit:         lisinopril      Last Written Prescription Date:  12/27/18  Last Fill Quantity: 90,   # refills: 1  Last Office Visit: 6/11/18  Future Office visit:         metoprolol      Last Written Prescription Date:  12/27/18  Last Fill Quantity: 180,   # refills: 1  Last Office Visit: 6/11/18  Future Office visit:

## 2019-07-01 NOTE — LETTER
July 2, 2019      Ramírez Hernández  [address]  [facility] [place] [address]        Dear Ramríez,     APPOINTMENT REMINDER:   Our records indicates that it is time for you to be seen for yearly exam and medication review     Your current medication request for metoprolol tartrate, lisinopril, warfarin, and furosemide will be approved for one refill but you will need to be seen before any additional refills can be approved.  Taking care of your health is important to us, and ongoing visits with your provider are vital to your care.    We look forward to seeing you in the near future.  You may call our office at [phone] to schedule a visit.     Please disregard this notice if you have already made an appointment.      Sincerely,  Aron Delvalle MD

## 2019-07-02 RX ORDER — LISINOPRIL 10 MG/1
TABLET ORAL
Qty: 30 TABLET | Refills: 0 | Status: SHIPPED | OUTPATIENT
Start: 2019-07-02 | End: 2019-07-29

## 2019-07-02 RX ORDER — METOPROLOL TARTRATE 50 MG
TABLET ORAL
Qty: 60 TABLET | Refills: 0 | Status: SHIPPED | OUTPATIENT
Start: 2019-07-02 | End: 2019-07-29

## 2019-07-02 RX ORDER — FUROSEMIDE 40 MG
TABLET ORAL
Qty: 30 TABLET | Refills: 0 | Status: SHIPPED | OUTPATIENT
Start: 2019-07-02 | End: 2019-07-29

## 2019-07-02 RX ORDER — WARFARIN SODIUM 5 MG/1
TABLET ORAL
Qty: 30 TABLET | Refills: 0 | Status: SHIPPED | OUTPATIENT
Start: 2019-07-02 | End: 2019-07-29

## 2019-07-24 NOTE — PROGRESS NOTES
SUBJECTIVE:   Ramírez Hernández is a 64 year old male who presents to clinic today for the following   health issues:    Heart Failure Follow-up  Are you experiencing any shortness of breath? Yes, at night or when lying flat  How would you describe your shortness of breath?  Same as usual    Yes, with activity only   How would you describe your shortness of breath?  Same as usual      Are you experiencing any swelling in your legs or feet?  No    Are you using more pillows than usual? No    Do you cough at night?  No    Do you check your weight daily?  Yes    Have you had a weight change recently?  Weight increase, drinking more alcohol    Are you having any of the following side effects from your medications? (Select all that apply)  The patient does not report symptoms of dizziness, fatigue, cough, swelling, or slow heart beat.    Since your last visit, how many times have you gone to the cardiologist, urgent care, emergency room, or hospital because of your heart failure?   None      Amount of exercise or physical activity: yard work    Problems taking medications regularly: No    Medication side effects: none    Diet: regular (no restrictions)        PROBLEMS TO ADD ON...    Additional history: no new issues or concerns.  Compliant with the medicines.  Feels great.  Advised to quit smoking.  Due for labs and meds.      Reviewed  and updated as needed this visit by clinical staff         Reviewed and updated as needed this visit by Provider         Patient Active Problem List   Diagnosis     Heart failure with acute decompensation, type unknown (H)     Persistent atrial fibrillation (H)     Acute systolic congestive heart failure (H)     Alcohol abuse     Tobacco abuse     Long-term (current) use of anticoagulants [Z79.01]     Past Surgical History:   Procedure Laterality Date     BACK SURGERY         Social History     Tobacco Use     Smoking status: Current Every Day Smoker     Packs/day: 1.00     Years: 40.00      Pack years: 40.00     Types: Cigarettes     Start date: 1/1/1975     Smokeless tobacco: Never Used   Substance Use Topics     Alcohol use: Yes     Comment: weekly     History reviewed. No pertinent family history.      Current Outpatient Medications   Medication Sig Dispense Refill     furosemide (LASIX) 40 MG tablet Take 1 tablet (40 mg) by mouth daily 90 tablet 3     lisinopril (PRINIVIL/ZESTRIL) 10 MG tablet Take 1 tablet (10 mg) by mouth daily 90 tablet 3     metoprolol tartrate (LOPRESSOR) 50 MG tablet TAKE 1 TABLET(50 MG) BY MOUTH TWICE DAILY 90 tablet 3     warfarin (COUMADIN) 5 MG tablet TAKE 1/2 TABLET BY MOUTH ON MONDAY AND FRIDAY AND 1 TABLET BY MOUTH ALL OTHER DAYS OF THE WEEK AS DIRECTED 30 tablet 0     No Known Allergies    ROS:  Constitutional, HEENT, cardiovascular, pulmonary, gi and gu systems are negative, except as otherwise noted.    OBJECTIVE:                                                    /84   Pulse 110   Temp 98.2  F (36.8  C) (Tympanic)   Wt 107.5 kg (237 lb)   SpO2 97%   BMI 36.04 kg/m    Body mass index is 36.04 kg/m .  GENERAL APPEARANCE: Alert, no acute distress  CV: irregular rate and rhythm, no murmur, rub or gallop  RESP: lungs clear to auscultation bilaterally  ABDOMEN: normal bowel sounds, soft, nontender, no hepatosplenomegaly or other masses  SKIN: no suspicious lesions or rashes to visualized skin  NEURO: Alert, oriented x 3, speech and mentation normal      Full labs pending.      ASSESSMENT/PLAN:                                                    1. Acute on chronic systolic heart failure (H)  Reviewed.  Stable.  Continue cares for now without change.  Euvolemic.    - Comprehensive metabolic panel  - Lipid Profile  - CBC with platelets differential    2. Acute systolic congestive heart failure (H)  As above.   - furosemide (LASIX) 40 MG tablet; Take 1 tablet (40 mg) by mouth daily  Dispense: 90 tablet; Refill: 3  - lisinopril (PRINIVIL/ZESTRIL) 10 MG tablet;  Take 1 tablet (10 mg) by mouth daily  Dispense: 90 tablet; Refill: 3  - metoprolol tartrate (LOPRESSOR) 50 MG tablet; TAKE 1 TABLET(50 MG) BY MOUTH TWICE DAILY  Dispense: 90 tablet; Refill: 3    3. Atrial fibrillation with rapid ventricular response (H)  As above.  Stable.  Slightly tachy today at first, right around 100 when I listened.  No change.   - metoprolol tartrate (LOPRESSOR) 50 MG tablet; TAKE 1 TABLET(50 MG) BY MOUTH TWICE DAILY  Dispense: 90 tablet; Refill: 3    4. Long term current use of anticoagulant therapy  Reviewed.  Update.  Afib.    - warfarin (COUMADIN) 5 MG tablet; TAKE 1/2 TABLET BY MOUTH ON MONDAY AND FRIDAY AND 1 TABLET BY MOUTH ALL OTHER DAYS OF THE WEEK AS DIRECTED  Dispense: 30 tablet; Refill: 0    5. Persistent atrial fibrillation (H)  As above.  Getting INR today.  Heart status stable.   - warfarin (COUMADIN) 5 MG tablet; TAKE 1/2 TABLET BY MOUTH ON MONDAY AND FRIDAY AND 1 TABLET BY MOUTH ALL OTHER DAYS OF THE WEEK AS DIRECTED  Dispense: 30 tablet; Refill: 0    6. Special screening for malignant neoplasms, colon  Reviewed risk/benefit.  On the coumadin, with hx of chf.  Going with cologard, pending results.            Aron Delvalle MD  [facility]

## 2019-07-29 ENCOUNTER — ANTICOAGULATION THERAPY VISIT (OUTPATIENT)
Dept: ANTICOAGULATION | Facility: OTHER | Age: 64
End: 2019-07-29
Attending: FAMILY MEDICINE
Payer: MEDICARE

## 2019-07-29 ENCOUNTER — OFFICE VISIT (OUTPATIENT)
Dept: FAMILY MEDICINE | Facility: OTHER | Age: 64
End: 2019-07-29
Attending: FAMILY MEDICINE
Payer: MEDICARE

## 2019-07-29 VITALS
TEMPERATURE: 98.2 F | OXYGEN SATURATION: 97 % | SYSTOLIC BLOOD PRESSURE: 136 MMHG | HEART RATE: 110 BPM | WEIGHT: 237 LBS | DIASTOLIC BLOOD PRESSURE: 84 MMHG | BODY MASS INDEX: 36.04 KG/M2

## 2019-07-29 DIAGNOSIS — I48.91 ATRIAL FIBRILLATION WITH RAPID VENTRICULAR RESPONSE (H): ICD-10-CM

## 2019-07-29 DIAGNOSIS — Z79.01 LONG TERM CURRENT USE OF ANTICOAGULANT THERAPY: ICD-10-CM

## 2019-07-29 DIAGNOSIS — I48.19 PERSISTENT ATRIAL FIBRILLATION (H): ICD-10-CM

## 2019-07-29 DIAGNOSIS — Z12.11 SPECIAL SCREENING FOR MALIGNANT NEOPLASMS, COLON: ICD-10-CM

## 2019-07-29 DIAGNOSIS — Z23 NEED FOR VACCINATION: ICD-10-CM

## 2019-07-29 DIAGNOSIS — I50.23 ACUTE ON CHRONIC SYSTOLIC HEART FAILURE (H): Primary | ICD-10-CM

## 2019-07-29 DIAGNOSIS — I50.21 ACUTE SYSTOLIC CONGESTIVE HEART FAILURE (H): ICD-10-CM

## 2019-07-29 LAB
ALBUMIN SERPL-MCNC: 3.6 G/DL (ref 3.4–5)
ALP SERPL-CCNC: 119 U/L (ref 40–150)
ALT SERPL W P-5'-P-CCNC: 22 U/L (ref 0–70)
ANION GAP SERPL CALCULATED.3IONS-SCNC: 6 MMOL/L (ref 3–14)
AST SERPL W P-5'-P-CCNC: 20 U/L (ref 0–45)
BASOPHILS # BLD AUTO: 0.1 10E9/L (ref 0–0.2)
BASOPHILS NFR BLD AUTO: 0.5 %
BILIRUB SERPL-MCNC: 0.8 MG/DL (ref 0.2–1.3)
BUN SERPL-MCNC: 9 MG/DL (ref 7–30)
CALCIUM SERPL-MCNC: 9.2 MG/DL (ref 8.5–10.1)
CHLORIDE SERPL-SCNC: 102 MMOL/L (ref 94–109)
CHOLEST SERPL-MCNC: 196 MG/DL
CO2 SERPL-SCNC: 25 MMOL/L (ref 20–32)
CREAT SERPL-MCNC: 0.77 MG/DL (ref 0.66–1.25)
DIFFERENTIAL METHOD BLD: ABNORMAL
EOSINOPHIL # BLD AUTO: 0.1 10E9/L (ref 0–0.7)
EOSINOPHIL NFR BLD AUTO: 0.6 %
ERYTHROCYTE [DISTWIDTH] IN BLOOD BY AUTOMATED COUNT: 14.5 % (ref 10–15)
GFR SERPL CREATININE-BSD FRML MDRD: >90 ML/MIN/{1.73_M2}
GLUCOSE SERPL-MCNC: 121 MG/DL (ref 70–99)
HCT VFR BLD AUTO: 44.5 % (ref 40–53)
HDLC SERPL-MCNC: 48 MG/DL
HGB BLD-MCNC: 15.2 G/DL (ref 13.3–17.7)
INR BLD: 3.1 (ref 0.86–1.14)
LDLC SERPL CALC-MCNC: 131 MG/DL
LYMPHOCYTES # BLD AUTO: 1.2 10E9/L (ref 0.8–5.3)
LYMPHOCYTES NFR BLD AUTO: 11.2 %
MCH RBC QN AUTO: 32.1 PG (ref 26.5–33)
MCHC RBC AUTO-ENTMCNC: 34.2 G/DL (ref 31.5–36.5)
MCV RBC AUTO: 94 FL (ref 78–100)
MONOCYTES # BLD AUTO: 1.4 10E9/L (ref 0–1.3)
MONOCYTES NFR BLD AUTO: 13.1 %
NEUTROPHILS # BLD AUTO: 7.7 10E9/L (ref 1.6–8.3)
NEUTROPHILS NFR BLD AUTO: 74.6 %
NONHDLC SERPL-MCNC: 148 MG/DL
PLATELET # BLD AUTO: 275 10E9/L (ref 150–450)
POTASSIUM SERPL-SCNC: 4 MMOL/L (ref 3.4–5.3)
PROT SERPL-MCNC: 8.2 G/DL (ref 6.8–8.8)
RBC # BLD AUTO: 4.74 10E12/L (ref 4.4–5.9)
SODIUM SERPL-SCNC: 133 MMOL/L (ref 133–144)
TRIGL SERPL-MCNC: 85 MG/DL
WBC # BLD AUTO: 10.3 10E9/L (ref 4–11)

## 2019-07-29 PROCEDURE — 36415 COLL VENOUS BLD VENIPUNCTURE: CPT | Mod: ZL | Performed by: FAMILY MEDICINE

## 2019-07-29 PROCEDURE — 36416 COLLJ CAPILLARY BLOOD SPEC: CPT | Mod: ZL | Performed by: FAMILY MEDICINE

## 2019-07-29 PROCEDURE — G0463 HOSPITAL OUTPT CLINIC VISIT: HCPCS | Mod: 25

## 2019-07-29 PROCEDURE — 90715 TDAP VACCINE 7 YRS/> IM: CPT | Performed by: FAMILY MEDICINE

## 2019-07-29 PROCEDURE — 80053 COMPREHEN METABOLIC PANEL: CPT | Mod: ZL | Performed by: FAMILY MEDICINE

## 2019-07-29 PROCEDURE — 90715 TDAP VACCINE 7 YRS/> IM: CPT

## 2019-07-29 PROCEDURE — 90471 IMMUNIZATION ADMIN: CPT | Mod: GY | Performed by: FAMILY MEDICINE

## 2019-07-29 PROCEDURE — 85610 PROTHROMBIN TIME: CPT | Mod: QW,ZL | Performed by: FAMILY MEDICINE

## 2019-07-29 PROCEDURE — 85025 COMPLETE CBC W/AUTO DIFF WBC: CPT | Mod: ZL | Performed by: FAMILY MEDICINE

## 2019-07-29 PROCEDURE — 80061 LIPID PANEL: CPT | Mod: ZL | Performed by: FAMILY MEDICINE

## 2019-07-29 PROCEDURE — G0463 HOSPITAL OUTPT CLINIC VISIT: HCPCS

## 2019-07-29 PROCEDURE — 99214 OFFICE O/P EST MOD 30 MIN: CPT | Performed by: FAMILY MEDICINE

## 2019-07-29 RX ORDER — LISINOPRIL 10 MG/1
10 TABLET ORAL DAILY
Qty: 90 TABLET | Refills: 3 | Status: SHIPPED | OUTPATIENT
Start: 2019-07-29 | End: 2020-01-27

## 2019-07-29 RX ORDER — METOPROLOL TARTRATE 50 MG
TABLET ORAL
Qty: 90 TABLET | Refills: 3 | Status: SHIPPED | OUTPATIENT
Start: 2019-07-29 | End: 2019-07-29

## 2019-07-29 RX ORDER — FUROSEMIDE 40 MG
40 TABLET ORAL DAILY
Qty: 90 TABLET | Refills: 3 | Status: SHIPPED | OUTPATIENT
Start: 2019-07-29 | End: 2020-01-27

## 2019-07-29 RX ORDER — WARFARIN SODIUM 5 MG/1
TABLET ORAL
Qty: 30 TABLET | Refills: 0 | Status: SHIPPED | OUTPATIENT
Start: 2019-07-29 | End: 2019-07-29

## 2019-07-29 ASSESSMENT — PATIENT HEALTH QUESTIONNAIRE - PHQ9: SUM OF ALL RESPONSES TO PHQ QUESTIONS 1-9: 9

## 2019-07-29 ASSESSMENT — PAIN SCALES - GENERAL: PAINLEVEL: WORST PAIN (10)

## 2019-07-29 NOTE — LETTER
July 30, 2019      Ramírez Hernández  [address]  [place] [address]        Dear ,    We are writing to inform you of your test results.  All your labs were stable.  Cholesterol was not great though, LDL (bad cholesterol) was high.  Recommend lipitor 40 mg daily, [facility] in [place].  Sugar was borderline high as well.  Recommend working on diet and exercise and follow up appointment in 6 months.      Resulted Orders   CBC with platelets differential   Result Value Ref Range    WBC 10.3 4.0 - 11.0 10e9/L    RBC Count 4.74 4.4 - 5.9 10e12/L    Hemoglobin 15.2 13.3 - 17.7 g/dL    Hematocrit 44.5 40.0 - 53.0 %    MCV 94 78 - 100 fl    MCH 32.1 26.5 - 33.0 pg    MCHC 34.2 31.5 - 36.5 g/dL    RDW 14.5 10.0 - 15.0 %    Platelet Count 275 150 - 450 10e9/L    % Neutrophils 74.6 %    % Lymphocytes 11.2 %    % Monocytes 13.1 %    % Eosinophils 0.6 %    % Basophils 0.5 %    Absolute Neutrophil 7.7 1.6 - 8.3 10e9/L    Absolute Lymphocytes 1.2 0.8 - 5.3 10e9/L    Absolute Monocytes 1.4 (H) 0.0 - 1.3 10e9/L    Absolute Eosinophils 0.1 0.0 - 0.7 10e9/L    Absolute Basophils 0.1 0.0 - 0.2 10e9/L    Diff Method Automated Method                If you have any questions or concerns, please call the clinic at the number listed above.       Sincerely,        Aron Delvalle MD

## 2019-07-29 NOTE — NURSING NOTE
"Chief Complaint   Patient presents with     Heart Failure       Initial /84   Pulse 110   Temp 98.2  F (36.8  C) (Tympanic)   Wt 107.5 kg (237 lb)   SpO2 97%   BMI 36.04 kg/m   Estimated body mass index is 36.04 kg/m  as calculated from the following:    Height as of 6/11/18: 1.727 m (5' 8\").    Weight as of this encounter: 107.5 kg (237 lb).  Medication Reconciliation: complete     Aleta Patel      "

## 2019-07-29 NOTE — LETTER
[place] [address] [address]  [facility] [place] [address]  Phone: [phone]        July 30, 2019      Ramírez Hernández                                                                                                                                [address]  [facility] [place] [address]            Dear Mr. Hernández,    All your labs were stable.  Cholesterol was not great though, LDL (bad cholesterol) was high.  Recommend lipitor 40 mg daily, [facility] in [place].  Sugar was borderline high as well.  Recommend working on diet and exercise and follow up appointment in 6 months.    Results for orders placed or performed in visit on 07/29/19   Comprehensive metabolic panel   Result Value Ref Range    Sodium 133 133 - 144 mmol/L    Potassium 4.0 3.4 - 5.3 mmol/L    Chloride 102 94 - 109 mmol/L    Carbon Dioxide 25 20 - 32 mmol/L    Anion Gap 6 3 - 14 mmol/L    Glucose 121 (H) 70 - 99 mg/dL    Urea Nitrogen 9 7 - 30 mg/dL    Creatinine 0.77 0.66 - 1.25 mg/dL    GFR Estimate >90 >60 mL/min/[1.73_m2]    GFR Estimate If Black >90 >60 mL/min/[1.73_m2]    Calcium 9.2 8.5 - 10.1 mg/dL    Bilirubin Total 0.8 0.2 - 1.3 mg/dL    Albumin 3.6 3.4 - 5.0 g/dL    Protein Total 8.2 6.8 - 8.8 g/dL    Alkaline Phosphatase 119 40 - 150 U/L    ALT 22 0 - 70 U/L    AST 20 0 - 45 U/L   Lipid Profile   Result Value Ref Range    Cholesterol 196 <200 mg/dL    Triglycerides 85 <150 mg/dL    HDL Cholesterol 48 >39 mg/dL    LDL Cholesterol Calculated 131 (H) <100 mg/dL    Non HDL Cholesterol 148 (H) <130 mg/dL   CBC with platelets differential   Result Value Ref Range    WBC 10.3 4.0 - 11.0 10e9/L    RBC Count 4.74 4.4 - 5.9 10e12/L    Hemoglobin 15.2 13.3 - 17.7 g/dL    Hematocrit 44.5 40.0 - 53.0 %    MCV 94 78 - 100 fl    MCH 32.1 26.5 - 33.0 pg    MCHC 34.2 31.5 - 36.5 g/dL    RDW 14.5 10.0 - 15.0 %    Platelet Count 275 150 - 450 10e9/L    % Neutrophils 74.6 %    % Lymphocytes 11.2 %    % Monocytes 13.1 %    %  Eosinophils 0.6 %    % Basophils 0.5 %    Absolute Neutrophil 7.7 1.6 - 8.3 10e9/L    Absolute Lymphocytes 1.2 0.8 - 5.3 10e9/L    Absolute Monocytes 1.4 (H) 0.0 - 1.3 10e9/L    Absolute Eosinophils 0.1 0.0 - 0.7 10e9/L    Absolute Basophils 0.1 0.0 - 0.2 10e9/L    Diff Method Automated Method           If you have any questions or concerns, please call the clinic at the number listed above.         Sincerely,           Aron Delvalle MD

## 2019-07-29 NOTE — PROGRESS NOTES
ANTICOAGULATION FOLLOW-UP CLINIC VISIT    Patient Name:  Ramírez Hernández  Date:  7/29/2019  Contact Type:  Telephone    SUBJECTIVE:  Patient Findings     Positives:   Change in diet/appetite (decreased vit K intake)    Comments:   INR done by lab. Call placed to patient and spoke to him re: INR result,warfarin dosing/INR recheck date. He verbalized understanding and has no questions. He denies bleeding/bruising, states less vit K intake than usual. He will increase vit K intake        Clinical Outcomes     Negatives:   Major bleeding event, Thromboembolic event, Anticoagulation-related hospital admission, Anticoagulation-related ED visit, Anticoagulation-related fatality    Comments:   INR done by lab. Call placed to patient and spoke to him re: INR result,warfarin dosing/INR recheck date. He verbalized understanding and has no questions. He denies bleeding/bruising, states less vit K intake than usual. He will increase vit K intake           OBJECTIVE    INR Point of Care   Date Value Ref Range Status   07/29/2019 3.1 (H) 0.86 - 1.14 Final     Comment:     This test is intended for monitoring Coumadin therapy.  Results are not   accurate in patients with prolonged INR due to factor deficiency.         ASSESSMENT / PLAN  INR assessment SUPRA    Recheck INR In: 4 WEEKS    INR Location Clinic      Anticoagulation Summary  As of 7/29/2019    INR goal:   2.0-3.0   TTR:   62.2 % (1.6 y)   INR used for dosing:   3.1! (7/29/2019)   Warfarin maintenance plan:   2.5 mg (5 mg x 0.5) every Mon, Fri; 5 mg (5 mg x 1) all other days   Full warfarin instructions:   2.5 mg every Mon, Fri; 5 mg all other days   Weekly warfarin total:   30 mg   No change documented:   Edel Irvin RN   Plan last modified:   Edel Irvin RN (10/31/2018)   Next INR check:   8/26/2019   Priority:   INR   Target end date:   Indefinite    Indications    Persistent atrial fibrillation (H) [I48.1]  Long-term (current) use of anticoagulants [Z79.01]  [Z79.01]             Anticoagulation Episode Summary     INR check location:       Preferred lab:       Send INR reminders to:   HC ANTICOAG POOL    Comments:   12/19-Taking Advil 200mg daily-AM stiffness      Anticoagulation Care Providers     Provider Role Specialty Phone number    CLAUDIO Crum MD [facility] [phone]            See the Encounter Report to view Anticoagulation Flowsheet and Dosing Calendar (Go to Encounters tab in chart review, and find the Anticoagulation Therapy Visit)        Edel Irvin RN

## 2019-07-30 RX ORDER — WARFARIN SODIUM 5 MG/1
TABLET ORAL
Qty: 90 TABLET | Refills: 0 | Status: SHIPPED | OUTPATIENT
Start: 2019-07-30 | End: 2020-04-27

## 2019-07-30 RX ORDER — METOPROLOL TARTRATE 50 MG
TABLET ORAL
Qty: 180 TABLET | Refills: 3 | Status: SHIPPED | OUTPATIENT
Start: 2019-07-30 | End: 2019-12-16

## 2019-08-11 ENCOUNTER — TRANSFERRED RECORDS (OUTPATIENT)
Dept: HEALTH INFORMATION MANAGEMENT | Facility: CLINIC | Age: 64
End: 2019-08-11

## 2019-08-11 LAB — COLOGUARD-ABSTRACT: POSITIVE

## 2019-08-23 ENCOUNTER — TELEPHONE (OUTPATIENT)
Dept: FAMILY MEDICINE | Facility: OTHER | Age: 64
End: 2019-08-23

## 2019-08-23 ENCOUNTER — ANTICOAGULATION THERAPY VISIT (OUTPATIENT)
Dept: ANTICOAGULATION | Facility: OTHER | Age: 64
End: 2019-08-23
Attending: FAMILY MEDICINE
Payer: MEDICARE

## 2019-08-23 DIAGNOSIS — Z79.01 LONG TERM CURRENT USE OF ANTICOAGULANT THERAPY: ICD-10-CM

## 2019-08-23 DIAGNOSIS — R19.5 POSITIVE COLORECTAL CANCER SCREENING USING COLOGUARD TEST: Primary | ICD-10-CM

## 2019-08-23 DIAGNOSIS — I48.19 PERSISTENT ATRIAL FIBRILLATION (H): ICD-10-CM

## 2019-08-23 LAB — INR BLD: 3.3 (ref 0.86–1.14)

## 2019-08-23 PROCEDURE — 85610 PROTHROMBIN TIME: CPT | Mod: QW,ZL | Performed by: FAMILY MEDICINE

## 2019-08-23 PROCEDURE — 36416 COLLJ CAPILLARY BLOOD SPEC: CPT | Mod: ZL | Performed by: FAMILY MEDICINE

## 2019-08-23 NOTE — PROGRESS NOTES
ANTICOAGULATION FOLLOW-UP CLINIC VISIT    Patient Name:  Ramírez Hernández  Date:  8/23/2019  Contact Type:  Telephone    SUBJECTIVE:  Patient Findings     Positives:   Change in alcohol use (increase), Change in diet/appetite (decreased vit K intake)    Comments:   INR done by lab. Call placed to patient and spoke to him re: INR result, warfarin dosing/INR recheck date. He verbalized understanding and has no questions. He states he has decreased vit K intake and had increased ETOH use.         Clinical Outcomes     Negatives:   Major bleeding event, Thromboembolic event, Anticoagulation-related hospital admission, Anticoagulation-related ED visit, Anticoagulation-related fatality    Comments:   INR done by lab. Call placed to patient and spoke to him re: INR result, warfarin dosing/INR recheck date. He verbalized understanding and has no questions. He states he has decreased vit K intake and had increased ETOH use.            OBJECTIVE    INR Point of Care   Date Value Ref Range Status   08/23/2019 3.3 (H) 0.86 - 1.14 Final     Comment:     This test is intended for monitoring Coumadin therapy.  Results are not   accurate in patients with prolonged INR due to factor deficiency.         ASSESSMENT / PLAN  INR assessment SUPRA decreased vit K intake, increased ETOH use   Recheck INR In: 3 WEEKS    INR Location Clinic      Anticoagulation Summary  As of 8/23/2019    INR goal:   2.0-3.0   TTR:   59.7 % (1.7 y)   INR used for dosing:   3.3! (8/23/2019)   Warfarin maintenance plan:   2.5 mg (5 mg x 0.5) every Mon, Wed, Fri; 5 mg (5 mg x 1) all other days   Full warfarin instructions:   8/23: Hold; Otherwise 2.5 mg every Mon, Wed, Fri; 5 mg all other days   Weekly warfarin total:   27.5 mg   Plan last modified:   Edel Irvin RN (8/23/2019)   Next INR check:   9/13/2019   Priority:   INR   Target end date:   Indefinite    Indications    Persistent atrial fibrillation (H) [I48.1]  Long-term (current) use of  anticoagulants [Z79.01] [Z79.01]             Anticoagulation Episode Summary     INR check location:       Preferred lab:       Send INR reminders to:   HC ANTICOAG POOL    Comments:   12/19-Taking Advil 200mg daily-AM stiffness      Anticoagulation Care Providers     Provider Role Specialty Phone number    CLAUDIO Crum MD [facility] [phone]            See the Encounter Report to view Anticoagulation Flowsheet and Dosing Calendar (Go to Encounters tab in chart review, and find the Anticoagulation Therapy Visit)        Edel Irvin RN

## 2019-08-27 NOTE — TELEPHONE ENCOUNTER
Pt was notified of Cologuard results, which was positive.  Referral made to general surgery for colonoscopy.

## 2019-08-29 ENCOUNTER — ANTICOAGULATION THERAPY VISIT (OUTPATIENT)
Dept: ANTICOAGULATION | Facility: OTHER | Age: 64
End: 2019-08-29

## 2019-08-29 ENCOUNTER — TELEPHONE (OUTPATIENT)
Dept: SURGERY | Facility: OTHER | Age: 64
End: 2019-08-29

## 2019-08-29 DIAGNOSIS — Z79.01 LONG TERM CURRENT USE OF ANTICOAGULANT THERAPY: ICD-10-CM

## 2019-08-29 DIAGNOSIS — Z12.11 SPECIAL SCREENING FOR MALIGNANT NEOPLASMS, COLON: Primary | ICD-10-CM

## 2019-08-29 DIAGNOSIS — R19.5 POSITIVE COLORECTAL CANCER SCREENING USING COLOGUARD TEST: ICD-10-CM

## 2019-08-29 DIAGNOSIS — I48.19 PERSISTENT ATRIAL FIBRILLATION (H): ICD-10-CM

## 2019-08-29 NOTE — TELEPHONE ENCOUNTER
Referral received for colonoscopy.   This patient was not approved by surgery education nurses for meet and greet colonoscopy and will need a preop or consult appointment. He is on Coumadin.  Preop scheduled with Dr. Delvalle.   Message sent to anticoagulation nurses about upcoming colonoscopy.  Patient scheduled for colonoscopy on 9/27/19 with Dr. La at [facility] with Gatorade bowel prep.   Instructions given via phone and instructions mailed to patient with surgery handbook.

## 2019-08-29 NOTE — PROGRESS NOTES
ANTICOAGULATION FOLLOW-UP CLINIC VISIT    Patient Name:  Ramírez Hernández  Date:  8/29/2019  Contact Type:  notified by surgery via messaging    SUBJECTIVE:  Patient Findings     Comments:   Notified by surgery that patient has colonoscopy scheduled for 9/27/19. Note sent to PCP re lovenox bridge.         Clinical Outcomes     Comments:   Notified by surgery that patient has colonoscopy scheduled for 9/27/19. Note sent to PCP re lovenox bridge.            OBJECTIVE    INR Point of Care   Date Value Ref Range Status   08/23/2019 3.3 (H) 0.86 - 1.14 Final     Comment:     This test is intended for monitoring Coumadin therapy.  Results are not   accurate in patients with prolonged INR due to factor deficiency.         ASSESSMENT / PLAN  No question data found.  Anticoagulation Summary  As of 8/29/2019    INR goal:   2.0-3.0   TTR:   59.7 % (1.7 y)   INR used for dosing:   No new INR was available at the time of this encounter.   Warfarin maintenance plan:   2.5 mg (5 mg x 0.5) every Mon, Wed, Fri; 5 mg (5 mg x 1) all other days   Full warfarin instructions:   2.5 mg every Mon, Wed, Fri; 5 mg all other days   Weekly warfarin total:   27.5 mg   No change documented:   Edel Irvin, RN   Plan last modified:   Edel Irvin RN (8/23/2019)   Next INR check:   9/13/2019   Priority:   INR   Target end date:   Indefinite    Indications    Persistent atrial fibrillation (H) [I48.1]  Long-term (current) use of anticoagulants [Z79.01] [Z79.01]             Anticoagulation Episode Summary     INR check location:       Preferred lab:       Send INR reminders to:   HC ANTICOAG POOL    Comments:   12/19-Taking Advil 200mg daily-AM stiffness  COLONOSCOPY  9/27/19. Note to PCP miles carranza      Anticoagulation Care Providers     Provider Role Specialty Phone number    CLAUDIO Crum MD [facility] [phone]            See the Encounter Report to view Anticoagulation Flowsheet and Dosing Calendar (Go to Encounters  tab in chart review, and find the Anticoagulation Therapy Visit)        Edel Irvin RN

## 2019-08-29 NOTE — LETTER
"August 29, 2019          Ramírez PAEZ Edgar  [address]  [place] [facility] [place] [address]      Dear Ramírez,     We want to your procedure to be as pleasant as possible. Please review the instructions below. If you have questions, you may contact us at the any of the following numbers:     Clinic Health Unit Coordinator: [phone] (Qing)  Clinic Nurse: [phone] (Nasreen)  Surgery Education Nurse: [phone] (Melisa or María Elena)    Date of Procedure: 9/27/19 with Dr. La  Admit Time: Hospital Surgery will call you the day before your procedure by 5pm with your arrival time. If your surgery is on Monday, expect a call on Friday. If you are not contacted before 5PM, please call admitting at [phone]. After hours or on weekends, please call [phone] to postpone.     Call the clinic nurse if you become ill within 1 week of your procedure to reschedule.     Preop appointment needed within the 30 days prior to your procedure. (Already scheduled for 9/16/19 @ 8:00 with Dr. Delvalle)    7 DAYS BEFORE THE EXAM:  Call the Surgery Education Nurse at [phone] and have a medication list ready.   Stop Aspirin or NSAIDS (Ibuprofen, Celebrex, Naproxen, etc) 7 days before surgery.  Stop fiber supplements, herbals, vitamins, and iron. Stop eating corn, nuts and seeds.   Talk to your primary care provider or anticoagulation nurse for instructions on your Coumadin/Warfarin. (A message was also already sent to anticoagulation nurses)     Arrange transportation with a responsible adult or you will be be cancelled.    Please  the following over the counter items for your bowel prep:    Two 5 mg Dulcolax (bisacodyl) tablets   One 8.3 ounce (238 g) bottle of miralax   One 10 ounce bottle of magnesium citrate   One 64 ounce bottle of gatorade-Not red, purple, or powdered.    2 DAYS BEFORE THE EXAM:   Low fiber diet.   See list of low fiber foods on page 3 of the \"Miralax, Dulcolax and Magnesium Citrate\" packet.   Drink at least " "4-6 large glasses of sports drink (like Gatorade) today and tomorrow.   Do not eat or drink anything red or purple.    1 DAY BEFORE THE EXAM:  No solid food or milk products after 12:01 AM. Drink only clear liquids all day, at least 8-10 glasses including 4-6 large glasses of sports drink.  See the list of clear liquids on page 2 of the \"Miralax, Dulcolax and Magnesium Citrate\" packet.    Do not drink any thing red or purple or drink any alcohol.             AT 12:00 PM NOON THE DAY BEFORE EXAM:  Take 2 Dulcolax tablets by mouth with clear liquids.            AT 6:00 PM THE DAY BEFORE EXAM:  Mix the bottle of Miralax and 64 oz. of Gatorade in a pitcher.   Drink one 8 oz. glass every 10-15 minutes until the pitcher is empty.   Stay near a toilet.     DAY OF COLONOSCOPY EXAM:             6 HOURS PRIOR TO EXAM ON DAY OF PROCEDURE:  Drink the bottle of magnesium citrate followed by a full glass of water.   You may have clear liquids up until 3 hours before arrival.  If you need to take any medications after this, take them with a tiny sip of water.  Shower before arrival. Wear clean, comfortable clothes.   No jewelry, make-up, nail polish, hair spray, lotions, or perfumes.  [place] in Admitting through the [facility].   You must have a responsible adult to drive and to stay with you for 4 hours at home.     TIPS FOR COLON CLEANSING BEFORE YOUR COLONOSCOPY  To get accurate results from your exam, your colon must be completely empty or you may need to repeat the colon prep and exam. If you followed instructions and your stool is clear or yellow liquid, you are ready. If you are not sure if your colon is clean, please call the clinic nurse.    You may use Tucks wipes, hemorrhoid treatments, hydrocortisone cream or alcohol-free baby wipes to ease anal irritation. You may also use Vaseline to help protect the skin.     Quickly drink each glass. Even when you are sitting on the toilet, keep drinking every 15 minutes. If " you have nausea or vomiting, take a break for 30 minutes and then resume drinking.    You will have loose watery stools and may also have chills. Dress for comfort. Expect to feel bloating, nausea and other discomfort until the stool clears from your colon.       Sincerely,      Alfredo La MD/[place] PATRICIO LPN

## 2019-09-06 NOTE — PROGRESS NOTES
[address]  [facility] [place] [address]  [phone]  Dept: [phone]    PRE-OP EVALUATION:  Today's date: 2019    Ramírez Hernández (: 1955) presents for pre-operative evaluation assessment as requested by Dr. La.  He requires evaluation and anesthesia risk assessment prior to undergoing surgery/procedure for treatment of positive cologuard .    Proposed Surgery/ Procedure: colonoscopy  Date of Surgery/ Procedure: 19  Time of Surgery/ Procedure: D  Hospital/Surgical Facility: [facility]  Primary Physician: Aron Delvalle  Type of Anesthesia Anticipated: to be determined    Patient has a Health Care Directive or Living Will:  NO    1. NO - Do you have a history of heart attack, stroke, stent, bypass or surgery on an artery in the head, neck, heart or legs?  2. NO - Do you ever have any pain or discomfort in your chest?  3. NO - Do you have a history of  Heart Failure?  4. YES - ARE YOUR TROUBLED BY SHORTNESS OF BREATH WHEN WALKING ON THE LEVEL, UP A SLIGHT HILL OR AT NIGHT? SOB due to smoking  5. NO - Do you currently have a cold, bronchitis or other respiratory infection?  6. YES - DO YOU HAVE A COUGH, SHORTNESS OF BREATH OR WHEEZING? SOB due to smoking  7. YES - DO YOU SOMETIMES GET PAINS IN THE CALVES OF YOUR LEGS WHEN YOU WALK? Occasionally   8. NO - Do you or anyone in your family have previous history of blood clots?  9. NO - Do you or does anyone in your family have a serious bleeding problem such as prolonged bleeding following surgeries or cuts?  10. NO - Have you ever had problems with anemia or been told to take iron pills?  11. NO - Have you had any abnormal blood loss such as black, tarry or bloody stools, or abnormal vaginal bleeding?  12. NO - Have you ever had a blood transfusion?  13. NO - Have you or any of your relatives ever had problems with anesthesia?  14. NO - Do you have sleep apnea, excessive snoring or daytime drowsiness?  15. NO - Do  you have any prosthetic heart valves?  16. NO - Do you have prosthetic joints?  17. NO - Is there any chance that you may be pregnant?      HPI:     HPI related to upcoming procedure: doing well right now.  Sob is normal.  Swelling in legs normal.  No new issues. Continues to smoke.        See problem list for active medical problems.  Problems all longstanding and stable, except as noted/documented.  See ROS for pertinent symptoms related to these conditions.      MEDICAL HISTORY:     Patient Active Problem List    Diagnosis Date Noted     Long-term (current) use of anticoagulants [Z79.01] 12/11/2017     Priority: Medium     Persistent atrial fibrillation (H) 12/08/2017     Priority: Medium     Acute systolic congestive heart failure (H) 12/08/2017     Priority: Medium     Alcohol abuse 12/08/2017     Priority: Medium     Tobacco abuse 12/08/2017     Priority: Medium     Heart failure with acute decompensation, type unknown (H) 12/03/2017     Priority: Medium      No past medical history on file.  Past Surgical History:   Procedure Laterality Date     BACK SURGERY       Current Outpatient Medications   Medication Sig Dispense Refill     furosemide (LASIX) 40 MG tablet Take 1 tablet (40 mg) by mouth daily 90 tablet 3     lisinopril (PRINIVIL/ZESTRIL) 10 MG tablet Take 1 tablet (10 mg) by mouth daily 90 tablet 3     metoprolol tartrate (LOPRESSOR) 50 MG tablet TAKE 1 TABLET(50 MG) BY MOUTH TWICE DAILY 180 tablet 3     warfarin (COUMADIN) 5 MG tablet TAKE 1/2 TABLET BY MOUTH ON MONDAY AND FRIDAY AND TAKE 1 TABLET BY MOUTH ALL OTHER DAYS OF THE WEEK 90 tablet 0     OTC products: None, except as noted above    No Known Allergies   Latex Allergy: NO    Social History     Tobacco Use     Smoking status: Current Every Day Smoker     Packs/day: 1.00     Years: 40.00     Pack years: 40.00     Types: Cigarettes     Start date: 1/1/1975     Smokeless tobacco: Never Used   Substance Use Topics     Alcohol use: Yes     Comment:  weekly     History   Drug Use No       REVIEW OF SYSTEMS:   CONSTITUTIONAL: NEGATIVE for fever, chills, change in weight  INTEGUMENTARY/SKIN: NEGATIVE for worrisome rashes, moles or lesions  EYES: NEGATIVE for vision changes or irritation  ENT/MOUTH: NEGATIVE for ear, mouth and throat problems  RESP: NEGATIVE for significant cough or SOB  BREAST: NEGATIVE for masses, tenderness or discharge  CV: NEGATIVE for chest pain, palpitations or peripheral edema  GI: NEGATIVE for nausea, abdominal pain, heartburn, or change in bowel habits  : NEGATIVE for frequency, dysuria, or hematuria  MUSCULOSKELETAL: NEGATIVE for significant arthralgias or myalgia  NEURO: NEGATIVE for weakness, dizziness or paresthesias  ENDOCRINE: NEGATIVE for temperature intolerance, skin/hair changes  HEME: NEGATIVE for bleeding problems  PSYCHIATRIC: NEGATIVE for changes in mood or affect    EXAM:   /74   Pulse 105   Wt 108.9 kg (240 lb)   SpO2 96%   BMI 36.49 kg/m      GENERAL APPEARANCE: healthy, alert and no distress     EYES: EOMI,  PERRL     HENT: ear canals and TM's normal and nose and mouth without ulcers or lesions     NECK: no adenopathy, no asymmetry, masses, or scars and thyroid normal to palpation     RESP: lungs clear to auscultation - no rales, rhonchi or wheezes     CV: regular rates and rhythm, normal S1 S2, no S3 or S4 and no murmur, click or rub     ABDOMEN:  soft, nontender, no HSM or masses and bowel sounds normal     MS: extremities normal- no gross deformities noted, no evidence of inflammation in joints, FROM in all extremities.     SKIN: no suspicious lesions or rashes     NEURO: Normal strength and tone, sensory exam grossly normal, mentation intact and speech normal     PSYCH: mentation appears normal. and affect normal/bright     LYMPHATICS: No cervical adenopathy    DIAGNOSTICS:   EKG: afib no acute ST/T changes c/w ischemia, no LVH by voltage criteria, unchanged from previous tracings.  INR on 9/13 was 2.5.   Cbc stable.  Bmp pending.      Recent Labs   Lab Test 08/23/19  0938 07/29/19  0844 07/29/19  0843  06/11/18  0919  12/15/17  1029   HGB  --   --  15.2  --   --   --  13.6   PLT  --   --  275  --   --   --  291   INR 3.3* 3.1*  --    < >  --    < >  --    NA  --   --  133  --  135  --  138   POTASSIUM  --   --  4.0  --  4.1  --  3.8   CR  --   --  0.77  --  1.06  --  1.08    < > = values in this interval not displayed.        IMPRESSION:   Reason for surgery/procedure: upcoming colonoscopy  Diagnosis/reason for consult: preop clearance for procedure.     The proposed surgical procedure is considered LOW risk.    REVISED CARDIAC RISK INDEX  The patient has the following serious cardiovascular risks for perioperative complications such as (MI, PE, VFib and 3  AV Block):  No serious cardiac risks  INTERPRETATION: 0 risks: Class I (very low risk - 0.4% complication rate)    The patient has the following additional risks for perioperative complications:  No identified additional risks      ICD-10-CM    1. Preop general physical exam Z01.818    2. Tobacco abuse Z72.0 Tobacco Cessation - Order to Satisfy Health Maintenance   3. Tobacco abuse counseling Z71.6        RECOMMENDATIONS:     --Consult hospital rounder / IM to assist post-op medical management    --Patient is to take all scheduled medications on the day of surgery EXCEPT for modifications listed below.  He is already instructed on holding coumadin, allowing INR to drift down.  Restart following procedure.     APPROVAL GIVEN to proceed with proposed procedure, without further diagnostic evaluation       Signed Electronically by: Aron Delvalle MD    Copy of this evaluation report is provided to requesting physician.    [place] Preop Guidelines    Revised Cardiac Risk Index

## 2019-09-06 NOTE — PATIENT INSTRUCTIONS
Before Your Surgery      Call your surgeon if there is any change in your health. This includes signs of a cold or flu (such as a sore throat, runny nose, cough, rash or fever).    Do not smoke, drink alcohol or take over the counter medicine (unless your surgeon or primary care doctor tells you to) for the 24 hours before and after surgery.    If you take prescribed drugs: Follow your doctor s orders about which medicines to take and which to stop until after surgery.    Eating and drinking prior to surgery: follow the instructions from your surgeon    Take a shower or bath the night before surgery. Use the soap your surgeon gave you to gently clean your skin. If you do not have soap from your surgeon, use your regular soap. Do not shave or scrub the surgery site.  Wear clean pajamas and have clean sheets on your bed.   HOW TO QUIT SMOKING  Smoking is one of the hardest habits to break. About half of all those who have ever smoked have been able to quit, and most of those (about 70%) who still smoke want to quit. Here are some of the best ways to stop smoking.     KEEP TRYING:  It takes most smokers about 8 tries before they are finally able to fully quit. So, the more often you try and fail, the better your chance of quitting the next time! So, don't give up!    GO COLD TURKEY:  Most ex-smokers quit cold turkey. Trying to cut back gradually doesn't seem to work as well, perhaps because it continues the smoking habit. Also, it is possible to fool yourself by inhaling more while smoking fewer cigarettes. This results in the same amount of nicotine in your body!    GET SUPPORT:  Support programs can make an important difference, especially for the heavy smoker. These groups offer lectures, methods to change your behavior and peer support. Call the free national Quitline for more information. 800-QUIT-NOW (550-446-5789). Low-cost or free programs are offered by many hospitals, local chapters of the American Lung  Association (767-149-4860) and the American Cancer Society (397-559-3058). Support at home is important too. Non-smokers can help by offering praise and encouragement. If the smoker fails to quit, encourage them to try again!    OVER-THE-COUNTER MEDICINES:  For those who can't quit on their own, Nicotine Replacement Therapy (NRT) may make quitting much easier. Certain aids such as the nicotine patch, gum and lozenge are available without a prescription. However, it is best to use these under the guidance of your doctor. The skin patch provides a steady supply of nicotine to the body. Nicotine gum and lozenge gives temporary bursts of low levels of nicotine. Both methods take the edge off the craving for cigarettes. WARNING: If you feel symptoms of nicotine overdose, such as nausea, vomiting, dizziness, weakness, or fast heartbeat, stop using these and see your doctor.    PRESCRIPTION MEDICINES:  After evaluating your smoking patterns and prior attempts at quitting, your doctor may offer a prescription medicine such as bupropion (Zyban, Wellbutrin), varenicline (Chantix, Champix), a niocotine inhaler or nasal spray. Each has its unique advantage and side effects which your doctor can review with you.    HEALTH BENEFITS OF QUITTING:  The benefits of quitting start right away and keep improving the longer you go without smokin minutes: blood pressure and pulse return to normal  8 hours: oxygen levels return to normal  2 days: ability to smell and taste begins to improve as damaged nerves start to regrow  2-3 weeks: circulation and lung function improves  1-9 months: decreased cough, congestion and shortness of breath; less tired  1 year: risk of heart attack decreases by half  5 years: risk of lung cancer decreases by half; risk of stroke becomes the same as a non-smoker  For information about how to quit smoking, visit the following links:  National Cancer [place] ,   Clearing the Air, Quit Smoking Today   -  an online booklet. http://www.smokefree.gov/pubs/clearing_the_air.pdf  Smokefree.gov http://smokefree.gov/  QuitNet http://www.quitnet.com/    0420-4989 Deonna Noble, [address] [place] [address], [place] [address]. All rights reserved. This information is not intended as a substitute for professional medical care. Always follow your healthcare professional's instructions.      The Benefits of Living Smoke Free  What do you want to gain from quitting? Check off some reasons to quit.  Health Benefits  ___ Reduce my risk of lung cancer, heart disease, chronic lung disease  ___ Have fewer wrinkles and softer skin  ___ Improve my sense of taste and smell  ___ For pregnant women--reduce the risk of having a miscarriage, stillbirth, premature birth, or low-birth-weight baby  Personal Benefits  ___ Feel more in control of my life  ___ Have better-smelling hair, breath, clothes, home, and car  ___ Save time by not having to take smoke breaks, buy cigarettes, or hunt for a light  ___ Have whiter teeth  Family Benefits  ___ Reduce my children s respiratory tract infections  ___ Set a good example for my children  ___ Reduce my family s cancer risk  Financial Benefits  ___ Save hundreds of dollars each year that would be spent on cigarettes  ___ Save money on medical bills  ___ Save on life, health, and car insurance premiums    Those Dollars Add Up!  Cigarettes are expensive, and getting more expensive all the time. Do you realize how much money you are spending on cigarettes per year? What is the average amount you spend on a pack of cigarettes? What is the average number of packs that you smoke per day? Using your answers to these questions, fill in this formula to help you find out:  ($ _____ per pack) ×  ( _____ number of packs per day) × (365 days) =  $ _____ yearly cost of smoking  Besides tobacco, there are other costs, including extra cleaning bills and replacement costs for clothing and furniture; medical expenses for  smoking-related illnesses; and higher health, life, and car insurance premiums.    Cigars and Pipes Count Too!  Cigars and pipes are also dangerous. So are smokeless (chewing) tobacco and snuff. All of these products contain nicotine, a highly addictive substance that has harmful effects on your body. Quitting smoking means giving up all tobacco products.      6393-3512 [facility], [address] [place] [address], [place] [address]. All rights reserved. This information is not intended as a substitute for professional medical care. Always follow your healthcare professional's instructions.

## 2019-09-13 ENCOUNTER — ANTICOAGULATION THERAPY VISIT (OUTPATIENT)
Dept: ANTICOAGULATION | Facility: OTHER | Age: 64
End: 2019-09-13
Attending: FAMILY MEDICINE
Payer: MEDICARE

## 2019-09-13 DIAGNOSIS — I48.19 PERSISTENT ATRIAL FIBRILLATION (H): ICD-10-CM

## 2019-09-13 DIAGNOSIS — Z79.01 LONG TERM CURRENT USE OF ANTICOAGULANT THERAPY: ICD-10-CM

## 2019-09-13 LAB — INR BLD: 2.5 (ref 0.86–1.14)

## 2019-09-13 PROCEDURE — 85610 PROTHROMBIN TIME: CPT | Mod: QW,ZL | Performed by: FAMILY MEDICINE

## 2019-09-13 PROCEDURE — 36416 COLLJ CAPILLARY BLOOD SPEC: CPT | Mod: ZL | Performed by: FAMILY MEDICINE

## 2019-09-13 NOTE — PROGRESS NOTES
ANTICOAGULATION FOLLOW-UP CLINIC VISIT    Patient Name:  Ramírez Hernández  Date:  2019  Contact Type:  Telephone/ spoke to patient    SUBJECTIVE:  Patient Findings     Positives:   Upcoming invasive procedure    Comments:   INR completed by lab.  Nurse called patient and spoke to patient re: INR, warfarin dosing/pre procedure, INR recheck date.  Patient verbalized understanding.  Edel Irvin RN states hold coumadin 5 days prior to procedure.  Note from Dr. Delvalle states no bridging.  Colonoscopy  and patient will call Edel Irvin RN with post dosing at that time.  Patient understands not to take coumadin morning of procedure.          Clinical Outcomes     Comments:   INR completed by lab.  Nurse called patient and spoke to patient re: INR, warfarin dosing/pre procedure, INR recheck date.  Patient verbalized understanding.  Edel Irvin RN states hold coumadin 5 days prior to procedure.  Note from Dr. Delvalle states no bridging.  Colonoscopy  and patient will call Edel Irvin RN with post dosing at that time.  Patient understands not to take coumadin morning of procedure.             OBJECTIVE    INR Point of Care   Date Value Ref Range Status   2019 2.5 (H) 0.86 - 1.14 Final     Comment:     This test is intended for monitoring Coumadin therapy.  Results are not   accurate in patients with prolonged INR due to factor deficiency.         ASSESSMENT / PLAN  INR assessment THER upcoming procedure   Recheck INR In: 2 WEEKS    INR Location Clinic      Anticoagulation Summary  As of 2019    INR goal:   2.0-3.0   TTR:   59.8 % (1.7 y)   INR used for dosin.5 (2019)   Warfarin maintenance plan:   2.5 mg (5 mg x 0.5) every Mon, Wed, Fri; 5 mg (5 mg x 1) all other days   Full warfarin instructions:   : Hold; : Hold; : Hold; : Hold; : Hold; Otherwise 2.5 mg every Mon, Wed, Fri; 5 mg all other days   Weekly warfarin total:   27.5 mg   Plan last modified:   Albaro  Edel SILVA RN (8/23/2019)   Next INR check:   9/27/2019   Priority:   INR   Target end date:   Indefinite    Indications    Persistent atrial fibrillation (H) [I48.1]  Long-term (current) use of anticoagulants [Z79.01] [Z79.01]             Anticoagulation Episode Summary     INR check location:       Preferred lab:       Send INR reminders to:   [facility] ANTICOAG POOL    Comments:   12/19-Taking Advil 200mg daily-AM stiffness  COLONOSCOPY  9/27/19. Note to PCP re bridge      Anticoagulation Care Providers     Provider Role Specialty Phone number    CLAUDIO Crum MD [facility] [phone]            See the Encounter Report to view Anticoagulation Flowsheet and Dosing Calendar (Go to Encounters tab in chart review, and find the Anticoagulation Therapy Visit)      Mary Ann Moreno RN

## 2019-09-16 ENCOUNTER — OFFICE VISIT (OUTPATIENT)
Dept: FAMILY MEDICINE | Facility: OTHER | Age: 64
End: 2019-09-16
Attending: FAMILY MEDICINE
Payer: MEDICARE

## 2019-09-16 VITALS
HEART RATE: 105 BPM | SYSTOLIC BLOOD PRESSURE: 136 MMHG | DIASTOLIC BLOOD PRESSURE: 74 MMHG | BODY MASS INDEX: 36.49 KG/M2 | WEIGHT: 240 LBS | OXYGEN SATURATION: 96 %

## 2019-09-16 DIAGNOSIS — Z01.818 PREOP GENERAL PHYSICAL EXAM: Primary | ICD-10-CM

## 2019-09-16 DIAGNOSIS — I48.19 PERSISTENT ATRIAL FIBRILLATION (H): ICD-10-CM

## 2019-09-16 DIAGNOSIS — Z71.6 TOBACCO ABUSE COUNSELING: ICD-10-CM

## 2019-09-16 DIAGNOSIS — Z72.0 TOBACCO ABUSE: ICD-10-CM

## 2019-09-16 LAB
ANION GAP SERPL CALCULATED.3IONS-SCNC: 8 MMOL/L (ref 3–14)
BASOPHILS # BLD AUTO: 0.1 10E9/L (ref 0–0.2)
BASOPHILS NFR BLD AUTO: 0.6 %
BUN SERPL-MCNC: 13 MG/DL (ref 7–30)
CALCIUM SERPL-MCNC: 9.1 MG/DL (ref 8.5–10.1)
CHLORIDE SERPL-SCNC: 102 MMOL/L (ref 94–109)
CO2 SERPL-SCNC: 25 MMOL/L (ref 20–32)
CREAT SERPL-MCNC: 0.91 MG/DL (ref 0.66–1.25)
DIFFERENTIAL METHOD BLD: NORMAL
EOSINOPHIL # BLD AUTO: 0.1 10E9/L (ref 0–0.7)
EOSINOPHIL NFR BLD AUTO: 1.4 %
ERYTHROCYTE [DISTWIDTH] IN BLOOD BY AUTOMATED COUNT: 14.6 % (ref 10–15)
GFR SERPL CREATININE-BSD FRML MDRD: 89 ML/MIN/{1.73_M2}
GLUCOSE SERPL-MCNC: 115 MG/DL (ref 70–99)
HCT VFR BLD AUTO: 43.6 % (ref 40–53)
HGB BLD-MCNC: 14.8 G/DL (ref 13.3–17.7)
LYMPHOCYTES # BLD AUTO: 1.5 10E9/L (ref 0.8–5.3)
LYMPHOCYTES NFR BLD AUTO: 14.5 %
MCH RBC QN AUTO: 32.2 PG (ref 26.5–33)
MCHC RBC AUTO-ENTMCNC: 33.9 G/DL (ref 31.5–36.5)
MCV RBC AUTO: 95 FL (ref 78–100)
MONOCYTES # BLD AUTO: 1.2 10E9/L (ref 0–1.3)
MONOCYTES NFR BLD AUTO: 12 %
NEUTROPHILS # BLD AUTO: 7.3 10E9/L (ref 1.6–8.3)
NEUTROPHILS NFR BLD AUTO: 71.5 %
PLATELET # BLD AUTO: 288 10E9/L (ref 150–450)
POTASSIUM SERPL-SCNC: 4.3 MMOL/L (ref 3.4–5.3)
RBC # BLD AUTO: 4.6 10E12/L (ref 4.4–5.9)
SODIUM SERPL-SCNC: 135 MMOL/L (ref 133–144)
WBC # BLD AUTO: 10.2 10E9/L (ref 4–11)

## 2019-09-16 PROCEDURE — 36415 COLL VENOUS BLD VENIPUNCTURE: CPT | Mod: ZL | Performed by: FAMILY MEDICINE

## 2019-09-16 PROCEDURE — G0463 HOSPITAL OUTPT CLINIC VISIT: HCPCS

## 2019-09-16 PROCEDURE — 93005 ELECTROCARDIOGRAM TRACING: CPT

## 2019-09-16 PROCEDURE — 93010 ELECTROCARDIOGRAM REPORT: CPT | Performed by: INTERNAL MEDICINE

## 2019-09-16 PROCEDURE — 85025 COMPLETE CBC W/AUTO DIFF WBC: CPT | Mod: ZL | Performed by: FAMILY MEDICINE

## 2019-09-16 PROCEDURE — 99214 OFFICE O/P EST MOD 30 MIN: CPT | Mod: 25 | Performed by: FAMILY MEDICINE

## 2019-09-16 PROCEDURE — 80048 BASIC METABOLIC PNL TOTAL CA: CPT | Mod: ZL | Performed by: FAMILY MEDICINE

## 2019-09-16 ASSESSMENT — PAIN SCALES - GENERAL: PAINLEVEL: NO PAIN (0)

## 2019-09-16 NOTE — NURSING NOTE
"Chief Complaint   Patient presents with     Pre-Op Exam       Initial /74   Pulse 105   Wt 108.9 kg (240 lb)   SpO2 96%   BMI 36.49 kg/m   Estimated body mass index is 36.49 kg/m  as calculated from the following:    Height as of 6/11/18: 1.727 m (5' 8\").    Weight as of this encounter: 108.9 kg (240 lb).  Medication Reconciliation: complete   Nicolle Pantoja LPN    "

## 2019-09-20 ENCOUNTER — ANESTHESIA EVENT (OUTPATIENT)
Dept: SURGERY | Facility: HOSPITAL | Age: 64
End: 2019-09-20
Payer: MEDICARE

## 2019-09-20 RX ORDER — MEPERIDINE HYDROCHLORIDE 25 MG/ML
12.5 INJECTION INTRAMUSCULAR; INTRAVENOUS; SUBCUTANEOUS
Status: CANCELLED | OUTPATIENT
Start: 2019-09-20

## 2019-09-20 RX ORDER — ONDANSETRON 4 MG/1
4 TABLET, ORALLY DISINTEGRATING ORAL EVERY 30 MIN PRN
Status: CANCELLED | OUTPATIENT
Start: 2019-09-20

## 2019-09-20 RX ORDER — NALOXONE HYDROCHLORIDE 0.4 MG/ML
.1-.4 INJECTION, SOLUTION INTRAMUSCULAR; INTRAVENOUS; SUBCUTANEOUS
Status: CANCELLED | OUTPATIENT
Start: 2019-09-20 | End: 2019-09-21

## 2019-09-20 RX ORDER — HYDROMORPHONE HYDROCHLORIDE 1 MG/ML
.3-.5 INJECTION, SOLUTION INTRAMUSCULAR; INTRAVENOUS; SUBCUTANEOUS EVERY 10 MIN PRN
Status: CANCELLED | OUTPATIENT
Start: 2019-09-20

## 2019-09-20 RX ORDER — SODIUM CHLORIDE, SODIUM LACTATE, POTASSIUM CHLORIDE, CALCIUM CHLORIDE 600; 310; 30; 20 MG/100ML; MG/100ML; MG/100ML; MG/100ML
INJECTION, SOLUTION INTRAVENOUS CONTINUOUS
Status: CANCELLED | OUTPATIENT
Start: 2019-09-20

## 2019-09-20 RX ORDER — LABETALOL 20 MG/4 ML (5 MG/ML) INTRAVENOUS SYRINGE
10
Status: CANCELLED | OUTPATIENT
Start: 2019-09-20

## 2019-09-20 RX ORDER — FENTANYL CITRATE 50 UG/ML
25-50 INJECTION, SOLUTION INTRAMUSCULAR; INTRAVENOUS
Status: CANCELLED | OUTPATIENT
Start: 2019-09-20

## 2019-09-20 RX ORDER — ALBUTEROL SULFATE 0.83 MG/ML
2.5 SOLUTION RESPIRATORY (INHALATION) EVERY 4 HOURS PRN
Status: CANCELLED | OUTPATIENT
Start: 2019-09-20

## 2019-09-20 RX ORDER — ONDANSETRON 2 MG/ML
4 INJECTION INTRAMUSCULAR; INTRAVENOUS EVERY 30 MIN PRN
Status: CANCELLED | OUTPATIENT
Start: 2019-09-20

## 2019-09-20 ASSESSMENT — LIFESTYLE VARIABLES: TOBACCO_USE: 1

## 2019-09-20 ASSESSMENT — ENCOUNTER SYMPTOMS: DYSRHYTHMIAS: 1

## 2019-09-20 NOTE — ANESTHESIA PREPROCEDURE EVALUATION
"Anesthesia Pre-Procedure Evaluation    Patient: Ramírez Hernández   MRN: [id number] : 1955          Preoperative Diagnosis: COLON CANCER SCREENING, POSITIVE COLOGUARD    Procedure(s):  SCREENING COLONOSCOPY, POSSIBLE BIOPSY, POSSIBLE POLYPECTOMY    No past medical history on file.  Past Surgical History:   Procedure Laterality Date     BACK SURGERY         Anesthesia Evaluation     .             ROS/MED HX    ENT/Pulmonary:     (+)tobacco use, Current use , . .    Neurologic:       Cardiovascular: Comment: Leg edema \"normal\" per primary H&P    (+) ----. Taking blood thinners : Instructions Given to patient: hold coumadin 5 days prior to procedure. CHF . LIU, . :. dysrhythmias a-fib, . Previous cardiac testing Echodate:2017results:No pericardial effusion is present.  Left ventricular size is normal.  Global left ventricular function is moderately reduced, given the presence of  left ventricular volume overload.  Diastolic function not assessed due to atrial fibrillation.  Mild right ventricular dilation is present.  Mild left atrial enlargement is present.  Mild mitral insufficiency is present.  Trace aortic insufficiency is present.  Moderate tricuspid insufficiency is present.  Right ventricular systolic pressure is 24mmHg above the right atrial pressure.  Trace to mild pulmonic insufficiency is present.date: results:ECG reviewed date: results:afib no acute ST/T changes consistent with ischemia, No LVH by voltage criteria, unchanged from previous tracings date: results:          METS/Exercise Tolerance:     Hematologic:         Musculoskeletal:   (+) arthritis,  -       GI/Hepatic:     (+) bowel prep,       Renal/Genitourinary:         Endo:     (+) Obesity, .      Psychiatric:         Infectious Disease:         Malignancy:         Other: Comment: ETOH                         Physical Exam      Airway   Mallampati: III  TM distance: >3 FB  Neck ROM: full    Dental   (+) missing and upper " "dentures    Cardiovascular   Rhythm and rate: irregular and abnormal  (+) murmur       Pulmonary (+) wheezes       Other findings: INR 2.5 on 9/13  INR 1.6 on 9/27    Metoprolol 5mg IV given to lower HR from 120 to low 100s        Lab Results   Component Value Date    WBC 10.2 09/16/2019    HGB 14.8 09/16/2019    HCT 43.6 09/16/2019     09/16/2019    CRP 17.7 (H) 12/03/2017    SED 10 12/03/2017     09/16/2019    POTASSIUM 4.3 09/16/2019    CHLORIDE 102 09/16/2019    CO2 25 09/16/2019    BUN 13 09/16/2019    CR 0.91 09/16/2019     (H) 09/16/2019    HECTOR 9.1 09/16/2019    MAG 2.3 12/07/2017    ALBUMIN 3.6 07/29/2019    PROTTOTAL 8.2 07/29/2019    ALT 22 07/29/2019    AST 20 07/29/2019    ALKPHOS 119 07/29/2019    BILITOTAL 0.8 07/29/2019    LIPASE 164 12/03/2017    AMYLASE 28 (L) 12/03/2017    INR 2.5 (H) 09/13/2019    TSH 1.45 12/03/2017       Preop Vitals  BP Readings from Last 3 Encounters:   09/16/19 136/74   07/29/19 136/84   06/11/18 138/78    Pulse Readings from Last 3 Encounters:   09/16/19 105   07/29/19 110   06/11/18 108      Resp Readings from Last 3 Encounters:   12/08/17 16    SpO2 Readings from Last 3 Encounters:   09/16/19 96%   07/29/19 97%   06/11/18 98%      Temp Readings from Last 1 Encounters:   07/29/19 98.2  F (36.8  C) (Tympanic)    Ht Readings from Last 1 Encounters:   06/11/18 1.727 m (5' 8\")      Wt Readings from Last 1 Encounters:   09/16/19 108.9 kg (240 lb)    Estimated body mass index is 36.49 kg/m  as calculated from the following:    Height as of 6/11/18: 1.727 m (5' 8\").    Weight as of 9/16/19: 108.9 kg (240 lb).       Anesthesia Plan      History & Physical Review  History and physical reviewed and following examination; no interval change.    ASA Status:  3 .    NPO Status:  > 8 hours    Plan for MAC with Intravenous and Propofol induction. Maintenance will be TIVA.  Reason for MAC:  Deep or markedly invasive procedure (G8)  PONV prophylaxis:  Ondansetron (or " other 5HT-3)       Postoperative Care  Postoperative pain management:  IV analgesics.      Consents  Anesthetic plan, risks, benefits and alternatives discussed with:  Patient.  Use of blood products discussed: Yes.   Use of blood products discussed with Patient.  Consented to blood products.  .                 DINAE Whitley CRNA

## 2019-09-27 ENCOUNTER — ANTICOAGULATION THERAPY VISIT (OUTPATIENT)
Dept: ANTICOAGULATION | Facility: OTHER | Age: 64
End: 2019-09-27

## 2019-09-27 ENCOUNTER — APPOINTMENT (OUTPATIENT)
Dept: LAB | Facility: HOSPITAL | Age: 64
End: 2019-09-27
Attending: FAMILY MEDICINE
Payer: MEDICARE

## 2019-09-27 ENCOUNTER — ANESTHESIA (OUTPATIENT)
Dept: SURGERY | Facility: HOSPITAL | Age: 64
End: 2019-09-27
Payer: MEDICARE

## 2019-09-27 ENCOUNTER — HOSPITAL ENCOUNTER (OUTPATIENT)
Facility: HOSPITAL | Age: 64
Discharge: HOME | End: 2019-09-27
Attending: SURGERY | Admitting: SURGERY
Payer: MEDICARE

## 2019-09-27 ENCOUNTER — SURGERY (OUTPATIENT)
Age: 64
End: 2019-09-27
Payer: MEDICARE

## 2019-09-27 VITALS
TEMPERATURE: 97 F | SYSTOLIC BLOOD PRESSURE: 141 MMHG | OXYGEN SATURATION: 96 % | DIASTOLIC BLOOD PRESSURE: 74 MMHG | RESPIRATION RATE: 20 BRPM

## 2019-09-27 DIAGNOSIS — Z79.01 LONG TERM CURRENT USE OF ANTICOAGULANT THERAPY: ICD-10-CM

## 2019-09-27 DIAGNOSIS — I48.19 PERSISTENT ATRIAL FIBRILLATION (H): ICD-10-CM

## 2019-09-27 LAB — INR PPP: 1.16 (ref 0.8–1.2)

## 2019-09-27 PROCEDURE — 25000125 ZZHC RX 250: Performed by: NURSE ANESTHETIST, CERTIFIED REGISTERED

## 2019-09-27 PROCEDURE — 36415 COLL VENOUS BLD VENIPUNCTURE: CPT | Performed by: NURSE ANESTHETIST, CERTIFIED REGISTERED

## 2019-09-27 PROCEDURE — 25800030 ZZH RX IP 258 OP 636: Performed by: NURSE ANESTHETIST, CERTIFIED REGISTERED

## 2019-09-27 PROCEDURE — 71000027 ZZH RECOVERY PHASE 2 EACH 15 MINS: Performed by: SURGERY

## 2019-09-27 PROCEDURE — 36000050 ZZH SURGERY LEVEL 2 1ST 30 MIN: Performed by: SURGERY

## 2019-09-27 PROCEDURE — 27210794 ZZH OR GENERAL SUPPLY STERILE: Performed by: SURGERY

## 2019-09-27 PROCEDURE — 45380 COLONOSCOPY AND BIOPSY: CPT | Performed by: SURGERY

## 2019-09-27 PROCEDURE — 40000306 ZZH STATISTIC PRE PROC ASSESS II: Performed by: SURGERY

## 2019-09-27 PROCEDURE — 37000008 ZZH ANESTHESIA TECHNICAL FEE, 1ST 30 MIN: Performed by: SURGERY

## 2019-09-27 PROCEDURE — 93010 ELECTROCARDIOGRAM REPORT: CPT | Performed by: INTERNAL MEDICINE

## 2019-09-27 PROCEDURE — 25000128 H RX IP 250 OP 636: Performed by: NURSE ANESTHETIST, CERTIFIED REGISTERED

## 2019-09-27 PROCEDURE — 85610 PROTHROMBIN TIME: CPT | Performed by: NURSE ANESTHETIST, CERTIFIED REGISTERED

## 2019-09-27 PROCEDURE — 88305 TISSUE EXAM BY PATHOLOGIST: CPT | Mod: TC | Performed by: SURGERY

## 2019-09-27 PROCEDURE — 45385 COLONOSCOPY W/LESION REMOVAL: CPT | Performed by: NURSE ANESTHETIST, CERTIFIED REGISTERED

## 2019-09-27 PROCEDURE — 93005 ELECTROCARDIOGRAM TRACING: CPT

## 2019-09-27 RX ORDER — METOPROLOL TARTRATE 1 MG/ML
5 INJECTION, SOLUTION INTRAVENOUS EVERY 5 MIN PRN
Status: DISCONTINUED | OUTPATIENT
Start: 2019-09-27 | End: 2019-09-27 | Stop reason: HOSPADM

## 2019-09-27 RX ORDER — LABETALOL 20 MG/4 ML (5 MG/ML) INTRAVENOUS SYRINGE
PRN
Status: DISCONTINUED | OUTPATIENT
Start: 2019-09-27 | End: 2019-09-27

## 2019-09-27 RX ORDER — LIDOCAINE 40 MG/G
CREAM TOPICAL
Status: DISCONTINUED | OUTPATIENT
Start: 2019-09-27 | End: 2019-09-27 | Stop reason: HOSPADM

## 2019-09-27 RX ORDER — SODIUM CHLORIDE, SODIUM LACTATE, POTASSIUM CHLORIDE, CALCIUM CHLORIDE 600; 310; 30; 20 MG/100ML; MG/100ML; MG/100ML; MG/100ML
INJECTION, SOLUTION INTRAVENOUS CONTINUOUS
Status: DISCONTINUED | OUTPATIENT
Start: 2019-09-27 | End: 2019-09-27 | Stop reason: HOSPADM

## 2019-09-27 RX ORDER — LIDOCAINE HYDROCHLORIDE 20 MG/ML
INJECTION, SOLUTION INFILTRATION; PERINEURAL PRN
Status: DISCONTINUED | OUTPATIENT
Start: 2019-09-27 | End: 2019-09-27

## 2019-09-27 RX ORDER — PROPOFOL 10 MG/ML
INJECTION, EMULSION INTRAVENOUS PRN
Status: DISCONTINUED | OUTPATIENT
Start: 2019-09-27 | End: 2019-09-27

## 2019-09-27 RX ADMIN — PROPOFOL 50 MG: 10 INJECTION, EMULSION INTRAVENOUS at 11:42

## 2019-09-27 RX ADMIN — PROPOFOL 50 MG: 10 INJECTION, EMULSION INTRAVENOUS at 11:46

## 2019-09-27 RX ADMIN — SODIUM CHLORIDE, POTASSIUM CHLORIDE, SODIUM LACTATE AND CALCIUM CHLORIDE 1000 ML: 600; 310; 30; 20 INJECTION, SOLUTION INTRAVENOUS at 09:36

## 2019-09-27 RX ADMIN — PROPOFOL 50 MG: 10 INJECTION, EMULSION INTRAVENOUS at 11:38

## 2019-09-27 RX ADMIN — PROPOFOL 20 MG: 10 INJECTION, EMULSION INTRAVENOUS at 11:52

## 2019-09-27 RX ADMIN — LABETALOL 20 MG/4 ML (5 MG/ML) INTRAVENOUS SYRINGE 5 MG: at 11:45

## 2019-09-27 RX ADMIN — LIDOCAINE HYDROCHLORIDE 40 MG: 20 INJECTION, SOLUTION INFILTRATION; PERINEURAL at 11:38

## 2019-09-27 RX ADMIN — METOPROLOL TARTRATE 5 MG: 1 INJECTION, SOLUTION INTRAVENOUS at 10:05

## 2019-09-27 RX ADMIN — PROPOFOL 50 MG: 10 INJECTION, EMULSION INTRAVENOUS at 11:49

## 2019-09-27 RX ADMIN — LABETALOL 20 MG/4 ML (5 MG/ML) INTRAVENOUS SYRINGE 10 MG: at 11:39

## 2019-09-27 RX ADMIN — SODIUM CHLORIDE, POTASSIUM CHLORIDE, SODIUM LACTATE AND CALCIUM CHLORIDE: 600; 310; 30; 20 INJECTION, SOLUTION INTRAVENOUS at 11:54

## 2019-09-27 NOTE — H&P
Surgery Consult Clinic Note      RE: Ramírez Hernández  : 1955        Chief Complaint:  Colon Cancer Screening   Positive Cologuard     History of Present Illness:  Ramírez Hernández is a very pleasant 64 year old year old male who I am seeing at the request of Aron Delvalle for evaluation of screening colon malignant neoplasm and consideration for colonoscopy.  He denies family history of colon or rectal cancer, blood in stool, changes in bowel habits, weight loss, abdominal pain. He has never had colonoscopy had a positive cologuard test.   Surgical hx: no abdominal  He specifically denies fever, chills, nausea, vomiting, chest pain, shortness of breath or palpitations.      Medical history:  History reviewed. No pertinent past medical history.    Surgical history:  Past Surgical History:   Procedure Laterality Date     BACK SURGERY         Family history:  History reviewed. No pertinent family history.    Medications:  No current outpatient medications on file.     Allergies:  The patienthas No Known Allergies.  .  Social history:  Social History     Tobacco Use     Smoking status: Current Every Day Smoker     Packs/day: 1.00     Years: 40.00     Pack years: 40.00     Types: Cigarettes     Start date: 1975     Smokeless tobacco: Never Used   Substance Use Topics     Alcohol use: Yes     Comment: weekly     Marital status: .  Occupation: retired .    Review of Systems:  10 point review of systems was obtained and negative other than what previously mentioned in HPI    Physical Examination:  /75   Temp 97  F (36.1  C) (Oral)   Resp 20   SpO2 95%   General: AAOx4, NAD, WN/WD, ambulating without assistance  HEENT:NCAT, EOMI, PERRL Sclerae anicteric; Trachea mideline,   Chest:  No acute distress, expiratory wheeze.   Cardiac:  Tachy rate, no murmur   Abdomen: non-distended   Extremities: Cursory exam unremarkable.  Skin: Warm, dry,   Neuro: no focal deficit,   Psych: happy, calm, asks  appropriate questions      Assessment/Plan:  64 y.o. male presents with positive cologuard.   Satisfactory candidate for colonoscopy.  The indications, risks, benefits and technical aspects of whole colon colonoscopy were outlined with risks including, but not limited to, perforation, bleeding and inability to visualize entire colon.  Management of each was reviewed.  The need of mechanical preparation of the colon was reviewed along with the use of monitored anesthetic care.  The patient's questions were asked and answered.        Alfredo La MD

## 2019-09-27 NOTE — BRIEF OP NOTE
[facility]    Brief Operative Note    Pre-operative diagnosis: COLON CANCER SCREENING, POSITIVE COLOGUARD  Post-operative diagnosis Colon polyps x 2   Procedure: Procedure(s):  SCREENING COLONOSCOPY WITH POLYPECTOMY  Surgeon: Surgeon(s) and Role:     * Alfredo La MD - Primary  Anesthesia: Monitor Anesthesia Care   Estimated blood loss: Less than 10 ml  Drains: None  Specimens:   ID Type Source Tests Collected by Time Destination   A :  Polyp Large Intestine, Sigmoid SURGICAL PATHOLOGY EXAM Alfredo La MD 9/27/2019 11:51 AM    B :  Polyp Large Intestine, Rectum SURGICAL PATHOLOGY EXAM Alfredo La MD 9/27/2019 11:53 AM      Findings:   Two small colon polyps removed .  Complications: None.  Implants:  * No implants in log *

## 2019-09-27 NOTE — ANESTHESIA CARE TRANSFER NOTE
Patient: Ramírez Hernández    Procedure(s):  SCREENING COLONOSCOPY WITH POLYPECTOMY    Diagnosis: COLON CANCER SCREENING, POSITIVE COLOGUARD  Diagnosis Additional Information: No value filed.    Anesthesia Type:   MAC     Note:  Airway :Nasal Cannula  Patient transferred to:Phase II  Handoff Report: Identifed the Patient, Identified the Reponsible Provider, Reviewed the pertinent medical history, Discussed the surgical course, Reviewed Intra-OP anesthesia mangement and issues during anesthesia, Set expectations for post-procedure period and Allowed opportunity for questions and acknowledgement of understanding      Vitals: (Last set prior to Anesthesia Care Transfer)    CRNA VITALS  9/27/2019 1127 - 9/27/2019 1159      9/27/2019             Pulse:  116    SpO2:  98 %    Resp Rate (observed):  (!) 1    Resp Rate (set):  8                Electronically Signed By: DIANE Heaton CRNA  September 27, 2019  11:59 AM

## 2019-09-27 NOTE — OR NURSING
Pateint discharged to home.  Lizy score 10/10. Pain level 0/10.  Discharged from unit via ambulatory.  Discharge instructions given to pt and brother understanding plan of care.

## 2019-09-27 NOTE — ANESTHESIA POSTPROCEDURE EVALUATION
Patient: Ramírez Hernández    Procedure(s):  SCREENING COLONOSCOPY WITH POLYPECTOMY    Diagnosis:COLON CANCER SCREENING, POSITIVE COLOGUARD  Diagnosis Additional Information: No value filed.    Anesthesia Type:  MAC    Note:  Anesthesia Post Evaluation    Patient participation: Able to fully participate in evaluation  Level of consciousness: awake and alert  Pain management: adequate  Airway patency: patent  Respiratory status: acceptable  Hydration status: acceptable  PONV: none             Last vitals:  Vitals:    09/27/19 1235 09/27/19 1240 09/27/19 1245   BP: 123/89 137/87 141/74   Resp:      Temp:      SpO2: 96% 96% 96%         Electronically Signed By: DIANE Mack CRNA  September 27, 2019  1:10 PM

## 2019-09-27 NOTE — PROGRESS NOTES
ANTICOAGULATION FOLLOW-UP CLINIC VISIT    Patient Name:  Ramírez Hernández  Date:  2019  Contact Type:  Telephone    SUBJECTIVE:  Patient Findings     Comments:   Patient had colonoscopy today. Call placed to patient and spoke to him re: warfarin dosing and INR recheck date post surgery. He verbalized understanding and has no questions. He has no bleeding/bruising and no new changes in diet/meds/activity.        Clinical Outcomes     Negatives:   Major bleeding event, Thromboembolic event, Anticoagulation-related hospital admission, Anticoagulation-related ED visit, Anticoagulation-related fatality    Comments:   Patient had colonoscopy today. Call placed to patient and spoke to him re: warfarin dosing and INR recheck date post surgery. He verbalized understanding and has no questions. He has no bleeding/bruising and no new changes in diet/meds/activity.           OBJECTIVE    INR   Date Value Ref Range Status   2019 1.16 0.80 - 1.20 Final       ASSESSMENT / PLAN  INR assessment SUB surgery today (colonoscopy, warfarin held x 5 days)   Recheck INR In: 6 DAYS    INR Location Clinic      Anticoagulation Summary  As of 2019    INR goal:   2.0-3.0   TTR:   59.3 % (1.8 y)   INR used for dosin.16! (2019)   Warfarin maintenance plan:   2.5 mg (5 mg x 0.5) every Mon, Wed, Fri; 5 mg (5 mg x 1) all other days   Full warfarin instructions:   : 7.5 mg; : 10 mg; : 5 mg; Otherwise 2.5 mg every Mon, Wed, Fri; 5 mg all other days   Weekly warfarin total:   27.5 mg   Plan last modified:   Edel Irvin RN (2019)   Next INR check:   10/3/2019   Priority:   INR   Target end date:   Indefinite    Indications    Persistent atrial fibrillation (H) [I48.1]  Long-term (current) use of anticoagulants [Z79.01] [Z79.01]             Anticoagulation Episode Summary     INR check location:       Preferred lab:       Send INR reminders to:   HC ANTICOAG POOL    Comments:   -Taking Advil 200mg  daily-AM stiffness  COLONOSCOPY  9/27/19. Note to PCP re bridge      Anticoagulation Care Providers     Provider Role Specialty Phone number    CLAUDIO Crum MD [facility] [phone]            See the Encounter Report to view Anticoagulation Flowsheet and Dosing Calendar (Go to Encounters tab in chart review, and find the Anticoagulation Therapy Visit)        Edel Irvin RN

## 2019-09-27 NOTE — DISCHARGE INSTRUCTIONS

## 2019-09-28 NOTE — OP NOTE
Ramírez Hernández MRN# [id number]   YOB: 1955 Age: 64 year old      Date of Admission:  9/27/2019  Date of Service:   9/27/19    Primary care provider: Aron Delvalle    PREOPERATIVE DIAGNOSIS:  Diagnostic colonoscopy        POSTOPERATIVE DIAGNOSIS:  Colon polyps x 2          PROCEDURE:  Colonoscopy with polypectomy          INDICATIONS: positive colo guard test.       Specimen:   ID Type Source Tests Collected by Time Destination   A :  Polyp Large Intestine, Sigmoid SURGICAL PATHOLOGY EXAM Alfredo La MD 9/27/2019 11:51 AM    B :  Polyp Large Intestine, Rectum SURGICAL PATHOLOGY EXAM Alfredo La MD 9/27/2019 11:53 AM        SURGEON: Alfredo La MD    DESCRIPTION OF PROCEDURE: Ramírez Hernández was brought into the endoscopy suite and placed in the left lateral decubitus position. After preprocedural pause and attended monitored anesthesia was administered, the external anus was inspected and was normal. Digital rectal exam was normal. The colonoscope was inserted and advanced under direct visualization to the level of the cecum which was identified by the appendiceal orifice and the ileocecal valve. The prep was excellent.. Upon slow withdrawal of the colonoscope, approximately 95% of the mucosa was directly visualized. There was a polyp in the sigmoid and rectum removed with biopsy forceps.  The rest of the colon was without mucosal abnormality. There was no evidence of further polyps, inflammation, bleeding or AVMs. Retroflexion of the rectum was normal. The extra air was removed from the colon, and the colonoscope withdrawn. The patient tolerated the procedure well and was taken to postanesthesia care unit.     We invite the patient to return in 5-10 years for follow up screening evaluation, pending pathology related to polyps removed.    Alfredo La MD

## 2019-09-30 LAB — COPATH REPORT: NORMAL

## 2019-10-03 ENCOUNTER — ANTICOAGULATION THERAPY VISIT (OUTPATIENT)
Dept: ANTICOAGULATION | Facility: OTHER | Age: 64
End: 2019-10-03
Attending: FAMILY MEDICINE
Payer: MEDICARE

## 2019-10-03 DIAGNOSIS — I48.19 PERSISTENT ATRIAL FIBRILLATION (H): ICD-10-CM

## 2019-10-03 DIAGNOSIS — Z79.01 LONG TERM CURRENT USE OF ANTICOAGULANT THERAPY: ICD-10-CM

## 2019-10-03 LAB — INR BLD: 2.6 (ref 0.86–1.14)

## 2019-10-03 PROCEDURE — 85610 PROTHROMBIN TIME: CPT | Mod: QW,ZL | Performed by: FAMILY MEDICINE

## 2019-10-03 PROCEDURE — 36416 COLLJ CAPILLARY BLOOD SPEC: CPT | Mod: ZL | Performed by: FAMILY MEDICINE

## 2019-10-03 NOTE — PROGRESS NOTES
ANTICOAGULATION FOLLOW-UP CLINIC VISIT    Patient Name:  Ramírez Hernández  Date:  10/3/2019  Contact Type:  Telephone    SUBJECTIVE:  Patient Findings     Comments:   INR done by lab. Call placed to patient and spoke to him  re: INR result, warfarin dosing/INR recheck date. He verbalized understanding and has no questionsHe is has no bleeding/bruising, new changes in diet/meds/activity or questions.         Clinical Outcomes     Negatives:   Major bleeding event, Thromboembolic event, Anticoagulation-related hospital admission, Anticoagulation-related ED visit, Anticoagulation-related fatality    Comments:   INR done by lab. Call placed to patient and spoke to him  re: INR result, warfarin dosing/INR recheck date. He verbalized understanding and has no questionsHe is has no bleeding/bruising, new changes in diet/meds/activity or questions.            OBJECTIVE    INR Point of Care   Date Value Ref Range Status   10/03/2019 2.6 (H) 0.86 - 1.14 Final     Comment:     This test is intended for monitoring Coumadin therapy.  Results are not   accurate in patients with prolonged INR due to factor deficiency.         ASSESSMENT / PLAN  INR assessment THER    Recheck INR In: 5 WEEKS    INR Location Clinic      Anticoagulation Summary  As of 10/3/2019    INR goal:   2.0-3.0   TTR:   59.1 % (1.8 y)   INR used for dosin.6 (10/3/2019)   Warfarin maintenance plan:   2.5 mg (5 mg x 0.5) every Mon, Wed, Fri; 5 mg (5 mg x 1) all other days   Full warfarin instructions:   2.5 mg every Mon, Wed, Fri; 5 mg all other days   Weekly warfarin total:   27.5 mg   No change documented:   Edel Irvin RN   Plan last modified:   Edel Irvin RN (2019)   Next INR check:   2019   Priority:   INR   Target end date:   Indefinite    Indications    Persistent atrial fibrillation [I48.19]  Long-term (current) use of anticoagulants [Z79.01] [Z79.01]             Anticoagulation Episode Summary     INR check location:        Preferred lab:       Send INR reminders to:   [facility] ANTICOAG POOL    Comments:   12/19-Taking Advil 200mg daily-AM stiffness  COLONOSCOPY  9/27/19. Note to PCP re bridge      Anticoagulation Care Providers     Provider Role Specialty Phone number    CLAUDIO Crum MD [facility] [phone]            See the Encounter Report to view Anticoagulation Flowsheet and Dosing Calendar (Go to Encounters tab in chart review, and find the Anticoagulation Therapy Visit)        Edel Irvin RN

## 2019-10-24 DIAGNOSIS — I48.19 PERSISTENT ATRIAL FIBRILLATION (H): ICD-10-CM

## 2019-10-24 DIAGNOSIS — Z79.01 LONG TERM CURRENT USE OF ANTICOAGULANT THERAPY: ICD-10-CM

## 2019-10-25 RX ORDER — WARFARIN SODIUM 5 MG/1
TABLET ORAL
Qty: 90 TABLET | Refills: 1 | Status: SHIPPED | OUTPATIENT
Start: 2019-10-25 | End: 2020-01-27

## 2019-11-07 ENCOUNTER — ANTICOAGULATION THERAPY VISIT (OUTPATIENT)
Dept: ANTICOAGULATION | Facility: OTHER | Age: 64
End: 2019-11-07
Attending: FAMILY MEDICINE
Payer: MEDICARE

## 2019-11-07 DIAGNOSIS — Z79.01 LONG TERM CURRENT USE OF ANTICOAGULANT THERAPY: ICD-10-CM

## 2019-11-07 DIAGNOSIS — I48.19 PERSISTENT ATRIAL FIBRILLATION (H): ICD-10-CM

## 2019-11-07 LAB — INR BLD: 2.8 (ref 0.86–1.14)

## 2019-11-07 PROCEDURE — 85610 PROTHROMBIN TIME: CPT | Mod: QW,ZL | Performed by: FAMILY MEDICINE

## 2019-11-07 PROCEDURE — 36416 COLLJ CAPILLARY BLOOD SPEC: CPT | Mod: ZL | Performed by: FAMILY MEDICINE

## 2019-11-07 NOTE — PROGRESS NOTES
ANTICOAGULATION FOLLOW-UP CLINIC VISIT    Patient Name:  Ramírez Hernández  Date:  2019  Contact Type:  Telephone    SUBJECTIVE:  Patient Findings     Positives:   Change in diet/appetite (patient reports decreased vit k intake)    Comments:   INR done by lab. No bleeding/bruising, no changes in diet/meds/activity or questions. INR results dicussed and advised patient re: warfarin dosing/INR recheck date. Patient advised to increase vit K intake a couple times a week. Patient verbalized understanding.          Clinical Outcomes     Negatives:   Major bleeding event, Thromboembolic event, Anticoagulation-related hospital admission, Anticoagulation-related ED visit, Anticoagulation-related fatality    Comments:   INR done by lab. No bleeding/bruising, no changes in diet/meds/activity or questions. INR results dicussed and advised patient re: warfarin dosing/INR recheck date. Patient advised to increase vit K intake a couple times a week. Patient verbalized understanding.             OBJECTIVE    INR Point of Care   Date Value Ref Range Status   2019 2.8 (H) 0.86 - 1.14 Final     Comment:     This test is intended for monitoring Coumadin therapy.  Results are not   accurate in patients with prolonged INR due to factor deficiency.         ASSESSMENT / PLAN  INR assessment THER    Recheck INR In: 4 WEEKS    INR Location Clinic      Anticoagulation Summary  As of 2019    INR goal:   2.0-3.0   TTR:   61.2 % (1.9 y)   INR used for dosin.8 (2019)   Warfarin maintenance plan:   2.5 mg (5 mg x 0.5) every Mon, Wed, Fri; 5 mg (5 mg x 1) all other days   Full warfarin instructions:   2.5 mg every Mon, Wed, Fri; 5 mg all other days   Weekly warfarin total:   27.5 mg   Plan last modified:   Edel Irvin RN (2019)   Next INR check:   2019   Priority:   INR   Target end date:   Indefinite    Indications    Persistent atrial fibrillation [I48.19]  Long-term (current) use of anticoagulants  [Z79.01] [Z79.01]             Anticoagulation Episode Summary     INR check location:       Preferred lab:       Send INR reminders to:   [facility] ANTICOAG POOL    Comments:   12/19-Taking Advil 200mg daily-AM stiffness  COLONOSCOPY  9/27/19. Note to PCP re bridge      Anticoagulation Care Providers     Provider Role Specialty Phone number    CLAUDIO Crum MD [facility] [phone]            See the Encounter Report to view Anticoagulation Flowsheet and Dosing Calendar (Go to Encounters tab in chart review, and find the Anticoagulation Therapy Visit)        Kallie Rachel RN

## 2019-12-05 ENCOUNTER — ANTICOAGULATION THERAPY VISIT (OUTPATIENT)
Dept: ANTICOAGULATION | Facility: OTHER | Age: 64
End: 2019-12-05
Attending: FAMILY MEDICINE
Payer: MEDICARE

## 2019-12-05 DIAGNOSIS — Z79.01 LONG TERM CURRENT USE OF ANTICOAGULANT THERAPY: ICD-10-CM

## 2019-12-05 DIAGNOSIS — I48.19 PERSISTENT ATRIAL FIBRILLATION (H): ICD-10-CM

## 2019-12-05 LAB — INR BLD: 3 (ref 0.86–1.14)

## 2019-12-05 PROCEDURE — 85610 PROTHROMBIN TIME: CPT | Mod: QW,ZL | Performed by: FAMILY MEDICINE

## 2019-12-05 PROCEDURE — 36416 COLLJ CAPILLARY BLOOD SPEC: CPT | Mod: ZL | Performed by: FAMILY MEDICINE

## 2019-12-05 NOTE — PROGRESS NOTES
ANTICOAGULATION FOLLOW-UP CLINIC VISIT    Patient Name:  Ramírez Hernández  Date:  12/5/2019  Contact Type:  Telephone    SUBJECTIVE:  Patient Findings     Comments:   Received INR results from lab. Call placed to patient and spoke to patient re: INR results, warfarin dosing/INR recheck date. Patient to return call to warfarin clinic with changes in bruising/bleeding, new changes in diet/meds/activity or questions.  Patient verbalized understanding.         Clinical Outcomes     Negatives:   Major bleeding event, Thromboembolic event, Anticoagulation-related hospital admission, Anticoagulation-related ED visit, Anticoagulation-related fatality    Comments:   Received INR results from lab. Call placed to patient and spoke to patient re: INR results, warfarin dosing/INR recheck date. Patient to return call to warfarin clinic with changes in bruising/bleeding, new changes in diet/meds/activity or questions.  Patient verbalized understanding.            OBJECTIVE    INR Point of Care   Date Value Ref Range Status   12/05/2019 3.0 (H) 0.86 - 1.14 Final     Comment:     This test is intended for monitoring Coumadin therapy.  Results are not   accurate in patients with prolonged INR due to factor deficiency.         ASSESSMENT / PLAN  INR assessment THER    Recheck INR In: 4 WEEKS    INR Location Clinic      Anticoagulation Summary  As of 12/5/2019    INR goal:   2.0-3.0   TTR:   63.7 % (1 y)   INR used for dosing:   3.0 (12/5/2019)   Warfarin maintenance plan:   2.5 mg (5 mg x 0.5) every Mon, Wed, Fri; 5 mg (5 mg x 1) all other days   Full warfarin instructions:   12/5: 2.5 mg; Otherwise 2.5 mg every Mon, Wed, Fri; 5 mg all other days   Weekly warfarin total:   27.5 mg   Plan last modified:   Edel Irvin RN (8/23/2019)   Next INR check:   1/2/2020   Priority:   Maintenance   Target end date:   Indefinite    Indications    Persistent atrial fibrillation [I48.19]  Long-term (current) use of anticoagulants [Z79.01]  [Z79.01]             Anticoagulation Episode Summary     INR check location:       Preferred lab:       Send INR reminders to:   [facility] ANTICOAG POOL    Comments:   12/19-Taking Advil 200mg daily-AM stiffness  COLONOSCOPY  9/27/19. Note to PCP re bridge      Anticoagulation Care Providers     Provider Role Specialty Phone number    CLAUDIO Crum MD [facility] [phone]            See the Encounter Report to view Anticoagulation Flowsheet and Dosing Calendar (Go to Encounters tab in chart review, and find the Anticoagulation Therapy Visit)        Kallie Rachel RN

## 2019-12-16 DIAGNOSIS — I48.91 ATRIAL FIBRILLATION WITH RAPID VENTRICULAR RESPONSE (H): ICD-10-CM

## 2019-12-16 DIAGNOSIS — I50.21 ACUTE SYSTOLIC CONGESTIVE HEART FAILURE (H): ICD-10-CM

## 2019-12-16 RX ORDER — METOPROLOL TARTRATE 50 MG
TABLET ORAL
Qty: 180 TABLET | Refills: 1 | Status: SHIPPED | OUTPATIENT
Start: 2019-12-16 | End: 2020-01-27

## 2019-12-16 NOTE — TELEPHONE ENCOUNTER
Patient was in to see Dr. Delvalle on 07/29/19. Per notes patient was to take metoprolol twice daily. Refill was given for 90 tablets with 3 refills, but then was re sent by RN with correction of 180 tablets with 3 refills. Joe did not receive new prescription and was only filling for 90 tablets. This only lasts patient 45 days. I am resending refill of 180 tablets with 1 refill.   3. Atrial fibrillation with rapid ventricular response (H)  As above.  Stable.  Slightly tachy today at first, right around 100 when I listened.  No change.   - metoprolol tartrate (LOPRESSOR) 50 MG tablet; TAKE 1 TABLET(50 MG) BY MOUTH TWICE DAILY  Dispense: 90 tablet; Refill: 3

## 2020-01-01 NOTE — PROGRESS NOTES
ANTICOAGULATION FOLLOW-UP CLINIC VISIT    Patient Name:  Ramírez Hernández  Date:  4/1/2019  Contact Type:  Telephone    SUBJECTIVE:     Patient Findings     Positives:   Change in alcohol use (increased ETOH use), Change in diet/appetite (decreased vit K intake)    Comments:   INR done by lab. Call placed to patient and spoke to him re: INR result, warfarin dosing and INR recheck date. He verbalized understanding and has no questions. He states he had decreased vit K intake and increased ETOH use past few weeks.            OBJECTIVE    INR Point of Care   Date Value Ref Range Status   04/01/2019 3.3 (H) 0.86 - 1.14 Final     Comment:     This test is intended for monitoring Coumadin therapy.  Results are not   accurate in patients with prolonged INR due to factor deficiency.         ASSESSMENT / PLAN  INR assessment SUPRA decreased vit K intake, increased ETOH use   Recheck INR In: 3 WEEKS    INR Location Clinic      Anticoagulation Summary  As of 4/1/2019    INR goal:   2.0-3.0   TTR:   57.8 % (1.3 y)   INR used for dosing:   3.3! (4/1/2019)   Warfarin maintenance plan:   2.5 mg (5 mg x 0.5) every Mon, Fri; 5 mg (5 mg x 1) all other days   Full warfarin instructions:   4/1: Hold; Otherwise 2.5 mg every Mon, Fri; 5 mg all other days   Weekly warfarin total:   30 mg   Plan last modified:   Edel Irvin RN (10/31/2018)   Next INR check:   4/22/2019   Priority:   INR   Target end date:   Indefinite    Indications    Persistent atrial fibrillation (H) [I48.1]  Long-term (current) use of anticoagulants [Z79.01] [Z79.01]             Anticoagulation Episode Summary     INR check location:       Preferred lab:       Send INR reminders to:   [facility] ANTICOAG POOL    Comments:   12/19-Taking Advil 200mg daily-AM stiffness      Anticoagulation Care Providers     Provider Role Specialty Phone number    CLAUDIO Crum MD [facility] [phone]            See the Encounter Report to view Anticoagulation Flowsheet  and Dosing Calendar (Go to Encounters tab in chart review, and find the Anticoagulation Therapy Visit)        Edel Irvin RN                  Statement Selected

## 2020-01-03 ENCOUNTER — ANTICOAGULATION THERAPY VISIT (OUTPATIENT)
Dept: ANTICOAGULATION | Facility: OTHER | Age: 65
End: 2020-01-03
Attending: FAMILY MEDICINE
Payer: MEDICARE

## 2020-01-03 DIAGNOSIS — Z79.01 LONG TERM CURRENT USE OF ANTICOAGULANT THERAPY: ICD-10-CM

## 2020-01-03 DIAGNOSIS — I48.19 PERSISTENT ATRIAL FIBRILLATION (H): ICD-10-CM

## 2020-01-03 LAB
CAPILLARY BLOOD COLLECTION: NORMAL
INR BLD: 2.6 (ref 0.86–1.14)

## 2020-01-03 PROCEDURE — 36416 COLLJ CAPILLARY BLOOD SPEC: CPT | Mod: ZL | Performed by: FAMILY MEDICINE

## 2020-01-03 PROCEDURE — 85610 PROTHROMBIN TIME: CPT | Mod: QW,ZL | Performed by: FAMILY MEDICINE

## 2020-01-03 NOTE — PROGRESS NOTES
ANTICOAGULATION FOLLOW-UP CLINIC VISIT    Patient Name:  Ramírez Hernández  Date:  1/3/2020  Contact Type:  Telephone    SUBJECTIVE:  Patient Findings     Comments:   Received INR results from lab. Call placed to patient and spoke to patient re: INR results, warfarin dosing/INR recheck date. Patient to return call to warfarin clinic with changes in bruising/bleeding, new changes in diet/meds/activity or questions.  Patient verbalized understanding.         Clinical Outcomes     Negatives:   Major bleeding event, Thromboembolic event, Anticoagulation-related hospital admission, Anticoagulation-related ED visit, Anticoagulation-related fatality    Comments:   Received INR results from lab. Call placed to patient and spoke to patient re: INR results, warfarin dosing/INR recheck date. Patient to return call to warfarin clinic with changes in bruising/bleeding, new changes in diet/meds/activity or questions.  Patient verbalized understanding.            OBJECTIVE    INR Point of Care   Date Value Ref Range Status   2020 2.6 (H) 0.86 - 1.14 Final     Comment:     This test is intended for monitoring Coumadin therapy.  Results are not   accurate in patients with prolonged INR due to factor deficiency.         ASSESSMENT / PLAN  INR assessment THER    Recheck INR In: 6 WEEKS    INR Location Clinic      Anticoagulation Summary  As of 1/3/2020    INR goal:   2.0-3.0   TTR:   70.7 % (1 y)   INR used for dosin.6 (1/3/2020)   Warfarin maintenance plan:   2.5 mg (5 mg x 0.5) every Mon, Wed, Fri; 5 mg (5 mg x 1) all other days   Full warfarin instructions:   2.5 mg every Mon, Wed, Fri; 5 mg all other days   Weekly warfarin total:   27.5 mg   No change documented:   Virginie, Kallie, RN   Plan last modified:   Edel Irvin RN (2019)   Next INR check:   2020   Priority:   Maintenance   Target end date:   Indefinite    Indications    Persistent atrial fibrillation [I48.19]  Long-term (current) use of anticoagulants  [Z79.01] [Z79.01]             Anticoagulation Episode Summary     INR check location:       Preferred lab:       Send INR reminders to:   [facility] ANTICOAG POOL    Comments:   12/19-Taking Advil 200mg daily-AM stiffness  COLONOSCOPY  9/27/19. Note to PCP re bridge      Anticoagulation Care Providers     Provider Role Specialty Phone number    CLAUDIO Crum MD [facility] [phone]            See the Encounter Report to view Anticoagulation Flowsheet and Dosing Calendar (Go to Encounters tab in chart review, and find the Anticoagulation Therapy Visit)        Kallie Rachel RN

## 2020-01-27 DIAGNOSIS — I48.91 ATRIAL FIBRILLATION WITH RAPID VENTRICULAR RESPONSE (H): ICD-10-CM

## 2020-01-27 DIAGNOSIS — I48.19 PERSISTENT ATRIAL FIBRILLATION (H): ICD-10-CM

## 2020-01-27 DIAGNOSIS — Z79.01 LONG TERM CURRENT USE OF ANTICOAGULANT THERAPY: ICD-10-CM

## 2020-01-27 DIAGNOSIS — I50.21 ACUTE SYSTOLIC CONGESTIVE HEART FAILURE (H): ICD-10-CM

## 2020-01-27 RX ORDER — WARFARIN SODIUM 5 MG/1
TABLET ORAL
Qty: 90 TABLET | Refills: 1 | Status: ON HOLD | OUTPATIENT
Start: 2020-01-27 | End: 2021-10-25

## 2020-01-27 RX ORDER — FUROSEMIDE 40 MG
40 TABLET ORAL DAILY
Qty: 90 TABLET | Refills: 3 | Status: SHIPPED | OUTPATIENT
Start: 2020-01-27 | End: 2021-05-12

## 2020-01-27 RX ORDER — LISINOPRIL 10 MG/1
10 TABLET ORAL DAILY
Qty: 90 TABLET | Refills: 3 | Status: SHIPPED | OUTPATIENT
Start: 2020-01-27 | End: 2021-01-26

## 2020-01-27 RX ORDER — METOPROLOL TARTRATE 50 MG
TABLET ORAL
Qty: 180 TABLET | Refills: 1 | Status: SHIPPED | OUTPATIENT
Start: 2020-01-27 | End: 2021-01-26

## 2020-02-18 ENCOUNTER — ANTICOAGULATION THERAPY VISIT (OUTPATIENT)
Dept: ANTICOAGULATION | Facility: OTHER | Age: 65
End: 2020-02-18
Attending: FAMILY MEDICINE
Payer: MEDICARE

## 2020-02-18 DIAGNOSIS — Z79.01 LONG TERM CURRENT USE OF ANTICOAGULANT THERAPY: ICD-10-CM

## 2020-02-18 DIAGNOSIS — I48.19 PERSISTENT ATRIAL FIBRILLATION (H): ICD-10-CM

## 2020-02-18 LAB
CAPILLARY BLOOD COLLECTION: NORMAL
INR BLD: 2.7 (ref 0.86–1.14)

## 2020-02-18 PROCEDURE — 85610 PROTHROMBIN TIME: CPT | Mod: QW,ZL | Performed by: FAMILY MEDICINE

## 2020-02-18 PROCEDURE — 36416 COLLJ CAPILLARY BLOOD SPEC: CPT | Mod: ZL | Performed by: FAMILY MEDICINE

## 2020-02-18 NOTE — PROGRESS NOTES
ANTICOAGULATION FOLLOW-UP CLINIC VISIT    Patient Name:  Ramírez Hernández  Date:  2020  Contact Type:  Telephone    SUBJECTIVE:  Patient Findings     Comments:   Received INR results from lab. Call placed to patient and spoke to patient re: INR results, warfarin dosing/INR recheck date. Patient to return call to warfarin clinic with changes in bruising/bleeding, new changes in diet/meds/activity or questions.  Patient verbalized understanding.         Clinical Outcomes     Negatives:   Major bleeding event, Thromboembolic event, Anticoagulation-related hospital admission, Anticoagulation-related ED visit, Anticoagulation-related fatality    Comments:   Received INR results from lab. Call placed to patient and spoke to patient re: INR results, warfarin dosing/INR recheck date. Patient to return call to warfarin clinic with changes in bruising/bleeding, new changes in diet/meds/activity or questions.  Patient verbalized understanding.            OBJECTIVE    INR Point of Care   Date Value Ref Range Status   2020 2.7 (H) 0.86 - 1.14 Final     Comment:     This test is intended for monitoring Coumadin therapy.  Results are not   accurate in patients with prolonged INR due to factor deficiency.         ASSESSMENT / PLAN  INR assessment THER    Recheck INR In: 6 WEEKS    INR Location Clinic      Anticoagulation Summary  As of 2020    INR goal:   2.0-3.0   TTR:   74.6 % (1 y)   INR used for dosin.7 (2020)   Warfarin maintenance plan:   2.5 mg (5 mg x 0.5) every Mon, Wed, Fri; 5 mg (5 mg x 1) all other days   Full warfarin instructions:   2.5 mg every Mon, Wed, Fri; 5 mg all other days   Weekly warfarin total:   27.5 mg   No change documented:   Virginie, Kallie, RN   Plan last modified:   Edel Irvin RN (2019)   Next INR check:   3/31/2020   Priority:   Maintenance   Target end date:   Indefinite    Indications    Persistent atrial fibrillation [I48.19]  Long-term (current) use of  anticoagulants [Z79.01] [Z79.01]             Anticoagulation Episode Summary     INR check location:       Preferred lab:       Send INR reminders to:   [facility] ANTICOAG POOL    Comments:   12/19-Taking Advil 200mg daily-AM stiffness  COLONOSCOPY  9/27/19. Note to PCP re bridge      Anticoagulation Care Providers     Provider Role Specialty Phone number    CLAUDIO Crum MD [facility] [phone]            See the Encounter Report to view Anticoagulation Flowsheet and Dosing Calendar (Go to Encounters tab in chart review, and find the Anticoagulation Therapy Visit)        Kallie Rachel RN

## 2020-03-30 ENCOUNTER — TELEPHONE (OUTPATIENT)
Dept: ANTICOAGULATION | Facility: OTHER | Age: 65
End: 2020-03-30

## 2020-03-30 DIAGNOSIS — Z79.01 LONG TERM CURRENT USE OF ANTICOAGULANT THERAPY: ICD-10-CM

## 2020-03-30 DIAGNOSIS — I48.19 PERSISTENT ATRIAL FIBRILLATION (H): Primary | ICD-10-CM

## 2020-03-30 DIAGNOSIS — I48.91 ATRIAL FIBRILLATION WITH RAPID VENTRICULAR RESPONSE (H): ICD-10-CM

## 2020-03-31 ENCOUNTER — ANTICOAGULATION THERAPY VISIT (OUTPATIENT)
Dept: ANTICOAGULATION | Facility: OTHER | Age: 65
End: 2020-03-31
Attending: FAMILY MEDICINE
Payer: MEDICARE

## 2020-03-31 DIAGNOSIS — I48.19 PERSISTENT ATRIAL FIBRILLATION (H): ICD-10-CM

## 2020-03-31 DIAGNOSIS — Z79.01 LONG TERM CURRENT USE OF ANTICOAGULANT THERAPY: ICD-10-CM

## 2020-03-31 DIAGNOSIS — I48.91 ATRIAL FIBRILLATION WITH RAPID VENTRICULAR RESPONSE (H): ICD-10-CM

## 2020-03-31 LAB — INR PPP: 2.48 (ref 0.86–1.14)

## 2020-03-31 PROCEDURE — 85610 PROTHROMBIN TIME: CPT | Mod: ZL | Performed by: FAMILY MEDICINE

## 2020-03-31 PROCEDURE — 36415 COLL VENOUS BLD VENIPUNCTURE: CPT | Mod: ZL | Performed by: FAMILY MEDICINE

## 2020-03-31 NOTE — PROGRESS NOTES
ANTICOAGULATION FOLLOW-UP CLINIC VISIT    Patient Name:  Ramírez Hernández  Date:  3/31/2020  Contact Type:  Telephone    SUBJECTIVE:  Patient Findings     Comments:   INR done by lab. Call placed to patient and spoke to him re: INR result, warfarin dosing/INR recheck date. He verbalized understanding and has no questions. He has no bleeding/bruising, no new changes in diet/meds/activity.         Clinical Outcomes     Negatives:   Major bleeding event, Thromboembolic event, Anticoagulation-related hospital admission, Anticoagulation-related ED visit, Anticoagulation-related fatality    Comments:   INR done by lab. Call placed to patient and spoke to him re: INR result, warfarin dosing/INR recheck date. He verbalized understanding and has no questions. He has no bleeding/bruising, no new changes in diet/meds/activity.            OBJECTIVE    INR   Date Value Ref Range Status   2020 2.48 (H) 0.86 - 1.14 Final       ASSESSMENT / PLAN  INR assessment THER    Recheck INR In: 6 WEEKS    INR Location Clinic      Anticoagulation Summary  As of 3/31/2020    INR goal:   2.0-3.0   TTR:   80.9 % (1 y)   INR used for dosin.48 (3/31/2020)   Warfarin maintenance plan:   2.5 mg (5 mg x 0.5) every Mon, Wed, Fri; 5 mg (5 mg x 1) all other days   Full warfarin instructions:   2.5 mg every Mon, Wed, Fri; 5 mg all other days   Weekly warfarin total:   27.5 mg   No change documented:   Edel Irvin RN   Plan last modified:   Edel Irvin RN (2019)   Next INR check:   2020   Priority:   Maintenance   Target end date:   Indefinite    Indications    Persistent atrial fibrillation [I48.19]  Long-term (current) use of anticoagulants [Z79.01] [Z79.01]             Anticoagulation Episode Summary     INR check location:       Preferred lab:       Send INR reminders to:   [facility] ANTICOAG POOL    Comments:   -Taking Advil 200mg daily-AM stiffness  COLONOSCOPY  19. Note to PCP re bridge      Anticoagulation Care  Providers     Provider Role Specialty Phone number    CLAUDIO Crum MD [facility] [phone]            See the Encounter Report to view Anticoagulation Flowsheet and Dosing Calendar (Go to Encounters tab in chart review, and find the Anticoagulation Therapy Visit)        Edel Irvin RN

## 2020-04-27 DIAGNOSIS — I48.19 PERSISTENT ATRIAL FIBRILLATION (H): ICD-10-CM

## 2020-04-27 DIAGNOSIS — Z79.01 LONG TERM CURRENT USE OF ANTICOAGULANT THERAPY: ICD-10-CM

## 2020-04-27 RX ORDER — WARFARIN SODIUM 5 MG/1
TABLET ORAL
Qty: 90 TABLET | Refills: 1 | Status: SHIPPED | OUTPATIENT
Start: 2020-04-27 | End: 2020-10-20

## 2020-04-27 NOTE — TELEPHONE ENCOUNTER
Patient called stating he needs all his medications refilled. He states he was told to call for refills when he has 1 day left of medications.  I told him that the information he was given was incorrect and that he should call his pharmacy for refills when he has 1 week left of medications as the refill requests take a few days to get to the pharmacy.

## 2020-05-12 ENCOUNTER — ANTICOAGULATION THERAPY VISIT (OUTPATIENT)
Dept: ANTICOAGULATION | Facility: OTHER | Age: 65
End: 2020-05-12
Attending: FAMILY MEDICINE
Payer: MEDICARE

## 2020-05-12 DIAGNOSIS — Z79.01 LONG TERM CURRENT USE OF ANTICOAGULANT THERAPY: ICD-10-CM

## 2020-05-12 DIAGNOSIS — I48.19 PERSISTENT ATRIAL FIBRILLATION (H): ICD-10-CM

## 2020-05-12 LAB
CAPILLARY BLOOD COLLECTION: NORMAL
INR BLD: 3.2 (ref 0.86–1.14)

## 2020-05-12 PROCEDURE — 85610 PROTHROMBIN TIME: CPT | Mod: QW,ZL | Performed by: FAMILY MEDICINE

## 2020-05-12 PROCEDURE — 36416 COLLJ CAPILLARY BLOOD SPEC: CPT | Mod: ZL | Performed by: FAMILY MEDICINE

## 2020-05-12 NOTE — PROGRESS NOTES
ANTICOAGULATION FOLLOW-UP CLINIC VISIT    Patient Name:  Ramírez Hernández  Date:  5/12/2020  Contact Type:  Telephone    SUBJECTIVE:  Patient Findings     Positives:   Change in diet/appetite (decreased vit K intake)    Comments:   INR done by lab. Call placed to patient and spoke to him re: INR result, warfarin dosing/INR recheck date. He states his intake of vit K had been decreased, he will try to increase it some. No bleeding/bruising and no other changes in meds/activity. He verbalized understanding of instruction and has no questions. He will call warfarin clinic if any changes/issues/illness.         Clinical Outcomes     Negatives:   Major bleeding event, Thromboembolic event, Anticoagulation-related hospital admission, Anticoagulation-related ED visit, Anticoagulation-related fatality    Comments:   INR done by lab. Call placed to patient and spoke to him re: INR result, warfarin dosing/INR recheck date. He states his intake of vit K had been decreased, he will try to increase it some. No bleeding/bruising and no other changes in meds/activity. He verbalized understanding of instruction and has no questions. He will call warfarin clinic if any changes/issues/illness.            OBJECTIVE    Recent labs: (last 7 days)     05/12/20  0946   INR 3.2*       ASSESSMENT / PLAN  INR assessment SUPRA decreased vit K intake   Recheck INR In: 8 WEEKS    INR Location Clinic      Anticoagulation Summary  As of 5/12/2020    INR goal:   2.0-3.0   TTR:   79.7 % (1 y)   INR used for dosing:   3.2! (5/12/2020)   Warfarin maintenance plan:   2.5 mg (5 mg x 0.5) every Mon, Wed, Fri; 5 mg (5 mg x 1) all other days   Full warfarin instructions:   5/12: 2.5 mg; Otherwise 2.5 mg every Mon, Wed, Fri; 5 mg all other days   Weekly warfarin total:   27.5 mg   Plan last modified:   Edel Irvin RN (8/23/2019)   Next INR check:   7/7/2020   Priority:   Maintenance   Target end date:   Indefinite    Indications    Persistent atrial  fibrillation [I48.19]  Long-term (current) use of anticoagulants [Z79.01] [Z79.01]             Anticoagulation Episode Summary     INR check location:       Preferred lab:       Send INR reminders to:   MADDIE CLAUDIO    Comments:   12/19-Taking Advil 200mg daily-AM stiffness  COLONOSCOPY  9/27/19. Note to PCP re bridge      Anticoagulation Care Providers     Provider Role Specialty Phone number    Aron Delvalle MD Referring [facility] [phone]    CLAUDIO Crum MD Responsible [facility] [phone]            See the Encounter Report to view Anticoagulation Flowsheet and Dosing Calendar (Go to Encounters tab in chart review, and find the Anticoagulation Therapy Visit)        Edel Irvin RN

## 2020-07-07 ENCOUNTER — ANTICOAGULATION THERAPY VISIT (OUTPATIENT)
Dept: ANTICOAGULATION | Facility: OTHER | Age: 65
End: 2020-07-07
Attending: FAMILY MEDICINE
Payer: MEDICARE

## 2020-07-07 DIAGNOSIS — Z79.01 LONG TERM CURRENT USE OF ANTICOAGULANT THERAPY: ICD-10-CM

## 2020-07-07 DIAGNOSIS — I48.19 PERSISTENT ATRIAL FIBRILLATION (H): ICD-10-CM

## 2020-07-07 LAB — INR BLD: 4.2 (ref 0.86–1.14)

## 2020-07-07 PROCEDURE — 85610 PROTHROMBIN TIME: CPT | Mod: QW,ZL | Performed by: FAMILY MEDICINE

## 2020-07-07 PROCEDURE — 36416 COLLJ CAPILLARY BLOOD SPEC: CPT | Mod: ZL | Performed by: FAMILY MEDICINE

## 2020-07-07 NOTE — PROGRESS NOTES
ANTICOAGULATION FOLLOW-UP CLINIC VISIT    Patient Name:  Ramírez Hernández  Date:  2020  Contact Type:  Telephone    SUBJECTIVE:  Patient Findings     Positives:   Change in diet/appetite (decrease in vit k intake )    Comments:   Received INR results from lab. Call placed to patient and spoke to patient re: INR results, warfarin dosing/INR recheck date. Patient to return call to warfarin clinic with changes in bruising/bleeding, new changes in diet/meds/activity or questions.  Patient verbalized understanding.         Clinical Outcomes     Negatives:   Major bleeding event, Thromboembolic event, Anticoagulation-related hospital admission, Anticoagulation-related ED visit, Anticoagulation-related fatality    Comments:   Received INR results from lab. Call placed to patient and spoke to patient re: INR results, warfarin dosing/INR recheck date. Patient to return call to warfarin clinic with changes in bruising/bleeding, new changes in diet/meds/activity or questions.  Patient verbalized understanding.            OBJECTIVE    Recent labs: (last 7 days)     20  0940   INR 4.2*       ASSESSMENT / PLAN  INR assessment SUPRA decreased vit k intake   Recheck INR In: 2 WEEKS    INR Location Clinic      Anticoagulation Summary  As of 2020    INR goal:   2.0-3.0   TTR:   64.3 % (1 y)   INR used for dosin.2! (2020)   Warfarin maintenance plan:   2.5 mg (5 mg x 0.5) every Mon, Wed, Fri; 5 mg (5 mg x 1) all other days   Full warfarin instructions:   : Hold; Otherwise 2.5 mg every Mon, Wed, Fri; 5 mg all other days   Weekly warfarin total:   27.5 mg   Plan last modified:   Edel Irvin RN (2019)   Next INR check:   2020   Priority:   High   Target end date:   Indefinite    Indications    Persistent atrial fibrillation (H) [I48.19]  Long-term (current) use of anticoagulants [Z79.01] [Z79.01]             Anticoagulation Episode Summary     INR check location:       Preferred lab:       Send  INR reminders to:   MADDIE CLAUDIO    Comments:   12/19-Taking Advil 200mg daily-AM stiffness  COLONOSCOPY  9/27/19. Note to PCP re bridge      Anticoagulation Care Providers     Provider Role Specialty Phone number    Aron Delvalle MD Referring [facility] [phone]    CLAUDIO Crum MD Responsible [facility] [phone]            See the Encounter Report to view Anticoagulation Flowsheet and Dosing Calendar (Go to Encounters tab in chart review, and find the Anticoagulation Therapy Visit)        Kallie Rachel RN

## 2020-07-21 ENCOUNTER — ANTICOAGULATION THERAPY VISIT (OUTPATIENT)
Dept: ANTICOAGULATION | Facility: OTHER | Age: 65
End: 2020-07-21
Attending: FAMILY MEDICINE
Payer: MEDICARE

## 2020-07-21 DIAGNOSIS — Z79.01 LONG TERM CURRENT USE OF ANTICOAGULANT THERAPY: ICD-10-CM

## 2020-07-21 DIAGNOSIS — I48.19 PERSISTENT ATRIAL FIBRILLATION (H): ICD-10-CM

## 2020-07-21 LAB
CAPILLARY BLOOD COLLECTION: NORMAL
INR BLD: 3.3 (ref 0.86–1.14)

## 2020-07-21 PROCEDURE — 85610 PROTHROMBIN TIME: CPT | Mod: QW,ZL | Performed by: FAMILY MEDICINE

## 2020-07-21 PROCEDURE — 36416 COLLJ CAPILLARY BLOOD SPEC: CPT | Mod: ZL | Performed by: FAMILY MEDICINE

## 2020-07-21 NOTE — PROGRESS NOTES
ANTICOAGULATION FOLLOW-UP CLINIC VISIT    Patient Name:  Ramírez Hernández  Date:  7/21/2020  Contact Type:  Telephone    SUBJECTIVE:  Patient Findings     Comments:   INR done by [place] lab. Call placed to patient and spoke to him re: INR result, warfarin dosing/INR recheck date. He verbalized understanding and has no questions. He denies any bleeding/bruising and has no changes in diet/activity. States he is no longer taking the advil daily. He will call warfarin clinic if any changes/illness/questions.         Clinical Outcomes     Negatives:   Major bleeding event, Thromboembolic event, Anticoagulation-related hospital admission, Anticoagulation-related ED visit, Anticoagulation-related fatality    Comments:   INR done by [place] lab. Call placed to patient and spoke to him re: INR result, warfarin dosing/INR recheck date. He verbalized understanding and has no questions. He denies any bleeding/bruising and has no changes in diet/activity. States he is no longer taking the advil daily. He will call warfarin clinic if any changes/illness/questions.            OBJECTIVE    Recent labs: (last 7 days)     07/21/20  0944   INR 3.3*       ASSESSMENT / PLAN  INR assessment SUPRA    Recheck INR In: 4 WEEKS    INR Location Clinic      Anticoagulation Summary  As of 7/21/2020    INR goal:   2.0-3.0   TTR:   63.3 % (1 y)   INR used for dosing:   3.3! (7/21/2020)   Warfarin maintenance plan:   2.5 mg (5 mg x 0.5) every Mon, Wed, Fri; 5 mg (5 mg x 1) all other days   Full warfarin instructions:   7/21: 2.5 mg; Otherwise 2.5 mg every Mon, Wed, Fri; 5 mg all other days   Weekly warfarin total:   27.5 mg   Plan last modified:   Edel Irvin RN (8/23/2019)   Next INR check:   8/18/2020   Priority:   High   Target end date:   Indefinite    Indications    Persistent atrial fibrillation (H) [I48.19]  Long-term (current) use of anticoagulants [Z79.01] [Z79.01]             Anticoagulation Episode Summary     INR check location:        Preferred lab:       Send INR reminders to:   MADDIE CLAUDIO    Comments:   12/19-Taking Advil 200mg daily-AM stiffness        Anticoagulation Care Providers     Provider Role Specialty Phone number    Aron Delvalle MD Referring [facility] [phone]    CLAUDIO Crum MD Responsible [facility] [phone]            See the Encounter Report to view Anticoagulation Flowsheet and Dosing Calendar (Go to Encounters tab in chart review, and find the Anticoagulation Therapy Visit)        Edel Irvin RN

## 2020-08-18 ENCOUNTER — ANTICOAGULATION THERAPY VISIT (OUTPATIENT)
Dept: ANTICOAGULATION | Facility: OTHER | Age: 65
End: 2020-08-18
Attending: FAMILY MEDICINE
Payer: MEDICARE

## 2020-08-18 DIAGNOSIS — Z79.01 LONG TERM CURRENT USE OF ANTICOAGULANT THERAPY: ICD-10-CM

## 2020-08-18 DIAGNOSIS — I48.19 PERSISTENT ATRIAL FIBRILLATION (H): ICD-10-CM

## 2020-08-18 LAB
CAPILLARY BLOOD COLLECTION: NORMAL
INR BLD: 3.8 (ref 0.86–1.14)

## 2020-08-18 PROCEDURE — 85610 PROTHROMBIN TIME: CPT | Mod: QW,ZL | Performed by: FAMILY MEDICINE

## 2020-08-18 PROCEDURE — 36416 COLLJ CAPILLARY BLOOD SPEC: CPT | Mod: ZL | Performed by: FAMILY MEDICINE

## 2020-08-18 NOTE — PROGRESS NOTES
ANTICOAGULATION FOLLOW-UP CLINIC VISIT    Patient Name:  Ramírez Hernández  Date:  8/18/2020  Contact Type:  Telephone    SUBJECTIVE:  Patient Findings     Positives:   Change in alcohol use (increased ETOH use)    Comments:   INR done by lab. Call placed to patient and spoke to him re: INR result, warfarin dosing/INR recheck date. He verbalized understanding and has no questions. He states his ETOH use is a lot more than usual (and it has been for many weeks). He denies any bleeding/bruising and no changes in diet/meds/activity. He will call warfarin clinic if any changes/issue/illness        Clinical Outcomes     Negatives:   Major bleeding event, Thromboembolic event, Anticoagulation-related hospital admission, Anticoagulation-related ED visit, Anticoagulation-related fatality    Comments:   INR done by lab. Call placed to patient and spoke to him re: INR result, warfarin dosing/INR recheck date. He verbalized understanding and has no questions. He states his ETOH use is a lot more than usual (and it has been for many weeks). He denies any bleeding/bruising and no changes in diet/meds/activity. He will call warfarin clinic if any changes/issue/illness           OBJECTIVE    Recent labs: (last 7 days)     08/18/20  0952   INR 3.8*       ASSESSMENT / PLAN  INR assessment SUPRA increase in ETOH use   Recheck INR In: 2 WEEKS    INR Location Clinic      Anticoagulation Summary  As of 8/18/2020    INR goal:   2.0-3.0   TTR:   63.3 % (1 y)   INR used for dosing:   3.8! (8/18/2020)   Warfarin maintenance plan:   5 mg (5 mg x 1) every Mon, Fri; 2.5 mg (5 mg x 0.5) all other days   Full warfarin instructions:   8/18: Hold; 8/19: Hold; Otherwise 5 mg every Mon, Fri; 2.5 mg all other days   Weekly warfarin total:   22.5 mg   Plan last modified:   Edel Irvin RN (8/18/2020)   Next INR check:   9/1/2020   Priority:   High   Target end date:   Indefinite    Indications    Persistent atrial fibrillation (H)  [I48.19]  Long-term (current) use of anticoagulants [Z79.01] [Z79.01]             Anticoagulation Episode Summary     INR check location:       Preferred lab:       Send INR reminders to:   MADDIE CLAUDIO    Comments:   12/19-Taking Advil 200mg daily-AM stiffness        Anticoagulation Care Providers     Provider Role Specialty Phone number    Aron Delvalle MD Referring [facility] [phone]    CLAUDIO Crum MD Responsible [facility] [phone]            See the Encounter Report to view Anticoagulation Flowsheet and Dosing Calendar (Go to Encounters tab in chart review, and find the Anticoagulation Therapy Visit)        Edel Irvin RN

## 2020-09-01 ENCOUNTER — ANTICOAGULATION THERAPY VISIT (OUTPATIENT)
Dept: ANTICOAGULATION | Facility: OTHER | Age: 65
End: 2020-09-01
Attending: FAMILY MEDICINE
Payer: MEDICARE

## 2020-09-01 DIAGNOSIS — I48.19 PERSISTENT ATRIAL FIBRILLATION (H): ICD-10-CM

## 2020-09-01 DIAGNOSIS — Z79.01 LONG TERM CURRENT USE OF ANTICOAGULANT THERAPY: ICD-10-CM

## 2020-09-01 LAB
CAPILLARY BLOOD COLLECTION: NORMAL
INR BLD: 3.4 (ref 0.86–1.14)

## 2020-09-01 PROCEDURE — 36416 COLLJ CAPILLARY BLOOD SPEC: CPT | Mod: ZL | Performed by: FAMILY MEDICINE

## 2020-09-01 PROCEDURE — 85610 PROTHROMBIN TIME: CPT | Mod: QW,ZL | Performed by: FAMILY MEDICINE

## 2020-09-01 NOTE — PROGRESS NOTES
ANTICOAGULATION FOLLOW-UP CLINIC VISIT    Patient Name:  Ramírez Hernández  Date:  9/1/2020  Contact Type:  Telephone    SUBJECTIVE:  Patient Findings     Positives:   Extra doses (patient not taking correct dosing)    Comments:   INR done by lab. Call placed to patient and spoke to him re: INR result, warfarin dosing/INR recheck date. In talking to patient he was telling me what doses he took and he was taking too much warfarin. He has no changes in diet/meds/activity. ETOH intake unchanged. He verbalized understanding of warfarin dosing and repeated it back accurately. He will call warfarin clinic if he has any changes/issues/illness or questions.         Clinical Outcomes     Negatives:   Major bleeding event, Thromboembolic event, Anticoagulation-related hospital admission, Anticoagulation-related ED visit, Anticoagulation-related fatality    Comments:   INR done by lab. Call placed to patient and spoke to him re: INR result, warfarin dosing/INR recheck date. In talking to patient he was telling me what doses he took and he was taking too much warfarin. He has no changes in diet/meds/activity. ETOH intake unchanged. He verbalized understanding of warfarin dosing and repeated it back accurately. He will call warfarin clinic if he has any changes/issues/illness or questions.            OBJECTIVE    Recent labs: (last 7 days)     09/01/20  0948   INR 3.4*       ASSESSMENT / PLAN  INR assessment SUPRA taking wrong dose of warfarin, had increased his dose   Recheck INR In: 2 WEEKS    INR Location Clinic      Anticoagulation Summary  As of 9/1/2020    INR goal:   2.0-3.0   TTR:   62.8 % (1 y)   INR used for dosing:   3.4! (9/1/2020)   Warfarin maintenance plan:   5 mg (5 mg x 1) every Mon, Fri; 2.5 mg (5 mg x 0.5) all other days   Full warfarin instructions:   9/1: Hold; 9/2: Hold; Otherwise 5 mg every Mon, Fri; 2.5 mg all other days   Weekly warfarin total:   22.5 mg   Plan last modified:   Edel Irvin RN  (9/1/2020)   Next INR check:   9/15/2020   Priority:   High   Target end date:   Indefinite    Indications    Persistent atrial fibrillation (H) [I48.19]  Long-term (current) use of anticoagulants [Z79.01] [Z79.01]             Anticoagulation Episode Summary     INR check location:       Preferred lab:       Send INR reminders to:   MADDIE CLAUDIO    Comments:   12/19-Taking Advil 200mg daily-AM stiffness        Anticoagulation Care Providers     Provider Role Specialty Phone number    Aron Delvalle MD Referring [facility] [phone]    CLAUDIO Crum MD Responsible [facility] [phone]            See the Encounter Report to view Anticoagulation Flowsheet and Dosing Calendar (Go to Encounters tab in chart review, and find the Anticoagulation Therapy Visit)        Edel Irvin RN

## 2020-09-23 ENCOUNTER — ANTICOAGULATION THERAPY VISIT (OUTPATIENT)
Dept: ANTICOAGULATION | Facility: OTHER | Age: 65
End: 2020-09-23
Attending: FAMILY MEDICINE
Payer: MEDICARE

## 2020-09-23 DIAGNOSIS — I48.19 PERSISTENT ATRIAL FIBRILLATION (H): ICD-10-CM

## 2020-09-23 DIAGNOSIS — Z79.01 LONG TERM CURRENT USE OF ANTICOAGULANT THERAPY: ICD-10-CM

## 2020-09-23 LAB
CAPILLARY BLOOD COLLECTION: NORMAL
INR BLD: 2 (ref 0.86–1.14)

## 2020-09-23 PROCEDURE — 36416 COLLJ CAPILLARY BLOOD SPEC: CPT | Mod: ZL | Performed by: FAMILY MEDICINE

## 2020-09-23 PROCEDURE — 85610 PROTHROMBIN TIME: CPT | Mod: QW,ZL | Performed by: FAMILY MEDICINE

## 2020-09-23 NOTE — PROGRESS NOTES
ANTICOAGULATION FOLLOW-UP CLINIC VISIT    Patient Name:  Ramírez Hernández  Date:  2020  Contact Type:  Telephone    SUBJECTIVE:  Patient Findings     Positives:   Change in alcohol use (decreased), Change in diet/appetite (increased vit K intake)    Comments:   INR done by lab. Call placed to patient and spoke to him re: INR result, warfarin dosing/INR recheck date. He verbalized understanding and has no questions. He denies any bleeding/bruising. States he increased vit K intake and also had decreased ETOH intake. No new changes in activity/meds. He will call warfarin clinic if any changes/issues/illness        Clinical Outcomes     Negatives:   Major bleeding event, Thromboembolic event, Anticoagulation-related hospital admission, Anticoagulation-related ED visit, Anticoagulation-related fatality    Comments:   INR done by lab. Call placed to patient and spoke to him re: INR result, warfarin dosing/INR recheck date. He verbalized understanding and has no questions. He denies any bleeding/bruising. States he increased vit K intake and also had decreased ETOH intake. No new changes in activity/meds. He will call warfarin clinic if any changes/issues/illness           OBJECTIVE    Recent labs: (last 7 days)     20  0834   INR 2.0*       ASSESSMENT / PLAN  INR assessment THER    Recheck INR In: 3 WEEKS    INR Location Clinic      Anticoagulation Summary  As of 2020    INR goal:   2.0-3.0   TTR:   62.6 % (1 y)   INR used for dosin.0 (2020)   Warfarin maintenance plan:   5 mg (5 mg x 1) every Mon, Fri; 2.5 mg (5 mg x 0.5) all other days   Full warfarin instructions:   5 mg every Mon, Fri; 2.5 mg all other days   Weekly warfarin total:   22.5 mg   No change documented:   Edel Irvin, RN   Plan last modified:   Edel Irvin RN (2020)   Next INR check:   10/14/2020   Priority:   High   Target end date:   Indefinite    Indications    Persistent atrial fibrillation (H) [I48.19]  Long-term  (current) use of anticoagulants [Z79.01] [Z79.01]             Anticoagulation Episode Summary     INR check location:       Preferred lab:       Send INR reminders to:   MADDIE CLAUDIO    Comments:   12/19-Taking Advil 200mg daily-AM stiffness        Anticoagulation Care Providers     Provider Role Specialty Phone number    Aron Delvalle MD Referring [facility] [phone]    CLAUDIO Crum MD Responsible [facility] [phone]            See the Encounter Report to view Anticoagulation Flowsheet and Dosing Calendar (Go to Encounters tab in chart review, and find the Anticoagulation Therapy Visit)        Edel Irvin RN

## 2020-10-14 ENCOUNTER — ANTICOAGULATION THERAPY VISIT (OUTPATIENT)
Dept: ANTICOAGULATION | Facility: OTHER | Age: 65
End: 2020-10-14
Attending: FAMILY MEDICINE
Payer: MEDICARE

## 2020-10-14 DIAGNOSIS — Z79.01 LONG TERM CURRENT USE OF ANTICOAGULANT THERAPY: ICD-10-CM

## 2020-10-14 DIAGNOSIS — I48.19 PERSISTENT ATRIAL FIBRILLATION (H): ICD-10-CM

## 2020-10-14 LAB
CAPILLARY BLOOD COLLECTION: NORMAL
INR BLD: 2 (ref 0.86–1.14)

## 2020-10-14 PROCEDURE — 36416 COLLJ CAPILLARY BLOOD SPEC: CPT | Mod: ZL | Performed by: FAMILY MEDICINE

## 2020-10-14 PROCEDURE — 85610 PROTHROMBIN TIME: CPT | Mod: ZL | Performed by: FAMILY MEDICINE

## 2020-10-14 NOTE — PROGRESS NOTES
ANTICOAGULATION FOLLOW-UP CLINIC VISIT    Patient Name:  Ramírez Hernández  Date:  10/14/2020  Contact Type:  Telephone    SUBJECTIVE:  Patient Findings     Comments:  INR done by lab. Call placed to patient home phone and spoke to patient re: INR result, warfarin dosing/INR recheck date. He verbalized understanding and has no questions. He has no bleeding/bruising and no new changes in diet/meds/activity. He will call warfarin clinic if any changes/issues/illness        Clinical Outcomes     Negatives:  Major bleeding event, Thromboembolic event, Anticoagulation-related hospital admission, Anticoagulation-related ED visit, Anticoagulation-related fatality    Comments:  INR done by lab. Call placed to patient home phone and spoke to patient re: INR result, warfarin dosing/INR recheck date. He verbalized understanding and has no questions. He has no bleeding/bruising and no new changes in diet/meds/activity. He will call warfarin clinic if any changes/issues/illness           OBJECTIVE    Recent labs: (last 7 days)     10/14/20  0846   INR 2.0*       ASSESSMENT / PLAN  INR assessment THER    Recheck INR In: 6 WEEKS    INR Location Clinic      Anticoagulation Summary  As of 10/14/2020    INR goal:  2.0-3.0   TTR:  64.4 % (1 y)   INR used for dosin.0 (10/14/2020)   Warfarin maintenance plan:  5 mg (5 mg x 1) every Mon, Fri; 2.5 mg (5 mg x 0.5) all other days   Full warfarin instructions:  5 mg every Mon, Fri; 2.5 mg all other days   Weekly warfarin total:  22.5 mg   No change documented:  Edel Irvin RN   Plan last modified:  Edel Irvin RN (2020)   Next INR check:  2020   Priority:  High   Target end date:  Indefinite    Indications    Persistent atrial fibrillation (H) [I48.19]  Long-term (current) use of anticoagulants [Z79.01] [Z79.01]             Anticoagulation Episode Summary     INR check location:      Preferred lab:      Send INR reminders to:  MADDIE CLAUDIO    Comments:  -Taking  Advil 200mg daily-AM stiffness        Anticoagulation Care Providers     Provider Role Specialty Phone number    Aron Delvalle MD Referring [facility] [phone]    CLAUDIO Crum MD Responsible [facility] [phone]            See the Encounter Report to view Anticoagulation Flowsheet and Dosing Calendar (Go to Encounters tab in chart review, and find the Anticoagulation Therapy Visit)        Edel Irvin RN

## 2020-10-20 DIAGNOSIS — Z79.01 LONG TERM CURRENT USE OF ANTICOAGULANT THERAPY: ICD-10-CM

## 2020-10-20 DIAGNOSIS — I48.19 PERSISTENT ATRIAL FIBRILLATION (H): ICD-10-CM

## 2020-10-20 RX ORDER — WARFARIN SODIUM 5 MG/1
TABLET ORAL
Qty: 75 TABLET | Refills: 2 | Status: SHIPPED | OUTPATIENT
Start: 2020-10-20 | End: 2021-01-25

## 2020-10-20 NOTE — TELEPHONE ENCOUNTER
Call from patient requesting warfarin refill. Warfarin refill will be done. Told patient to call his pharmacy to get others refilled.

## 2020-11-25 ENCOUNTER — ANTICOAGULATION THERAPY VISIT (OUTPATIENT)
Dept: ANTICOAGULATION | Facility: OTHER | Age: 65
End: 2020-11-25
Attending: FAMILY MEDICINE
Payer: MEDICARE

## 2020-11-25 DIAGNOSIS — Z79.01 LONG TERM CURRENT USE OF ANTICOAGULANT THERAPY: ICD-10-CM

## 2020-11-25 DIAGNOSIS — I48.19 PERSISTENT ATRIAL FIBRILLATION (H): ICD-10-CM

## 2020-11-25 LAB
CAPILLARY BLOOD COLLECTION: NORMAL
INR BLD: 2.5 (ref 0.86–1.14)

## 2020-11-25 PROCEDURE — 36416 COLLJ CAPILLARY BLOOD SPEC: CPT | Mod: ZL | Performed by: FAMILY MEDICINE

## 2020-11-25 PROCEDURE — 85610 PROTHROMBIN TIME: CPT | Mod: ZL | Performed by: FAMILY MEDICINE

## 2020-11-25 NOTE — PROGRESS NOTES
ANTICOAGULATION FOLLOW-UP CLINIC VISIT    Patient Name:  Ramírez Hernández  Date:  2020  Contact Type:  Telephone    SUBJECTIVE:  Patient Findings     Comments:  INR done by lab. Call placed to pt and spoke to him re: INR results/Warfarin dosing/INR recheck date. Pt verbalized understanding of instructions. He denies any bleeding/bruising or any other new changes in diet/meds/activity. He is to call Warfarin clinic if any questions/illness.         Clinical Outcomes     Negatives:  Major bleeding event, Thromboembolic event, Anticoagulation-related hospital admission, Anticoagulation-related ED visit, Anticoagulation-related fatality    Comments:  INR done by lab. Call placed to pt and spoke to him re: INR results/Warfarin dosing/INR recheck date. Pt verbalized understanding of instructions. He denies any bleeding/bruising or any other new changes in diet/meds/activity. He is to call Warfarin clinic if any questions/illness.            OBJECTIVE    Recent labs: (last 7 days)     20  0944   INR 2.5*       ASSESSMENT / PLAN  INR assessment THER    Recheck INR In: 8 WEEKS    INR Location Clinic      Anticoagulation Summary  As of 2020    INR goal:  2.0-3.0   TTR:  64.4 % (1 y)   INR used for dosin.5 (2020)   Warfarin maintenance plan:  5 mg (5 mg x 1) every Mon, Fri; 2.5 mg (5 mg x 0.5) all other days   Full warfarin instructions:  5 mg every Mon, Fri; 2.5 mg all other days   Weekly warfarin total:  22.5 mg   No change documented:  Penelope Rascon RN   Plan last modified:  Edel Irvin RN (2020)   Next INR check:  2021   Priority:  High   Target end date:  Indefinite    Indications    Persistent atrial fibrillation (H) [I48.19]  Long-term (current) use of anticoagulants [Z79.01] [Z79.01]             Anticoagulation Episode Summary     INR check location:      Preferred lab:      Send INR reminders to:  MADDIE CLAUDIO    Comments:  -Taking Advil 200mg daily-AM  stiffness        Anticoagulation Care Providers     Provider Role Specialty Phone number    Aron Delvalle MD Referring Family Medicine [phone]    CLAUDIO Crum MD Responsible Family Medicine [phone]            See the Encounter Report to view Anticoagulation Flowsheet and Dosing Calendar (Go to Encounters tab in chart review, and find the Anticoagulation Therapy Visit)        Penelope Rascon RN

## 2021-01-20 ENCOUNTER — ANTICOAGULATION THERAPY VISIT (OUTPATIENT)
Dept: ANTICOAGULATION | Facility: OTHER | Age: 66
End: 2021-01-20
Attending: FAMILY MEDICINE
Payer: MEDICARE

## 2021-01-20 DIAGNOSIS — Z79.01 LONG TERM CURRENT USE OF ANTICOAGULANT THERAPY: ICD-10-CM

## 2021-01-20 DIAGNOSIS — I48.19 PERSISTENT ATRIAL FIBRILLATION (H): ICD-10-CM

## 2021-01-20 LAB
CAPILLARY BLOOD COLLECTION: NORMAL
INR BLD: 3.3 (ref 0.86–1.14)

## 2021-01-20 PROCEDURE — 36416 COLLJ CAPILLARY BLOOD SPEC: CPT | Mod: ZL | Performed by: FAMILY MEDICINE

## 2021-01-20 PROCEDURE — 85610 PROTHROMBIN TIME: CPT | Mod: ZL | Performed by: FAMILY MEDICINE

## 2021-01-20 NOTE — PROGRESS NOTES
ANTICOAGULATION FOLLOW-UP CLINIC VISIT    Patient Name:  Ramírez Hernández  Date:  1/20/2021  Contact Type:  Telephone    SUBJECTIVE:  Patient Findings     Positives:  Change in diet/appetite (No vit k intake)    Comments:  INR done by lab. Call placed to pt and spoke to pt re: INR results/Warfarin dosing/INR recheck date. Pt states that he has not been eating any vegetables- mainly meat and potatoes. Encouraged to increase his Vit K intake. Pt denied any bleeding/bruising or any other new changes to their diet/meds/activity. Pt verbalized understanding of all instructions. Pt is to call Warfarin Clinic if any questions/issues/illness.           Clinical Outcomes     Negatives:  Major bleeding event, Thromboembolic event, Anticoagulation-related hospital admission, Anticoagulation-related ED visit, Anticoagulation-related fatality    Comments:  INR done by lab. Call placed to pt and spoke to pt re: INR results/Warfarin dosing/INR recheck date. Pt states that he has not been eating any vegetables- mainly meat and potatoes. Encouraged to increase his Vit K intake. Pt denied any bleeding/bruising or any other new changes to their diet/meds/activity. Pt verbalized understanding of all instructions. Pt is to call Warfarin Clinic if any questions/issues/illness.              OBJECTIVE    Recent labs: (last 7 days)     01/20/21  0957   INR 3.3*       ASSESSMENT / PLAN  INR assessment SUPRA    Recheck INR In: 3 WEEKS    INR Location Clinic      Anticoagulation Summary  As of 1/20/2021    INR goal:  2.0-3.0   TTR:  58.7 % (1 y)   INR used for dosing:  3.3 (1/20/2021)   Warfarin maintenance plan:  5 mg (5 mg x 1) every Mon, Fri; 2.5 mg (5 mg x 0.5) all other days   Full warfarin instructions:  1/20: Hold; Otherwise 5 mg every Mon, Fri; 2.5 mg all other days   Weekly warfarin total:  22.5 mg   Plan last modified:  Edel Irvin RN (9/1/2020)   Next INR check:  2/10/2021   Priority:  High   Target end date:  Indefinite     Indications    Persistent atrial fibrillation (H) [I48.19]  Long-term (current) use of anticoagulants [Z79.01] [Z79.01]             Anticoagulation Episode Summary     INR check location:      Preferred lab:      Send INR reminders to:  MADDIE CLAUDIO    Comments:  12/19-Taking Advil 200mg daily-AM stiffness        Anticoagulation Care Providers     Provider Role Specialty Phone number    Aron Delvalle MD Referring Family Medicine [phone]    CLAUDIO Crum MD Responsible Family Medicine [phone]            See the Encounter Report to view Anticoagulation Flowsheet and Dosing Calendar (Go to Encounters tab in chart review, and find the Anticoagulation Therapy Visit)        Penelope Rascon RN

## 2021-01-25 RX ORDER — WARFARIN SODIUM 5 MG/1
TABLET ORAL
Qty: 75 TABLET | Refills: 2 | Status: ON HOLD | OUTPATIENT
Start: 2021-01-25 | End: 2021-10-25

## 2021-01-26 DIAGNOSIS — I50.21 ACUTE SYSTOLIC CONGESTIVE HEART FAILURE (H): ICD-10-CM

## 2021-01-26 DIAGNOSIS — I48.91 ATRIAL FIBRILLATION WITH RAPID VENTRICULAR RESPONSE (H): ICD-10-CM

## 2021-01-26 RX ORDER — LISINOPRIL 10 MG/1
10 TABLET ORAL DAILY
Qty: 90 TABLET | Refills: 3 | Status: SHIPPED | OUTPATIENT
Start: 2021-01-26 | End: 2022-01-01

## 2021-01-26 RX ORDER — METOPROLOL TARTRATE 50 MG
TABLET ORAL
Qty: 180 TABLET | Refills: 3 | Status: SHIPPED | OUTPATIENT
Start: 2021-01-26 | End: 2022-01-01

## 2021-02-10 ENCOUNTER — ANTICOAGULATION THERAPY VISIT (OUTPATIENT)
Dept: ANTICOAGULATION | Facility: OTHER | Age: 66
End: 2021-02-10
Attending: FAMILY MEDICINE
Payer: MEDICARE

## 2021-02-10 DIAGNOSIS — I48.19 PERSISTENT ATRIAL FIBRILLATION (H): ICD-10-CM

## 2021-02-10 DIAGNOSIS — Z79.01 LONG TERM CURRENT USE OF ANTICOAGULANT THERAPY: ICD-10-CM

## 2021-02-10 LAB
CAPILLARY BLOOD COLLECTION: NORMAL
INR BLD: 2.8 (ref 0.86–1.14)

## 2021-02-10 PROCEDURE — 85610 PROTHROMBIN TIME: CPT | Mod: ZL | Performed by: FAMILY MEDICINE

## 2021-02-10 PROCEDURE — 36416 COLLJ CAPILLARY BLOOD SPEC: CPT | Mod: ZL | Performed by: FAMILY MEDICINE

## 2021-02-10 NOTE — PROGRESS NOTES
ANTICOAGULATION FOLLOW-UP CLINIC VISIT    Patient Name:  Ramírez Hernández  Date:  2/10/2021  Contact Type:  Telephone    SUBJECTIVE:  Patient Findings     Comments:  INR done by lab. Call placed to pt and spoke to patient re: INR results/Warfarin dosing/INR recheck date. Pt denied any bleeding/bruising or any other new changes to their diet/meds/activity. Pt verbalized understanding of all instructions. Pt is to call Warfarin Clinic if any questions/issues/illness.           Clinical Outcomes     Negatives:  Major bleeding event, Thromboembolic event, Anticoagulation-related hospital admission, Anticoagulation-related ED visit, Anticoagulation-related fatality    Comments:  INR done by lab. Call placed to pt and spoke to patient re: INR results/Warfarin dosing/INR recheck date. Pt denied any bleeding/bruising or any other new changes to their diet/meds/activity. Pt verbalized understanding of all instructions. Pt is to call Warfarin Clinic if any questions/issues/illness.              OBJECTIVE    Recent labs: (last 7 days)     02/10/21  0946   INR 2.8*       ASSESSMENT / PLAN  INR assessment THER    Recheck INR In: 6 WEEKS    INR Location Clinic      Anticoagulation Summary  As of 2/10/2021    INR goal:  2.0-3.0   TTR:  55.2 % (1 y)   INR used for dosin.8 (2/10/2021)   Warfarin maintenance plan:  5 mg (5 mg x 1) every Mon, Fri; 2.5 mg (5 mg x 0.5) all other days   Full warfarin instructions:  5 mg every Mon, Fri; 2.5 mg all other days   Weekly warfarin total:  22.5 mg   No change documented:  Penelope Rascon RN   Plan last modified:  Edel Irvin RN (2020)   Next INR check:  3/24/2021   Priority:  High   Target end date:  Indefinite    Indications    Persistent atrial fibrillation (H) [I48.19]  Long-term (current) use of anticoagulants [Z79.01] [Z79.01]             Anticoagulation Episode Summary     INR check location:      Preferred lab:      Send INR reminders to:  MADDIE CLAUDIO    Comments:   12/19-Taking Advil 200mg daily-AM stiffness        Anticoagulation Care Providers     Provider Role Specialty Phone number    Aron Delvalle MD Referring Family Medicine [phone]    CLAUDIO Crum MD Responsible Family Medicine [phone]            See the Encounter Report to view Anticoagulation Flowsheet and Dosing Calendar (Go to Encounters tab in chart review, and find the Anticoagulation Therapy Visit)        Penelope Rascon RN

## 2021-03-24 ENCOUNTER — ANTICOAGULATION THERAPY VISIT (OUTPATIENT)
Dept: ANTICOAGULATION | Facility: OTHER | Age: 66
End: 2021-03-24
Attending: FAMILY MEDICINE
Payer: MEDICARE

## 2021-03-24 DIAGNOSIS — Z79.01 LONG TERM CURRENT USE OF ANTICOAGULANT THERAPY: ICD-10-CM

## 2021-03-24 DIAGNOSIS — I48.19 PERSISTENT ATRIAL FIBRILLATION (H): ICD-10-CM

## 2021-03-24 LAB
CAPILLARY BLOOD COLLECTION: NORMAL
INR BLD: 2.8 (ref 0.86–1.14)

## 2021-03-24 PROCEDURE — 36416 COLLJ CAPILLARY BLOOD SPEC: CPT | Mod: ZL | Performed by: FAMILY MEDICINE

## 2021-03-24 PROCEDURE — 85610 PROTHROMBIN TIME: CPT | Mod: ZL | Performed by: FAMILY MEDICINE

## 2021-03-24 NOTE — PROGRESS NOTES
ANTICOAGULATION FOLLOW-UP CLINIC VISIT    Patient Name:  Ramírez Hernández  Date:  3/24/2021  Contact Type:  Telephone    SUBJECTIVE:  Patient Findings     Comments:  INR done by lab. Call placed to pt and spoke to patient re: INR results/Warfarin dosing/INR recheck date. Pt denied any bleeding/bruising or any other new changes to their diet/meds/activity. Pt verbalized understanding of all instructions. Pt is to call Warfarin Clinic if any questions/issues/illness.           Clinical Outcomes     Negatives:  Major bleeding event, Thromboembolic event, Anticoagulation-related hospital admission, Anticoagulation-related ED visit, Anticoagulation-related fatality    Comments:  INR done by lab. Call placed to pt and spoke to patient re: INR results/Warfarin dosing/INR recheck date. Pt denied any bleeding/bruising or any other new changes to their diet/meds/activity. Pt verbalized understanding of all instructions. Pt is to call Warfarin Clinic if any questions/issues/illness.              OBJECTIVE    Recent labs: (last 7 days)     21  0949   INR 2.8*       ASSESSMENT / PLAN  INR assessment THER    Recheck INR In: 6 WEEKS    INR Location Clinic      Anticoagulation Summary  As of 3/24/2021    INR goal:  2.0-3.0   TTR:  55.2 % (1 y)   INR used for dosin.8 (3/24/2021)   Warfarin maintenance plan:  5 mg (5 mg x 1) every Mon, Fri; 2.5 mg (5 mg x 0.5) all other days   Full warfarin instructions:  5 mg every Mon, Fri; 2.5 mg all other days   Weekly warfarin total:  22.5 mg   No change documented:  Penelope Rascon RN   Plan last modified:  Edel Irvin RN (2020)   Next INR check:  2021   Priority:  High   Target end date:  Indefinite    Indications    Persistent atrial fibrillation (H) [I48.19]  Long-term (current) use of anticoagulants [Z79.01] [Z79.01]             Anticoagulation Episode Summary     INR check location:      Preferred lab:      Send INR reminders to:  MADDIE CLAUDIO    Comments:   12/19-Taking Advil 200mg daily-AM stiffness        Anticoagulation Care Providers     Provider Role Specialty Phone number    Aron Delvalle MD Referring Family Medicine [phone]    CLAUDIO rCum MD Responsible Family Medicine [phone]            See the Encounter Report to view Anticoagulation Flowsheet and Dosing Calendar (Go to Encounters tab in chart review, and find the Anticoagulation Therapy Visit)        Penelope Rascon RN

## 2021-05-05 ENCOUNTER — ANTICOAGULATION THERAPY VISIT (OUTPATIENT)
Dept: ANTICOAGULATION | Facility: OTHER | Age: 66
End: 2021-05-05
Attending: FAMILY MEDICINE
Payer: MEDICARE

## 2021-05-05 DIAGNOSIS — Z79.01 LONG TERM CURRENT USE OF ANTICOAGULANT THERAPY: ICD-10-CM

## 2021-05-05 DIAGNOSIS — I48.19 PERSISTENT ATRIAL FIBRILLATION (H): ICD-10-CM

## 2021-05-05 LAB
CAPILLARY BLOOD COLLECTION: NORMAL
INR BLD: 4 (ref 0.86–1.14)

## 2021-05-05 PROCEDURE — 36416 COLLJ CAPILLARY BLOOD SPEC: CPT | Mod: ZL | Performed by: FAMILY MEDICINE

## 2021-05-05 PROCEDURE — 85610 PROTHROMBIN TIME: CPT | Mod: ZL | Performed by: FAMILY MEDICINE

## 2021-05-05 NOTE — PROGRESS NOTES
ANTICOAGULATION FOLLOW-UP CLINIC VISIT    Patient Name:  Ramírez Hernández  Date:  2021  Contact Type:  Telephone    SUBJECTIVE:  Patient Findings     Positives:  Change in alcohol use (Increased ETOH), Change in diet/appetite (Decreased Vit K)    Comments:  INR done by lab. Call placed to patient and spoke with him re: INR results/Warfarin dosing/INR recheck date. INR supra-therapeutic today- 4.0. He is to hold his Warfarin today and decrease his dose on . After Friday, he is to continue on his maintenance dose. He stated that he hasn't been eating greens and has had an increase in ETOH. He verbalized understanding of al instructions. He is to contact Warfarin Clinic with any unusual bleeding/bruising or any other new changes in his diet/meds/activity or any questions/issues/illness.        Clinical Outcomes     Negatives:  Major bleeding event, Thromboembolic event, Anticoagulation-related hospital admission, Anticoagulation-related ED visit, Anticoagulation-related fatality    Comments:  INR done by lab. Call placed to patient and spoke with him re: INR results/Warfarin dosing/INR recheck date. INR supra-therapeutic today- 4.0. He is to hold his Warfarin today and decrease his dose on . After Friday, he is to continue on his maintenance dose. He stated that he hasn't been eating greens and has had an increase in ETOH. He verbalized understanding of al instructions. He is to contact Warfarin Clinic with any unusual bleeding/bruising or any other new changes in his diet/meds/activity or any questions/issues/illness.           OBJECTIVE    Recent labs: (last 7 days)     21  0939   INR 4.0*       ASSESSMENT / PLAN  INR assessment SUPRA    Recheck INR In: 4 WEEKS    INR Location Clinic      Anticoagulation Summary  As of 2021    INR goal:  2.0-3.0   TTR:  46.8 % (1 y)   INR used for dosin.0 (2021)   Warfarin maintenance plan:  5 mg (5 mg x 1) every Mon, Fri; 2.5 mg (5 mg x 0.5) all other  days   Full warfarin instructions:  5/5: Hold; 5/7: 2.5 mg; Otherwise 5 mg every Mon, Fri; 2.5 mg all other days   Weekly warfarin total:  22.5 mg   Plan last modified:  Edel Irvin RN (9/1/2020)   Next INR check:  6/2/2021   Priority:  High   Target end date:  Indefinite    Indications    Persistent atrial fibrillation (H) [I48.19]  Long-term (current) use of anticoagulants [Z79.01] [Z79.01]             Anticoagulation Episode Summary     INR check location:      Preferred lab:      Send INR reminders to:  MADDIE CLAUDIO    Comments:  12/19-Taking Advil 200mg daily-AM stiffness        Anticoagulation Care Providers     Provider Role Specialty Phone number    Aron Delvalle MD Referring Family Medicine [phone]    CLAUDIO Crum MD Responsible Family Medicine [phone]            See the Encounter Report to view Anticoagulation Flowsheet and Dosing Calendar (Go to Encounters tab in chart review, and find the Anticoagulation Therapy Visit)        Penelope Rascon RN

## 2021-05-12 DIAGNOSIS — I50.21 ACUTE SYSTOLIC CONGESTIVE HEART FAILURE (H): ICD-10-CM

## 2021-05-12 RX ORDER — FUROSEMIDE 40 MG
40 TABLET ORAL DAILY
Qty: 90 TABLET | Refills: 3 | Status: ON HOLD | OUTPATIENT
Start: 2021-05-12 | End: 2021-10-29

## 2021-05-12 NOTE — TELEPHONE ENCOUNTER
lasix      Last Written Prescription Date:  1/27/20  Last Fill Quantity: 90,   # refills: 3  Last Office Visit: 9/16/19  Future Office visit:       Routing refill request to provider for review/approval because:

## 2021-06-02 ENCOUNTER — ANTICOAGULATION THERAPY VISIT (OUTPATIENT)
Dept: ANTICOAGULATION | Facility: OTHER | Age: 66
End: 2021-06-02
Attending: FAMILY MEDICINE
Payer: MEDICARE

## 2021-06-02 DIAGNOSIS — I48.19 PERSISTENT ATRIAL FIBRILLATION (H): ICD-10-CM

## 2021-06-02 DIAGNOSIS — Z79.01 LONG TERM CURRENT USE OF ANTICOAGULANT THERAPY: ICD-10-CM

## 2021-06-02 LAB
CAPILLARY BLOOD COLLECTION: NORMAL
INR BLD: 4 (ref 0.86–1.14)

## 2021-06-02 PROCEDURE — 36416 COLLJ CAPILLARY BLOOD SPEC: CPT | Mod: ZL | Performed by: FAMILY MEDICINE

## 2021-06-02 PROCEDURE — 85610 PROTHROMBIN TIME: CPT | Mod: ZL | Performed by: FAMILY MEDICINE

## 2021-06-02 NOTE — PROGRESS NOTES
ANTICOAGULATION FOLLOW-UP CLINIC VISIT    Patient Name:  Ramírez Hernández  Date:  6/2/2021  Contact Type:  Telephone    SUBJECTIVE:  Patient Findings     Positives:  Change in alcohol use (ETOH use), Change in diet/appetite (Decreased Vit K)    Comments:  INR done by lab. Call placed to patient and spoke with him re: INR results/Warfarin dosing/INR recheck date. INR supra-therapeutic today-4.0. He is to hold his Warfarin today. After today, a 11 % decrease was made to his maintenance dose. He stated that he has been eating greens, but not very much. He also stated daily ETOH use. Informed him that the ETOH will increase his INR and that he needs a dose adjustments. He verbalized understanding of all instructions. He is to contact Warfarin Clinic with any unusual bleeding/bruising or any other new changes to his diet/meds/activity or any questions/issues/illness.         Clinical Outcomes     Negatives:  Major bleeding event, Thromboembolic event, Anticoagulation-related hospital admission, Anticoagulation-related ED visit, Anticoagulation-related fatality    Comments:  INR done by lab. Call placed to patient and spoke with him re: INR results/Warfarin dosing/INR recheck date. INR supra-therapeutic today-4.0. He is to hold his Warfarin today. After today, a 11 % decrease was made to his maintenance dose. He stated that he has been eating greens, but not very much. He also stated daily ETOH use. Informed him that the ETOH will increase his INR and that he needs a dose adjustments. He verbalized understanding of all instructions. He is to contact Warfarin Clinic with any unusual bleeding/bruising or any other new changes to his diet/meds/activity or any questions/issues/illness.            OBJECTIVE    Recent labs: (last 7 days)     06/02/21  0925   INR 4.0*       ASSESSMENT / PLAN  INR assessment SUPRA    Recheck INR In: 2 WEEKS    INR Location Clinic      Anticoagulation Summary  As of 6/2/2021    INR goal:  2.0-3.0    TTR:  46.8 % (1 y)   INR used for dosin.0 (2021)   Warfarin maintenance plan:  5 mg (5 mg x 1) every Mon; 2.5 mg (5 mg x 0.5) all other days   Full warfarin instructions:  : Hold; Otherwise 5 mg every Mon; 2.5 mg all other days   Weekly warfarin total:  20 mg   Plan last modified:  Penelope Rascon RN (2021)   Next INR check:  2021   Priority:  High   Target end date:  Indefinite    Indications    Persistent atrial fibrillation (H) [I48.19]  Long-term (current) use of anticoagulants [Z79.01] [Z79.01]             Anticoagulation Episode Summary     INR check location:      Preferred lab:      Send INR reminders to:  MADDIE CLAUDIO    Comments:  -Taking Advil 200mg daily-AM stiffness        Anticoagulation Care Providers     Provider Role Specialty Phone number    Aron Delvalle MD Referring Family Medicine [phone]    CLAUDIO Crum MD Responsible Family Medicine [phone]            See the Encounter Report to view Anticoagulation Flowsheet and Dosing Calendar (Go to Encounters tab in chart review, and find the Anticoagulation Therapy Visit)        Penelope Rascon RN

## 2021-06-16 ENCOUNTER — ANTICOAGULATION THERAPY VISIT (OUTPATIENT)
Dept: ANTICOAGULATION | Facility: OTHER | Age: 66
End: 2021-06-16
Attending: FAMILY MEDICINE
Payer: MEDICARE

## 2021-06-16 DIAGNOSIS — Z79.01 LONG TERM CURRENT USE OF ANTICOAGULANT THERAPY: ICD-10-CM

## 2021-06-16 DIAGNOSIS — I48.19 PERSISTENT ATRIAL FIBRILLATION (H): ICD-10-CM

## 2021-06-16 LAB
CAPILLARY BLOOD COLLECTION: NORMAL
INR BLD: 2.8 (ref 0.86–1.14)

## 2021-06-16 PROCEDURE — 85610 PROTHROMBIN TIME: CPT | Mod: ZL | Performed by: FAMILY MEDICINE

## 2021-06-16 PROCEDURE — 36416 COLLJ CAPILLARY BLOOD SPEC: CPT | Mod: ZL | Performed by: FAMILY MEDICINE

## 2021-06-16 NOTE — PROGRESS NOTES
ANTICOAGULATION FOLLOW-UP CLINIC VISIT    Patient Name:  Ramírez Hernández  Date:  2021  Contact Type:  Telephone    SUBJECTIVE:  Patient Findings     Comments:  INR done by lab. Call placed to pt and spoke to him re: INR results/Warfarin dosing/INR recheck date. Pt denied any bleeding/bruising or any other new changes to their diet/meds/activity. INR therapeutic today- 2.8. He is to continue on his maintenance dose. Pt verbalized understanding of all instructions. Pt is to call Warfarin Clinic if any questions/issues/illness.           Clinical Outcomes     Negatives:  Major bleeding event, Thromboembolic event, Anticoagulation-related hospital admission, Anticoagulation-related ED visit, Anticoagulation-related fatality    Comments:  INR done by lab. Call placed to pt and spoke to him re: INR results/Warfarin dosing/INR recheck date. Pt denied any bleeding/bruising or any other new changes to their diet/meds/activity. INR therapeutic today- 2.8. He is to continue on his maintenance dose. Pt verbalized understanding of all instructions. Pt is to call Warfarin Clinic if any questions/issues/illness.              OBJECTIVE    Recent labs: (last 7 days)     21  1014   INR 2.8*       ASSESSMENT / PLAN  INR assessment THER    Recheck INR In: 4 WEEKS    INR Location Clinic      Anticoagulation Summary  As of 2021    INR goal:  2.0-3.0   TTR:  47.5 % (1 y)   INR used for dosin.8 (2021)   Warfarin maintenance plan:  5 mg (5 mg x 1) every Mon; 2.5 mg (5 mg x 0.5) all other days   Full warfarin instructions:  5 mg every Mon; 2.5 mg all other days   Weekly warfarin total:  20 mg   No change documented:  Penelope Rascon RN   Plan last modified:  Penelope Rascon RN (2021)   Next INR check:  2021   Priority:  Maintenance   Target end date:  Indefinite    Indications    Persistent atrial fibrillation (H) [I48.19]  Long-term (current) use of anticoagulants [Z79.01] [Z79.01]              Anticoagulation Episode Summary     INR check location:      Preferred lab:      Send INR reminders to:  MADDIE CLAUDIO    Comments:  12/19-Taking Advil 200mg daily-AM stiffness        Anticoagulation Care Providers     Provider Role Specialty Phone number    Aron Delvalle MD Referring Family Medicine [phone]    CLAUDIO Crum MD Responsible Family Medicine [phone]            See the Encounter Report to view Anticoagulation Flowsheet and Dosing Calendar (Go to Encounters tab in chart review, and find the Anticoagulation Therapy Visit)        Penelope Rascon RN

## 2021-07-14 ENCOUNTER — LAB (OUTPATIENT)
Dept: LAB | Facility: OTHER | Age: 66
End: 2021-07-14
Attending: FAMILY MEDICINE
Payer: MEDICARE

## 2021-07-14 ENCOUNTER — ANTICOAGULATION THERAPY VISIT (OUTPATIENT)
Dept: ANTICOAGULATION | Facility: OTHER | Age: 66
End: 2021-07-14
Attending: FAMILY MEDICINE
Payer: MEDICARE

## 2021-07-14 DIAGNOSIS — I48.19 PERSISTENT ATRIAL FIBRILLATION (H): ICD-10-CM

## 2021-07-14 DIAGNOSIS — Z79.01 LONG TERM CURRENT USE OF ANTICOAGULANT THERAPY: ICD-10-CM

## 2021-07-14 DIAGNOSIS — I48.19 PERSISTENT ATRIAL FIBRILLATION (H): Primary | ICD-10-CM

## 2021-07-14 LAB — INR BLD: 2.4 (ref 0.9–1.1)

## 2021-07-14 PROCEDURE — 85610 PROTHROMBIN TIME: CPT | Mod: ZL

## 2021-07-14 PROCEDURE — 36416 COLLJ CAPILLARY BLOOD SPEC: CPT | Mod: ZL

## 2021-07-14 NOTE — PROGRESS NOTES
ANTICOAGULATION MANAGEMENT     Ramírez Hernández 66 year old male is on warfarin with therapeutic INR result. (Goal INR 2.0-3.0)    Recent labs: (last 7 days)     07/14/21  0949   INR 2.4*       ASSESSMENT     Source(s): Patient/Caregiver Call       Warfarin doses taken: Warfarin taken as instructed    Diet: No new diet changes identified    New illness, injury, or hospitalization: No    Medication/supplement changes: None noted    Signs or symptoms of bleeding or clotting: No    Previous INR: Therapeutic last visit; previously outside of goal range    Additional findings: None     PLAN     Recommended plan for no diet, medication or health factor changes affecting INR     Dosing Instructions: Continue your current warfarin dose with next INR in 6 weeks       Summary  As of 7/14/2021    Full warfarin instructions:  5 mg every Mon; 2.5 mg all other days   Next INR check:  8/25/2021             Telephone call with Ramírez who verbalizes understanding and agrees to plan    Lab visit scheduled    Education provided: None required    Plan made per [facility] anticoagulation protocol    Penelope Rascon, RN  Anticoagulation Clinic  7/14/2021    _______________________________________________________________________     Anticoagulation Episode Summary     Current INR goal:  2.0-3.0   TTR:  55.1 % (1 y)   Target end date:  Indefinite   Send INR reminders to:  [facility]    Indications    Persistent atrial fibrillation (H) [I48.19]  Long-term (current) use of anticoagulants [Z79.01] [Z79.01]           Comments:  12/19-Taking Advil 200mg daily-AM stiffness           Anticoagulation Care Providers     Provider Role Specialty Phone number    Aron Delvalle MD Referring Family Medicine [phone]    CLAUDIO Crum MD Responsible Family Medicine [phone]

## 2021-08-25 ENCOUNTER — LAB (OUTPATIENT)
Dept: LAB | Facility: OTHER | Age: 66
End: 2021-08-25
Attending: FAMILY MEDICINE
Payer: MEDICARE

## 2021-08-25 ENCOUNTER — ANTICOAGULATION THERAPY VISIT (OUTPATIENT)
Dept: ANTICOAGULATION | Facility: OTHER | Age: 66
End: 2021-08-25
Attending: FAMILY MEDICINE
Payer: MEDICARE

## 2021-08-25 DIAGNOSIS — Z79.01 LONG TERM CURRENT USE OF ANTICOAGULANT THERAPY: ICD-10-CM

## 2021-08-25 DIAGNOSIS — I48.19 PERSISTENT ATRIAL FIBRILLATION (H): Primary | ICD-10-CM

## 2021-08-25 DIAGNOSIS — I48.19 PERSISTENT ATRIAL FIBRILLATION (H): ICD-10-CM

## 2021-08-25 LAB — INR BLD: 2.7 (ref 0.9–1.1)

## 2021-08-25 PROCEDURE — 85610 PROTHROMBIN TIME: CPT | Mod: ZL

## 2021-08-25 PROCEDURE — 36416 COLLJ CAPILLARY BLOOD SPEC: CPT | Mod: ZL

## 2021-08-25 NOTE — PROGRESS NOTES
ANTICOAGULATION MANAGEMENT     Ramírez Hernández 66 year old male is on warfarin with therapeutic INR result. (Goal INR 2.0-3.0)    Recent labs: (last 7 days)     08/25/21  0948   INR 2.7*       ASSESSMENT     Source(s): Chart Review and Patient/Caregiver Call       Warfarin doses taken: Warfarin taken as instructed    Diet: No new diet changes identified    New illness, injury, or hospitalization: No    Medication/supplement changes: None noted    Signs or symptoms of bleeding or clotting: No    Previous INR: Therapeutic last 2(+) visits    Additional findings: None     PLAN     Recommended plan for no diet, medication or health factor changes affecting INR     Dosing Instructions: Continue your current warfarin dose with next INR in 6 weeks       Summary  As of 8/25/2021    Full warfarin instructions:  5 mg every Mon; 2.5 mg all other days   Next INR check:  10/6/2021             Telephone call with Ramírez who verbalizes understanding and agrees to plan    Lab visit scheduled    Education provided: Please call back if any changes to your diet, medications or how you've been taking warfarin, Monitoring for bleeding signs and symptoms and Monitoring for clotting signs and symptoms    Plan made per [facility] anticoagulation protocol    Penelope Rascon RN  Anticoagulation Clinic  8/25/2021    _______________________________________________________________________     Anticoagulation Episode Summary     Current INR goal:  2.0-3.0   TTR:  66.6 % (1 y)   Target end date:  Indefinite   Send INR reminders to:  [facility]    Indications    Persistent atrial fibrillation (H) [I48.19]  Long-term (current) use of anticoagulants [Z79.01] [Z79.01]           Comments:  12/19-Taking Advil 200mg daily-AM stiffness           Anticoagulation Care Providers     Provider Role Specialty Phone number    Aron Delvalle MD Referring Family Medicine [phone]    CLAUDIO Crum MD Responsible Family Medicine [phone]

## 2021-10-06 ENCOUNTER — ANTICOAGULATION THERAPY VISIT (OUTPATIENT)
Dept: ANTICOAGULATION | Facility: OTHER | Age: 66
End: 2021-10-06
Attending: FAMILY MEDICINE
Payer: MEDICARE

## 2021-10-06 ENCOUNTER — LAB (OUTPATIENT)
Dept: LAB | Facility: OTHER | Age: 66
End: 2021-10-06
Attending: FAMILY MEDICINE
Payer: MEDICARE

## 2021-10-06 DIAGNOSIS — I48.19 PERSISTENT ATRIAL FIBRILLATION (H): Primary | ICD-10-CM

## 2021-10-06 DIAGNOSIS — Z79.01 LONG TERM CURRENT USE OF ANTICOAGULANT THERAPY: ICD-10-CM

## 2021-10-06 DIAGNOSIS — I48.19 PERSISTENT ATRIAL FIBRILLATION (H): ICD-10-CM

## 2021-10-06 LAB — INR BLD: 2 (ref 0.9–1.1)

## 2021-10-06 PROCEDURE — 85610 PROTHROMBIN TIME: CPT | Mod: ZL

## 2021-10-06 PROCEDURE — 36415 COLL VENOUS BLD VENIPUNCTURE: CPT | Mod: ZL

## 2021-10-06 NOTE — PROGRESS NOTES
ANTICOAGULATION MANAGEMENT     Ramírez Hernández 66 year old male is on warfarin with therapeutic INR result. (Goal INR 2.0-3.0)    Recent labs: (last 7 days)     10/06/21  1332   INR 2.0*       ASSESSMENT     Source(s): Chart Review and Patient/Caregiver Call       Warfarin doses taken: Warfarin taken as instructed    Diet: No new diet changes identified    New illness, injury, or hospitalization: No    Medication/supplement changes: None noted    Signs or symptoms of bleeding or clotting: No    Previous INR: Therapeutic last 2(+) visits    Additional findings: None     PLAN     Recommended plan for no diet, medication or health factor changes affecting INR     Dosing Instructions: Continue your current warfarin dose with next INR in 6 weeks       Summary  As of 10/6/2021    Full warfarin instructions:  5 mg every Mon; 2.5 mg all other days   Next INR check:  11/17/2021             Telephone call with Ramírez who verbalizes understanding and agrees to plan    Lab visit scheduled    Education provided: Please call back if any changes to your diet, medications or how you've been taking warfarin, Monitoring for bleeding signs and symptoms and Monitoring for clotting signs and symptoms    Plan made per [facility] anticoagulation protocol    Penelope Rascon RN  Anticoagulation Clinic  10/6/2021    _______________________________________________________________________     Anticoagulation Episode Summary     Current INR goal:  2.0-3.0   TTR:  70.4 % (1 y)   Target end date:  Indefinite   Send INR reminders to:  [facility] [place]    Indications    Persistent atrial fibrillation (H) [I48.19]  Long-term (current) use of anticoagulants [Z79.01] [Z79.01]           Comments:  12/19-Taking Advil 200mg daily-AM stiffness           Anticoagulation Care Providers     Provider Role Specialty Phone number    Aron Delvalle MD Referring Family Medicine [phone]    CLAUDIO Crum MD Responsible Family Medicine [phone]

## 2021-10-24 ENCOUNTER — APPOINTMENT (OUTPATIENT)
Dept: CT IMAGING | Facility: HOSPITAL | Age: 66
End: 2021-10-24
Attending: EMERGENCY MEDICINE
Payer: MEDICARE

## 2021-10-24 ENCOUNTER — HOSPITAL ENCOUNTER (EMERGENCY)
Facility: HOSPITAL | Age: 66
Discharge: ANOTHER HOSPITAL | End: 2021-10-24
Attending: EMERGENCY MEDICINE | Admitting: EMERGENCY MEDICINE
Payer: MEDICARE

## 2021-10-24 ENCOUNTER — HOSPITAL ENCOUNTER (INPATIENT)
Facility: OTHER | Age: 66
LOS: 5 days | Discharge: HOME | DRG: 308 | End: 2021-10-29
Attending: FAMILY MEDICINE | Admitting: INTERNAL MEDICINE
Payer: MEDICARE

## 2021-10-24 VITALS
TEMPERATURE: 96.8 F | HEART RATE: 146 BPM | SYSTOLIC BLOOD PRESSURE: 147 MMHG | WEIGHT: 246.03 LBS | RESPIRATION RATE: 28 BRPM | BODY MASS INDEX: 37.41 KG/M2 | OXYGEN SATURATION: 89 % | DIASTOLIC BLOOD PRESSURE: 100 MMHG

## 2021-10-24 DIAGNOSIS — K70.31 ALCOHOLIC CIRRHOSIS OF LIVER WITH ASCITES (H): ICD-10-CM

## 2021-10-24 DIAGNOSIS — K70.31 ALCOHOLIC CIRRHOSIS OF LIVER WITH ASCITES (H): Primary | ICD-10-CM

## 2021-10-24 DIAGNOSIS — I50.9 CONGESTIVE HEART FAILURE, UNSPECIFIED HF CHRONICITY, UNSPECIFIED HEART FAILURE TYPE (H): ICD-10-CM

## 2021-10-24 DIAGNOSIS — I48.20 CHRONIC ATRIAL FIBRILLATION WITH RVR (H): ICD-10-CM

## 2021-10-24 DIAGNOSIS — R91.8 MASS OF LEFT LUNG: ICD-10-CM

## 2021-10-24 PROBLEM — I50.21 ACUTE SYSTOLIC CONGESTIVE HEART FAILURE (H): Status: ACTIVE | Noted: 2017-12-08

## 2021-10-24 PROBLEM — I48.91 A-FIB (H): Status: ACTIVE | Noted: 2021-10-24

## 2021-10-24 PROBLEM — I48.19 PERSISTENT ATRIAL FIBRILLATION (H): Status: ACTIVE | Noted: 2017-12-08

## 2021-10-24 PROBLEM — F10.10 ALCOHOL ABUSE: Status: ACTIVE | Noted: 2017-12-08

## 2021-10-24 LAB
ALBUMIN SERPL-MCNC: 3 G/DL (ref 3.4–5)
ALBUMIN SERPL-MCNC: 3.5 G/DL (ref 3.5–5.7)
ALP SERPL-CCNC: 160 U/L (ref 40–150)
ALT SERPL W P-5'-P-CCNC: 18 U/L (ref 0–70)
AMMONIA PLAS-SCNC: 32 UMOL/L (ref 16–53)
ANION GAP SERPL CALCULATED.3IONS-SCNC: 10 MMOL/L (ref 3–14)
AST SERPL W P-5'-P-CCNC: 54 U/L (ref 0–45)
BASE EXCESS BLDA CALC-SCNC: 0.3 MMOL/L (ref -9–1.8)
BASE EXCESS BLDV CALC-SCNC: -0.7 MMOL/L (ref -7.7–1.9)
BASOPHILS # BLD AUTO: 0 10E3/UL (ref 0–0.2)
BASOPHILS NFR BLD AUTO: 0 %
BILIRUB SERPL-MCNC: 1.8 MG/DL (ref 0.2–1.3)
BUN SERPL-MCNC: 15 MG/DL (ref 7–30)
CALCIUM SERPL-MCNC: 9.2 MG/DL (ref 8.5–10.1)
CHLORIDE BLD-SCNC: 97 MMOL/L (ref 94–109)
CK SERPL-CCNC: 896 U/L (ref 30–300)
CO2 SERPL-SCNC: 23 MMOL/L (ref 20–32)
CREAT SERPL-MCNC: 0.78 MG/DL (ref 0.66–1.25)
EOSINOPHIL # BLD AUTO: 0.1 10E3/UL (ref 0–0.7)
EOSINOPHIL NFR BLD AUTO: 0 %
ERYTHROCYTE [DISTWIDTH] IN BLOOD BY AUTOMATED COUNT: 14.3 % (ref 10–15)
ETHANOL SERPL-MCNC: <0.01 G/DL
FLUAV RNA SPEC QL NAA+PROBE: NEGATIVE
FLUBV RNA RESP QL NAA+PROBE: NEGATIVE
GFR SERPL CREATININE-BSD FRML MDRD: >90 ML/MIN/1.73M2
GLUCOSE BLD-MCNC: 122 MG/DL (ref 70–99)
HCO3 BLD-SCNC: 23 MMOL/L (ref 21–28)
HCO3 BLDV-SCNC: 25 MMOL/L (ref 21–28)
HCT VFR BLD AUTO: 33.5 % (ref 40–53)
HGB BLD-MCNC: 11.4 G/DL (ref 13.3–17.7)
HOLD SPECIMEN: NORMAL
IMM GRANULOCYTES # BLD: 0.2 10E3/UL
IMM GRANULOCYTES NFR BLD: 1 %
INR PPP: 1.98 (ref 0.85–1.15)
LACTATE SERPL-SCNC: 2 MMOL/L (ref 0.7–2)
LIPASE SERPL-CCNC: 45 U/L (ref 73–393)
LYMPHOCYTES # BLD AUTO: 0.5 10E3/UL (ref 0.8–5.3)
LYMPHOCYTES NFR BLD AUTO: 3 %
MAGNESIUM SERPL-MCNC: 1.6 MG/DL (ref 1.9–2.7)
MAGNESIUM SERPL-MCNC: 2 MG/DL (ref 1.6–2.3)
MCH RBC QN AUTO: 29.8 PG (ref 26.5–33)
MCHC RBC AUTO-ENTMCNC: 34 G/DL (ref 31.5–36.5)
MCV RBC AUTO: 88 FL (ref 78–100)
MONOCYTES # BLD AUTO: 2.1 10E3/UL (ref 0–1.3)
MONOCYTES NFR BLD AUTO: 12 %
NEUTROPHILS # BLD AUTO: 14.7 10E3/UL (ref 1.6–8.3)
NEUTROPHILS NFR BLD AUTO: 84 %
NRBC # BLD AUTO: 0 10E3/UL
NRBC BLD AUTO-RTO: 0 /100
NT-PROBNP SERPL-MCNC: ABNORMAL PG/ML (ref 0–900)
O2/TOTAL GAS SETTING VFR VENT: 0 %
O2/TOTAL GAS SETTING VFR VENT: 21 %
OXYHGB MFR BLDV: 44 % (ref 70–75)
PCO2 BLD: 29 MM HG (ref 35–45)
PCO2 BLDV: 45 MM HG (ref 40–50)
PH BLD: 7.5 [PH] (ref 7.35–7.45)
PH BLDV: 7.36 [PH] (ref 7.32–7.43)
PHOSPHATE SERPL-MCNC: 2.9 MG/DL (ref 2.5–5)
PLATELET # BLD AUTO: 283 10E3/UL (ref 150–450)
PO2 BLD: 75 MM HG (ref 80–105)
PO2 BLDV: 30 MM HG (ref 25–47)
POTASSIUM BLD-SCNC: 3.8 MMOL/L (ref 3.5–5.1)
POTASSIUM BLD-SCNC: 4.3 MMOL/L (ref 3.4–5.3)
PROT SERPL-MCNC: 8.8 G/DL (ref 6.8–8.8)
RBC # BLD AUTO: 3.82 10E6/UL (ref 4.4–5.9)
RSV RNA SPEC NAA+PROBE: NEGATIVE
SARS-COV-2 RNA RESP QL NAA+PROBE: NEGATIVE
SODIUM SERPL-SCNC: 130 MMOL/L (ref 133–144)
TROPONIN I SERPL-MCNC: 0.06 UG/L (ref 0–0.04)
TROPONIN I SERPL-MCNC: 68.1 PG/ML (ref 0–34)
WBC # BLD AUTO: 17.5 10E3/UL (ref 4–11)

## 2021-10-24 PROCEDURE — 96375 TX/PRO/DX INJ NEW DRUG ADDON: CPT | Mod: XU

## 2021-10-24 PROCEDURE — 250N000013 HC RX MED GY IP 250 OP 250 PS 637: Performed by: FAMILY MEDICINE

## 2021-10-24 PROCEDURE — 36415 COLL VENOUS BLD VENIPUNCTURE: CPT | Performed by: EMERGENCY MEDICINE

## 2021-10-24 PROCEDURE — 83880 ASSAY OF NATRIURETIC PEPTIDE: CPT | Performed by: EMERGENCY MEDICINE

## 2021-10-24 PROCEDURE — 84132 ASSAY OF SERUM POTASSIUM: CPT | Performed by: FAMILY MEDICINE

## 2021-10-24 PROCEDURE — 72125 CT NECK SPINE W/O DYE: CPT | Mod: MG

## 2021-10-24 PROCEDURE — C9803 HOPD COVID-19 SPEC COLLECT: HCPCS

## 2021-10-24 PROCEDURE — 85610 PROTHROMBIN TIME: CPT | Performed by: EMERGENCY MEDICINE

## 2021-10-24 PROCEDURE — 85025 COMPLETE CBC W/AUTO DIFF WBC: CPT | Performed by: EMERGENCY MEDICINE

## 2021-10-24 PROCEDURE — 82803 BLOOD GASES ANY COMBINATION: CPT | Performed by: FAMILY MEDICINE

## 2021-10-24 PROCEDURE — 83605 ASSAY OF LACTIC ACID: CPT | Performed by: FAMILY MEDICINE

## 2021-10-24 PROCEDURE — 71260 CT THORAX DX C+: CPT | Mod: MG

## 2021-10-24 PROCEDURE — 36415 COLL VENOUS BLD VENIPUNCTURE: CPT | Performed by: FAMILY MEDICINE

## 2021-10-24 PROCEDURE — 82077 ASSAY SPEC XCP UR&BREATH IA: CPT | Performed by: EMERGENCY MEDICINE

## 2021-10-24 PROCEDURE — 96376 TX/PRO/DX INJ SAME DRUG ADON: CPT

## 2021-10-24 PROCEDURE — 250N000009 HC RX 250: Performed by: EMERGENCY MEDICINE

## 2021-10-24 PROCEDURE — 250N000011 HC RX IP 250 OP 636: Performed by: FAMILY MEDICINE

## 2021-10-24 PROCEDURE — 82040 ASSAY OF SERUM ALBUMIN: CPT | Performed by: EMERGENCY MEDICINE

## 2021-10-24 PROCEDURE — 87637 SARSCOV2&INF A&B&RSV AMP PRB: CPT | Performed by: EMERGENCY MEDICINE

## 2021-10-24 PROCEDURE — 255N000002 HC RX 255 OP 636: Performed by: EMERGENCY MEDICINE

## 2021-10-24 PROCEDURE — 84484 ASSAY OF TROPONIN QUANT: CPT | Performed by: FAMILY MEDICINE

## 2021-10-24 PROCEDURE — 250N000011 HC RX IP 250 OP 636: Performed by: EMERGENCY MEDICINE

## 2021-10-24 PROCEDURE — 82805 BLOOD GASES W/O2 SATURATION: CPT | Performed by: EMERGENCY MEDICINE

## 2021-10-24 PROCEDURE — 83735 ASSAY OF MAGNESIUM: CPT | Performed by: FAMILY MEDICINE

## 2021-10-24 PROCEDURE — 82040 ASSAY OF SERUM ALBUMIN: CPT | Performed by: FAMILY MEDICINE

## 2021-10-24 PROCEDURE — 99285 EMERGENCY DEPT VISIT HI MDM: CPT | Performed by: EMERGENCY MEDICINE

## 2021-10-24 PROCEDURE — 36600 WITHDRAWAL OF ARTERIAL BLOOD: CPT | Performed by: FAMILY MEDICINE

## 2021-10-24 PROCEDURE — 93010 ELECTROCARDIOGRAM REPORT: CPT | Performed by: INTERNAL MEDICINE

## 2021-10-24 PROCEDURE — 200N000001 HC R&B ICU

## 2021-10-24 PROCEDURE — 82140 ASSAY OF AMMONIA: CPT | Performed by: FAMILY MEDICINE

## 2021-10-24 PROCEDURE — 96374 THER/PROPH/DIAG INJ IV PUSH: CPT | Mod: XU

## 2021-10-24 PROCEDURE — 84100 ASSAY OF PHOSPHORUS: CPT | Performed by: FAMILY MEDICINE

## 2021-10-24 PROCEDURE — 83690 ASSAY OF LIPASE: CPT | Performed by: EMERGENCY MEDICINE

## 2021-10-24 PROCEDURE — 70450 CT HEAD/BRAIN W/O DYE: CPT | Mod: MG

## 2021-10-24 PROCEDURE — 93005 ELECTROCARDIOGRAM TRACING: CPT

## 2021-10-24 PROCEDURE — 250N000009 HC RX 250: Performed by: FAMILY MEDICINE

## 2021-10-24 PROCEDURE — 84484 ASSAY OF TROPONIN QUANT: CPT | Performed by: EMERGENCY MEDICINE

## 2021-10-24 PROCEDURE — 83735 ASSAY OF MAGNESIUM: CPT | Performed by: EMERGENCY MEDICINE

## 2021-10-24 PROCEDURE — 82550 ASSAY OF CK (CPK): CPT | Performed by: EMERGENCY MEDICINE

## 2021-10-24 PROCEDURE — 99291 CRITICAL CARE FIRST HOUR: CPT | Mod: 25

## 2021-10-24 RX ORDER — METOPROLOL TARTRATE 50 MG
50 TABLET ORAL 2 TIMES DAILY
Status: DISCONTINUED | OUTPATIENT
Start: 2021-10-24 | End: 2021-10-29 | Stop reason: HOSPADM

## 2021-10-24 RX ORDER — DEXTROSE MONOHYDRATE 25 G/50ML
25-50 INJECTION, SOLUTION INTRAVENOUS
Status: CANCELLED | OUTPATIENT
Start: 2021-10-24

## 2021-10-24 RX ORDER — LORAZEPAM 2 MG/ML
1-2 INJECTION INTRAMUSCULAR EVERY 30 MIN PRN
Status: DISCONTINUED | OUTPATIENT
Start: 2021-10-24 | End: 2021-10-27

## 2021-10-24 RX ORDER — LISINOPRIL 5 MG/1
10 TABLET ORAL DAILY
Status: CANCELLED | OUTPATIENT
Start: 2021-10-24

## 2021-10-24 RX ORDER — FUROSEMIDE 10 MG/ML
40 INJECTION INTRAMUSCULAR; INTRAVENOUS ONCE
Status: COMPLETED | OUTPATIENT
Start: 2021-10-24 | End: 2021-10-24

## 2021-10-24 RX ORDER — OXYCODONE AND ACETAMINOPHEN 5; 325 MG/1; MG/1
1 TABLET ORAL EVERY 4 HOURS PRN
Status: DISCONTINUED | OUTPATIENT
Start: 2021-10-24 | End: 2021-10-29 | Stop reason: HOSPADM

## 2021-10-24 RX ORDER — LANOLIN ALCOHOL/MO/W.PET/CERES
100 CREAM (GRAM) TOPICAL DAILY
Status: COMPLETED | OUTPATIENT
Start: 2021-10-24 | End: 2021-10-28

## 2021-10-24 RX ORDER — NALOXONE HYDROCHLORIDE 0.4 MG/ML
0.4 INJECTION, SOLUTION INTRAMUSCULAR; INTRAVENOUS; SUBCUTANEOUS
Status: DISCONTINUED | OUTPATIENT
Start: 2021-10-24 | End: 2021-10-29 | Stop reason: HOSPADM

## 2021-10-24 RX ORDER — ONDANSETRON 2 MG/ML
4 INJECTION INTRAMUSCULAR; INTRAVENOUS EVERY 6 HOURS PRN
Status: CANCELLED | OUTPATIENT
Start: 2021-10-24

## 2021-10-24 RX ORDER — DILTIAZEM HCL IN NACL,ISO-OSM 125 MG/125
5-15 PLASTIC BAG, INJECTION (ML) INTRAVENOUS CONTINUOUS
Status: DISCONTINUED | OUTPATIENT
Start: 2021-10-24 | End: 2021-10-25

## 2021-10-24 RX ORDER — LISINOPRIL 10 MG/1
10 TABLET ORAL DAILY
Status: DISCONTINUED | OUTPATIENT
Start: 2021-10-25 | End: 2021-10-25

## 2021-10-24 RX ORDER — METOPROLOL TARTRATE 1 MG/ML
5 INJECTION, SOLUTION INTRAVENOUS EVERY 5 MIN PRN
Status: DISCONTINUED | OUTPATIENT
Start: 2021-10-24 | End: 2021-10-29 | Stop reason: HOSPADM

## 2021-10-24 RX ORDER — NALOXONE HYDROCHLORIDE 0.4 MG/ML
0.2 INJECTION, SOLUTION INTRAMUSCULAR; INTRAVENOUS; SUBCUTANEOUS
Status: DISCONTINUED | OUTPATIENT
Start: 2021-10-24 | End: 2021-10-29 | Stop reason: HOSPADM

## 2021-10-24 RX ORDER — ONDANSETRON 4 MG/1
4 TABLET, ORALLY DISINTEGRATING ORAL EVERY 6 HOURS PRN
Status: DISCONTINUED | OUTPATIENT
Start: 2021-10-24 | End: 2021-10-29 | Stop reason: HOSPADM

## 2021-10-24 RX ORDER — FOLIC ACID 1 MG/1
1 TABLET ORAL DAILY
Status: DISCONTINUED | OUTPATIENT
Start: 2021-10-24 | End: 2021-10-29 | Stop reason: HOSPADM

## 2021-10-24 RX ORDER — DILTIAZEM HYDROCHLORIDE 5 MG/ML
25 INJECTION INTRAVENOUS ONCE
Status: COMPLETED | OUTPATIENT
Start: 2021-10-24 | End: 2021-10-24

## 2021-10-24 RX ORDER — NICOTINE POLACRILEX 4 MG
15-30 LOZENGE BUCCAL
Status: CANCELLED | OUTPATIENT
Start: 2021-10-24

## 2021-10-24 RX ORDER — FUROSEMIDE 10 MG/ML
40 INJECTION INTRAMUSCULAR; INTRAVENOUS
Status: CANCELLED | OUTPATIENT
Start: 2021-10-24

## 2021-10-24 RX ORDER — LIDOCAINE 40 MG/G
CREAM TOPICAL
Status: DISCONTINUED | OUTPATIENT
Start: 2021-10-24 | End: 2021-10-29 | Stop reason: HOSPADM

## 2021-10-24 RX ORDER — IOPAMIDOL 612 MG/ML
100 INJECTION, SOLUTION INTRAVASCULAR ONCE
Status: COMPLETED | OUTPATIENT
Start: 2021-10-24 | End: 2021-10-24

## 2021-10-24 RX ORDER — METOPROLOL TARTRATE 1 MG/ML
5 INJECTION, SOLUTION INTRAVENOUS EVERY 5 MIN PRN
Status: CANCELLED | OUTPATIENT
Start: 2021-10-24

## 2021-10-24 RX ORDER — ONDANSETRON 4 MG/1
4 TABLET, ORALLY DISINTEGRATING ORAL EVERY 6 HOURS PRN
Status: CANCELLED | OUTPATIENT
Start: 2021-10-24

## 2021-10-24 RX ORDER — LORAZEPAM 1 MG/1
1-2 TABLET ORAL EVERY 30 MIN PRN
Status: DISCONTINUED | OUTPATIENT
Start: 2021-10-24 | End: 2021-10-27

## 2021-10-24 RX ORDER — DEXTROSE MONOHYDRATE 25 G/50ML
25-50 INJECTION, SOLUTION INTRAVENOUS
Status: DISCONTINUED | OUTPATIENT
Start: 2021-10-24 | End: 2021-10-29 | Stop reason: HOSPADM

## 2021-10-24 RX ORDER — LIDOCAINE 40 MG/G
CREAM TOPICAL
Status: CANCELLED | OUTPATIENT
Start: 2021-10-24

## 2021-10-24 RX ORDER — PROCHLORPERAZINE 25 MG
12.5 SUPPOSITORY, RECTAL RECTAL EVERY 12 HOURS PRN
Status: DISCONTINUED | OUTPATIENT
Start: 2021-10-24 | End: 2021-10-29 | Stop reason: HOSPADM

## 2021-10-24 RX ORDER — PROCHLORPERAZINE MALEATE 5 MG
5 TABLET ORAL EVERY 6 HOURS PRN
Status: CANCELLED | OUTPATIENT
Start: 2021-10-24

## 2021-10-24 RX ORDER — DILTIAZEM HCL IN NACL,ISO-OSM 125 MG/125
5-15 PLASTIC BAG, INJECTION (ML) INTRAVENOUS CONTINUOUS
Status: CANCELLED | OUTPATIENT
Start: 2021-10-24

## 2021-10-24 RX ORDER — ACETAMINOPHEN 325 MG/1
650 TABLET ORAL EVERY 4 HOURS PRN
Status: CANCELLED | OUTPATIENT
Start: 2021-10-24

## 2021-10-24 RX ORDER — NICOTINE POLACRILEX 4 MG
15-30 LOZENGE BUCCAL
Status: DISCONTINUED | OUTPATIENT
Start: 2021-10-24 | End: 2021-10-29 | Stop reason: HOSPADM

## 2021-10-24 RX ORDER — MULTIPLE VITAMINS W/ MINERALS TAB 9MG-400MCG
1 TAB ORAL DAILY
Status: DISCONTINUED | OUTPATIENT
Start: 2021-10-24 | End: 2021-10-29 | Stop reason: HOSPADM

## 2021-10-24 RX ORDER — ACETAMINOPHEN 325 MG/1
650 TABLET ORAL EVERY 4 HOURS PRN
Status: DISCONTINUED | OUTPATIENT
Start: 2021-10-24 | End: 2021-10-29 | Stop reason: HOSPADM

## 2021-10-24 RX ORDER — FUROSEMIDE 10 MG/ML
40 INJECTION INTRAMUSCULAR; INTRAVENOUS
Status: DISCONTINUED | OUTPATIENT
Start: 2021-10-25 | End: 2021-10-25

## 2021-10-24 RX ORDER — MAGNESIUM SULFATE HEPTAHYDRATE 40 MG/ML
2 INJECTION, SOLUTION INTRAVENOUS ONCE
Status: COMPLETED | OUTPATIENT
Start: 2021-10-24 | End: 2021-10-25

## 2021-10-24 RX ORDER — METOPROLOL TARTRATE 25 MG/1
25 TABLET, FILM COATED ORAL 2 TIMES DAILY
Status: CANCELLED | OUTPATIENT
Start: 2021-10-24

## 2021-10-24 RX ORDER — PROCHLORPERAZINE MALEATE 5 MG
5 TABLET ORAL EVERY 6 HOURS PRN
Status: DISCONTINUED | OUTPATIENT
Start: 2021-10-24 | End: 2021-10-29 | Stop reason: HOSPADM

## 2021-10-24 RX ORDER — ONDANSETRON 2 MG/ML
4 INJECTION INTRAMUSCULAR; INTRAVENOUS EVERY 6 HOURS PRN
Status: DISCONTINUED | OUTPATIENT
Start: 2021-10-24 | End: 2021-10-29 | Stop reason: HOSPADM

## 2021-10-24 RX ORDER — PROCHLORPERAZINE 25 MG
12.5 SUPPOSITORY, RECTAL RECTAL EVERY 12 HOURS PRN
Status: CANCELLED | OUTPATIENT
Start: 2021-10-24

## 2021-10-24 RX ORDER — DILTIAZEM HCL IN NACL,ISO-OSM 125 MG/125
5-15 PLASTIC BAG, INJECTION (ML) INTRAVENOUS CONTINUOUS
Status: DISCONTINUED | OUTPATIENT
Start: 2021-10-24 | End: 2021-10-24 | Stop reason: HOSPADM

## 2021-10-24 RX ORDER — QUETIAPINE FUMARATE 25 MG/1
25 TABLET, FILM COATED ORAL EVERY 6 HOURS PRN
Status: DISCONTINUED | OUTPATIENT
Start: 2021-10-24 | End: 2021-10-29 | Stop reason: HOSPADM

## 2021-10-24 RX ADMIN — FUROSEMIDE 40 MG: 10 INJECTION, SOLUTION INTRAMUSCULAR; INTRAVENOUS at 19:04

## 2021-10-24 RX ADMIN — DILTIAZEM HYDROCHLORIDE 25 MG: 5 INJECTION, SOLUTION INTRAVENOUS at 17:24

## 2021-10-24 RX ADMIN — Medication 5 MG/HR: at 21:35

## 2021-10-24 RX ADMIN — THIAMINE HCL TAB 100 MG 100 MG: 100 TAB at 22:30

## 2021-10-24 RX ADMIN — METOPROLOL TARTRATE 50 MG: 50 TABLET, FILM COATED ORAL at 22:28

## 2021-10-24 RX ADMIN — LORAZEPAM 2 MG: 2 INJECTION, SOLUTION INTRAMUSCULAR; INTRAVENOUS at 21:07

## 2021-10-24 RX ADMIN — FUROSEMIDE 40 MG: 10 INJECTION, SOLUTION INTRAVENOUS at 22:34

## 2021-10-24 RX ADMIN — Medication 5 MG/HR: at 19:04

## 2021-10-24 RX ADMIN — OXYCODONE HYDROCHLORIDE AND ACETAMINOPHEN 1 TABLET: 5; 325 TABLET ORAL at 22:31

## 2021-10-24 RX ADMIN — FOLIC ACID 1 MG: 1 TABLET ORAL at 22:26

## 2021-10-24 RX ADMIN — IOPAMIDOL 100 ML: 612 INJECTION, SOLUTION INTRAVENOUS at 16:46

## 2021-10-24 RX ADMIN — Medication 1 TABLET: at 22:29

## 2021-10-24 RX ADMIN — IOPAMIDOL 100 ML: 612 INJECTION, SOLUTION INTRAVENOUS at 16:53

## 2021-10-24 ASSESSMENT — ACTIVITIES OF DAILY LIVING (ADL)
FALL_HISTORY_WITHIN_LAST_SIX_MONTHS: YES
ADLS_ACUITY_SCORE: 12
CONCENTRATING,_REMEMBERING_OR_MAKING_DECISIONS_DIFFICULTY: YES
THE_FOLLOWING_AIDS_WERE_PROVIDED;: PATIENT DECLINED OFFER OF AUXILIARY AIDS
TOILETING_ASSISTANCE: TOILETING DIFFICULTY, REQUIRES EQUIPMENT
PATIENT'S_PREFERRED_MEANS_OF_COMMUNICATION: ENGLISH SPEAKER WITH HEARING LOSS, NO SPEECH PROBLEMS.
HEARING_MANAGEMENT: TALK LOUD
ADLS_ACUITY_SCORE: 12
DESCRIBE_HEARING_LOSS: BILATERAL HEARING LOSS
DRESSING/BATHING_DIFFICULTY: YES
DIFFICULTY_COMMUNICATING: NO
ADLS_ACUITY_SCORE: 12
WERE_AUXILIARY_AIDS_OFFERED?: NO
TOILETING_ISSUES: YES
DIFFICULTY_EATING/SWALLOWING: NO
WHICH_OF_THE_ABOVE_FUNCTIONAL_RISKS_HAD_A_RECENT_ONSET_OR_CHANGE?: AMBULATION
WALKING_OR_CLIMBING_STAIRS_DIFFICULTY: OTHER (SEE COMMENTS)
EQUIPMENT_CURRENTLY_USED_AT_HOME: OTHER (SEE COMMENTS)
DOING_ERRANDS_INDEPENDENTLY_DIFFICULTY: YES
WEAR_GLASSES_OR_BLIND: YES
HEARING_DIFFICULTY_OR_DEAF: YES
PATIENT_/_FAMILY_COMMUNICATION_STYLE: SPOKEN LANGUAGE (ENGLISH OR BILINGUAL)

## 2021-10-24 ASSESSMENT — ENCOUNTER SYMPTOMS
SPEECH DIFFICULTY: 0
BACK PAIN: 1
CARDIOVASCULAR NEGATIVE: 1
CONFUSION: 1
NUMBNESS: 0
DIZZINESS: 0
ENDOCRINE NEGATIVE: 1
ABDOMINAL DISTENTION: 1
EYE DISCHARGE: 1
CONSTITUTIONAL NEGATIVE: 1
HEADACHES: 0
COUGH: 1
SHORTNESS OF BREATH: 1

## 2021-10-24 NOTE — ED PROVIDER NOTES
"  History     Chief Complaint   Patient presents with     Altered Mental Status     Fall     HPI  Ramírez Hernández is a 66 year old male who is transported to the emergency department by EMS after having been found on the floor by a neighbor.  The patient believes that he fell off of a chair.  He is uncertain how long he was on the floor.  The individual who checked on him summoned EMS.  They found the patient to be somewhat confused.  He denies a headache or visual disturbance.  He has fairly extensive bruising on the right side of the cervical spine extending along the right trapezius and right shoulder.  He states he does have low back pain.  He relates he said multiple lower lumbar surgeries.  He states he is chronically short of breath as he is a smoker.  He does have a cough but he states that is unchanged.  He is mildly confused.  He thinks today is \"Monday\".  He believes the month is \"December\".  He is however able to correctly identify the fact that he is in the emergency department in [facility] and is able to follow commands without difficulty.  He denies any abdominal discomfort.  He denies any pain in his chest.  He denies any pain numbness or tingling in his extremities.  He was transported on a backboard.    Allergies:  No Known Allergies    Problem List:    Patient Active Problem List    Diagnosis Date Noted     Long-term (current) use of anticoagulants [Z79.01] 12/11/2017     Priority: Medium     Persistent atrial fibrillation (H) 12/08/2017     Priority: Medium     Acute systolic congestive heart failure (H) 12/08/2017     Priority: Medium     Alcohol abuse 12/08/2017     Priority: Medium     Tobacco abuse 12/08/2017     Priority: Medium     Heart failure with acute decompensation, type unknown (H) 12/03/2017     Priority: Medium        Past Medical History:    No past medical history on file.    Past Surgical History:    Past Surgical History:   Procedure Laterality Date     BACK SURGERY   " "    COLONOSCOPY N/A 9/27/2019    Procedure: SCREENING COLONOSCOPY WITH POLYPECTOMY;  Surgeon: Alfredo La MD;  Location: [facility] OR       Family History:    No family history on file.    Social History:  Marital Status:   [5]  Social History     Tobacco Use     Smoking status: Current Every Day Smoker     Packs/day: 1.00     Years: 40.00     Pack years: 40.00     Types: Cigarettes     Start date: 1/1/1975     Smokeless tobacco: Never Used   Substance Use Topics     Alcohol use: Yes     Comment: weekly     Drug use: No        Medications:    furosemide (LASIX) 40 MG tablet  lisinopril (ZESTRIL) 10 MG tablet  metoprolol tartrate (LOPRESSOR) 50 MG tablet  warfarin ANTICOAGULANT (COUMADIN) 5 MG tablet  warfarin ANTICOAGULANT (COUMADIN) 5 MG tablet          Review of Systems   Constitutional: Negative.    HENT: Negative.    Eyes: Positive for discharge.   Respiratory: Positive for cough and shortness of breath.    Cardiovascular: Negative.    Gastrointestinal: Positive for abdominal distention.   Endocrine: Negative.    Genitourinary: Negative.    Musculoskeletal: Positive for back pain.   Neurological: Negative for dizziness, speech difficulty, numbness and headaches.   Psychiatric/Behavioral: Positive for confusion.   Lease see history of chief complaint.  All other appropriate systems reviewed and they are found unremarkable.    Physical Exam   BP: 159/95  Pulse: (!) 147  Temp: 96.8  F (36  C)  Resp: (!) 36  Weight: 111.6 kg (246 lb 0.5 oz)  SpO2: 93 %      Physical Exam is a 66-year-old gentleman who is awake alert he is able to follow commands appropriately.  He is aware that he is in the emergency department in [facility].  He identifies the day as \"Monday\".  He did identifies the month as \"December\".  He is able to follow commands without difficulty.  HEENT normocephalic extraocular muscles intact pupils equally react light patient does have a subconjunctival hematoma on the left he also has some " purulent drainage from the medial canthus of his left eye midline palate intact oropharynx is clear.  Neck exam patient has fairly extensive bruising on the right side of the cervical spine along the right trapezius there is no palpable midline bony tenderness.  Full range of motion is maintained.  Pulmonary exam patient is diminished breath sounds bilaterally.  Is a mild end expiratory wheeze.  Heart retains regular rate and rhythm S1 and S2 sounds are appreciated.  No murmur.  The abdomen is distended.  It is nontender.  I do not appreciate any rebound or involuntary guarding.  Extremities have full range of motion 5/5 strength brisk capillary refill.  Patient has extensive skin changes consistent with stasis dermatitis in his lower legs.  Neurologic exam cranial nerves II through grossly intact.  Dermatologic exam patient does have the previously described stasis dermatitis changes of his lower extremities.  There are no diffuse skin rashes or lesions noted.  ED Course     ED Course as of Oct 24 1818   Sun Oct 24, 2021   1811 Patient was given IV Cardizem.  His heart rate slowed into the 110s.  He tolerated it well.  He will be given 40 mg of intravenous Lasix.  I discussed the case with Dr. Tran at [facility] who very graciously agreed to admit the patient to her service.  We will tranfer by ALS                 {EKG done and interpreted by myself.  It shows atrial fibrillation with a ventricular response rate of 156 bpm.  Corrected QT is 409 ms.  There is no ST segment elevation or depression.  There does not appear to be evidence of acute ischemia.  This EKG does appear consistent with atrial fibrillation with a rapid ventricular response rate.         Results for orders placed or performed during the hospital encounter of 10/24/21 (from the past 24 hour(s))   INR   Result Value Ref Range    INR 1.98 (H) 0.85 - 1.15   Comprehensive metabolic panel   Result Value Ref Range    Sodium 130 (L) 133  - 144 mmol/L    Potassium 4.3 3.4 - 5.3 mmol/L    Chloride 97 94 - 109 mmol/L    Carbon Dioxide (CO2) 23 20 - 32 mmol/L    Anion Gap 10 3 - 14 mmol/L    Urea Nitrogen 15 7 - 30 mg/dL    Creatinine 0.78 0.66 - 1.25 mg/dL    Calcium 9.2 8.5 - 10.1 mg/dL    Glucose 122 (H) 70 - 99 mg/dL    Alkaline Phosphatase 160 (H) 40 - 150 U/L    AST 54 (H) 0 - 45 U/L    ALT 18 0 - 70 U/L    Protein Total 8.8 6.8 - 8.8 g/dL    Albumin 3.0 (L) 3.4 - 5.0 g/dL    Bilirubin Total 1.8 (H) 0.2 - 1.3 mg/dL    GFR Estimate >90 >60 mL/min/1.73m2   Lipase   Result Value Ref Range    Lipase 45 (L) 73 - 393 U/L   Troponin I   Result Value Ref Range    Troponin I 0.064 (H) 0.000 - 0.045 ug/L   Magnesium   Result Value Ref Range    Magnesium 2.0 1.6 - 2.3 mg/dL   Ethyl Alcohol Level   Result Value Ref Range    Alcohol ethyl <0.01 <=0.01 g/dL   CBC with platelets differential    Narrative    The following orders were created for panel order CBC with platelets differential.  Procedure                               Abnormality         Status                     ---------                               -----------         ------                     CBC with platelets and d...[[id number]]  Abnormal            Final result                 Please view results for these tests on the individual orders.   Blood gas venous and oxyhgb   Result Value Ref Range    pH Venous 7.36 7.32 - 7.43    pCO2 Venous 45 40 - 50 mm Hg    pO2 Venous 30 25 - 47 mm Hg    Bicarbonate Venous 25 21 - 28 mmol/L    FIO2 21     Oxyhemoglobin Venous 44 (L) 70 - 75 %    Base Excess/Deficit (+/-) -0.7 -7.7 - 1.9 mmol/L   CBC with platelets and differential   Result Value Ref Range    WBC Count 17.5 (H) 4.0 - 11.0 10e3/uL    RBC Count 3.82 (L) 4.40 - 5.90 10e6/uL    Hemoglobin 11.4 (L) 13.3 - 17.7 g/dL    Hematocrit 33.5 (L) 40.0 - 53.0 %    MCV 88 78 - 100 fL    MCH 29.8 26.5 - 33.0 pg    MCHC 34.0 31.5 - 36.5 g/dL    RDW 14.3 10.0 - 15.0 %    Platelet Count 283 150 - 450 10e3/uL    %  Neutrophils 84 %    % Lymphocytes 3 %    % Monocytes 12 %    % Eosinophils 0 %    % Basophils 0 %    % Immature Granulocytes 1 %    NRBCs per 100 WBC 0 <1 /100    Absolute Neutrophils 14.7 (H) 1.6 - 8.3 10e3/uL    Absolute Lymphocytes 0.5 (L) 0.8 - 5.3 10e3/uL    Absolute Monocytes 2.1 (H) 0.0 - 1.3 10e3/uL    Absolute Eosinophils 0.1 0.0 - 0.7 10e3/uL    Absolute Basophils 0.0 0.0 - 0.2 10e3/uL    Absolute Immature Granulocytes 0.2 (H) <=0.0 10e3/uL    Absolute NRBCs 0.0 10e3/uL   CK total   Result Value Ref Range     (H) 30 - 300 U/L   Nt probnp inpatient (BNP)   Result Value Ref Range    N terminal Pro BNP Inpatient 11,227 (H) 0 - 900 pg/mL   CT Head w/o Contrast    Narrative    PROCEDURE: CT HEAD W/O CONTRAST     HISTORY: Trauma - Head Injury.    COMPARISON: None.    TECHNIQUE:  Helical images of the head from the foramen magnum to the  vertex were obtained without contrast.    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  There are no masses or ventricular shifts or extracerebral  collections. The ventricular system and cortical sulci are normal for  age. Moderate mucosal thickening is seen in the maxillary sinuses.      Impression    IMPRESSION: No acute brain injury.      KLAUS MANCIA MD         SYSTEM ID:  [id number]   CT Cervical Spine w/o Contrast    Narrative    PROCEDURE: CT CERVICAL SPINE W/O CONTRAST 10/24/2021 5:01 PM    HISTORY: Trauma - C-Spine Injury    COMPARISONS: None.    Meds/Dose Given:    TECHNIQUE: CT scan of the cervical spine with sagittal coronal  reconstructions    FINDINGS: Degenerative changes are seen at the middle atlantoaxial  joint. There is forward subluxation of C2 on C3 due to facet joint  degenerative change. Prominent anterior osteophytes are seen at C2-C3,  C3-C4, C4-C5, C5-C6, and C6-C7. Advanced facet joint degenerative  changes are seen at C2-C3 on the right.    There are no fractures of the vertebral bodies or arches.    Heavy atherosclerotic  calcifications are seen at the carotid  bifurcations.          Impression    IMPRESSION: Advanced degenerative changes of the cervical spine. No  acute fracture.    KLAUS MANCIA MD         SYSTEM ID:  [id number]   CT Chest/Abdomen/Pelvis w Contrast    Narrative    PROCEDURE: CT CHEST/ABDOMEN/PELVIS W CONTRAST 10/24/2021 5:06 PM    HISTORY: Trauma - Abdominal/Pelvic Injury    COMPARISONS: None.    Meds/Dose Given: ISOVUE 300 147 mL    TECHNIQUE: CT scan of the chest abdomen and pelvis with IV contrast  sagittal coronal reconstructions were obtained    FINDINGS: CT chest: There is a 3.3 cm in diameter left apical mass  suspicious for malignancy. There is a 3 mm in diameter nodule in the  right lung on series 5 axial image 47. There is some subsegmental  atelectasis or scarring seen in both lungs. The hilar and mediastinal  lymph nodes are normal in caliber. The heart is enlarged. Degenerative  changes are present in the thoracic spine. An old healed left rib  fracture with residual deformity is noted.    CT scan of the abdomen and pelvis: There is a large amount of fluid  seen within the abdomen presumably ascites. There is superficial  nodularity of the liver suspicious for cirrhosis. Spleen and pancreas  appear normal. The adrenal glands are normal. The right left kidneys  are free of masses or hydronephrosis. The periaortic lymph nodes are  normal in caliber. The bladder and rectum are normal. The regional  skeleton is intact.         Impression    IMPRESSION: Left apical mass suspicious for malignancy.    There is a large amount of ascites.    There is superficial nodularity in the liver suspicious for cirrhosis.    KLAUS MANCIA MD         SYSTEM ID:  [id number]   [place] Draw    Narrative    The following orders were created for panel order [place] Draw.  Procedure                               Abnormality         Status                     ---------                               -----------         ------                      Extra Urine Collection[[id number]]                           In process                   Please view results for these tests on the individual orders.       Medications   furosemide (LASIX) injection 40 mg (has no administration in time range)   iopamidol (ISOVUE-300) IV solution 61% 100 mL (100 mLs Intravenous Given 10/24/21 1653)   sodium chloride (PF) 0.9% PF flush 100 mL (60 mLs Intravenous Given 10/24/21 1653)   iopamidol (ISOVUE-300) IV solution 61% 100 mL (100 mLs Intravenous Given 10/24/21 1646)   diltiazem (CARDIZEM) injection 25 mg (25 mg Intravenous Given 10/24/21 1724)       Assessments & Plan (with Medical Decision Making)     I have reviewed the nursing notes.    I have reviewed the findings, diagnosis, plan and need for follow up with the patient.  The plan is to transfer the patient for inpatient management to [facility]    New Prescriptions    No medications on file       Final diagnoses:   Chronic atrial fibrillation with RVR (H)   Congestive heart failure, unspecified HF chronicity, unspecified heart failure type (H)   Alcoholic cirrhosis of liver with ascites (H)   Mass of left lung       10/24/2021   [facility] EM[facility]     Kenneth Beasley,   10/24/21 [id number]

## 2021-10-25 ENCOUNTER — APPOINTMENT (OUTPATIENT)
Dept: CARDIOLOGY | Facility: OTHER | Age: 66
DRG: 308 | End: 2021-10-25
Attending: FAMILY MEDICINE
Payer: MEDICARE

## 2021-10-25 PROBLEM — I48.91 A-FIB (H): Status: RESOLVED | Noted: 2021-10-24 | Resolved: 2021-10-25

## 2021-10-25 PROBLEM — K74.60 CIRRHOSIS OF LIVER (H): Status: ACTIVE | Noted: 2021-10-25

## 2021-10-25 PROBLEM — Z72.0 TOBACCO ABUSE: Status: ACTIVE | Noted: 2017-12-08

## 2021-10-25 PROBLEM — R91.8 LUNG MASS: Status: ACTIVE | Noted: 2021-10-25

## 2021-10-25 PROBLEM — I50.9 HEART FAILURE WITH ACUTE DECOMPENSATION, TYPE UNKNOWN (H): Status: RESOLVED | Noted: 2017-12-03 | Resolved: 2021-10-25

## 2021-10-25 LAB
ANION GAP SERPL CALCULATED.3IONS-SCNC: 13 MMOL/L (ref 3–14)
BUN SERPL-MCNC: 17 MG/DL (ref 7–25)
CALCIUM SERPL-MCNC: 9.2 MG/DL (ref 8.6–10.3)
CHLORIDE BLD-SCNC: 96 MMOL/L (ref 98–107)
CK SERPL-CCNC: 618 U/L (ref 30–223)
CO2 SERPL-SCNC: 22 MMOL/L (ref 21–31)
CREAT SERPL-MCNC: 0.99 MG/DL (ref 0.7–1.3)
ERYTHROCYTE [DISTWIDTH] IN BLOOD BY AUTOMATED COUNT: 14.3 % (ref 10–15)
GFR SERPL CREATININE-BSD FRML MDRD: 79 ML/MIN/1.73M2
GLUCOSE BLD-MCNC: 130 MG/DL (ref 70–105)
HCT VFR BLD AUTO: 29.6 % (ref 40–53)
HGB BLD-MCNC: 10.1 G/DL (ref 13.3–17.7)
INR PPP: 1.97 (ref 0.85–1.15)
LACTATE SERPL-SCNC: 1.2 MMOL/L (ref 0.7–2)
LVEF ECHO: NORMAL
MAGNESIUM SERPL-MCNC: 2.2 MG/DL (ref 1.9–2.7)
MCH RBC QN AUTO: 30 PG (ref 26.5–33)
MCHC RBC AUTO-ENTMCNC: 34.1 G/DL (ref 31.5–36.5)
MCV RBC AUTO: 88 FL (ref 78–100)
PHOSPHATE SERPL-MCNC: 3.9 MG/DL (ref 2.5–5)
PLATELET # BLD AUTO: 226 10E3/UL (ref 150–450)
POTASSIUM BLD-SCNC: 4 MMOL/L (ref 3.5–5.1)
RBC # BLD AUTO: 3.37 10E6/UL (ref 4.4–5.9)
SODIUM SERPL-SCNC: 131 MMOL/L (ref 134–144)
TROPONIN I SERPL-MCNC: 83.5 PG/ML (ref 0–34)
WBC # BLD AUTO: 14.5 10E3/UL (ref 4–11)

## 2021-10-25 PROCEDURE — 250N000011 HC RX IP 250 OP 636: Performed by: INTERNAL MEDICINE

## 2021-10-25 PROCEDURE — 83605 ASSAY OF LACTIC ACID: CPT | Performed by: FAMILY MEDICINE

## 2021-10-25 PROCEDURE — 250N000013 HC RX MED GY IP 250 OP 250 PS 637: Performed by: FAMILY MEDICINE

## 2021-10-25 PROCEDURE — 84484 ASSAY OF TROPONIN QUANT: CPT | Performed by: FAMILY MEDICINE

## 2021-10-25 PROCEDURE — 250N000011 HC RX IP 250 OP 636: Performed by: FAMILY MEDICINE

## 2021-10-25 PROCEDURE — 250N000009 HC RX 250: Performed by: FAMILY MEDICINE

## 2021-10-25 PROCEDURE — 999N000208 ECHOCARDIOGRAM COMPLETE

## 2021-10-25 PROCEDURE — 255N000002 HC RX 255 OP 636: Performed by: FAMILY MEDICINE

## 2021-10-25 PROCEDURE — 93306 TTE W/DOPPLER COMPLETE: CPT | Mod: 26 | Performed by: INTERNAL MEDICINE

## 2021-10-25 PROCEDURE — 85610 PROTHROMBIN TIME: CPT | Performed by: FAMILY MEDICINE

## 2021-10-25 PROCEDURE — 200N000001 HC R&B ICU

## 2021-10-25 PROCEDURE — 258N000003 HC RX IP 258 OP 636: Performed by: INTERNAL MEDICINE

## 2021-10-25 PROCEDURE — 85027 COMPLETE CBC AUTOMATED: CPT | Performed by: FAMILY MEDICINE

## 2021-10-25 PROCEDURE — C8929 TTE W OR WO FOL WCON,DOPPLER: HCPCS

## 2021-10-25 PROCEDURE — 83735 ASSAY OF MAGNESIUM: CPT | Performed by: FAMILY MEDICINE

## 2021-10-25 PROCEDURE — 80048 BASIC METABOLIC PNL TOTAL CA: CPT | Performed by: FAMILY MEDICINE

## 2021-10-25 PROCEDURE — 99223 1ST HOSP IP/OBS HIGH 75: CPT | Performed by: INTERNAL MEDICINE

## 2021-10-25 PROCEDURE — 999N000157 HC STATISTIC RCP TIME EA 10 MIN

## 2021-10-25 PROCEDURE — 84100 ASSAY OF PHOSPHORUS: CPT | Performed by: FAMILY MEDICINE

## 2021-10-25 PROCEDURE — 82550 ASSAY OF CK (CPK): CPT | Performed by: INTERNAL MEDICINE

## 2021-10-25 PROCEDURE — 36415 COLL VENOUS BLD VENIPUNCTURE: CPT | Performed by: FAMILY MEDICINE

## 2021-10-25 RX ORDER — WARFARIN SODIUM 5 MG/1
TABLET ORAL
Status: ON HOLD | COMMUNITY
End: 2021-10-29

## 2021-10-25 RX ORDER — FUROSEMIDE 10 MG/ML
40 INJECTION INTRAMUSCULAR; INTRAVENOUS EVERY 8 HOURS
Status: DISCONTINUED | OUTPATIENT
Start: 2021-10-25 | End: 2021-10-26

## 2021-10-25 RX ADMIN — METOPROLOL TARTRATE 50 MG: 50 TABLET, FILM COATED ORAL at 09:06

## 2021-10-25 RX ADMIN — OXYCODONE HYDROCHLORIDE AND ACETAMINOPHEN 1 TABLET: 5; 325 TABLET ORAL at 17:22

## 2021-10-25 RX ADMIN — OXYCODONE HYDROCHLORIDE AND ACETAMINOPHEN 1 TABLET: 5; 325 TABLET ORAL at 22:10

## 2021-10-25 RX ADMIN — METOPROLOL TARTRATE 50 MG: 50 TABLET, FILM COATED ORAL at 22:10

## 2021-10-25 RX ADMIN — FUROSEMIDE 40 MG: 10 INJECTION, SOLUTION INTRAVENOUS at 16:15

## 2021-10-25 RX ADMIN — FOLIC ACID 1 MG: 1 TABLET ORAL at 09:06

## 2021-10-25 RX ADMIN — FUROSEMIDE 40 MG: 10 INJECTION, SOLUTION INTRAVENOUS at 07:37

## 2021-10-25 RX ADMIN — THIAMINE HCL TAB 100 MG 100 MG: 100 TAB at 09:06

## 2021-10-25 RX ADMIN — MAGNESIUM SULFATE HEPTAHYDRATE 2 G: 40 INJECTION, SOLUTION INTRAVENOUS at 00:01

## 2021-10-25 RX ADMIN — LORAZEPAM 1 MG: 2 INJECTION, SOLUTION INTRAMUSCULAR; INTRAVENOUS at 13:53

## 2021-10-25 RX ADMIN — PERFLUTREN 4 ML: 6.52 INJECTION, SUSPENSION INTRAVENOUS at 10:18

## 2021-10-25 RX ADMIN — Medication 10 MG/HR: at 06:39

## 2021-10-25 RX ADMIN — PHYTONADIONE 5 MG: 10 INJECTION, EMULSION INTRAMUSCULAR; INTRAVENOUS; SUBCUTANEOUS at 11:10

## 2021-10-25 RX ADMIN — Medication 1 TABLET: at 09:06

## 2021-10-25 ASSESSMENT — MIFFLIN-ST. JEOR: SCORE: 1874.63

## 2021-10-25 ASSESSMENT — ACTIVITIES OF DAILY LIVING (ADL)
ADLS_ACUITY_SCORE: 21
ADLS_ACUITY_SCORE: 22
ADLS_ACUITY_SCORE: 12
ADLS_ACUITY_SCORE: 21
ADLS_ACUITY_SCORE: 23
ADLS_ACUITY_SCORE: 23
ADLS_ACUITY_SCORE: 21
ADLS_ACUITY_SCORE: 20
ADLS_ACUITY_SCORE: 21
ADLS_ACUITY_SCORE: 20
ADLS_ACUITY_SCORE: 21
ADLS_ACUITY_SCORE: 20
ADLS_ACUITY_SCORE: 23
ADLS_ACUITY_SCORE: 21
ADLS_ACUITY_SCORE: 23
ADLS_ACUITY_SCORE: 12
ADLS_ACUITY_SCORE: 21
ADLS_ACUITY_SCORE: 26
ADLS_ACUITY_SCORE: 21
ADLS_ACUITY_SCORE: 21
ADLS_ACUITY_SCORE: 23
ADLS_ACUITY_SCORE: 26
ADLS_ACUITY_SCORE: 22
ADLS_ACUITY_SCORE: 23

## 2021-10-25 NOTE — PROGRESS NOTES
Patient sats in the low 90s on Room Air.  Breath sounds Coarse with forced expiratory wheezes throughout.

## 2021-10-25 NOTE — PLAN OF CARE
Pt arrived tachypneic and SOB with audible wheezing noted. Unknown history. Pt lives alone and is unkempt. Oriented to place, person but not time and situation. Cardiac rhythm A-fib with RVR. Uncontrolled at 130's. Cardizem drip infusing at 5mg/hr. Skin pink, warm and dry.

## 2021-10-25 NOTE — PLAN OF CARE
PT rested during shift, confusion at times, pleasant and cooperative, abdomen remains distended, absent of a bowel movement. Reyes in place with adequate output, cares completed. Expiratory wheezing heard, diminished bases. PTs work of breathing easier than beginning of shift, remains on room air. HR elevated at times, AFIB, otherwise VSS. All alarms active and audible   Ally King RN 10/25/2021 6:19 PM

## 2021-10-25 NOTE — H&P
[facility]    History and Physical  Hospitalist       Date of Admission:  10/24/2021    Assessment & Plan   Ramírez Hernández is a 66 year old male who presents with chronic alcohol abuse with A. fib with RVR, acute diastolic CHF exacerbation and tense ascites due to cirrhosis.    Principal Problem:    Acute diasolic congestive heart failure (H)    Assessment: Severe volume overload with preserved ejection fraction and will benefit from aggressive diuresis.  TTE results reviewed and unchanged.  Troponin elevation noted without consistent cardiopulmonary symptoms or EKG changes and no evidence of ACS.  Valvular dysfunction likely from severe fluid overload.    Plan:   -lasix 40 mg IV every 8   -strict I's and O's and daily weights   -BMP daily   -Continue to trend troponin     Cirrhosis of liver (H)    Assessment: Tense ascites with radiographic evidence of clinically consistent with ongoing heavy alcohol use.  Will benefit from diagnostic and therapeutic paracentesis once clinical course more stable.    Plan:   -Diuresis as above  -Paracentesis in a.m. if remaining stable  -Start GI prophylaxis  -5 mg IV vitamin K for Coumadin reversal       Lung mass    Assessment: Incidentally noted from CAT scan, high suspicion for malignancy, very pleasant goals of care discussion with both his brother Scott, who patient would want to make his medical decisions if he was unable to, as well as patient.  Going forward wants to continue with DNR/DNI status only, would like to attempt diuresis and paracentesis to improve his symptoms with consideration for acute rehab but if having further complications or decline in his overall condition would then want pursue comfort care measures only going forward.    Plan:   -Follow-up surveillance and diagnostic considerations as an outpatient pending clinical course    Active Problems:    Persistent atrial fibrillation (H)    Assessment: Chronic A. fib with RVR on admit,  resolved with diltiazem drip overnight.  Significant ecchymosis of his right shoulder and high bleeding risk for probable paracentesis in a.m. and will hold Coumadin going forward.    Plan:   -Discontinue dill drip  -Resume home metoprolol      Alcohol abuse    Assessment: Daily heavy drinker, has previously had severe alcohol withdrawal    Plan:   -CIWA per protocol    Hyponatremia  Assessment: Likely hypervolemic hyponatremia  Plan:  -Diuresis and monitoring daily      Tobacco abuse    Assessment: Ongoing smoker counseled to quit    Plan:   -Nicotine patch daily      DVT Prophylaxis: Pneumatic Compression Devices  Code Status: No CPR- Do NOT Intubate    Raza Nance    Primary Care Physician   Aron Delvalle    Chief Complaint   Fall    History is obtained from the patient and chart review.    History of Present Illness   Ramírez Hernández is a 66 year old male history of diastolic CHF and chronic A. fib who presents with A. fib with RVR, acute diastolic CHF exacerbation and cirrhosis with tense ascites.  Patient has been his normal state of health, yesterday fell off of his chair at his table landing on his right shoulder, family was unable to get him off the floor and he was brought to an outside ER, there was noted to have A. fib with RVR and was started on diltiazem drip and admitted to the ICU for further management.    Past Medical History    I have reviewed this patient's medical history and updated it with pertinent information if needed.   No past medical history on file.    Past Surgical History   I have reviewed this patient's surgical history and updated it with pertinent information if needed.  Past Surgical History:   Procedure Laterality Date     BACK SURGERY       COLONOSCOPY N/A 9/27/2019    Procedure: SCREENING COLONOSCOPY WITH POLYPECTOMY;  Surgeon: Alfredo La MD;  Location: [facility]       Prior to Admission Medications   Prior to Admission Medications   Prescriptions Last Dose Informant  Patient Reported? Taking?   furosemide (LASIX) 40 MG tablet Unknown at Unknown time Other No Yes   Sig: Take 1 tablet (40 mg) by mouth daily   lisinopril (ZESTRIL) 10 MG tablet Unknown at Unknown time Other No Yes   Sig: Take 1 tablet (10 mg) by mouth daily   metoprolol tartrate (LOPRESSOR) 50 MG tablet Unknown at Unknown time Other No Yes   Sig: TAKE 1 TABLET(50 MG) BY MOUTH TWICE DAILY   warfarin ANTICOAGULANT (COUMADIN) 5 MG tablet Unknown at Unknown time Other Yes Yes   Sig: Take one tablet (5 mg) on Mondays, and take one-half tablet (2.5 mg) all other days, or as directed by Warfarin Clinic.      Facility-Administered Medications: None     Allergies   No Known Allergies    Social History   I have reviewed this patient's social history and updated it with pertinent information if needed. Ramírez Hernández  reports that he has been smoking cigarettes. He started smoking about 46 years ago. He has a 40.00 pack-year smoking history. He has never used smokeless tobacco. He reports current alcohol use. He reports that he does not use drugs.    Family History   I have reviewed this patient's family history and updated it with pertinent information if needed.   Family History   Problem Relation Age of Onset     No Known Problems Other        Review of Systems     Constitutional: Generally poor energy and appetite, no recent sick contacts, no Covid related symptoms.  Eyes: no changes in vision  Ears, nose, mouth, throat, and face: no mouth sores, dysphagia, or odynophagia  Respiratory: no shortness of breath, cough, or wheezing. No aspiration symptoms.   Cardiovascular: no chest pain, palpitations, orthopnea, no new or changing lower extremity edema, or syncope.   Gastrointestinal: no constipation, diarrhea, nausea, vomiting or abdominal pain.  Genitourinary: no dysuria, hematuria, urgency or frequency.   Hematologic/lymphatic: no unintentional weight loss or night sweats.  Musculoskeletal: no pain to  extremities.  Neurological: no new weakness, tingling, numbness.   Endocrine: not a known diabetic.       Physical Exam   Temp: 99.3  F (37.4  C) Temp src: Tympanic BP: 125/87 Pulse: 75   Resp: 20 SpO2: 95 % O2 Device: None (Room air)    Vital Signs with Ranges  Temp:  [96.8  F (36  C)-99.3  F (37.4  C)] 99.3  F (37.4  C)  Pulse:  [] 75  Resp:  [11-46] 20  BP: ()/() 125/87  SpO2:  [89 %-97 %] 95 %  250 lbs 7.08 oz    Exam:  GENERAL: Talkative,Sitting up in bed, appropriate and pleasant, in no apparent distress.  Head: normocephalic and atraumatic  Eyes: anicteric and non-injected sclera  Nose: no rhinorrhea or epistaxis.   Throat: moist mucous membranes with no active oral lesions.  NECK: Supple, jugular venous distension not present.  CARDIOVASCULAR: Irregularly irregular rate and rhythm, no rubs, or gallops. Normal S1/S2.  1+ chronic appearing hyperpigmented venous stasis changes of his bilateral lower extremities.     RESPIRATORY: clear to auscultation bilaterally, no wheezes, no crackles.  No accessory muscle use or evidence of respiratory distress.   GI: soft, non-tender, diffuse tight distention with shifting dullness, normoactive bowel sounds.  Not able to palpate liver edge due to body habitus.    MUSCULOSKELETAL: warm and well perfused, 2+ dorsalis pedis pulses.    SKIN: Chronic stasis changes of his bilateral lower extremities as noted above.  Right neck and upper chest and back with dense appearing hematoma which is nonblanchable without epithelial breakdown.  NEUROLOGY: AAOx3, follows commands, speech and language without focal deficits.  No upper extremity tremor.      Data   Data reviewed today:  I personally reviewed no images or EKG's today.  Recent Labs   Lab 10/25/21  0448 10/24/21  2055 10/24/21  1640 10/24/21  1639 10/24/21  1638   WBC 14.5*  --  17.5*  --   --    HGB 10.1*  --  11.4*  --   --    MCV 88  --  88  --   --      --  283  --   --    INR 1.97*  --   --   --   1.98*   *  --   --  130*  --    POTASSIUM 4.0 3.8  --  4.3  --    CHLORIDE 96*  --   --  97  --    CO2 22  --   --  23  --    BUN 17  --   --  15  --    CR 0.99  --   --  0.78  --    ANIONGAP 13  --   --  10  --    HECTOR 9.2  --   --  9.2  --    *  --   --  122*  --    ALBUMIN  --  3.5  --  3.0*  --    PROTTOTAL  --   --   --  8.8  --    BILITOTAL  --   --   --  1.8*  --    ALKPHOS  --   --   --  160*  --    ALT  --   --   --  18  --    AST  --   --   --  54*  --    LIPASE  --   --   --  45*  --    TROPONIN  --   --   --  0.064*  --        Recent Results (from the past 24 hour(s))   CT Head w/o Contrast    Narrative    PROCEDURE: CT HEAD W/O CONTRAST     HISTORY: Trauma - Head Injury.    COMPARISON: None.    TECHNIQUE:  Helical images of the head from the foramen magnum to the  vertex were obtained without contrast.    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  There are no masses or ventricular shifts or extracerebral  collections. The ventricular system and cortical sulci are normal for  age. Moderate mucosal thickening is seen in the maxillary sinuses.      Impression    IMPRESSION: No acute brain injury.      KLAUS MANCIA MD         SYSTEM ID:  [id number]   CT Cervical Spine w/o Contrast    Narrative    PROCEDURE: CT CERVICAL SPINE W/O CONTRAST 10/24/2021 5:01 PM    HISTORY: Trauma - C-Spine Injury    COMPARISONS: None.    Meds/Dose Given:    TECHNIQUE: CT scan of the cervical spine with sagittal coronal  reconstructions    FINDINGS: Degenerative changes are seen at the middle atlantoaxial  joint. There is forward subluxation of C2 on C3 due to facet joint  degenerative change. Prominent anterior osteophytes are seen at C2-C3,  C3-C4, C4-C5, C5-C6, and C6-C7. Advanced facet joint degenerative  changes are seen at C2-C3 on the right.    There are no fractures of the vertebral bodies or arches.    Heavy atherosclerotic calcifications are seen at the carotid  bifurcations.           Impression    IMPRESSION: Advanced degenerative changes of the cervical spine. No  acute fracture.    KLAUS MANCIA MD         SYSTEM ID:  [id number]   CT Chest/Abdomen/Pelvis w Contrast    Narrative    PROCEDURE: CT CHEST/ABDOMEN/PELVIS W CONTRAST 10/24/2021 5:06 PM    HISTORY: Trauma - Abdominal/Pelvic Injury    COMPARISONS: None.    Meds/Dose Given: ISOVUE 300 147 mL    TECHNIQUE: CT scan of the chest abdomen and pelvis with IV contrast  sagittal coronal reconstructions were obtained    FINDINGS: CT chest: There is a 3.3 cm in diameter left apical mass  suspicious for malignancy. There is a 3 mm in diameter nodule in the  right lung on series 5 axial image 47. There is some subsegmental  atelectasis or scarring seen in both lungs. The hilar and mediastinal  lymph nodes are normal in caliber. The heart is enlarged. Degenerative  changes are present in the thoracic spine. An old healed left rib  fracture with residual deformity is noted.    CT scan of the abdomen and pelvis: There is a large amount of fluid  seen within the abdomen presumably ascites. There is superficial  nodularity of the liver suspicious for cirrhosis. Spleen and pancreas  appear normal. The adrenal glands are normal. The right left kidneys  are free of masses or hydronephrosis. The periaortic lymph nodes are  normal in caliber. The bladder and rectum are normal. The regional  skeleton is intact.         Impression    IMPRESSION: Left apical mass suspicious for malignancy.    There is a large amount of ascites.    There is superficial nodularity in the liver suspicious for cirrhosis.    KLAUS MANCIA MD         SYSTEM ID:  [id number]   Echocardiogram Complete   Result Value    LVEF  60-65%    Narrative    [id number]  [id number]  [id number]  [id number]^ANGI^MOON^JADEN     [facility]  [address] [place] [address]     Name: NATALIE DOWNEY  MRN: [id number]  : 1955  Study Date: 10/25/2021 08:36 AM  Age: 66  yrs  Gender: Male  Patient Location: [facility]  Reason For Study: Heart Failure  Ordering Physician: MOON WHITLEY  Performed By: Ela Lazaro RDCS, BUD     BSA: 2.2 m2  Height: 67 in  Weight: 250 lb  HR: 99  BP: 171/84 mmHg  ______________________________________________________________________________  Procedure  Complete Portable Echo Adult. Contrast Definity. Definity (NDC #46647-174-89)  given intravenously. Patient was given 4ml mixture of 1.5ml Definity and 8.5ml  saline. 6 ml wasted. Definity Lot # [id number] . The patient's rhythm is atrial  fibrillation.  ______________________________________________________________________________  Interpretation Summary  The patient's rhythm is atrial fibrillation.  Severe functional tricuspid insufficiency is present. TV leaflets do not  coapt.  Moderate right ventricular dilation is present.Global right ventricular  function is moderately reduced.Regional wall motion abnormalities involving  mid free wall..  Global and regional left ventricular function is normal with an EF of 60-65%.  Flattened septum is consistent with right ventricular volume overload.  IVC diameter >2.1 cm collapsing <50% with sniff suggests a high RA pressure  estimated at 15 mmHg or greater.  No pericardial effusion is present.  There has been no change comapred to study from 2017.  ______________________________________________________________________________  Left Ventricle  Global and regional left ventricular function is normal with an EF of 60-65%.  Left ventricular size is normal. Left ventricular wall thickness is normal.  Diastolic function not assessed due to atrial fibrillation. Flattened septum  is consistent with right ventricular volume overload.     Right Ventricle  Moderate right ventricular dilation is present. Global right ventricular  function is moderately reduced. Regional wall motion abnormalities involving  mid free wall..     Atria  Moderate left atrial enlargement is present.  Severe right atrial enlargement  is present.     Mitral Valve  Moderate mitral annular calcification is present. Trace mitral insufficiency  is present.     Aortic Valve  The aortic valve is tricuspid. Moderate aortic valve calcification is present.  Trace to mild aortic insufficiency is present.     Tricuspid Valve  Severe tricuspid insufficiency is present. The right ventricular systolic  pressure is approximated at 35.0 mmHg plus the right atrial pressure.     Pulmonic Valve  Trace pulmonic insufficiency is present.     Vessels  The thoracic aorta is normal. IVC diameter >2.1 cm collapsing <50% with sniff  suggests a high RA pressure estimated at 15 mmHg or greater.     Pericardium  No pericardial effusion is present.     Compared to Previous Study  There has been no change.     ______________________________________________________________________________  MMode/2D Measurements & Calculations  IVSd: 0.99 cm  LVIDd: 5.0 cm  LVIDs: 3.5 cm  LVPWd: 0.87 cm  FS: 31.0 %     LV mass(C)d: 165.8 grams  LV mass(C)dI: 74.6 grams/m2  Ao root diam: 3.5 cm  asc Aorta Diam: 3.7 cm  LVOT diam: 2.0 cm  LVOT area: 3.1 cm2  LA Volume (BP): 76.9 ml  LA Volume Index (BP): 34.6 ml/m2  RWT: 0.35     Doppler Measurements & Calculations  Ao V2 max: 143.5 cm/sec  Ao max P.0 mmHg  Ao V2 mean: 101.4 cm/sec  Ao mean P.0 mmHg  Ao V2 VTI: 25.4 cm  ROBERT(I,D): 2.4 cm2  ROBERT(V,D): 2.6 cm2     LV V1 max P.6 mmHg  LV V1 max: 117.7 cm/sec  LV V1 VTI: 19.3 cm  SV(LVOT): 60.5 ml  SI(LVOT): 27.2 ml/m2  PA acc time: 0.09 sec  TR max ray: 295.0 cm/sec  TR max P.0 mmHg  AV Ray Ratio (DI): 0.82  ROBERT Index (cm2/m2): 1.1     ______________________________________________________________________________  Report approved by: Zenaida Lima 10/25/2021 10:45 AM

## 2021-10-25 NOTE — PROGRESS NOTES
PT resting in bed, remains confused at times, picks at cardiac monitor/IV sites, able to reorient all alarms active and audile. Abdomen significantly distended, bowel sounds hypoactive, bilateral lung sounds diminished/course crackles, HR remains elevated and irregular. Stopped Cardizem per provider, IV SL. Pneumatic compression devices placed, linen changed, breakfast provided. PT calm and cooperative.  Ally King RN 10/25/2021 9:45 AM        SAFETY CHECKLIST  ID Bands and Risk clasps correct and in place (DNR, Fall risk, Allergy, Latex, Limb):  Yes  All Lines Reconciled and labeled correctly: Yes  Whiteboard updated:Yes  Environmental interventions (bed/chair alarm on, call light, side rails, restraints, sitter....): Yes  Verify Tele #:

## 2021-10-25 NOTE — PROGRESS NOTES
"PT having visual hallucinations, stating \"there is a cigarette butt in my bed, I need an emily tray\". PT calm and able to reorient, given PRN ativan per CIWA score.  Ally King RN 10/25/2021 2:02 PM    "

## 2021-10-25 NOTE — PHARMACY-ADMISSION MEDICATION HISTORY
Pharmacy -- Admission Medication Reconciliation- IN PROGRESS note    Prior to admission (PTA) medications were reviewed and the patient's PTA medication list was updated.    Sources Consulted: Surescripts fill history, Patient medication bottles from home, Anticoagulation chart notes    **Per ICU nursing, patient currently confused and is not a good candidate for interview at this time**      The following significant changes were made:  -Updated patient's pharmacy- it appears patient fills at [facility]  -Removed Warfarin x2 entries, and re-entered x1 with updated directions      **Notes:  -Warfarin: per 10/6 anticoagulation note, patient's current Warfarin dose is 5 mg on Mondays, and 2.5 mg all other days. Patient taking for atrial fibrillation; dosing/[facility]R checks [facility]; INR last checked 10/6 with next INR check due 11/17.  **Patient's Warfarin in old bottle dated 10/20/20- with directions to take 5 mg on MWF, 2.5 mg all other days- unclear how patient has been taking at home but last few anticoagulation notes have patient on same dose of 5 mg on Mondays, 2.5 mg rest of week.    Per nursing, patient currently confused and not a good candidate for interview to discuss meds at this time- will continue to evaluate to discuss Warfarin dosing/last home doses/OTC medications.    Ray Voss [facility], 10/25/2021,  9:02 AM

## 2021-10-25 NOTE — PROGRESS NOTES
"Partial bed bath completed, along with toe nail care as requested by provider. PT's skin exteremly dry/cracked in lower bilateral legs, lotion applied. Perineal care, along with heck cares complete. When attempted to roll pt side to side to change linen and brief, pt refused to be turned stating \"no, no, no it hurts\" when inquiring about pain, pt states it is in his back and he can not turn over. Pt given PRN pain medication, will reassess and attempt to have pt turn if able.  Ally King RN 10/25/2021 5:40 PM    "

## 2021-10-25 NOTE — PHARMACY-ANTICOAGULATION SERVICE
Clinical Pharmacy - Warfarin Dosing Consult     Pharmacy has been consulted to manage this patient s warfarin therapy.       INR   Date Value Ref Range Status   10/24/2021 1.98 (H) 0.85 - 1.15 Final   10/06/2021 2.0 (H) 0.9 - 1.1 Final       Recommend warfarin 0 mg today.  Pharmacy will monitor Ramírez PAEZ Edgar daily and order warfarin doses to achieve specified goal.      Please contact pharmacy as soon as possible if the warfarin needs to be held for a procedure or if the warfarin goals change.

## 2021-10-25 NOTE — PROGRESS NOTES
Diet instruction:   Attempted to visit with pt today for heart healthy nutrition therapy. He was cooperative but seemed confused. Answered some questions appropriately. Reports he eats a lot of canned soups and stews. Suspect he does not and likely will not do much scratch cooking for himself. Will visit with again next avail day to see if further instruction would be better received/understood.   Abby Billings RD on 10/25/2021 at 3:40 PM

## 2021-10-25 NOTE — PHARMACY-ANTICOAGULATION SERVICE
Pharmacy Consult- Initial Coumadin Assessment    Ramírez Hernández is a 66 year old male admitted on 10/24/2021 with Acute systolic congestive heart failure (H)    Primary Indication(s) for Anticoagulation: afib    Goal INR: 2-3    FYI, patient is followed by the Anticoagulation/[facility] Clinic at: [facility]    Patient Active Problem List   Diagnosis     Persistent atrial fibrillation (H)     Acute systolic congestive heart failure (H)     Alcohol abuse     Tobacco abuse     Long-term (current) use of anticoagulants [Z79.01]     Cirrhosis of liver (H)     Lung mass       Patient previously anticoagulated on Coumadin at a dose of 20 mg/week; dosed as: 5 mg M; 2.5 mg ROW    Factors that may increase patient's sensitivity to warfarin (Coumadin) include: none noted    Factors that may decrease patient's sensitivity to warfarin (Coumadin) include: IV vitamin K 5 mg 10/25/21.    Recent Labs   Lab Test 10/25/21  0448 10/24/21  1640 10/24/21  1638 10/06/21  1332 08/25/21  0948 09/27/19  0851 09/16/19  0822 07/29/19  0844 07/29/19  0843   HGB 10.1* 11.4*  --   --   --   --  14.8  --  15.2   INR 1.97*  --  1.98* 2.0* 2.7*   < >  --    < >  --     283  --   --   --   --  288  --  275    < > = values in this interval not displayed.        Plan: No warfarin dose today. Planned tap tomorrow.     Thank You for the Consult. Will continue to follow.    Mary Jane Tavarez [facility] ....................  10/25/2021   8:59 AM

## 2021-10-26 ENCOUNTER — APPOINTMENT (OUTPATIENT)
Dept: OCCUPATIONAL THERAPY | Facility: OTHER | Age: 66
DRG: 308 | End: 2021-10-26
Attending: INTERNAL MEDICINE
Payer: MEDICARE

## 2021-10-26 ENCOUNTER — APPOINTMENT (OUTPATIENT)
Dept: PHYSICAL THERAPY | Facility: OTHER | Age: 66
DRG: 308 | End: 2021-10-26
Attending: INTERNAL MEDICINE
Payer: MEDICARE

## 2021-10-26 PROBLEM — E43 SEVERE PROTEIN-CALORIE MALNUTRITION (H): Status: ACTIVE | Noted: 2021-10-26

## 2021-10-26 LAB
% MONONUCLEAR CELLS, BODY FLUID: 73 %
ABO/RH(D): NORMAL
ALBUMIN FLD-MCNC: 1.6 G/DL
ALBUMIN SERPL-MCNC: 3.4 G/DL (ref 3.5–5.7)
ALP SERPL-CCNC: 94 U/L (ref 34–104)
ALT SERPL W P-5'-P-CCNC: 16 U/L (ref 7–52)
ANION GAP SERPL CALCULATED.3IONS-SCNC: 10 MMOL/L (ref 3–14)
ANTIBODY SCREEN: NEGATIVE
APPEARANCE FLD: ABNORMAL
AST SERPL W P-5'-P-CCNC: 44 U/L (ref 13–39)
BILIRUB SERPL-MCNC: 2 MG/DL (ref 0.3–1)
BUN SERPL-MCNC: 21 MG/DL (ref 7–25)
CALCIUM SERPL-MCNC: 9.3 MG/DL (ref 8.6–10.3)
CHLORIDE BLD-SCNC: 98 MMOL/L (ref 98–107)
CO2 SERPL-SCNC: 26 MMOL/L (ref 21–31)
COLOR FLD: YELLOW
CREAT SERPL-MCNC: 0.97 MG/DL (ref 0.7–1.3)
ERYTHROCYTE [DISTWIDTH] IN BLOOD BY AUTOMATED COUNT: 14.5 % (ref 10–15)
GFR SERPL CREATININE-BSD FRML MDRD: 81 ML/MIN/1.73M2
GLUCOSE BLD-MCNC: 105 MG/DL (ref 70–105)
GRAM STAIN RESULT: ABNORMAL
GRAM STAIN RESULT: ABNORMAL
HCT VFR BLD AUTO: 30.7 % (ref 40–53)
HGB BLD-MCNC: 10.2 G/DL (ref 13.3–17.7)
INR PPP: 1.48 (ref 0.85–1.15)
LACTATE SERPL-SCNC: 1.3 MMOL/L (ref 0.7–2)
MAGNESIUM SERPL-MCNC: 2 MG/DL (ref 1.9–2.7)
MCH RBC QN AUTO: 29.7 PG (ref 26.5–33)
MCHC RBC AUTO-ENTMCNC: 33.2 G/DL (ref 31.5–36.5)
MCV RBC AUTO: 90 FL (ref 78–100)
NEUTS BAND NFR FLD MANUAL: 27 %
PLATELET # BLD AUTO: 240 10E3/UL (ref 150–450)
POTASSIUM BLD-SCNC: 3.7 MMOL/L (ref 3.5–5.1)
PREALB SERPL IA-MCNC: 5 MG/DL (ref 15–45)
PROT FLD-MCNC: 4.2 G/DL
PROT SERPL-MCNC: 6.8 G/DL (ref 6.4–8.9)
RBC # BLD AUTO: 3.43 10E6/UL (ref 4.4–5.9)
RBC # FLD: ABNORMAL /UL
SODIUM SERPL-SCNC: 134 MMOL/L (ref 134–144)
SPECIMEN EXPIRATION DATE: NORMAL
TROPONIN I SERPL-MCNC: 25.3 PG/ML (ref 0–34)
WBC # BLD AUTO: 11 10E3/UL (ref 4–11)
WBC # FLD AUTO: 680 /UL

## 2021-10-26 PROCEDURE — 84484 ASSAY OF TROPONIN QUANT: CPT | Performed by: INTERNAL MEDICINE

## 2021-10-26 PROCEDURE — 85027 COMPLETE CBC AUTOMATED: CPT | Performed by: INTERNAL MEDICINE

## 2021-10-26 PROCEDURE — 0W9G3ZX DRAINAGE OF PERITONEAL CAVITY, PERCUTANEOUS APPROACH, DIAGNOSTIC: ICD-10-PCS | Performed by: INTERNAL MEDICINE

## 2021-10-26 PROCEDURE — 87205 SMEAR GRAM STAIN: CPT | Performed by: INTERNAL MEDICINE

## 2021-10-26 PROCEDURE — 250N000013 HC RX MED GY IP 250 OP 250 PS 637: Performed by: FAMILY MEDICINE

## 2021-10-26 PROCEDURE — 84134 ASSAY OF PREALBUMIN: CPT | Performed by: INTERNAL MEDICINE

## 2021-10-26 PROCEDURE — 82042 OTHER SOURCE ALBUMIN QUAN EA: CPT | Performed by: INTERNAL MEDICINE

## 2021-10-26 PROCEDURE — 88112 CYTOPATH CELL ENHANCE TECH: CPT

## 2021-10-26 PROCEDURE — 97530 THERAPEUTIC ACTIVITIES: CPT | Mod: GO | Performed by: OCCUPATIONAL THERAPIST

## 2021-10-26 PROCEDURE — 97530 THERAPEUTIC ACTIVITIES: CPT | Mod: GP

## 2021-10-26 PROCEDURE — 86900 BLOOD TYPING SEROLOGIC ABO: CPT | Performed by: INTERNAL MEDICINE

## 2021-10-26 PROCEDURE — 36415 COLL VENOUS BLD VENIPUNCTURE: CPT

## 2021-10-26 PROCEDURE — 89051 BODY FLUID CELL COUNT: CPT | Performed by: INTERNAL MEDICINE

## 2021-10-26 PROCEDURE — 97535 SELF CARE MNGMENT TRAINING: CPT | Mod: GO | Performed by: OCCUPATIONAL THERAPIST

## 2021-10-26 PROCEDURE — 85610 PROTHROMBIN TIME: CPT | Performed by: FAMILY MEDICINE

## 2021-10-26 PROCEDURE — 88305 TISSUE EXAM BY PATHOLOGIST: CPT

## 2021-10-26 PROCEDURE — 83735 ASSAY OF MAGNESIUM: CPT | Performed by: FAMILY MEDICINE

## 2021-10-26 PROCEDURE — 99232 SBSQ HOSP IP/OBS MODERATE 35: CPT | Mod: 25 | Performed by: INTERNAL MEDICINE

## 2021-10-26 PROCEDURE — 80053 COMPREHEN METABOLIC PANEL: CPT | Performed by: INTERNAL MEDICINE

## 2021-10-26 PROCEDURE — 89050 BODY FLUID CELL COUNT: CPT | Performed by: INTERNAL MEDICINE

## 2021-10-26 PROCEDURE — 97165 OT EVAL LOW COMPLEX 30 MIN: CPT | Mod: GO | Performed by: OCCUPATIONAL THERAPIST

## 2021-10-26 PROCEDURE — 83605 ASSAY OF LACTIC ACID: CPT | Performed by: FAMILY MEDICINE

## 2021-10-26 PROCEDURE — 97116 GAIT TRAINING THERAPY: CPT | Mod: GP

## 2021-10-26 PROCEDURE — 250N000011 HC RX IP 250 OP 636: Performed by: INTERNAL MEDICINE

## 2021-10-26 PROCEDURE — 250N000013 HC RX MED GY IP 250 OP 250 PS 637: Performed by: INTERNAL MEDICINE

## 2021-10-26 PROCEDURE — 97161 PT EVAL LOW COMPLEX 20 MIN: CPT | Mod: GP

## 2021-10-26 PROCEDURE — 36415 COLL VENOUS BLD VENIPUNCTURE: CPT | Performed by: FAMILY MEDICINE

## 2021-10-26 PROCEDURE — 120N000001 HC R&B MED SURG/OB

## 2021-10-26 PROCEDURE — 87070 CULTURE OTHR SPECIMN AEROBIC: CPT | Performed by: INTERNAL MEDICINE

## 2021-10-26 PROCEDURE — 84157 ASSAY OF PROTEIN OTHER: CPT | Performed by: INTERNAL MEDICINE

## 2021-10-26 PROCEDURE — 49083 ABD PARACENTESIS W/IMAGING: CPT | Performed by: INTERNAL MEDICINE

## 2021-10-26 RX ORDER — FUROSEMIDE 10 MG/ML
40 INJECTION INTRAMUSCULAR; INTRAVENOUS EVERY 12 HOURS
Status: DISCONTINUED | OUTPATIENT
Start: 2021-10-26 | End: 2021-10-27

## 2021-10-26 RX ORDER — NYSTATIN 100000 [USP'U]/G
POWDER TOPICAL
Status: DISCONTINUED | OUTPATIENT
Start: 2021-10-26 | End: 2021-10-29 | Stop reason: HOSPADM

## 2021-10-26 RX ORDER — IBUPROFEN 200 MG
200 TABLET ORAL DAILY PRN
Status: ON HOLD | COMMUNITY
End: 2021-10-29

## 2021-10-26 RX ORDER — PANTOPRAZOLE SODIUM 40 MG/1
40 TABLET, DELAYED RELEASE ORAL
Status: DISCONTINUED | OUTPATIENT
Start: 2021-10-26 | End: 2021-10-29 | Stop reason: HOSPADM

## 2021-10-26 RX ORDER — MAGNESIUM OXIDE 400 MG/1
400 TABLET ORAL 2 TIMES DAILY
Status: COMPLETED | OUTPATIENT
Start: 2021-10-26 | End: 2021-10-28

## 2021-10-26 RX ADMIN — OXYCODONE HYDROCHLORIDE AND ACETAMINOPHEN 1 TABLET: 5; 325 TABLET ORAL at 03:59

## 2021-10-26 RX ADMIN — OXYCODONE HYDROCHLORIDE AND ACETAMINOPHEN 1 TABLET: 5; 325 TABLET ORAL at 22:11

## 2021-10-26 RX ADMIN — THIAMINE HCL TAB 100 MG 100 MG: 100 TAB at 09:39

## 2021-10-26 RX ADMIN — PANTOPRAZOLE SODIUM 40 MG: 40 TABLET, DELAYED RELEASE ORAL at 08:46

## 2021-10-26 RX ADMIN — Medication 1 TABLET: at 09:39

## 2021-10-26 RX ADMIN — FUROSEMIDE 40 MG: 10 INJECTION, SOLUTION INTRAVENOUS at 00:09

## 2021-10-26 RX ADMIN — QUETIAPINE FUMARATE 25 MG: 25 TABLET ORAL at 23:51

## 2021-10-26 RX ADMIN — METOPROLOL TARTRATE 50 MG: 50 TABLET, FILM COATED ORAL at 09:39

## 2021-10-26 RX ADMIN — FUROSEMIDE 40 MG: 10 INJECTION, SOLUTION INTRAVENOUS at 11:48

## 2021-10-26 RX ADMIN — MAGNESIUM OXIDE TAB 400 MG (241.3 MG ELEMENTAL MG) 400 MG: 400 (241.3 MG) TAB at 23:51

## 2021-10-26 RX ADMIN — METOPROLOL TARTRATE 50 MG: 50 TABLET, FILM COATED ORAL at 22:11

## 2021-10-26 RX ADMIN — FOLIC ACID 1 MG: 1 TABLET ORAL at 09:38

## 2021-10-26 RX ADMIN — FUROSEMIDE 40 MG: 10 INJECTION, SOLUTION INTRAVENOUS at 23:52

## 2021-10-26 RX ADMIN — ACETAMINOPHEN 650 MG: 325 TABLET, FILM COATED ORAL at 23:55

## 2021-10-26 ASSESSMENT — ACTIVITIES OF DAILY LIVING (ADL)
ADLS_ACUITY_SCORE: 23
ADLS_ACUITY_SCORE: 18
ADLS_ACUITY_SCORE: 23
ADLS_ACUITY_SCORE: 23
ADLS_ACUITY_SCORE: 20
ADLS_ACUITY_SCORE: 23
ADLS_ACUITY_SCORE: 23
ADLS_ACUITY_SCORE: 20
ADLS_ACUITY_SCORE: 23
ADLS_ACUITY_SCORE: 20
ADLS_ACUITY_SCORE: 22
ADLS_ACUITY_SCORE: 20
ADLS_ACUITY_SCORE: 23
ADLS_ACUITY_SCORE: 20
ADLS_ACUITY_SCORE: 23
ADLS_ACUITY_SCORE: 20

## 2021-10-26 ASSESSMENT — MIFFLIN-ST. JEOR: SCORE: 1873.63

## 2021-10-26 NOTE — PROGRESS NOTES
Patient transferred from ICU to room 332 on medical. Patient able to pivot transfer to toilet and from toilet to recliner, nurse placed dressing to buttocks, left medial side superficial skin tear. Patient also has what looks like an intact fracture blister to left thigh area, patient also has a large bruised area to right shoulder, patient stated he has no pain with movement at this time. Reyes cath in place, up in recliner with a mat under him. Demetria Montero RN on 10/26/2021 at 3:20 PM  No further issues at end of shift.

## 2021-10-26 NOTE — PROGRESS NOTES
10/26/21 1523   Quick Adds   Type of Visit Initial Occupational Therapy Evaluation   Living Environment   People in home alone   Current Living Arrangements house   Home Accessibility stairs to enter home;stairs within home   Number of Stairs, Main Entrance 3   Stair Railings, Main Entrance railings safe and in good condition   Self-Care   Usual Activity Tolerance fair   Current Activity Tolerance poor   Equipment Currently Used at Home shower chair   Disability/Function   Hearing Difficulty or Deaf no   Cognitive Status Examination   Orientation Status person;place   Follows Commands WFL   Safety Deficit insight into deficits/self-awareness;problem-solving   Memory Deficit minimal deficit   Pain Assessment   Patient Currently in Pain Yes, see Vital Sign flowsheet   Range of Motion Comprehensive   General Range of Motion   (limited right shoulder due to bruising/pain)   Coordination   Upper Extremity Coordination No deficits were identified   Transfers   Transfers sit-stand transfer;bed-chair transfer;toilet transfer   Transfer Skill: Bed to Chair/Chair to Bed   Bed-Chair [place] (Transfers) minimum assist (75% patient effort);2 person assist   Assistive Device (Bed-Chair Transfers) rolling walker   Sit-Stand Transfer   Sit-Stand [place] (Transfers) minimum assist (75% patient effort);2 person assist   Assistive Device (Sit-Stand Transfers) walker, front-wheeled   Toilet Transfer   [place] Level (Toilet Transfer) minimum assist (75% patient effort);2 person assist   Assistive Device (Toilet Transfer) grab bars/safety frame   Clinical Impression   Criteria for Skilled Therapeutic Interventions Met (OT) yes   OT Diagnosis functional weakness    OT Problem List-Impairments impacting ADL activity tolerance impaired;pain;strength   Assessment of Occupational Performance 1-3 Performance Deficits   Identified Performance Deficits mobility/self care    Planned Therapy Interventions (OT) ADL  retraining;progressive activity/exercise   Clinical Decision Making Complexity (OT) low complexity   Therapy Frequency (OT) Daily   Predicted Duration of Therapy 2-3 days    Risk & Benefits of therapy have been explained risks/benefits reviewed   OT Discharge Planning    OT Discharge Recommendation (DC Rec) Transitional Care Facility   OT Rationale for DC Rec Pt could definately benefit from on-going therapies to maximize level of independence needed to return to home as previous    OT Brief overview of current status  Pt pleasant, moving slow, needed min assist of 2 for transfers, needed assist with toileting, reports he does not move much at baseline, will assess ADL's further.    Total Evaluation Time (Minutes)   Total Evaluation Time (Minutes) 15

## 2021-10-26 NOTE — PROGRESS NOTES
Assisted MD with paracentesis time out was performed prior to procedure and consent signed. Pt tolerated well. Approx 1400ml of fluid removed and sent to lab as ordered. Pt alert and orientated at this time. Large bruise noted on right shoulder from fall at home. Reyes draining. Pt very unkempt. Bandage placed over paracentesis site CDI. Lungs exp wheezes and fine crackles. Pt denies pain this am       SAFETY CHECKLIST  ID Bands and Risk clasps correct and in place (DNR, Fall risk, Allergy, Latex, Limb):  Yes  All Lines Reconciled and labeled correctly: Yes  Whiteboard updated:Yes  Environmental interventions (bed/chair alarm on, call light, side rails, restraints, sitter....): Yes

## 2021-10-26 NOTE — PROGRESS NOTES
10/26/21 1500   Quick Adds   Type of Visit Initial PT Evaluation   Living Environment   People in home alone   Current Living Arrangements house   Home Accessibility no concerns;stairs to enter home   Number of Stairs, Main Entrance 3   Stair Railings, Main Entrance railings on both sides of stairs   Transportation Anticipated family or friend will provide   Self-Care   Usual Activity Tolerance fair   Current Activity Tolerance poor   Regular Exercise No   Equipment Currently Used at Home none   Disability/Function   Hearing Difficulty or Deaf yes   Patient's preferred means of communication English speaker with hearing loss, no speech problems.   Describe hearing loss bilateral hearing loss   Use of hearing assistive devices none   Wear Glasses or Blind yes   Vision Management glasses   Concentrating, Remembering or Making Decisions Difficulty yes   Difficulty Communicating no   Difficulty Eating/Swallowing no   Walking or Climbing Stairs Difficulty yes   Dressing/Bathing Difficulty no   Toileting issues no   Doing Errands Independently Difficulty (such as shopping) yes   Errands Management friends and family provide assistance   Fall history within last six months yes   Number of times patient has fallen within last six months 1   General Information   Referring Physician Goyo   Patient/Family Therapy Goals Statement (PT) return home when able   Existing Precautions/Restrictions fall   Weight-Bearing Status - LLE full weight-bearing   Weight-Bearing Status - RLE full weight-bearing   Cognition   Orientation Status (Cognition) oriented x 3   Affect/Mental Status (Cognition) WFL   Follows Commands (Cognition) WFL   Pain Assessment   Patient Currently in Pain Yes, see Vital Sign flowsheet   Integumentary/Edema   Integumentary/Edema Comments bruising right shoulder; small open sore near coccyx    Posture    Posture Forward head position;Protracted shoulders   Range of Motion (ROM)   ROM Quick Adds ROM WFL    Strength   Manual Muscle Testing Quick Adds Strength WFL   Strength Comments however, patient fatigues with activity   Transfers   Transfer Safety Comments minimal assist of 2 with Fww   Gait/Stairs (Locomotion)   [place] Level (Gait) minimum assist (75% patient effort);2 person assist   Assistive Device (Gait) walker, front-wheeled   Distance in Feet (Required for LE Total Joints) 3-4   Pattern (Gait) 3-point   Balance   Balance Comments fair with Fww   Sensory Examination   Sensory Perception WFL   Coordination   Coordination no deficits were identified   Muscle Tone   Muscle Tone no deficits were identified   Clinical Impression   Criteria for Skilled Therapeutic Intervention yes, treatment indicated   PT Diagnosis (PT) impaired mobility   Influenced by the following impairments fatigue and weakness   Functional limitations due to impairments activity/gait tolerance   Clinical Presentation Stable/Uncomplicated   Therapy Frequency (PT) Daily   PT Discharge Planning    PT Discharge Recommendation (DC Rec) Transitional Care Facility;home with assist;home with home care physical therapy   PT Rationale for DC Rec to promote strength, stability and safe mobility   PT Brief overview of current status  minimal assist for mobility; fatigue limiting activity;gait tolerance   Total Evaluation Time   Total Evaluation Time (Minutes) 15

## 2021-10-26 NOTE — PROGRESS NOTES
[facility]    Hospitalist Progress Note      Assessment & Plan   Ramírez Hernández is a 66 year old male who was admitted on 10/24/2021 with chronic alcohol abuse with A. fib with RVR, acute diastolic CHF exacerbation and tense ascites due to cirrhosis.    .     Principal Problem:    Cirrhosis of liver (H)    Assessment: Likely secondary to chronic alcohol abuse.  Tense ascites and underwent successful diagnostic/therapeutic paracentesis this morning.  Will benefit from ongoing monitoring medication up cessation. Received 5mg vit. K x1 on admit for INR reversal.    Plan:   - start PPI  - diuresis as below  - continue beta blocker as below  - follow-up paracentesis labs/SAAG    Principal Problem:    Acute diasolic congestive heart failure (H)    Assessment: improved. Severe volume overload with preserved ejection fraction and will benefit from ongoing aggressive diuresis.  TTE results reviewed and unchanged.  Troponin elevation noted without consistent cardiopulmonary symptoms or EKG changes and no evidence of ACS.  Valvular dysfunction likely from severe fluid overload.    Plan:   -lasix 40 mg IV every 8->every 12   -strict I's and O's and daily weights   -BMP daily        Lung mass    Assessment: Incidentally noted from CT scan, high suspicion for malignancy, on admit: very pleasant goals of care discussion with both his brother Scott, who patient would want to make his medical decisions if he was unable to, as well as patient.  Going forward patient wants to continue with DNR/DNI status only, would like to attempt diuresis and paracentesis to improve his symptoms with consideration for acute rehab but if having further complications or decline in his overall condition would then want pursue comfort care measures only going forward.    Plan:   -Follow-up surveillance and diagnostic considerations as an outpatient pending clinical course     Active Problems:    Persistent atrial fibrillation (H)     Assessment: stable rate control. Chronic A. fib with RVR on admit, resolved with diltiazem drip.  Significant ecchymosis of his right shoulder and high bleeding risk for probable paracentesis in a.m. and will hold Coumadin going forward.    Plan:   -Resume home metoprolol  -hold coumadin until falls risk decreased  -pt/ot today    Severe protein calorie malnutrition  Assessment: Chronic alcoholism with prealbumin less than 20 and third spacing all clinically consistent  Plan:  -Schedule supplements  -Appreciate inpatient dietary management       Alcohol abuse    Assessment: Daily heavy drinker, has previously had severe alcohol withdrawal    Plan:   -CIWA per protocol     Hyponatremia  Assessment: resolved with diuresis and likely hypervolemic hyponatremia  Plan:  -Diuresis and monitoring daily       Tobacco abuse    Assessment: Ongoing smoker counseled to quit    Plan:   -Nicotine patch daily      DVT Prophylaxis: Pneumatic Compression Devices  Code Status: No CPR- Do NOT Intubate    Diet: 2 Gram Sodium Diet No Caffeine for 24 hours (once tests completed, may have caffeine)    DVT Prophylaxis: Pneumatic Compression Devices  Reyes Catheter: PRESENT, indication: Strict 1-2 Hour I&O  Code Status: No CPR- Do NOT Intubate           Disposition Plan   Expected discharge: recommended to prior living arrangement once hemoglobin stable, mental status at baseline and safe disposition plan/ TCU bed available.  Entered: Raza Nance MD 10/26/2021, 8:21 AM       The patient's care was discussed with the Bedside Nurse and Patient.    Raza Nance MD  Hospitalist Service  [facility]  Contact information available via [facility] Paging/Directory    ______________________________________________________________________    Interval History   CC: falls    Overnight no acute events and afebrile, overall feeling significantly improved today, his abdomen feels less distended, his breathing feels better, not having any  shakes or hallucinations, he is hungry for breakfast, no nausea or vomiting, strength is improving he like to try and walk around today, no other new complaints.    -Data reviewed today: I reviewed all new labs and imaging results over the last 24 hours. I personally reviewed no images or EKG's today.    Physical Exam   Temp: 97  F (36.1  C) Temp src: Temporal BP: 117/66 Pulse: 93   Resp: 14 SpO2: 93 % O2 Device: None (Room air) Oxygen Delivery: 1 LPM  Vitals:    10/24/21 2200 10/25/21 0400 10/26/21 0400   Weight: 113.5 kg (250 lb 3.6 oz) 113.6 kg (250 lb 7.1 oz) 113.5 kg (250 lb 3.6 oz)     Vital Signs with Ranges  Temp:  [96.8  F (36  C)-99.1  F (37.3  C)] 97  F (36.1  C)  Pulse:  [] 93  Resp:  [12-38] 14  BP: (109-161)/(55-93) 117/66  SpO2:  [91 %-97 %] 93 %  I/O last 3 completed shifts:  In: 473 [P.O.:473]  Out: 1700 [Urine:1700]    Exam:  GENERAL: Talkative, laying in bed, appropriate and pleasant, in no apparent distress.  CARDIOVASCULAR: Irregularly irregular rate and rhythm, no rubs, or gallops. Normal S1/S2.  trace chronic appearing hyperpigmented venous stasis changes of his bilateral lower extremities.     RESPIRATORY: clear to auscultation bilaterally, no wheezes, no crackles.    GI: soft, non-tender, diffuse tight distention with shifting dullness, normoactive bowel sounds.  Not able to palpate liver edge due to body habitus.    MUSCULOSKELETAL: warm and well perfused, 2+ dorsalis pedis pulses.    SKIN: Chronic stasis changes of his bilateral lower extremities as noted above.  Right neck and upper chest and back with dense appearing hematoma which is nonblanchable without epithelial breakdown. Unchanged today.   NEUROLOGY: AAOx3, follows commands, speech and language without focal deficits.  No upper extremity tremor. grossly non focal.         Medications       folic acid  1 mg Oral Daily     furosemide  40 mg Intravenous Q12H     metoprolol tartrate  50 mg Oral BID     multivitamin w/minerals  1  tablet Oral Daily     sodium chloride (PF)  3 mL Intracatheter Q8H     thiamine  100 mg Oral Daily       Data   Recent Labs   Lab 10/26/21  0521 10/25/21  0448 10/24/21  2055 10/24/21  1640 10/24/21  1639 10/24/21  1639 10/24/21  1638   WBC 11.0 14.5*  --  17.5*  --   --   --    HGB 10.2* 10.1*  --  11.4*  --   --   --    MCV 90 88  --  88  --   --   --     226  --  283  --   --   --    INR 1.48* 1.97*  --   --   --   --  1.98*    131*  --   --   --  130*  --    POTASSIUM 3.7 4.0 3.8  --    < > 4.3  --    CHLORIDE 98 96*  --   --   --  97  --    CO2 26 22  --   --   --  23  --    BUN 21 17  --   --   --  15  --    CR 0.97 0.99  --   --   --  0.78  --    ANIONGAP 10 13  --   --   --  10  --    HECTOR 9.3 9.2  --   --   --  9.2  --     130*  --   --   --  122*  --    ALBUMIN 3.4*  --  3.5  --    < > 3.0*  --    PROTTOTAL 6.8  --   --   --   --  8.8  --    BILITOTAL 2.0*  --   --   --   --  1.8*  --    ALKPHOS 94  --   --   --   --  160*  --    ALT 16  --   --   --   --  18  --    AST 44*  --   --   --   --  54*  --    LIPASE  --   --   --   --   --  45*  --    TROPONIN  --   --   --   --   --  0.064*  --     < > = values in this interval not displayed.       Recent Results (from the past 24 hour(s))   Echocardiogram Complete   Result Value    LVEF  60-65%    [facility]    [id number]  [id number]  [id number]  [id number]^ANGI^MOON^JADEN     [facility]  [address] [place] [address]     Name: NATALIE DOWNEY  MRN: [id number]  : 1955  Study Date: 10/25/2021 08:36 AM  Age: 66 yrs  Gender: Male  Patient Location: [facility]  Reason For Study: Heart Failure  Ordering Physician: MOON WHITLEY  Performed By: Ela Lazaro RDCS, MANNT     BSA: 2.2 m2  Height: 67 in  Weight: 250 lb  HR: 99  BP: 171/84 mmHg  ______________________________________________________________________________  Procedure  Complete Portable Echo Adult. Contrast Definity. Definity (NDC #20912-005-22)  given  intravenously. Patient was given 4ml mixture of 1.5ml Definity and 8.5ml  saline. 6 ml wasted. Definity Lot # [id number] . The patient's rhythm is atrial  fibrillation.  ______________________________________________________________________________  Interpretation Summary  The patient's rhythm is atrial fibrillation.  Severe functional tricuspid insufficiency is present. TV leaflets do not  coapt.  Moderate right ventricular dilation is present.Global right ventricular  function is moderately reduced.Regional wall motion abnormalities involving  mid free wall..  Global and regional left ventricular function is normal with an EF of 60-65%.  Flattened septum is consistent with right ventricular volume overload.  IVC diameter >2.1 cm collapsing <50% with sniff suggests a high RA pressure  estimated at 15 mmHg or greater.  No pericardial effusion is present.  There has been no change comapred to study from 2017.  ______________________________________________________________________________  Left Ventricle  Global and regional left ventricular function is normal with an EF of 60-65%.  Left ventricular size is normal. Left ventricular wall thickness is normal.  Diastolic function not assessed due to atrial fibrillation. Flattened septum  is consistent with right ventricular volume overload.     Right Ventricle  Moderate right ventricular dilation is present. Global right ventricular  function is moderately reduced. Regional wall motion abnormalities involving  mid free wall..     Atria  Moderate left atrial enlargement is present. Severe right atrial enlargement  is present.     Mitral Valve  Moderate mitral annular calcification is present. Trace mitral insufficiency  is present.     Aortic Valve  The aortic valve is tricuspid. Moderate aortic valve calcification is present.  Trace to mild aortic insufficiency is present.     Tricuspid Valve  Severe tricuspid insufficiency is present. The right ventricular systolic  pressure  is approximated at 35.0 mmHg plus the right atrial pressure.     Pulmonic Valve  Trace pulmonic insufficiency is present.     Vessels  The thoracic aorta is normal. IVC diameter >2.1 cm collapsing <50% with sniff  suggests a high RA pressure estimated at 15 mmHg or greater.     Pericardium  No pericardial effusion is present.     Compared to Previous Study  There has been no change.     ______________________________________________________________________________  MMode/2D Measurements & Calculations  IVSd: 0.99 cm  LVIDd: 5.0 cm  LVIDs: 3.5 cm  LVPWd: 0.87 cm  FS: 31.0 %     LV mass(C)d: 165.8 grams  LV mass(C)dI: 74.6 grams/m2  Ao root diam: 3.5 cm  asc Aorta Diam: 3.7 cm  LVOT diam: 2.0 cm  LVOT area: 3.1 cm2  LA Volume (BP): 76.9 ml  LA Volume Index (BP): 34.6 ml/m2  RWT: 0.35     Doppler Measurements & Calculations  Ao V2 max: 143.5 cm/sec  Ao max P.0 mmHg  Ao V2 mean: 101.4 cm/sec  Ao mean P.0 mmHg  Ao V2 VTI: 25.4 cm  ROBERT(I,D): 2.4 cm2  ROBERT(V,D): 2.6 cm2     LV V1 max P.6 mmHg  LV V1 max: 117.7 cm/sec  LV V1 VTI: 19.3 cm  SV(LVOT): 60.5 ml  SI(LVOT): 27.2 ml/m2  PA acc time: 0.09 sec  TR max ray: 295.0 cm/sec  TR max P.0 mmHg  AV Ray Ratio (DI): 0.82  ROBERT Index (cm2/m2): 1.1     ______________________________________________________________________________  Report approved by: Zenaida Lima 10/25/2021 10:45 AM

## 2021-10-26 NOTE — PROCEDURES
ULTRASOUND GUIDED PARACENTESIS    Volume removed: 1400 ml    Indication: ascites and cirrhosis    Anesthesia: 1% lidocaine    Universal protocol was followed. TIME OUT conductedjust prior to starting procedure patients identity was confirmed, site, side, procedure, patient position, and availability of correct equipment was also confirmed.      The procedure, benefits, risks, and alternatives were explained to the patient and he voiced understanding of the information and agreed to proceed with the procedure.    Bedside ultrasound guidance was used to localize a bowel free zone of peritoneal fluid, the level of the diaphragm, and needle entry location.      The skin overlying the right upper quadrant was prepped and draped in the usual manner with the patient in the laying position. The skin was anesthetized using a 25 gauge needle. The abdominal space was entered with a 14-gauge paracentesis needle equipped with an overlying cannula. 30 cc of fluid was withdrawn and sent for analysis. The fluid was serosanguinous, The needle was then withdrawn and an overlying bandage applied. The patient tolerated the procedure well and there were no immediate complications.     Raza Nance MD

## 2021-10-26 NOTE — PHARMACY-CONSULT NOTE
Pharmacy - Transfer Medication Reconciliation     The patient's transfer medication orders have been compared to the medication administration record and to the Prior to Admissions Medications list - any noted discrepancies were resolved with the MD.     Thank you. Pharmacy will continue to monitor.     Mary Jane Tavarez SINTIA ....................  10/26/2021   3:11 PM

## 2021-10-26 NOTE — PHARMACY-ADMISSION MEDICATION HISTORY
"Pharmacy -- Admission Medication Reconciliation    Prior to admission (PTA) medications were reviewed and the patient's PTA medication list was updated.    Sources Consulted: Sure Scripts, Care Everywhere, Henry COLMENARES [place] anticoagulation note, patient on telephone    The reliability of this Medication Reconciliation is: Reliability: Unreliable    The following significant changes were made:  Added ibuprofen 200 mg (per Henry's note, patient agreed, but unsure)        Patient reported probably missing medications for a couple of days this week      In addition, the patient's allergies were reviewed with the patient and left as follows:   Allergies: Patient has no known allergies.    The pharmacist has reviewed with the patient that all personal medications should be removed from the building or locked in the belongings safe.  Patient shall only take medications ordered by the physician and administered by the nursing staff.       Medication barriers identified: yes, patient answered \"no\" to when he took medications last, patient reported taking 3 meds in am and 2 at night, but could not tell me which ones, I thought 2nd metoprolol and warfarin but he just kept changing time from 7 30 pm to 10 to 11, then 730 am, unsure if he was just listing times   Medication adherence concerns: yes   Understanding of emergency medications: unable to assess    Coby Corado [facility], 10/26/2021,  11:15 AM     "

## 2021-10-27 ENCOUNTER — APPOINTMENT (OUTPATIENT)
Dept: PHYSICAL THERAPY | Facility: OTHER | Age: 66
DRG: 308 | End: 2021-10-27
Attending: FAMILY MEDICINE
Payer: MEDICARE

## 2021-10-27 ENCOUNTER — APPOINTMENT (OUTPATIENT)
Dept: OCCUPATIONAL THERAPY | Facility: OTHER | Age: 66
DRG: 308 | End: 2021-10-27
Attending: FAMILY MEDICINE
Payer: MEDICARE

## 2021-10-27 LAB
ALBUMIN SERPL-MCNC: 3.2 G/DL (ref 3.5–5.7)
ALP SERPL-CCNC: 95 U/L (ref 34–104)
ALT SERPL W P-5'-P-CCNC: 14 U/L (ref 7–52)
ANION GAP SERPL CALCULATED.3IONS-SCNC: 9 MMOL/L (ref 3–14)
AST SERPL W P-5'-P-CCNC: 32 U/L (ref 13–39)
BILIRUB SERPL-MCNC: 2.2 MG/DL (ref 0.3–1)
BUN SERPL-MCNC: 20 MG/DL (ref 7–25)
CALCIUM SERPL-MCNC: 8.9 MG/DL (ref 8.6–10.3)
CHLORIDE BLD-SCNC: 97 MMOL/L (ref 98–107)
CO2 SERPL-SCNC: 27 MMOL/L (ref 21–31)
CREAT SERPL-MCNC: 0.88 MG/DL (ref 0.7–1.3)
ERYTHROCYTE [DISTWIDTH] IN BLOOD BY AUTOMATED COUNT: 14.6 % (ref 10–15)
GFR SERPL CREATININE-BSD FRML MDRD: 90 ML/MIN/1.73M2
GLUCOSE BLD-MCNC: 104 MG/DL (ref 70–105)
HCT VFR BLD AUTO: 30.3 % (ref 40–53)
HGB BLD-MCNC: 10.2 G/DL (ref 13.3–17.7)
INR PPP: 1.42 (ref 0.85–1.15)
LACTATE SERPL-SCNC: 1 MMOL/L (ref 0.7–2)
MAGNESIUM SERPL-MCNC: 1.7 MG/DL (ref 1.9–2.7)
MCH RBC QN AUTO: 30.1 PG (ref 26.5–33)
MCHC RBC AUTO-ENTMCNC: 33.7 G/DL (ref 31.5–36.5)
MCV RBC AUTO: 89 FL (ref 78–100)
PHOSPHATE SERPL-MCNC: 3.4 MG/DL (ref 2.5–5)
PLATELET # BLD AUTO: 250 10E3/UL (ref 150–450)
POTASSIUM BLD-SCNC: 3 MMOL/L (ref 3.5–5.1)
POTASSIUM BLD-SCNC: 3.1 MMOL/L (ref 3.5–5.1)
PROT SERPL-MCNC: 6.4 G/DL (ref 6.4–8.9)
RBC # BLD AUTO: 3.39 10E6/UL (ref 4.4–5.9)
SODIUM SERPL-SCNC: 133 MMOL/L (ref 134–144)
WBC # BLD AUTO: 8.3 10E3/UL (ref 4–11)

## 2021-10-27 PROCEDURE — 97530 THERAPEUTIC ACTIVITIES: CPT | Mod: GP

## 2021-10-27 PROCEDURE — 97530 THERAPEUTIC ACTIVITIES: CPT | Mod: GO | Performed by: OCCUPATIONAL THERAPIST

## 2021-10-27 PROCEDURE — 83735 ASSAY OF MAGNESIUM: CPT | Performed by: INTERNAL MEDICINE

## 2021-10-27 PROCEDURE — 97116 GAIT TRAINING THERAPY: CPT | Mod: GP

## 2021-10-27 PROCEDURE — 120N000001 HC R&B MED SURG/OB

## 2021-10-27 PROCEDURE — 84132 ASSAY OF SERUM POTASSIUM: CPT | Performed by: FAMILY MEDICINE

## 2021-10-27 PROCEDURE — 84100 ASSAY OF PHOSPHORUS: CPT | Performed by: FAMILY MEDICINE

## 2021-10-27 PROCEDURE — 36415 COLL VENOUS BLD VENIPUNCTURE: CPT | Performed by: FAMILY MEDICINE

## 2021-10-27 PROCEDURE — 99232 SBSQ HOSP IP/OBS MODERATE 35: CPT | Performed by: INTERNAL MEDICINE

## 2021-10-27 PROCEDURE — 97535 SELF CARE MNGMENT TRAINING: CPT | Mod: GO | Performed by: OCCUPATIONAL THERAPIST

## 2021-10-27 PROCEDURE — 85610 PROTHROMBIN TIME: CPT | Performed by: FAMILY MEDICINE

## 2021-10-27 PROCEDURE — 36415 COLL VENOUS BLD VENIPUNCTURE: CPT | Performed by: INTERNAL MEDICINE

## 2021-10-27 PROCEDURE — 83605 ASSAY OF LACTIC ACID: CPT | Performed by: FAMILY MEDICINE

## 2021-10-27 PROCEDURE — 250N000013 HC RX MED GY IP 250 OP 250 PS 637: Performed by: INTERNAL MEDICINE

## 2021-10-27 PROCEDURE — 85027 COMPLETE CBC AUTOMATED: CPT | Performed by: INTERNAL MEDICINE

## 2021-10-27 PROCEDURE — 250N000013 HC RX MED GY IP 250 OP 250 PS 637: Performed by: FAMILY MEDICINE

## 2021-10-27 PROCEDURE — 80053 COMPREHEN METABOLIC PANEL: CPT | Performed by: INTERNAL MEDICINE

## 2021-10-27 RX ORDER — POTASSIUM CHLORIDE 1500 MG/1
40 TABLET, EXTENDED RELEASE ORAL ONCE
Status: COMPLETED | OUTPATIENT
Start: 2021-10-27 | End: 2021-10-27

## 2021-10-27 RX ORDER — POTASSIUM CHLORIDE 1500 MG/1
20 TABLET, EXTENDED RELEASE ORAL ONCE
Status: COMPLETED | OUTPATIENT
Start: 2021-10-27 | End: 2021-10-28

## 2021-10-27 RX ORDER — FUROSEMIDE 40 MG
40 TABLET ORAL
Status: DISCONTINUED | OUTPATIENT
Start: 2021-10-27 | End: 2021-10-28

## 2021-10-27 RX ADMIN — FUROSEMIDE 40 MG: 40 TABLET ORAL at 14:43

## 2021-10-27 RX ADMIN — FOLIC ACID 1 MG: 1 TABLET ORAL at 09:45

## 2021-10-27 RX ADMIN — METOPROLOL TARTRATE 50 MG: 50 TABLET, FILM COATED ORAL at 23:33

## 2021-10-27 RX ADMIN — Medication 1 TABLET: at 09:45

## 2021-10-27 RX ADMIN — ACETAMINOPHEN 650 MG: 325 TABLET, FILM COATED ORAL at 23:32

## 2021-10-27 RX ADMIN — QUETIAPINE FUMARATE 25 MG: 25 TABLET ORAL at 23:33

## 2021-10-27 RX ADMIN — POTASSIUM CHLORIDE 40 MEQ: 1500 TABLET, EXTENDED RELEASE ORAL at 09:45

## 2021-10-27 RX ADMIN — METOPROLOL TARTRATE 50 MG: 50 TABLET, FILM COATED ORAL at 09:45

## 2021-10-27 RX ADMIN — MAGNESIUM OXIDE TAB 400 MG (241.3 MG ELEMENTAL MG) 400 MG: 400 (241.3 MG) TAB at 09:46

## 2021-10-27 RX ADMIN — NYSTATIN: 100000 POWDER TOPICAL at 14:43

## 2021-10-27 RX ADMIN — FUROSEMIDE 40 MG: 40 TABLET ORAL at 10:10

## 2021-10-27 RX ADMIN — NYSTATIN: 100000 POWDER TOPICAL at 18:12

## 2021-10-27 RX ADMIN — THIAMINE HCL TAB 100 MG 100 MG: 100 TAB at 09:46

## 2021-10-27 RX ADMIN — NYSTATIN: 100000 POWDER TOPICAL at 09:45

## 2021-10-27 RX ADMIN — PANTOPRAZOLE SODIUM 40 MG: 40 TABLET, DELAYED RELEASE ORAL at 07:40

## 2021-10-27 RX ADMIN — NYSTATIN: 100000 POWDER TOPICAL at 23:33

## 2021-10-27 RX ADMIN — POTASSIUM CHLORIDE 40 MEQ: 1500 TABLET, EXTENDED RELEASE ORAL at 23:32

## 2021-10-27 RX ADMIN — MAGNESIUM OXIDE TAB 400 MG (241.3 MG ELEMENTAL MG) 400 MG: 400 (241.3 MG) TAB at 23:33

## 2021-10-27 ASSESSMENT — ACTIVITIES OF DAILY LIVING (ADL)
ADLS_ACUITY_SCORE: 18

## 2021-10-27 ASSESSMENT — MIFFLIN-ST. JEOR: SCORE: 1876.25

## 2021-10-27 NOTE — PROGRESS NOTES
SAFETY CHECKLIST  ID Bands and Risk clasps correct and in place (DNR, Fall risk, Allergy, Latex, Limb):  Yes  All Lines Reconciled and labeled correctly: Yes  Whiteboard updated:Yes  Environmental interventions (bed/chair alarm on, call light, side rails, restraints, sitter....): Yes  Verify Tele #: [id number]

## 2021-10-27 NOTE — PROGRESS NOTES
10/27/21 1200   Signing Clinician's Name / Credentials   Signing clinician's name / credentials Hamlet Pereyra MPT   Quick Adds   Rehab Discipline PT   Functional Transfer Training   Treatment Detail CGA with Fww   Gait Training   Symptoms Noted During/After Treatment (Gait Training) fatigue   Distance in Feet (Required for LE Total Joints) 15 x 2   Treatment Detail ambulation within patient's room   [place] Level (Gait Training) contact guard   Physical Assistance Level (Gait Training) 1 person assist   Weight Bearing (Gait Training) full weight-bearing   Assistive Device (Gait Training) rolling walker   Therapeutic Activity   Treatment Detail moderate to minimal assist for supine<>sit   Toilet Training   [place] Level (Toilet Training) minimum assist (75% patient effort)   Assistance (Toilet Training) 1 person assist   PT Discharge Planning    PT Discharge Recommendation (DC Rec) Transitional Care Facility;home with assist;home with home care physical therapy   PT Rationale for DC Rec to promote strength, stability and safe mobility   PT Brief overview of current status  minimal assist for bed mobilities; minimal assist to CGA for transfers and short ambulation with Fww   Additional Documentation   Rehab Comments patient would benefit from continued PT for strengthening and stability    PT Plan continue PT   Total Session Time   Total Session Time (minutes) 55 minutes

## 2021-10-27 NOTE — PROGRESS NOTES
"Clinical Nutrition / Initial Assessment     Reason for Assessment:  Screened at nutritional risk due to:  potential for malnutrition    Assessment:   Client History:  Pt unavailable to visit with this afternoon. Spoke with nursing. He has not eaten anything today. Answers some questions appropriately but not always. Not appropriate for nutrition education at this time. Does have ascites.   Diet Order:  2 g Na  Oral Intake:  Poor  Supplement Intake:  Ensure/boost TID on trays  Weight:   Wt Readings from Last 10 Encounters:   10/27/21 113.8 kg (250 lb 12.8 oz)   10/24/21 111.6 kg (246 lb 0.5 oz)   09/16/19 108.9 kg (240 lb)   07/29/19 107.5 kg (237 lb)   06/11/18 98.9 kg (218 lb)   01/10/18 98 kg (216 lb)   12/15/17 113.4 kg (250 lb)   12/08/17 113.4 kg (250 lb)   Height: 5' 7\"  BMI: Body mass index is 39.28 kg/m .    Estimated nutritional needs based on:  Adjusted for BMI over 30:  85 kg / 187 lbs   Estimated energy needs:  2125-97321 kcal/day (25-30 kcal/kg)  Estimated protein needs:   gm/day (1-1.2 g/kg)  Estimated fluid needs:  0427-9680 ml/day (1 ml/kcal)    Malnutrition Criteria:  (Need to have 2 indicators to qualify recommendation)  Energy Intake:  Acute Moderate: < 75% of estimated energy requirement for > 7days  Interpretation of Weight Loss:  No significant weight loss  Physical Findings:  Chronic Fluid Accumulation:  severe  Reduced  Strength:  Not Measured    Recommended Nutrition Diagnosis:   Severe Malnutrition in the context of chronic illness - based on AND/ASPEN Clinical Characterstics of Malnutrition May 2012      Nutrition Education: Nutrition education will be provided as appropriate.    Nutrition Diagnosis: Oral or Nutrition Support Intake:  inadequate energy intakes related to decreased appetite, increased confusion as evidenced by minimal intakes since admit.    Intervention:  Nutrition Prescription:     Nutrition Intervention(s):Recommended general, healthful diet  1. Meals and " Snacks: 2 g Na diet   2. Medical Food Supplement: boost/ensure TID on trays     Nutrition Goal(s):  1. Pt will consume 75% or more of meals and supplements   2. Pt will tolerate diet as ordered    Monitoring and Evaluation:   Food Intake, diet tolerance, weights    Discharge Recommendation:   Nutrition Discharge Planning  recommend supplements if intakes remain less than 75% of meals.     RD will reassess in within 1-5 days or sooner.    Abby Billings RD on 10/27/2021 at 3:52 PM

## 2021-10-27 NOTE — PROGRESS NOTES
[facility]    Hospitalist Progress Note      Assessment & Plan   Ramírez Hernández is a 66 year old male who was admitted on 10/24/2021 with chronic alcohol abuse with A. fib with RVR, acute diastolic CHF exacerbation and tense ascites due to cirrhosis.    .     Principal Problem:    Cirrhosis of liver (H)    Assessment: Improved.  Status post paracentesis T-System on 10/26 with SAAG consistent with portal hypertension as etiology for ascites.  No evidence of SBP.  Likely secondary to chronic alcohol abuse.  Will benefit from ongoing monitoring medication up cessation. Received 5mg vit. K x1 on admit for INR reversal.    Plan:   -Continue PPI  - diuresis as below  - continue beta blocker as below  -Follow-up cytology and ascites cultures    Principal Problem:    Acute diasolic congestive heart failure (H)    Assessment: improved. Severe volume overload with preserved ejection fraction and will benefit from ongoing aggressive diuresis.  TTE results reviewed and unchanged.  Troponin initially elevated without consistent cardiopulmonary symptoms or EKG changes and no evidence of ACS.  Valvular dysfunction likely from severe fluid overload.    Plan:   -Transition to oral Lasix 40 mg p.o. twice daily  -strict I's and O's and daily weights   -BMP daily   -D/C Reyes       Lung mass    Assessment: Incidentally noted from CT scan, high suspicion for malignancy, on admit: very pleasant goals of care discussion with both his brother Scott, who patient would want to make his medical decisions if he was unable to, as well as patient.  Going forward patient wants to continue with DNR/DNI status only, would like to attempt diuresis and paracentesis to improve his symptoms with consideration for acute rehab but if having further complications or decline in his overall condition would then want pursue comfort care measures only going forward.    Plan:   -Follow-up surveillance and diagnostic considerations as an  outpatient pending clinical course     Active Problems:    Persistent atrial fibrillation (H)    Assessment: stable rate control. Chronic A. fib with RVR on admit, resolved with diltiazem drip.  Significant ecchymosis of his right shoulder and ongoing high risk for falls and bleeding complications and for the short-term will hold Coumadin going forward.      Plan:   -Resume home metoprolol  -hold coumadin until falls risk decreased  -pt/ot today for disposition needs    Severe protein calorie malnutrition  Assessment: Chronic alcoholism with prealbumin less than 20 and third spacing all clinically consistent  Plan:  -Schedule supplements  -Appreciate inpatient dietary management       Alcohol abuse    Assessment: Daily heavy drinker, has previously had severe alcohol withdrawal.  No evidence of withdrawal during his stay    Plan:   -discontinue CIWA protocol.       Hyponatremia  Assessment: resolved with diuresis and likely hypervolemic hyponatremia,   Plan:  - monitoring daily       Tobacco abuse    Assessment: Ongoing smoker counseled to quit    Plan:   -Nicotine patch daily      Diet: 2 Gram Sodium Diet  Snacks/Supplements Adult: Ensure Enlive; With Meals    DVT Prophylaxis: Pneumatic Compression Devices  Reyes Catheter: PRESENT, indication: Strict 1-2 Hour I&O  Code Status: No CPR- Do NOT Intubate           Disposition Plan   Expected discharge: recommended to prior living arrangement once hemoglobin stable, mental status at baseline and safe disposition plan/ TCU bed available.  Entered: Raza Nance MD 10/27/2021, 11:19 AM       The patient's care was discussed with the Bedside Nurse and Patient.    Raza Nance MD  Hospitalist Service  [facility]  Contact information available via [facility] Paging/Directory    ______________________________________________________________________    Interval History   CC: falls    Overnight no acute events and afebrile, continues to feel well, he is weak but  he wants to move around more today and he wants his Reyes catheter out.  No new shortness of breath chest pain nausea vomiting or other new complaints.    -Data reviewed today: I reviewed all new labs and imaging results over the last 24 hours. I personally reviewed no images or EKG's today.    Physical Exam   Temp: 99  F (37.2  C) Temp src: Tympanic BP: (!) 141/66 Pulse: 105   Resp: 20 SpO2: 95 % O2 Device: None (Room air)    Vitals:    10/25/21 0400 10/26/21 0400 10/27/21 0420   Weight: 113.6 kg (250 lb 7.1 oz) 113.5 kg (250 lb 3.6 oz) 113.8 kg (250 lb 12.8 oz)     Vital Signs with Ranges  Temp:  [99  F (37.2  C)-99.4  F (37.4  C)] 99  F (37.2  C)  Pulse:  [] 105  Resp:  [14-33] 20  BP: (135-163)/() 141/66  SpO2:  [92 %-97 %] 95 %  I/O last 3 completed shifts:  In: 510 [P.O.:500; I.V.:10]  Out: 2175 [Urine:2175]    Exam:  GENERAL: Talkative, laying in bed, appropriate and pleasant, in no apparent distress.  CARDIOVASCULAR: Irregularly irregular rate and rhythm, no rubs, or gallops. Normal S1/S2.  trace chronic appearing hyperpigmented venous stasis changes of his bilateral lower extremities unchanged today.     RESPIRATORY: clear to auscultation bilaterally, no wheezes, no crackles.    GI: soft, non-tender, mild diffuse distention, normoactive bowel sounds.    MUSCULOSKELETAL: warm and well perfused, 2+ dorsalis pedis pulses.    SKIN: Right neck and upper chest and back with dense appearing hematoma which is nonblanchable without epithelial breakdown. Unchanged today and chronic appearing.    NEUROLOGY: AAOx3, follows commands, speech and language without focal deficits.  No upper extremity tremor.      Medications       folic acid  1 mg Oral Daily     furosemide  40 mg Oral BID     magnesium oxide  400 mg Oral BID     metoprolol tartrate  50 mg Oral BID     multivitamin w/minerals  1 tablet Oral Daily     nystatin   Topical TID     pantoprazole  40 mg Oral QAM AC     sodium chloride (PF)  3 mL  Intracatheter Q8H     thiamine  100 mg Oral Daily       Data   Recent Labs   Lab 10/27/21  0539 10/26/21  0521 10/25/21  0448 10/24/21  1640 10/24/21  1639 10/24/21  1639   WBC 8.3 11.0 14.5*   < >  --   --    HGB 10.2* 10.2* 10.1*   < >  --   --    MCV 89 90 88   < >  --   --     240 226   < >  --   --    INR 1.42* 1.48* 1.97*   < >  --   --    * 134 131*   < >  --  130*   POTASSIUM 3.1* 3.7 4.0   < >  --  4.3   CHLORIDE 97* 98 96*   < >  --  97   CO2 27 26 22   < >  --  23   BUN 20 21 17   < >  --  15   CR 0.88 0.97 0.99   < >  --  0.78   ANIONGAP 9 10 13   < >  --  10   HECTOR 8.9 9.3 9.2   < >  --  9.2    105 130*   < >  --  122*   ALBUMIN 3.2* 3.4*  --    < >   < > 3.0*   PROTTOTAL 6.4 6.8  --   --    < > 8.8   BILITOTAL 2.2* 2.0*  --   --    < > 1.8*   ALKPHOS 95 94  --   --    < > 160*   ALT 14 16  --   --    < > 18   AST 32 44*  --   --    < > 54*   LIPASE  --   --   --   --   --  45*   TROPONIN  --   --   --   --   --  0.064*    < > = values in this interval not displayed.       No results found for this or any previous visit (from the past 24 hour(s)).

## 2021-10-27 NOTE — PROGRESS NOTES
SAFETY CHECKLIST  ID Bands and Risk clasps correct and in place (DNR, Fall risk, Allergy, Latex, Limb):  Yes  All Lines Reconciled and labeled correctly: Yes  Whiteboard updated:Yes  Environmental interventions (bed/chair alarm on, call light, side rails, restraints, sitter....): Yes   Verify Tele #: 7  Angely Quinteros RN on 10/27/2021 at 7:27 AM

## 2021-10-27 NOTE — PROGRESS NOTES
":     Spoke with patient in room to discuss discharge planning.  Patient's goal is to return home at discharge.  Discussed home care services and SNF.  Patient declined stating he does not need any assistance.  He stated that his brother Scott calls him daily, checks on him weekly, and takes him shopping as needed.  He verbally agreed it would be okay to call his brother to discuss his discharge plans.     Contacted patient's brother Scott.  Scott stated that he lives in [place] (about 20 miles from patient's home).  He is able to provide transportation home at discharge.  Once patient is home he is able to check on patient in person daily until he feels he is doing well enough at home.  He stated he calls him 3-4 times a day to check on him.  He stated that at baseline patient does not walk very well due to past back surgeries (\"walks like a penguin\").  He is able to help patient with his shopping.  Discussed that patient could benefit from home care services but patient is declining those services.  Will continue to keep Scott informed of discharge plans.    WALT Crane on 10/27/2021 at 4:08 PM    "

## 2021-10-27 NOTE — PLAN OF CARE
Patient admitted for cirrhosis of liver. VSS and WNL. Tachypneic with visible LIU. Abd muscle use with breathing. Coarse lung sounds. Distended, taut abdomen. Reyes catheter patent with adequate output. Redness, moisture to groin. Alonso BLLE. CIWA scores WNL. Patient disoriented to time, place. Will continue to monitor. Temp: 99.3  F (37.4  C) Temp src: Tympanic BP: 135/66 Pulse: 89   Resp: 20 SpO2: 92 % O2 Device: None (Room air)        Alva Rowe RN on 10/27/2021 at 5:29 AM

## 2021-10-27 NOTE — PROGRESS NOTES
10/27/21 1442   Signing Clinician's Name / Credentials   Signing clinician's name / credentials Teresa Collier OTR/L    Quick Adds   Rehab Discipline OT   Functional Transfer Training   Treatment Detail CGA with FWW slowly, min assist with upper body bathing due to limited right shoulder movement    Gait Training   Symptoms Noted During/After Treatment (Gait Training) fatigue   [place] Level (Gait Training) contact guard   Physical Assistance Level (Gait Training) 2 person assist   Assistive Device (Gait Training) rolling walker   OT Discharge Planning    OT Discharge Recommendation (DC Rec) Transitional Care Facility;home with home care occupational therapy   OT Rationale for DC Rec will continue to assess as pt progresses   OT Brief overview of current status  Pt was in bed upon appraoch and agreeable to get OOB to recliner, pt needed min to mod assist with supine to sit, ambulated slowly to recliner using FWW and CGA of 2 for safety and balance, pt needed min assist with light upper body cares due to painful and limited use of right shoulder, will continue to assess needs    Additional Documentation   OT Plan cont OT    Total Session Time   Total Session Time (minutes) 30 minutes

## 2021-10-27 NOTE — PROGRESS NOTES
"Patient alert and pleasantly confused. Disoriented to time.   VSS. LS crackles and expiratory wheezing. Distended abdomen, paracentesis opening has some leakage, placed ABD over area. Reyes pulled, patient tolerated well.  Patient is voiding adequate amount of yellow urine. Groin area is red and odorous. Applied nystatin powder to area. Will continue to monitor   BP (!) 161/78   Pulse 101   Temp 98.4  F (36.9  C) (Tympanic)   Resp 20   Ht 1.702 m (5' 7\")   Wt 113.8 kg (250 lb 12.8 oz)   SpO2 97%   BMI 39.28 kg/m     Angely Quinteros RN on 10/27/2021 at 6:41 PM  "

## 2021-10-28 ENCOUNTER — APPOINTMENT (OUTPATIENT)
Dept: OCCUPATIONAL THERAPY | Facility: OTHER | Age: 66
DRG: 308 | End: 2021-10-28
Attending: FAMILY MEDICINE
Payer: MEDICARE

## 2021-10-28 ENCOUNTER — APPOINTMENT (OUTPATIENT)
Dept: PHYSICAL THERAPY | Facility: OTHER | Age: 66
DRG: 308 | End: 2021-10-28
Attending: FAMILY MEDICINE
Payer: MEDICARE

## 2021-10-28 LAB
ANION GAP SERPL CALCULATED.3IONS-SCNC: 11 MMOL/L (ref 3–14)
BUN SERPL-MCNC: 15 MG/DL (ref 7–25)
CALCIUM SERPL-MCNC: 9 MG/DL (ref 8.6–10.3)
CHLORIDE BLD-SCNC: 98 MMOL/L (ref 98–107)
CO2 SERPL-SCNC: 29 MMOL/L (ref 21–31)
CREAT SERPL-MCNC: 0.78 MG/DL (ref 0.7–1.3)
GFR SERPL CREATININE-BSD FRML MDRD: >90 ML/MIN/1.73M2
GLUCOSE BLD-MCNC: 97 MG/DL (ref 70–105)
HOLD SPECIMEN: NORMAL
INR PPP: 1.39 (ref 0.85–1.15)
PATH REPORT.FINAL DX SPEC: NORMAL
PHOSPHATE SERPL-MCNC: 2.4 MG/DL (ref 2.5–5)
POTASSIUM BLD-SCNC: 3.4 MMOL/L (ref 3.5–5.1)
POTASSIUM BLD-SCNC: 3.6 MMOL/L (ref 3.5–5.1)
SODIUM SERPL-SCNC: 138 MMOL/L (ref 134–144)

## 2021-10-28 PROCEDURE — 84100 ASSAY OF PHOSPHORUS: CPT | Performed by: FAMILY MEDICINE

## 2021-10-28 PROCEDURE — 97530 THERAPEUTIC ACTIVITIES: CPT | Mod: GO | Performed by: OCCUPATIONAL THERAPIST

## 2021-10-28 PROCEDURE — 120N000001 HC R&B MED SURG/OB

## 2021-10-28 PROCEDURE — 80048 BASIC METABOLIC PNL TOTAL CA: CPT | Performed by: INTERNAL MEDICINE

## 2021-10-28 PROCEDURE — 250N000013 HC RX MED GY IP 250 OP 250 PS 637: Performed by: FAMILY MEDICINE

## 2021-10-28 PROCEDURE — 36415 COLL VENOUS BLD VENIPUNCTURE: CPT | Performed by: FAMILY MEDICINE

## 2021-10-28 PROCEDURE — 84132 ASSAY OF SERUM POTASSIUM: CPT | Performed by: FAMILY MEDICINE

## 2021-10-28 PROCEDURE — 85610 PROTHROMBIN TIME: CPT | Performed by: FAMILY MEDICINE

## 2021-10-28 PROCEDURE — 0W9G3ZZ DRAINAGE OF PERITONEAL CAVITY, PERCUTANEOUS APPROACH: ICD-10-PCS | Performed by: INTERNAL MEDICINE

## 2021-10-28 PROCEDURE — 99232 SBSQ HOSP IP/OBS MODERATE 35: CPT | Mod: 25 | Performed by: INTERNAL MEDICINE

## 2021-10-28 PROCEDURE — 49083 ABD PARACENTESIS W/IMAGING: CPT | Performed by: INTERNAL MEDICINE

## 2021-10-28 PROCEDURE — 250N000013 HC RX MED GY IP 250 OP 250 PS 637: Performed by: INTERNAL MEDICINE

## 2021-10-28 RX ORDER — POTASSIUM CHLORIDE 1500 MG/1
40 TABLET, EXTENDED RELEASE ORAL ONCE
Status: COMPLETED | OUTPATIENT
Start: 2021-10-28 | End: 2021-10-28

## 2021-10-28 RX ORDER — SPIRONOLACTONE 25 MG/1
50 TABLET ORAL DAILY
Status: DISCONTINUED | OUTPATIENT
Start: 2021-10-28 | End: 2021-10-29 | Stop reason: HOSPADM

## 2021-10-28 RX ADMIN — METOPROLOL TARTRATE 50 MG: 50 TABLET, FILM COATED ORAL at 21:59

## 2021-10-28 RX ADMIN — FUROSEMIDE 40 MG: 40 TABLET ORAL at 08:33

## 2021-10-28 RX ADMIN — PANTOPRAZOLE SODIUM 40 MG: 40 TABLET, DELAYED RELEASE ORAL at 08:33

## 2021-10-28 RX ADMIN — METOPROLOL TARTRATE 50 MG: 50 TABLET, FILM COATED ORAL at 11:44

## 2021-10-28 RX ADMIN — NYSTATIN: 100000 POWDER TOPICAL at 18:16

## 2021-10-28 RX ADMIN — POTASSIUM CHLORIDE 20 MEQ: 1500 TABLET, EXTENDED RELEASE ORAL at 01:32

## 2021-10-28 RX ADMIN — NYSTATIN: 100000 POWDER TOPICAL at 11:44

## 2021-10-28 RX ADMIN — SPIRONOLACTONE 50 MG: 25 TABLET ORAL at 11:44

## 2021-10-28 RX ADMIN — NYSTATIN: 100000 POWDER TOPICAL at 08:34

## 2021-10-28 RX ADMIN — OXYCODONE HYDROCHLORIDE AND ACETAMINOPHEN 1 TABLET: 5; 325 TABLET ORAL at 08:33

## 2021-10-28 RX ADMIN — POTASSIUM & SODIUM PHOSPHATES POWDER PACK 280-160-250 MG 1 PACKET: 280-160-250 PACK at 15:23

## 2021-10-28 RX ADMIN — POTASSIUM CHLORIDE 40 MEQ: 1500 TABLET, EXTENDED RELEASE ORAL at 08:33

## 2021-10-28 RX ADMIN — THIAMINE HCL TAB 100 MG 100 MG: 100 TAB at 11:44

## 2021-10-28 RX ADMIN — FUROSEMIDE 60 MG: 40 TABLET ORAL at 15:23

## 2021-10-28 RX ADMIN — MAGNESIUM OXIDE TAB 400 MG (241.3 MG ELEMENTAL MG) 400 MG: 400 (241.3 MG) TAB at 11:44

## 2021-10-28 RX ADMIN — POTASSIUM & SODIUM PHOSPHATES POWDER PACK 280-160-250 MG 1 PACKET: 280-160-250 PACK at 08:33

## 2021-10-28 RX ADMIN — FOLIC ACID 1 MG: 1 TABLET ORAL at 11:44

## 2021-10-28 RX ADMIN — Medication 1 TABLET: at 11:44

## 2021-10-28 RX ADMIN — POTASSIUM & SODIUM PHOSPHATES POWDER PACK 280-160-250 MG 1 PACKET: 280-160-250 PACK at 21:59

## 2021-10-28 ASSESSMENT — ACTIVITIES OF DAILY LIVING (ADL)
ADLS_ACUITY_SCORE: 18
ADLS_ACUITY_SCORE: 22
ADLS_ACUITY_SCORE: 22
ADLS_ACUITY_SCORE: 18
ADLS_ACUITY_SCORE: 22
ADLS_ACUITY_SCORE: 18
ADLS_ACUITY_SCORE: 22
ADLS_ACUITY_SCORE: 18
ADLS_ACUITY_SCORE: 22
ADLS_ACUITY_SCORE: 18
ADLS_ACUITY_SCORE: 22
ADLS_ACUITY_SCORE: 22
ADLS_ACUITY_SCORE: 18

## 2021-10-28 ASSESSMENT — MIFFLIN-ST. JEOR: SCORE: 1783.72

## 2021-10-28 NOTE — PROGRESS NOTES
"Patient alert and confused of location and time.  VSS. LS crackles and expiratory wheezing. BS hypoactive. Abdomen round and firm. Patient requested pain meds this morning gave PRN med with relief. BLLE kaitlynn. Scattered bruising. Paracentesis procedure completed. Right side paracentesis opening continues to drain, applied fluff dressing and abd.  Patient voiding on floor this afternoon, was asked several times to use the call light or his bedside urinal when he has the urge to void, however, patient stated that he didn't care.  Patient continues to void on floor. Unmeasured occurrences documented.  Multiple skin tears on bottom, side of abdomen, outside upper thigh. Allevyns applied.Will continue to monitor.   BP (!) 157/75 (BP Location: Right arm, Patient Position: Sitting)   Pulse 84   Temp 98.6  F (37  C) (Tympanic)   Resp 22   Ht 1.702 m (5' 7\")   Wt 104.5 kg (230 lb 6.4 oz)   SpO2 100%   BMI 36.09 kg/m     Angely Quinteros RN on 10/28/2021 at 5:18 PM      "

## 2021-10-28 NOTE — PROGRESS NOTES
SAFETY CHECKLIST  ID Bands and Risk clasps correct and in place (DNR, Fall risk, Allergy, Latex, Limb):  Yes  All Lines Reconciled and labeled correctly: Yes  Whiteboard updated:Yes  Environmental interventions (bed/chair alarm on, call light, side rails, restraints, sitter....): Yes   Verify Tele #: 7  Angely Quinteros RN on 10/28/2021 at 7:26 AM

## 2021-10-28 NOTE — PROGRESS NOTES
Was called to start IV. Placed new 20 gauge in the right forearm on the first attempt. Good blood return. IV patent. No redness or swelling at the site. Pt tolerated procedure well

## 2021-10-28 NOTE — PROGRESS NOTES
10/28/21 1609   Signing Clinician's Name / Credentials   Signing clinician's name / credentials Pennie Bingham OTR/L   Quick Adds   Rehab Discipline OT   Quick Adds Therapeutic Activity   Therapeutic Activity   Symptoms Noted During/After Treatment None   Treatment Detail bed mobilities assist needed to boost in bed.    OT Discharge Planning    OT Discharge Recommendation (DC Rec) home;Home with assist   OT Rationale for DC Rec pt would benefit from home care but patient has refused   OT Brief overview of current status  pt requires min/mod assist with bed mobility and min/CGA with mobility and self cares.   Additional Documentation   OT Plan Continue OT   Total Session Time   Total Session Time (minutes) 15 minutes

## 2021-10-28 NOTE — PLAN OF CARE
Patient admitted for cirrhosis of liver. VSS and WNL. Tachycardic at times. C/o discomfort to R shoulder with repositioning, denies pain medications. Confused and agitated with staff, oriented to self and situation. LIU with abd muscle use. Course crackles with audible wheezing throughout. Distended abdomen. RLQ paracentesis site has large amounts of drainage. Alonso BLLE. Scattered bruising and wounds. Voiding spontaneously in urinal. Will continue to monitor. Temp: 97.5  F (36.4  C) Temp src: Tympanic BP: 135/57 Pulse: 93   Resp: 24 SpO2: 96 % O2 Device: None (Room air)        Alva Rowe RN on 10/28/2021 at 5:47 AM

## 2021-10-28 NOTE — PROCEDURES
ULTRASOUND GUIDED PARACENTESIS    Volume removed: 4200    Indication: recurrent ascites    Anesthesia: 1% lidocaine    Universal protocol was followed. TIME OUT conductedjust prior to starting procedure patients identity was confirmed, site, side, procedure, patient position, and availability of correct equipment was also confirmed.      The procedure, benefits, risks, and alternatives were explained to the patient and he voiced understanding of the information and agreed to proceed with the procedure.    Bedside ultrasound guidance was used to localize a bowel free zone of peritoneal fluid, the level of the diaphragm, and needle entry location.      The skin overlying the left lower quadrant was prepped and draped in the usual manner with the patient in the laying position. The skin was anesthetized using a 25 gauge needle. The abdominal space was entered with a 14-gauge paracentesis needle equipped with an overlying cannula. 20 cc of fluid was withdrawn, the needle was then withdrawn and an overlying bandage applied. The patient tolerated the procedure well and there were no immediate complications.     Raza Nance MD

## 2021-10-28 NOTE — PROGRESS NOTES
Prior to the start of the procedure and with procedural staff participation, I verbally confirmed the patient s identity using two indicators, relevant allergies, that the procedure was appropriate and matched the consent or emergent situation, and that the correct equipment/implants were available. Immediately prior to starting the procedure I conducted the Time Out with the procedural staff and re-confirmed the patient s name, procedure, and site/side. (The Joint Commission universal protocol was followed.)  Yes  Paracentesis.   Sedation (Moderate or Deep): None    Angely Quinteros RN on 10/28/2021 at 1:57 PM

## 2021-10-28 NOTE — PROGRESS NOTES
[facility]    Hospitalist Progress Note      Assessment & Plan   Ramírez Hernández is a 66 year old male who was admitted on 10/24/2021 with chronic alcohol abuse with A. fib with RVR, acute diastolic CHF exacerbation and tense ascites due to cirrhosis.    .     Principal Problem:    Cirrhosis of liver (H)    Assessment: Stable.  Status post paracentesis on 10/26 with SAAG consistent with portal hypertension as etiology for ascites.  No evidence of SBP.  Likely secondary to chronic alcohol abuse.  Will benefit from ongoing monitoring medication up cessation. Received 5mg vit. K x1 on admit for INR reversal.  Worsening abdominal distention today with worsening shifting dullness and will benefit from repeat therapeutic tap.    Plan:   -Continue PPI  -Increase Lasix from 40->60 p.o. twice daily  -add spironolactone 50 daily  -continue beta blocker as below  -Follow-up cytology and ascites cultures  -We will likely need outpatient paracentesis in future    Principal Problem:    Acute diasolic congestive heart failure (H)    Assessment: improved. Severe volume overload with preserved ejection fraction and will benefit from ongoing aggressive diuresis.  TTE results reviewed and unchanged.  Troponin initially elevated without consistent cardiopulmonary symptoms or EKG changes and no evidence of ACS.  Valvular dysfunction likely from severe fluid overload.    Plan:   -Transition to oral Lasix and Aldactone as above  -strict I's and O's and daily weights   -BMP daily        Lung mass    Assessment: Incidentally noted from CT scan, high suspicion for malignancy, on admit: very pleasant goals of care discussion with both his brother Scott, who patient would want to make his medical decisions if he was unable to, as well as patient.  Going forward patient wants to continue with DNR/DNI status only, would like to attempt diuresis and paracentesis to improve his symptoms, we will not consider home care or acute  rehab and only wanting to go home with a walker and outpatient rehab.    Plan:   -Follow-up surveillance and diagnostic considerations as an outpatient pending clinical course  -Outpatient PT referral     Active Problems:    Persistent atrial fibrillation (H)    Assessment: stable rate control. Chronic A. fib with RVR on admit, resolved with diltiazem drip.  Significant ecchymosis of his right shoulder and ongoing high risk for falls, ongoing heavy alcohol use, questionable medication compliance, and risk of bleeding complications outweighs stroke prevention benefit at this point.     Plan:   -Resume home metoprolol  -Hold Coumadin going forward    Severe protein calorie malnutrition  Assessment: Chronic alcoholism with prealbumin less than 20 and third spacing all clinically consistent  Plan:  -Schedule supplements  -Appreciate inpatient dietary management       Alcohol abuse    Assessment: Daily heavy drinker, has previously had severe alcohol withdrawal.  No evidence of withdrawal during his stay     Hyponatremia  Assessment: resolved with diuresis and likely hypervolemic hyponatremia,   Plan:  - monitoring daily       Tobacco abuse    Assessment: Ongoing smoker counseled to quit    Plan:   -Nicotine patch daily      Diet: 2 Gram Sodium Diet  Snacks/Supplements Adult: Ensure Enlive; With Meals    DVT Prophylaxis: Pneumatic Compression Devices  Reyes Catheter: Not present  Code Status: No CPR- Do NOT Intubate           Disposition Plan   Expected discharge: recommended to prior living arrangement once hemoglobin stable, mental status at baseline and safe disposition plan/ TCU bed available.  Entered: Raza Nance MD 10/28/2021, 12:43 PM       The patient's care was discussed with the Bedside Nurse and Patient.    Raza Nance MD  Hospitalist Service  [facility]  Contact information available via [facility]  Homero/Pavel    ______________________________________________________________________    Interval History   CC: falls    Overnight no acute events and afebrile, wants to discharge, has been up and getting to the bathroom alone, was willing to use a walker absolutely unwilling to consider nurse or physical or occupational therapy to come into his home, he states that his brother Scott calls him 4-5 times a day and checks on him at least once a day and provides transportation and help with shopping.  His belly is increasingly distended and is willing to have another paracentesis done today, no new complaints.    -Data reviewed today: I reviewed all new labs and imaging results over the last 24 hours. I personally reviewed no images or EKG's today.    Physical Exam   Temp: 98.4  F (36.9  C) Temp src: Tympanic BP: 136/61 Pulse: 103   Resp: 20 SpO2: 96 % O2 Device: None (Room air)    Vitals:    10/26/21 0400 10/27/21 0420 10/28/21 0325   Weight: 113.5 kg (250 lb 3.6 oz) 113.8 kg (250 lb 12.8 oz) 104.5 kg (230 lb 6.4 oz)     Vital Signs with Ranges  Temp:  [97.5  F (36.4  C)-99.4  F (37.4  C)] 98.4  F (36.9  C)  Pulse:  [] 103  Resp:  [20-24] 20  BP: (135-167)/(57-82) 136/61  SpO2:  [94 %-97 %] 96 %  I/O last 3 completed shifts:  In: 480 [P.O.:480]  Out: 2325 [Urine:2325]    Exam:  GENERAL: Talkative, laying in bed, appropriate and pleasant, in no apparent distress.  CARDIOVASCULAR: Irregularly irregular rate and rhythm, no rubs, or gallops. Normal S1/S2.  trace chronic appearing hyperpigmented venous stasis changes of his bilateral lower extremities unchanged today.     RESPIRATORY: clear to auscultation bilaterally, no wheezes, no crackles.    GI: soft, non-tender,  increasing diffuse tense distention, normoactive bowel sounds, paracentesis site at right lower quadrant with scant ascitic drainage, no surrounding erythema.    MUSCULOSKELETAL: warm and well perfused, 2+ dorsalis pedis pulses.    SKIN: Right neck  and upper chest and back with dense appearing hematoma which is nonblanchable without epithelial breakdown. Unchanged today and chronic appearing.    NEUROLOGY: AAOx3, follows commands, speech and language without focal deficits.  No upper extremity tremor.      Medications       folic acid  1 mg Oral Daily     furosemide  60 mg Oral BID     metoprolol tartrate  50 mg Oral BID     multivitamin w/minerals  1 tablet Oral Daily     nystatin   Topical TID     pantoprazole  40 mg Oral QAM AC     potassium & sodium phosphates  1 packet Oral TID     sodium chloride (PF)  3 mL Intracatheter Q8H     spironolactone  50 mg Oral Daily       Data   Recent Labs   Lab 10/28/21  0514 10/27/21  1907 10/27/21  0539 10/26/21  0521 10/26/21  0521 10/25/21  0448 10/25/21  0448 10/24/21  1640 10/24/21  1639   WBC  --   --  8.3  --  11.0  --  14.5*   < >  --    HGB  --   --  10.2*  --  10.2*  --  10.1*   < >  --    MCV  --   --  89  --  90  --  88   < >  --    PLT  --   --  250  --  240  --  226   < >  --    INR 1.39*  --  1.42*  --  1.48*   < > 1.97*   < >  --      --  133*  --  134   < > 131*   < > 130*   POTASSIUM 3.4* 3.0* 3.1*   < > 3.7   < > 4.0   < > 4.3   CHLORIDE 98  --  97*  --  98   < > 96*   < > 97   CO2 29  --  27  --  26   < > 22   < > 23   BUN 15  --  20  --  21   < > 17   < > 15   CR 0.78  --  0.88  --  0.97   < > 0.99   < > 0.78   ANIONGAP 11  --  9  --  10   < > 13   < > 10   HECTOR 9.0  --  8.9  --  9.3   < > 9.2   < > 9.2   GLC 97  --  104  --  105   < > 130*   < > 122*   ALBUMIN  --   --  3.2*  --  3.4*  --   --    < > 3.0*   PROTTOTAL  --   --  6.4  --  6.8  --   --    < > 8.8   BILITOTAL  --   --  2.2*  --  2.0*  --   --    < > 1.8*   ALKPHOS  --   --  95  --  94  --   --    < > 160*   ALT  --   --  14  --  16  --   --    < > 18   AST  --   --  32  --  44*  --   --    < > 54*   LIPASE  --   --   --   --   --   --   --   --  45*   TROPONIN  --   --   --   --   --   --   --   --  0.064*    < > = values in this  interval not displayed.       No results found for this or any previous visit (from the past 24 hour(s)).

## 2021-10-29 ENCOUNTER — APPOINTMENT (OUTPATIENT)
Dept: OCCUPATIONAL THERAPY | Facility: OTHER | Age: 66
DRG: 308 | End: 2021-10-29
Attending: FAMILY MEDICINE
Payer: MEDICARE

## 2021-10-29 ENCOUNTER — DOCUMENTATION ONLY (OUTPATIENT)
Dept: ANTICOAGULATION | Facility: OTHER | Age: 66
End: 2021-10-29

## 2021-10-29 ENCOUNTER — APPOINTMENT (OUTPATIENT)
Dept: PHYSICAL THERAPY | Facility: OTHER | Age: 66
DRG: 308 | End: 2021-10-29
Attending: FAMILY MEDICINE
Payer: MEDICARE

## 2021-10-29 VITALS
BODY MASS INDEX: 36.3 KG/M2 | HEIGHT: 67 IN | SYSTOLIC BLOOD PRESSURE: 146 MMHG | RESPIRATION RATE: 20 BRPM | HEART RATE: 99 BPM | TEMPERATURE: 96.2 F | WEIGHT: 231.26 LBS | DIASTOLIC BLOOD PRESSURE: 69 MMHG | OXYGEN SATURATION: 97 %

## 2021-10-29 DIAGNOSIS — Z79.01 LONG TERM CURRENT USE OF ANTICOAGULANT THERAPY: ICD-10-CM

## 2021-10-29 DIAGNOSIS — I48.19 PERSISTENT ATRIAL FIBRILLATION (H): Primary | ICD-10-CM

## 2021-10-29 LAB
ANION GAP SERPL CALCULATED.3IONS-SCNC: 9 MMOL/L (ref 3–14)
BUN SERPL-MCNC: 13 MG/DL (ref 7–25)
CALCIUM SERPL-MCNC: 8.8 MG/DL (ref 8.6–10.3)
CHLORIDE BLD-SCNC: 99 MMOL/L (ref 98–107)
CO2 SERPL-SCNC: 30 MMOL/L (ref 21–31)
CREAT SERPL-MCNC: 0.76 MG/DL (ref 0.7–1.3)
GFR SERPL CREATININE-BSD FRML MDRD: >90 ML/MIN/1.73M2
GLUCOSE BLD-MCNC: 93 MG/DL (ref 70–105)
HOLD SPECIMEN: NORMAL
INR PPP: 1.44 (ref 0.85–1.15)
MAGNESIUM SERPL-MCNC: 1.6 MG/DL (ref 1.9–2.7)
PHOSPHATE SERPL-MCNC: 2.8 MG/DL (ref 2.5–5)
POTASSIUM BLD-SCNC: 3.6 MMOL/L (ref 3.5–5.1)
SODIUM SERPL-SCNC: 138 MMOL/L (ref 134–144)

## 2021-10-29 PROCEDURE — 80048 BASIC METABOLIC PNL TOTAL CA: CPT | Performed by: INTERNAL MEDICINE

## 2021-10-29 PROCEDURE — 250N000011 HC RX IP 250 OP 636: Performed by: FAMILY MEDICINE

## 2021-10-29 PROCEDURE — 36415 COLL VENOUS BLD VENIPUNCTURE: CPT | Performed by: INTERNAL MEDICINE

## 2021-10-29 PROCEDURE — 83735 ASSAY OF MAGNESIUM: CPT | Performed by: FAMILY MEDICINE

## 2021-10-29 PROCEDURE — 84100 ASSAY OF PHOSPHORUS: CPT | Performed by: FAMILY MEDICINE

## 2021-10-29 PROCEDURE — 250N000013 HC RX MED GY IP 250 OP 250 PS 637: Performed by: INTERNAL MEDICINE

## 2021-10-29 PROCEDURE — 85610 PROTHROMBIN TIME: CPT | Performed by: FAMILY MEDICINE

## 2021-10-29 PROCEDURE — 97530 THERAPEUTIC ACTIVITIES: CPT | Mod: GP

## 2021-10-29 PROCEDURE — 250N000013 HC RX MED GY IP 250 OP 250 PS 637: Performed by: FAMILY MEDICINE

## 2021-10-29 PROCEDURE — 99239 HOSP IP/OBS DSCHRG MGMT >30: CPT | Performed by: INTERNAL MEDICINE

## 2021-10-29 PROCEDURE — 97535 SELF CARE MNGMENT TRAINING: CPT | Mod: GO | Performed by: OCCUPATIONAL THERAPIST

## 2021-10-29 RX ORDER — SPIRONOLACTONE 50 MG/1
50 TABLET, FILM COATED ORAL DAILY
Qty: 90 TABLET | Refills: 0 | Status: SHIPPED | OUTPATIENT
Start: 2021-10-29 | End: 2022-01-01

## 2021-10-29 RX ORDER — MAGNESIUM SULFATE HEPTAHYDRATE 40 MG/ML
2 INJECTION, SOLUTION INTRAVENOUS ONCE
Status: COMPLETED | OUTPATIENT
Start: 2021-10-29 | End: 2021-10-29

## 2021-10-29 RX ORDER — PANTOPRAZOLE SODIUM 40 MG/1
40 TABLET, DELAYED RELEASE ORAL
Qty: 90 TABLET | Refills: 3 | Status: SHIPPED | OUTPATIENT
Start: 2021-10-30 | End: 2022-01-01

## 2021-10-29 RX ORDER — FUROSEMIDE 20 MG
60 TABLET ORAL
Qty: 540 TABLET | Refills: 0 | Status: SHIPPED | OUTPATIENT
Start: 2021-10-29 | End: 2022-01-01

## 2021-10-29 RX ADMIN — METOPROLOL TARTRATE 50 MG: 50 TABLET, FILM COATED ORAL at 10:30

## 2021-10-29 RX ADMIN — FUROSEMIDE 60 MG: 40 TABLET ORAL at 07:52

## 2021-10-29 RX ADMIN — NYSTATIN: 100000 POWDER TOPICAL at 07:48

## 2021-10-29 RX ADMIN — FOLIC ACID 1 MG: 1 TABLET ORAL at 10:30

## 2021-10-29 RX ADMIN — Medication 1 TABLET: at 10:30

## 2021-10-29 RX ADMIN — PANTOPRAZOLE SODIUM 40 MG: 40 TABLET, DELAYED RELEASE ORAL at 07:53

## 2021-10-29 RX ADMIN — MAGNESIUM SULFATE HEPTAHYDRATE 2 G: 40 INJECTION, SOLUTION INTRAVENOUS at 07:43

## 2021-10-29 RX ADMIN — SPIRONOLACTONE 50 MG: 25 TABLET ORAL at 10:30

## 2021-10-29 ASSESSMENT — ACTIVITIES OF DAILY LIVING (ADL)
ADLS_ACUITY_SCORE: 22

## 2021-10-29 ASSESSMENT — MIFFLIN-ST. JEOR: SCORE: 1787.63

## 2021-10-29 NOTE — PHARMACY - DISCHARGE MEDICATION RECONCILIATION AND EDUCATION
[facility]  Part of [address] [address]    October 29, 2021    Dear Pharmacist,    Your customer, Ramírez Hernández, born on 1955, was recently discharged from [facility].  We have updated his medication list and want to alert you to the following:       Review of your medicines      START taking      Dose / Directions   pantoprazole 40 MG EC tablet  Commonly known as: PROTONIX      Dose: 40 mg  Start taking on: October 30, 2021  Take 1 tablet (40 mg) by mouth every morning (before breakfast)  Quantity: 90 tablet  Refills: 3     spironolactone 50 MG tablet  Commonly known as: ALDACTONE      Dose: 50 mg  Take 1 tablet (50 mg) by mouth daily  Quantity: 90 tablet  Refills: 0        CONTINUE these medicines which may have CHANGED, or have new prescriptions. If we are uncertain of the size of tablets/capsules you have at home, strength may be listed as something that might have changed.      Dose / Directions   furosemide 20 MG tablet  Commonly known as: LASIX  This may have changed:     medication strength    how much to take    when to take this      Dose: 60 mg  Take 3 tablets (60 mg) by mouth 2 times daily  Quantity: 540 tablet  Refills: 0        CONTINUE these medicines which have NOT CHANGED      Dose / Directions   lisinopril 10 MG tablet  Commonly known as: ZESTRIL  Used for: Acute systolic congestive heart failure (H)      Dose: 10 mg  Take 1 tablet (10 mg) by mouth daily  Quantity: 90 tablet  Refills: 3     metoprolol tartrate 50 MG tablet  Commonly known as: LOPRESSOR  Used for: Atrial fibrillation with rapid ventricular response (H), Acute systolic congestive heart failure (H)      TAKE 1 TABLET(50 MG) BY MOUTH TWICE DAILY  Quantity: 180 tablet  Refills: 3        STOP taking    ibuprofen 200 MG tablet  Commonly known as: ADVIL/MOTRIN        warfarin ANTICOAGULANT 5 MG tablet  Commonly known as: COUMADIN              Where  to get your medicines      These medications were sent to [id number] - [place], [place] - [address] AT [facility] OF  [address], [place] [address]    Phone: [phone]     furosemide 20 MG tablet    pantoprazole 40 MG EC tablet    spironolactone 50 MG tablet         We also reviewed Ramírez Hernández's allergy list and updated it as needed:  Allergies: Patient has no known allergies.    Thank you for continuing to care for Ramírez Hernández.  We look forward to working together with you in the future.    Sincerely,  Kristin Jimenez, [facility] and [facility]

## 2021-10-29 NOTE — PLAN OF CARE
Occupational Therapy Discharge Summary    Reason for therapy discharge:    Discharged to home.    Progress towards therapy goal(s). See goals on Care Plan in [facility] electronic health record for goal details.  Goals partially met.  Barriers to achieving goals:   discharge from facility.    Therapy recommendation(s):    No further therapy is recommended.pt will have assist from family and outpatient PT

## 2021-10-29 NOTE — PROGRESS NOTES
:     Patient was discharged home with his brother late morning.  He had declined home care and SNF options.  His brother will be regularly checking on him and assists him with his needs.     WALT Crane on 10/29/2021 at 1:59 PM

## 2021-10-29 NOTE — PROGRESS NOTES
10/29/21 1551   Signing Clinician's Name / Credentials   Signing clinician's name / credentials Teresa Collier OTR/L    Quick Adds   Rehab Discipline OT   Functional Transfer Training   Symptoms Noted During/After Treatment fatigue   Toilet Transfer Training   Toilet Transfer [place] (Training) stand-by assist   Toilet Transfer Physical Assistance Level (Training) supervision   Gait Training   Symptoms Noted During/After Treatment (Gait Training) fatigue   [place] (Gait Training) stand-by assist   Physical Assistance Level (Gait Training) supervision   Assistive Device (Gait Training) rolling walker   Toilet Training   [place] (Toilet Training) stand-by assist   Assistance (Toilet Training) supervision   OT Discharge Planning    OT Discharge Recommendation (DC Rec) Home with assist   OT Rationale for DC Rec Pt will continue with outpatient PT, no further OT needed at this time    OT Brief overview of current status  Pt able to ambulate in room with SBA/CGA, completed toileting with SBA and bathing with min assist to wash under left arm due to painful right shoulder, pt needed very min assist to wendy pants, reports his brother will be able to assist him with these things at home.    Additional Documentation   OT Plan d/c home with family assist    Total Session Time   Total Session Time (minutes) 30 minutes

## 2021-10-29 NOTE — PHARMACY - DISCHARGE MEDICATION RECONCILIATION AND EDUCATION
Pharmacy:  Discharge Counseling and Medication Reconciliation    Ramírez Hernández  [address]  [facility] [place] [id number] [phone] (home)   66 year old male  PCP: Aron Delvalle    Allergies: Patient has no known allergies.    Discharge Counseling:    Pharmacist met with patient (and/or family) today to review the medication portion of the After Visit Summary (with an emphasis on NEW medications) and to address patient's questions/concerns.    Summary of Education: met with patient to discuss administration, duration and side effects of new medications.  Discussed changes in lasix, told patient to STOP ibuprofen/warfarin.  All questions answered.    Materials Provided:  MedCounselor sheets printed from Clinical Pharmacology on: spironolactone, protonix    Discharge Medication Reconciliation:    It has been determined that the patient has an adequate supply of medications available or which can be obtained from the patient's preferred pharmacy, which HE/SHE has confirmed as: [facility] [An updated medication list will be faxed to the patient's pharmacy.]    Thank you for the consult.    Kristin Jimenez RPH........October 29, 2021 8:33 AM

## 2021-10-29 NOTE — PROGRESS NOTES
"ANTICOAGULATION  MANAGEMENT    Ramírez Hernández is being discharged from the [facility] ([facility]).    Reason for discharge: warfarin therapy completed- Per Maria Esther Orellana DO discharge summary  \"Persistent atrial fibrillation (H): Chronic with stable rate control.  Initial A. fib with RVR on admit, resolved with diltiazem drip.  Significant ecchymosis of his right shoulder and ongoing high risk for falls, ongoing heavy alcohol use, questionable medication compliance, and risk of bleeding complications outweighs stroke prevention benefit at this point.   Will hold Coumadin going forward and resume his home metoprolol.  Anticoagulation can be reconsidered pending his rehab progress\".    Anticoagulation episode resolved, ACC referral closed and INR Standing order discontinued    If patient needs warfarin management in the future, please send a new referral    Penelope Rascon RN        "

## 2021-10-29 NOTE — PROGRESS NOTES
10/29/21 1100   Signing Clinician's Name / Credentials   Signing clinician's name / credentials Hamlet Pereyra MPT   Quick Adds   Rehab Discipline PT   Gait Training   Symptoms Noted During/After Treatment (Gait Training) fatigue   Distance in Feet (Required for LE Total Joints) 25   Treatment Detail within patient's room   [place] (Gait Training) stand-by assist   Physical Assistance Level (Gait Training) supervision   Weight Bearing (Gait Training) full weight-bearing   Assistive Device (Gait Training) rolling walker   Therapeutic Activity   Treatment Detail SBA for bed mobilities   PT Discharge Planning    PT Discharge Recommendation (DC Rec) home with assist;home with outpatient physical therapy   PT Rationale for DC Rec to promote strength, stability and safe mobility   PT Brief overview of current status  SBA for mobilities using a Fww   Additional Documentation   Rehab Comments patient would benefit from continued PT in an outpatient setting   PT Plan patient discharging home with orders for outpatient PT   Total Session Time   Total Session Time (minutes) 15 minutes

## 2021-10-29 NOTE — PROGRESS NOTES
NSG DISCHARGE NOTE    Patient discharged to home at 11:33 AM via wheel chair. Accompanied by brother and staff. Discharge instructions reviewed with patient, opportunity offered to ask questions. Prescriptions sent to patients preferred pharmacy. Patient's own meds given back to him. All belongings sent with patient.    JANET LAU, LAURY

## 2021-10-29 NOTE — PROGRESS NOTES
SAFETY CHECKLIST  ID Bands and Risk clasps correct and in place (DNR, Fall risk, Allergy, Latex, Limb):  Yes  All Lines Reconciled and labeled correctly: Yes  Whiteboard updated:Yes  Environmental interventions (bed/chair alarm on, call light, side rails, restraints, sitter....): Yes  Verify Tele #: [id number]    Estrella RODRIGUEZN, RN, PHN on 10/28/2021 at 7:22 PM

## 2021-10-29 NOTE — PROGRESS NOTES
10/28/21 1624   Signing Clinician's Name / Credentials   Signing clinician's name / credentials Hamlet Pereyra MPT   Quick Adds   Rehab Discipline PT   Functional Transfer Training   Treatment Detail CGA with Fww   Gait Training   Symptoms Noted During/After Treatment (Gait Training) fatigue   Treatment Detail short ambulation within patient's room   [place] Level (Gait Training) contact guard   Physical Assistance Level (Gait Training) 1 person assist   Weight Bearing (Gait Training) full weight-bearing   Assistive Device (Gait Training) rolling walker   Therapeutic Activity   Treatment Detail minimal assist to SBA for bed mobilities   PT Discharge Planning    PT Discharge Recommendation (DC Rec) Transitional Care Facility;home with assist;home with home care physical therapy   PT Rationale for DC Rec to promote strength, stability and safe mobility   PT Brief overview of current status  minimal assist for bed mobilities; minimal assist to CGA for transfers and short ambulation with Fww   Additional Documentation   PT Plan continue PT   Total Session Time   Total Session Time (minutes) 15 minutes

## 2021-10-29 NOTE — PLAN OF CARE
Disoriented to time, place, & situation. Responds to reorientation. Infrequent congested cough. LS: expiratory wheezes, fine crackles throughout. Room air with oxygen sats 94-98%. Tele monitoring continued: A. Fib. Scattered bruises. Skin tears to bilateral elbows, scabbing present.     Temp: (!) 96.1  F (35.6  C) Temp src: Tympanic BP: 137/55 Pulse: 90   Resp: 20 SpO2: 94 % O2 Device: None (Room air)      Problem: Adult Inpatient Plan of Care  Goal: Plan of Care Review  Outcome: No Change  Flowsheets (Taken 10/29/2021 0525)  Plan of Care Reviewed With: patient  Progress: no change  Goal: Patient-Specific Goal (Individualized)  Outcome: No Change  Goal: Absence of Hospital-Acquired Illness or Injury  Outcome: No Change  Intervention: Identify and Manage Fall Risk  Safety Promotion/Fall Prevention:    safety round/check completed    activity supervised    room door open  Intervention: Prevent Skin Injury  Body Position:    weight shifting    supine    supine, head elevated  Intervention: Prevent and Manage VTE (Venous Thromboembolism) Risk  VTE Prevention/Management:    ambulation promoted    fluids promoted  Goal: Optimal Comfort and Wellbeing  Outcome: No Change  Goal: Readiness for Transition of Care  Outcome: No Change    Estrella RODRIGUEZN, RN, PHN on 10/29/2021 at 5:52 AM

## 2021-10-29 NOTE — PLAN OF CARE
Physical Therapy Discharge Summary    Reason for therapy discharge:    Discharged to home with outpatient therapy.    Progress towards therapy goal(s). See goals on Care Plan in [facility] electronic health record for goal details.  Goals partially met.  Barriers to achieving goals:   discharge from facility.    Therapy recommendation(s):    Continued therapy is recommended.  Rationale/Recommendations:  to promote increased functional activity/gait tolerance.

## 2021-10-29 NOTE — DISCHARGE SUMMARY
[facility]    Discharge Summary  Hospitalist    Date of Admission:  10/24/2021  Date of Discharge:  10/29/2021  Discharging Provider: Raza Nance  Date of Service (when I saw the patient): 10/29/21    Discharge Diagnoses   Principal Problem:    Cirrhosis of liver (H) (10/25/2021)  Active Problems:    Persistent atrial fibrillation (H) (12/8/2017)    Acute systolic congestive heart failure (H) (12/8/2017)    Alcohol abuse (12/8/2017)    Tobacco abuse (12/8/2017)    Lung mass (10/25/2021)    Severe protein-calorie malnutrition (H) (10/26/2021)      History of Present Illness   Ramírez Hernández is an 66 year old male history of chronic alcoholism and persistent atrial fibrillation presenting with A. fib with RVR, acute diastolic CHF exacerbation and cirrhosis with tense ascites.  Patient has been his normal state of health, yesterday fell off of his chair at his table landing on his right shoulder, family was unable to get him off the floor and he was brought to an outside ER, there was noted to have A. fib with RVR and was started on diltiazem drip and admitted to the ICU for further management.    Hospital Course   Ramírez Hernández was admitted on 10/24/2021.  The following problems were addressed during his hospitalization:    Cirrhosis of liver (H): Status post paracentesis on 10/26 with SAAG consistent with portal hypertension as etiology for ascites.  No evidence of SBP.  Likely secondary to chronic alcohol abuse.  Received 5mg vit. K x1 on admit for INR reversal.    He was initially started on IV Lasix with prompt diuresis was net negative 5.7 liters during his stay.  Transitioned up to 60 mg p.o. twice daily of Lasix and spironolactone 50 mg daily was also added and with this renal function remained stable.  On 10/28 he underwent repeat therapeutic paracentesis given ongoing abdominal distention.  Cytology and cultures were negative from initial tap.  We will otherwise start on Protonix,  continue Lasix 60 mg p.o. twice daily and spironolactone 50 mg daily,  continue beta-blocker for A. fib and will follow up with his PCP for ongoing up titration of diuretics.  Likely need more routine paracenteses going forward.        Acute diasolic congestive heart failure (H): Severe volume overload with preserved ejection fraction and will benefit from ongoing aggressive diuresis.  TTE results reviewed.  Troponin initially elevated without consistent cardiopulmonary symptoms or EKG changes and no evidence of ACS.  Valvular dysfunction likely from severe fluid overload.  Again he tolerated diuresis well as noted above and liter repeat BMP and LFTs at time of clinic follow-up as scheduled.       Lung mass: Incidentally noted from CT scan, high suspicion for malignancy, on admit: very pleasant goals of care discussion with both his brother Scott, who patient would want to make his medical decisions if he was unable to, as well as patient.  Going forward patient wants to continue with DNR/DNI status only, would like to attempt diuresis and paracentesis to improve his symptoms,  he would not consider home care or acute rehab at an SNF.  Was willing to consider outpatient rehab and after evaluation by physical occupational therapy will also use a walker.  He would not want to consider active treatment for any lung cancer at this point and can consider follow-up surveillance and diagnostic considerations as an outpatient pending clinical course.  He did not want to consider home care or acute rehab and only wanted to go home with a walker and outpatient physical therapy.  His brother Scott otherwise drives him everywhere, helps him with shopping, checks in on him daily because of multiple times throughout the day.       Persistent atrial fibrillation (H): Chronic with stable rate control.  Initial A. fib with RVR on admit, resolved with diltiazem drip.  Significant ecchymosis of his right shoulder and ongoing high risk for  falls, ongoing heavy alcohol use, questionable medication compliance, and risk of bleeding complications outweighs stroke prevention benefit at this point.   Will hold Coumadin going forward and resume his home metoprolol.  Anticoagulation can be reconsidered pending his rehab progress.    Severe protein calorie malnutrition: Chronic alcoholism with prealbumin less than 20 and third spacing all clinically consistent.  Encouraged to continue with a high-protein low-sodium diet.       Alcohol abuse: Daily heavy drinker, has previously had severe alcohol withdrawal.  No evidence of withdrawal during his stay     Hyponatremia: Resolved with diuresis and likely hypervolemic hyponatremia.     Hypokalemia: likely due to diuresis on 10/27 and 10/28 and replaced per protocol, started on aldactone as above to help with this this, and will need a repeat BMP as indicated above.     Raza Nance MD    Significant Results and Procedures   Paracentesis x2    Pending Results   These results will be followed up by pcp  Unresulted Labs Ordered in the Past 30 Days of this Admission     Date and Time Order Name Status Description    10/26/2021  8:15 AM Ascites Fluid Aerobic Bacterial Culture Routine In process           Code Status   Full Code       Primary Care Physician   Aron Delvalle    Physical Exam   Temp: (!) 96.2  F (35.7  C) Temp src: Tympanic BP: (!) 146/69 Pulse: 99   Resp: 20 SpO2: 97 % O2 Device: None (Room air)    Vitals:    10/27/21 0420 10/28/21 0325 10/29/21 0713   Weight: 113.8 kg (250 lb 12.8 oz) 104.5 kg (230 lb 6.4 oz) 104.9 kg (231 lb 4.2 oz)     Vital Signs with Ranges  Temp:  [96  F (35.6  C)-98.6  F (37  C)] 96.2  F (35.7  C)  Pulse:  [] 99  Resp:  [18-22] 20  BP: (122-167)/(55-82) 146/69  SpO2:  [94 %-100 %] 97 %  I/O last 3 completed shifts:  In: 123 [P.O.:120; I.V.:3]  Out: 920 [Urine:920]    Exam on day of discharge:  GENERAL: Talkative, laying in bed, appropriate and pleasant, in no apparent  distress.  CARDIOVASCULAR: Irregularly irregular rate and rhythm, no rubs, or gallops. Normal S1/S2.  trace chronic appearing hyperpigmented venous stasis changes of his bilateral lower extremities.   RESPIRATORY: clear to auscultation bilaterally, no wheezes, no crackles.    GI: soft, non-tender, improving distention today, bilateral paracentesis sites without further drainage, no surrounding erythema.  MUSCULOSKELETAL: warm and well perfused, 2+ dorsalis pedis pulses.    SKIN: Right neck and upper chest and back with dense appearing hematoma which is nonblanchable without epithelial breakdown and improving today.   NEUROLOGY: AAOx3, follows commands, speech and language without focal deficits.  No upper extremity tremor.         Discharge Disposition   Discharged to home  Condition at discharge: Stable    Consultations This Hospital Stay   PHARMACY TO DOSE WARFARIN  CARE MANAGEMENT / SOCIAL WORK IP CONSULT  NUTRITION SERVICES ADULT IP CONSULT  PHYSICAL THERAPY ADULT IP CONSULT  OCCUPATIONAL THERAPY ADULT IP CONSULT  SOCIAL WORK IP CONSULT    Time Spent on this Encounter   I, Raza Nance MD, personally saw the patient today and spent greater than 30 minutes discharging this patient.    Discharge Orders      Physical Therapy Referral      Reason for your hospital stay    Fluid buildup from congestive heart failure and cirrhosis of the liver     Follow-up and recommended labs and tests     11/4/2021 10:15 AM Shameka Richter PA at the [facility]     Activity    Your activity upon discharge: activity as tolerated     Monitor and record    weight every day and call weight to your primary care provider if you gain more than 3 pounds in 1 day     When to contact your care team    Call your primary doctor if you have any of the following: temperature greater than 101,  increased shortness of breath, increased drainage, increased swelling, or increased pain.     Discharge Instructions    -Start taking Protonix to  prevent ulcers from forming in your stomach  -Start taking Lasix and spironolactone as prescribed to prevent fluid from reaccumulating in her belly  -Stop consuming any alcohol  -Stop taking Coumadin     Diet    Follow this diet upon discharge: Orders Placed This Encounter      Snacks/Supplements Adult: Ensure Enlive; With Meals      2 Gram Sodium Diet     Discharge Medications   Current Discharge Medication List      START taking these medications    Details   pantoprazole (PROTONIX) 40 MG EC tablet Take 1 tablet (40 mg) by mouth every morning (before breakfast)  Qty: 90 tablet, Refills: 3    Associated Diagnoses: Alcoholic cirrhosis of liver with ascites (H)      spironolactone (ALDACTONE) 50 MG tablet Take 1 tablet (50 mg) by mouth daily  Qty: 90 tablet, Refills: 0    Associated Diagnoses: Alcoholic cirrhosis of liver with ascites (H)         CONTINUE these medications which have CHANGED    Details   furosemide (LASIX) 20 MG tablet Take 3 tablets (60 mg) by mouth 2 times daily  Qty: 540 tablet, Refills: 0    Associated Diagnoses: Alcoholic cirrhosis of liver with ascites (H)         CONTINUE these medications which have NOT CHANGED    Details   lisinopril (ZESTRIL) 10 MG tablet Take 1 tablet (10 mg) by mouth daily  Qty: 90 tablet, Refills: 3    Associated Diagnoses: Acute systolic congestive heart failure (H)      metoprolol tartrate (LOPRESSOR) 50 MG tablet TAKE 1 TABLET(50 MG) BY MOUTH TWICE DAILY  Qty: 180 tablet, Refills: 3    Associated Diagnoses: Atrial fibrillation with rapid ventricular response (H); Acute systolic congestive heart failure (H)         STOP taking these medications       ibuprofen (ADVIL/MOTRIN) 200 MG tablet Comments:   Reason for Stopping:         warfarin ANTICOAGULANT (COUMADIN) 5 MG tablet Comments:   Reason for Stopping:             Allergies   No Known Allergies  Data   Most Recent 3 CBC's:  Recent Labs   Lab Test 10/27/21  0539 10/26/21  0521 10/25/21  0448   WBC 8.3 11.0 14.5*    HGB 10.2* 10.2* 10.1*   MCV 89 90 88    240 226      Most Recent 3 BMP's:  Recent Labs   Lab Test 10/29/21  0559 10/28/21  1334 10/28/21  0514 10/27/21  1907 10/27/21  0539     --  138  --  133*   POTASSIUM 3.6 3.6 3.4*   < > 3.1*   CHLORIDE 99  --  98  --  97*   CO2 30  --  29  --  27   BUN 13  --  15  --  20   CR 0.76  --  0.78  --  0.88   ANIONGAP 9  --  11  --  9   HECTOR 8.8  --  9.0  --  8.9   GLC 93  --  97  --  104    < > = values in this interval not displayed.     Most Recent 2 LFT's:  Recent Labs   Lab Test 10/27/21  0539 10/26/21  0521   AST 32 44*   ALT 14 16   ALKPHOS 95 94   BILITOTAL 2.2* 2.0*     Most Recent INR's and Anticoagulation Dosing History:  Anticoagulation Dose History     Recent Dosing and Labs Latest Ref Rng & Units 2021 10/6/2021 10/24/2021 10/25/2021 10/26/2021 10/27/2021 10/28/2021    INR 0.85 - 1.15 2.7(H) 2.0(H) 1.98(H) 1.97(H) 1.48(H) 1.42(H) 1.39(H)        Most Recent 3 Troponin's:  Recent Labs   Lab Test 10/24/21  1639 17  2155 17  1605 17  0926   TROPI  --  0.020 <0.015 <0.015   TROPONIN 0.064*  --   --   --      Most Recent Cholesterol Panel:  Recent Labs   Lab Test 19  0843   CHOL 196   *   HDL 48   TRIG 85     Most Recent 6 Bacteria Isolates From Any Culture (See EPIC Reports for Culture Details):No lab results found.  Most Recent TSH, T4 and A1c Labs:  Recent Labs   Lab Test 17  0926   TSH 1.45     Results for orders placed or performed during the hospital encounter of 10/24/21   Echocardiogram Complete     Value    LVEF  60-65%    Narrative    [id number]  [id number]  [id number]  [id number]^ANGI^MOON^JADEN     [facility]  [address] [place] [address]     Name: NATALIE DOWNEY  MRN: [id number]  : 1955  Study Date: 10/25/2021 08:36 AM  Age: 66 yrs  Gender: Male  Patient Location: [facility]  Reason For Study: Heart Failure  Ordering Physician: MOON WHITLEY  Performed By: Ela Lazaro RDCS,  RVT     BSA: 2.2 m2  Height: 67 in  Weight: 250 lb  HR: 99  BP: 171/84 mmHg  ______________________________________________________________________________  Procedure  Complete Portable Echo Adult. Contrast Definity. Definity (NDC #78904-060-97)  given intravenously. Patient was given 4ml mixture of 1.5ml Definity and 8.5ml  saline. 6 ml wasted. Definity Lot # [id number] . The patient's rhythm is atrial  fibrillation.  ______________________________________________________________________________  Interpretation Summary  The patient's rhythm is atrial fibrillation.  Severe functional tricuspid insufficiency is present. TV leaflets do not  coapt.  Moderate right ventricular dilation is present.Global right ventricular  function is moderately reduced.Regional wall motion abnormalities involving  mid free wall..  Global and regional left ventricular function is normal with an EF of 60-65%.  Flattened septum is consistent with right ventricular volume overload.  IVC diameter >2.1 cm collapsing <50% with sniff suggests a high RA pressure  estimated at 15 mmHg or greater.  No pericardial effusion is present.  There has been no change comapred to study from 2017.  ______________________________________________________________________________  Left Ventricle  Global and regional left ventricular function is normal with an EF of 60-65%.  Left ventricular size is normal. Left ventricular wall thickness is normal.  Diastolic function not assessed due to atrial fibrillation. Flattened septum  is consistent with right ventricular volume overload.     Right Ventricle  Moderate right ventricular dilation is present. Global right ventricular  function is moderately reduced. Regional wall motion abnormalities involving  mid free wall..     Atria  Moderate left atrial enlargement is present. Severe right atrial enlargement  is present.     Mitral Valve  Moderate mitral annular calcification is present. Trace mitral insufficiency  is  present.     Aortic Valve  The aortic valve is tricuspid. Moderate aortic valve calcification is present.  Trace to mild aortic insufficiency is present.     Tricuspid Valve  Severe tricuspid insufficiency is present. The right ventricular systolic  pressure is approximated at 35.0 mmHg plus the right atrial pressure.     Pulmonic Valve  Trace pulmonic insufficiency is present.     Vessels  The thoracic aorta is normal. IVC diameter >2.1 cm collapsing <50% with sniff  suggests a high RA pressure estimated at 15 mmHg or greater.     Pericardium  No pericardial effusion is present.     Compared to Previous Study  There has been no change.     ______________________________________________________________________________  MMode/2D Measurements & Calculations  IVSd: 0.99 cm  LVIDd: 5.0 cm  LVIDs: 3.5 cm  LVPWd: 0.87 cm  FS: 31.0 %     LV mass(C)d: 165.8 grams  LV mass(C)dI: 74.6 grams/m2  Ao root diam: 3.5 cm  asc Aorta Diam: 3.7 cm  LVOT diam: 2.0 cm  LVOT area: 3.1 cm2  LA Volume (BP): 76.9 ml  LA Volume Index (BP): 34.6 ml/m2  RWT: 0.35     Doppler Measurements & Calculations  Ao V2 max: 143.5 cm/sec  Ao max P.0 mmHg  Ao V2 mean: 101.4 cm/sec  Ao mean P.0 mmHg  Ao V2 VTI: 25.4 cm  ROBERT(I,D): 2.4 cm2  ROBERT(V,D): 2.6 cm2     LV V1 max P.6 mmHg  LV V1 max: 117.7 cm/sec  LV V1 VTI: 19.3 cm  SV(LVOT): 60.5 ml  SI(LVOT): 27.2 ml/m2  PA acc time: 0.09 sec  TR max ray: 295.0 cm/sec  TR max P.0 mmHg  AV Ray Ratio (DI): 0.82  ROBERT Index (cm2/m2): 1.1     ______________________________________________________________________________  Report approved by: Zenaida Lima 10/25/ 10:45 AM

## 2021-11-01 ENCOUNTER — PATIENT OUTREACH (OUTPATIENT)
Dept: CARE COORDINATION | Facility: CLINIC | Age: 66
End: 2021-11-01

## 2021-11-01 LAB — BACTERIA FLD CULT: NO GROWTH

## 2021-11-01 NOTE — PROGRESS NOTES
Clinic Care Coordination Contact  Patient has PCP elsewhere, no follow-up here. No TCM call required per policy.  Sloane Lundy RN ,....................  11/1/2021   10:40 AM

## 2021-11-04 ENCOUNTER — OFFICE VISIT (OUTPATIENT)
Dept: FAMILY MEDICINE | Facility: OTHER | Age: 66
End: 2021-11-04
Attending: PHYSICIAN ASSISTANT
Payer: MEDICARE

## 2021-11-04 VITALS
BODY MASS INDEX: 31.97 KG/M2 | TEMPERATURE: 98.6 F | WEIGHT: 204.1 LBS | HEART RATE: 120 BPM | DIASTOLIC BLOOD PRESSURE: 68 MMHG | RESPIRATION RATE: 18 BRPM | OXYGEN SATURATION: 98 % | SYSTOLIC BLOOD PRESSURE: 120 MMHG

## 2021-11-04 DIAGNOSIS — K70.31 ALCOHOLIC CIRRHOSIS OF LIVER WITH ASCITES (H): Primary | ICD-10-CM

## 2021-11-04 DIAGNOSIS — R91.8 LUNG MASS: ICD-10-CM

## 2021-11-04 DIAGNOSIS — Z23 NEED FOR PROPHYLACTIC VACCINATION AND INOCULATION AGAINST INFLUENZA: ICD-10-CM

## 2021-11-04 DIAGNOSIS — I48.19 PERSISTENT ATRIAL FIBRILLATION (H): ICD-10-CM

## 2021-11-04 PROCEDURE — 90662 IIV NO PRSV INCREASED AG IM: CPT

## 2021-11-04 PROCEDURE — 99214 OFFICE O/P EST MOD 30 MIN: CPT | Performed by: PHYSICIAN ASSISTANT

## 2021-11-04 PROCEDURE — G0463 HOSPITAL OUTPT CLINIC VISIT: HCPCS | Mod: 25

## 2021-11-04 PROCEDURE — G0008 ADMIN INFLUENZA VIRUS VAC: HCPCS

## 2021-11-04 ASSESSMENT — PAIN SCALES - GENERAL: PAINLEVEL: NO PAIN (0)

## 2021-11-04 NOTE — NURSING NOTE
"Chief Complaint   Patient presents with     ER F/U       Initial /68   Pulse 120   Temp 98.6  F (37  C)   Resp 18   Wt 92.6 kg (204 lb 1.6 oz)   SpO2 98%   BMI 31.97 kg/m   Estimated body mass index is 31.97 kg/m  as calculated from the following:    Height as of 10/24/21: 1.702 m (5' 7\").    Weight as of this encounter: 92.6 kg (204 lb 1.6 oz).  Medication Reconciliation: lesia Pichardo  "

## 2021-11-04 NOTE — PROGRESS NOTES
Assessment & Plan     (K70.31) Alcoholic cirrhosis of liver with ascites (H)  (primary encounter diagnosis)  Comment: Feels improved after paracentesis. States he has cut down on alcohol and smoking. Abstinence encouraged.    (Z23) Need for prophylactic vaccination and inoculation against influenz  Plan: INFLUENZA, QUAD, HIGH DOSE, PF, 65YR + (FLUZONE        HD)            (R91.8) Lung mass  Comment: this was addressed at hospitalization and patient declined any further work up. Discussed in length again today. He declines pulmonary referral or any type of f/u even knowing this is suspicious for cancer.      (I48.19) Persistent atrial fibrillation (H)  Comment: Coumadin was discontinued at hospitalization due to fall risk. He denies any falls since going home.                     Tobacco Cessation:   reports that he has been smoking cigarettes. He started smoking about 46 years ago. He has a 40.00 pack-year smoking history. He has never used smokeless tobacco.          No follow-ups on file.    CATALINO Rene  [facility]    Charlie Elmore is a 66 year old who presents for the following health issues     HPI       Hospital Follow-up Visit:    Hospital/Nursing Home/IP Rehab Facility: [facility]  Date of Admission: 10/24/2021  Date of Discharge: 10/24/2021  Reason(s) for Admission: Chronic Afib with RVR, CHF, Unspecified HF, Alcoholic cirrhosis of liver with ascites, mass of left lung        Was your hospitalization related to COVID-19? No   Problems taking medications regularly:  None  Medication changes since discharge: Increased lasix, increased metoprolol, added aldactone   Problems adhering to non-medication therapy:  None    Summary of hospitalization:    Diagnostic Tests/Treatments reviewed.  Follow up needed:   Other Healthcare Providers Involved in Patient s Care:         None  Update since discharge: improved. Post Discharge Medication Reconciliation: .  Plan  of care communicated with patient              Review of Systems   Constitutional, HEENT, cardiovascular, pulmonary, gi and gu systems are negative, except as otherwise noted.      Objective    /68   Pulse 120   Temp 98.6  F (37  C)   Resp 18   Wt 92.6 kg (204 lb 1.6 oz)   SpO2 98%   BMI 31.97 kg/m    Body mass index is 31.97 kg/m .  Physical Exam   Gen:Appears well, in no apparent distress.   Resp: Lungs CTA without wheeze, rales, rhonchi  Card: RRR  Abd:Round, soft. Normal bowel sounds. NTTP. No mass or organomegaly

## 2021-12-10 DIAGNOSIS — K70.31 ALCOHOLIC CIRRHOSIS OF LIVER WITH ASCITES (H): ICD-10-CM

## 2021-12-10 RX ORDER — FUROSEMIDE 20 MG
TABLET ORAL
Qty: 540 TABLET | Refills: 0 | OUTPATIENT
Start: 2021-12-10

## 2021-12-10 RX ORDER — SPIRONOLACTONE 50 MG/1
TABLET, FILM COATED ORAL
Qty: 90 TABLET | Refills: 0 | OUTPATIENT
Start: 2021-12-10

## 2021-12-10 NOTE — TELEPHONE ENCOUNTER
LASIX      Last Written Prescription Date:  10-29-21  Last Fill Quantity: 540,   # refills: 0  Last Office Visit: 11-4-2021  Future Office visit:       Routing refill request to provider for review/approval because:    SPIRONOLACTONE      Last Written Prescription Date:  10-  Last Fill Quantity: 90,   # refills: 0  Last Office Visit: 11-4-21  Future Office visit:       Routing refill request to provider for review/approval because:  Drug not on the [facility], P or [facility] refill protocol or controlled substance

## 2021-12-13 RX ORDER — FUROSEMIDE 20 MG
TABLET ORAL
Qty: 540 TABLET | Refills: 0 | OUTPATIENT
Start: 2021-12-13

## 2021-12-13 RX ORDER — SPIRONOLACTONE 50 MG/1
TABLET, FILM COATED ORAL
Qty: 90 TABLET | Refills: 0 | OUTPATIENT
Start: 2021-12-13

## 2021-12-13 NOTE — TELEPHONE ENCOUNTER
"Requested Prescriptions   Pending Prescriptions Disp Refills     spironolactone (ALDACTONE) 50 MG tablet [Pharmacy Med Name: SPIRONOLACTONE 50MG TABLETS] 90 tablet 0     Sig: TAKE 1 TABLET(50 MG) BY MOUTH DAILY       Diuretics (Including Combos) Protocol Failed - 12/10/2021  1:12 PM        Failed - Recent (12 mo) or future (30 days) visit within the authorizing provider's specialty     Patient has had an office visit with the authorizing provider or a provider within the authorizing providers department within the previous 12 mos or has a future within next 30 days. See \"Patient Info\" tab in inbasket, or \"Choose Columns\" in Meds & Orders section of the refill encounter.              Passed - Blood pressure under 140/90 in past 12 months     BP Readings from Last 3 Encounters:   11/04/21 120/68   10/29/21 (!) 146/69   10/24/21 147/100                 Passed - Medication is active on med list        Passed - Patient is age 18 or older        Passed - Normal serum creatinine on file in past 12 months     Recent Labs   Lab Test 10/29/21  0559   CR 0.76              Passed - Normal serum potassium on file in past 12 months     Recent Labs   Lab Test 10/29/21  0559   POTASSIUM 3.6                    Passed - Normal serum sodium on file in past 12 months     Recent Labs   Lab Test 10/29/21  0559                    furosemide (LASIX) 20 MG tablet [Pharmacy Med Name: FUROSEMIDE 20MG TABLETS] 540 tablet 0     Sig: TAKE 3 TABLETS(60 MG) BY MOUTH TWICE DAILY       Diuretics (Including Combos) Protocol Failed - 12/10/2021  1:12 PM        Failed - Recent (12 mo) or future (30 days) visit within the authorizing provider's specialty     Patient has had an office visit with the authorizing provider or a provider within the authorizing providers department within the previous 12 mos or has a future within next 30 days. See \"Patient Info\" tab in inbasket, or \"Choose Columns\" in Meds & Orders section of the refill encounter.  "             Passed - Blood pressure under 140/90 in past 12 months     BP Readings from Last 3 Encounters:   11/04/21 120/68   10/29/21 (!) 146/69   10/24/21 147/100                 Passed - Medication is active on med list        Passed - Patient is age 18 or older        Passed - Normal serum creatinine on file in past 12 months     Recent Labs   Lab Test 10/29/21  0559   CR 0.76              Passed - Normal serum potassium on file in past 12 months     Recent Labs   Lab Test 10/29/21  0559   POTASSIUM 3.6                    Passed - Normal serum sodium on file in past 12 months     Recent Labs   Lab Test 10/29/21  0559                    PCP not at [facility]. Unable to complete prescription refill per RN Medication Refill Policy.................... Alondra Sifuentes RN ....................  12/13/2021   3:25 PM

## 2022-01-01 ENCOUNTER — HOSPITAL ENCOUNTER (OUTPATIENT)
Dept: GENERAL RADIOLOGY | Facility: OTHER | Age: 67
Discharge: HOME | End: 2022-12-01
Attending: PODIATRIST
Payer: MEDICARE

## 2022-01-01 ENCOUNTER — APPOINTMENT (OUTPATIENT)
Dept: GENERAL RADIOLOGY | Facility: HOSPITAL | Age: 67
DRG: 492 | End: 2022-01-01
Attending: PODIATRIST
Payer: MEDICARE

## 2022-01-01 ENCOUNTER — APPOINTMENT (OUTPATIENT)
Dept: CT IMAGING | Facility: HOSPITAL | Age: 67
DRG: 492 | End: 2022-01-01
Attending: EMERGENCY MEDICINE
Payer: MEDICARE

## 2022-01-01 ENCOUNTER — APPOINTMENT (OUTPATIENT)
Dept: ULTRASOUND IMAGING | Facility: HOSPITAL | Age: 67
DRG: 492 | End: 2022-01-01
Attending: NURSE ANESTHETIST, CERTIFIED REGISTERED
Payer: MEDICARE

## 2022-01-01 ENCOUNTER — OFFICE VISIT (OUTPATIENT)
Dept: ORTHOPEDICS | Facility: OTHER | Age: 67
End: 2022-01-01
Attending: PODIATRIST
Payer: MEDICARE

## 2022-01-01 ENCOUNTER — APPOINTMENT (OUTPATIENT)
Dept: GENERAL RADIOLOGY | Facility: HOSPITAL | Age: 67
DRG: 492 | End: 2022-01-01
Attending: EMERGENCY MEDICINE
Payer: MEDICARE

## 2022-01-01 ENCOUNTER — ANESTHESIA (OUTPATIENT)
Dept: SURGERY | Facility: HOSPITAL | Age: 67
DRG: 492 | End: 2022-01-01
Payer: MEDICARE

## 2022-01-01 ENCOUNTER — CARE COORDINATION (OUTPATIENT)
Dept: FAMILY MEDICINE | Facility: OTHER | Age: 67
End: 2022-01-01

## 2022-01-01 ENCOUNTER — APPOINTMENT (OUTPATIENT)
Dept: PHYSICAL THERAPY | Facility: HOSPITAL | Age: 67
DRG: 492 | End: 2022-01-01
Attending: INTERNAL MEDICINE
Payer: MEDICARE

## 2022-01-01 ENCOUNTER — TELEPHONE (OUTPATIENT)
Dept: CASE MANAGEMENT | Facility: HOSPITAL | Age: 67
End: 2022-01-01

## 2022-01-01 ENCOUNTER — TELEPHONE (OUTPATIENT)
Dept: FAMILY MEDICINE | Facility: OTHER | Age: 67
End: 2022-01-01
Payer: MEDICARE

## 2022-01-01 ENCOUNTER — ANESTHESIA EVENT (OUTPATIENT)
Dept: SURGERY | Facility: HOSPITAL | Age: 67
DRG: 492 | End: 2022-01-01
Payer: MEDICARE

## 2022-01-01 ENCOUNTER — MEDICAL CORRESPONDENCE (OUTPATIENT)
Dept: HEALTH INFORMATION MANAGEMENT | Facility: OTHER | Age: 67
End: 2022-01-01

## 2022-01-01 ENCOUNTER — HOSPITAL ENCOUNTER (OUTPATIENT)
Dept: GENERAL RADIOLOGY | Facility: OTHER | Age: 67
Discharge: HOME | End: 2022-11-17
Attending: PODIATRIST
Payer: MEDICARE

## 2022-01-01 ENCOUNTER — HOSPITAL ENCOUNTER (INPATIENT)
Facility: HOSPITAL | Age: 67
LOS: 8 days | Discharge: NURSING, REHAB OR LONG-TERM CARE | DRG: 492 | End: 2022-11-10
Attending: EMERGENCY MEDICINE | Admitting: INTERNAL MEDICINE
Payer: MEDICARE

## 2022-01-01 ENCOUNTER — NURSING HOME VISIT (OUTPATIENT)
Dept: GERIATRICS | Facility: OTHER | Age: 67
End: 2022-01-01
Attending: FAMILY MEDICINE
Payer: MEDICARE

## 2022-01-01 VITALS
OXYGEN SATURATION: 97 % | BODY MASS INDEX: 32.01 KG/M2 | HEIGHT: 68 IN | RESPIRATION RATE: 20 BRPM | HEART RATE: 102 BPM | SYSTOLIC BLOOD PRESSURE: 150 MMHG | WEIGHT: 211.2 LBS | TEMPERATURE: 98.3 F | DIASTOLIC BLOOD PRESSURE: 61 MMHG

## 2022-01-01 DIAGNOSIS — K70.31 ALCOHOLIC CIRRHOSIS OF LIVER WITH ASCITES (H): ICD-10-CM

## 2022-01-01 DIAGNOSIS — Z98.890 S/P ORIF (OPEN REDUCTION INTERNAL FIXATION) FRACTURE: ICD-10-CM

## 2022-01-01 DIAGNOSIS — I50.21 ACUTE SYSTOLIC CONGESTIVE HEART FAILURE (H): ICD-10-CM

## 2022-01-01 DIAGNOSIS — C79.31 BRAIN METASTASIS: Primary | ICD-10-CM

## 2022-01-01 DIAGNOSIS — Z87.81 S/P ORIF (OPEN REDUCTION INTERNAL FIXATION) FRACTURE: ICD-10-CM

## 2022-01-01 DIAGNOSIS — S82.892A ANKLE FRACTURE, LEFT, CLOSED, INITIAL ENCOUNTER: ICD-10-CM

## 2022-01-01 DIAGNOSIS — Z98.890 S/P ORIF (OPEN REDUCTION INTERNAL FIXATION) FRACTURE: Primary | ICD-10-CM

## 2022-01-01 DIAGNOSIS — I48.91 ATRIAL FIBRILLATION WITH RAPID VENTRICULAR RESPONSE (H): ICD-10-CM

## 2022-01-01 DIAGNOSIS — G89.18 POST-OP PAIN: ICD-10-CM

## 2022-01-01 DIAGNOSIS — C79.31 BRAIN METASTASIS: ICD-10-CM

## 2022-01-01 DIAGNOSIS — G89.18 POST-OP PAIN: Primary | ICD-10-CM

## 2022-01-01 DIAGNOSIS — Z09 POSTOPERATIVE FOLLOW-UP: Primary | ICD-10-CM

## 2022-01-01 DIAGNOSIS — I48.19 PERSISTENT ATRIAL FIBRILLATION (H): ICD-10-CM

## 2022-01-01 DIAGNOSIS — K21.9 GASTROESOPHAGEAL REFLUX DISEASE WITHOUT ESOPHAGITIS: ICD-10-CM

## 2022-01-01 DIAGNOSIS — Z87.81 S/P ORIF (OPEN REDUCTION INTERNAL FIXATION) FRACTURE: Primary | ICD-10-CM

## 2022-01-01 DIAGNOSIS — I10 ESSENTIAL HYPERTENSION: ICD-10-CM

## 2022-01-01 DIAGNOSIS — R91.8 LUNG MASS: ICD-10-CM
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
## 2022-01-01 LAB
ALBUMIN SERPL BCG-MCNC: 3.5 G/DL (ref 3.5–5.2)
ALBUMIN SERPL BCG-MCNC: 3.6 G/DL (ref 3.5–5.2)
ALBUMIN SERPL BCG-MCNC: 3.6 G/DL (ref 3.5–5.2)
ALBUMIN SERPL BCG-MCNC: 4 G/DL (ref 3.5–5.2)
ALBUMIN UR-MCNC: NEGATIVE MG/DL
ALP SERPL-CCNC: 100 U/L (ref 40–129)
ALP SERPL-CCNC: 123 U/L (ref 40–129)
ALP SERPL-CCNC: 92 U/L (ref 40–129)
ALP SERPL-CCNC: 97 U/L (ref 40–129)
ALT SERPL W P-5'-P-CCNC: 17 U/L (ref 10–50)
ALT SERPL W P-5'-P-CCNC: 21 U/L (ref 10–50)
ALT SERPL W P-5'-P-CCNC: 25 U/L (ref 10–50)
ALT SERPL W P-5'-P-CCNC: 35 U/L (ref 10–50)
ANION GAP SERPL CALCULATED.3IONS-SCNC: 10 MMOL/L (ref 7–15)
ANION GAP SERPL CALCULATED.3IONS-SCNC: 12 MMOL/L (ref 7–15)
ANION GAP SERPL CALCULATED.3IONS-SCNC: 14 MMOL/L (ref 7–15)
ANION GAP SERPL CALCULATED.3IONS-SCNC: 9 MMOL/L (ref 7–15)
ANION GAP SERPL CALCULATED.3IONS-SCNC: 9 MMOL/L (ref 7–15)
APPEARANCE UR: CLEAR
AST SERPL W P-5'-P-CCNC: 20 U/L (ref 10–50)
AST SERPL W P-5'-P-CCNC: 22 U/L (ref 10–50)
BASOPHILS # BLD AUTO: 0 10E3/UL (ref 0–0.2)
BASOPHILS NFR BLD AUTO: 0 %
BILIRUB SERPL-MCNC: 0.5 MG/DL
BILIRUB SERPL-MCNC: 0.8 MG/DL
BILIRUB UR QL STRIP: NEGATIVE
BUN SERPL-MCNC: 23.6 MG/DL (ref 8–23)
BUN SERPL-MCNC: 23.7 MG/DL (ref 8–23)
BUN SERPL-MCNC: 25.9 MG/DL (ref 8–23)
BUN SERPL-MCNC: 30 MG/DL (ref 8–23)
BUN SERPL-MCNC: 36.3 MG/DL (ref 8–23)
CALCIUM SERPL-MCNC: 10.1 MG/DL (ref 8.8–10.2)
CALCIUM SERPL-MCNC: 8.7 MG/DL (ref 8.8–10.2)
CALCIUM SERPL-MCNC: 9 MG/DL (ref 8.8–10.2)
CALCIUM SERPL-MCNC: 9.2 MG/DL (ref 8.8–10.2)
CALCIUM SERPL-MCNC: 9.6 MG/DL (ref 8.8–10.2)
CHLORIDE SERPL-SCNC: 101 MMOL/L (ref 98–107)
CHLORIDE SERPL-SCNC: 103 MMOL/L (ref 98–107)
CHLORIDE SERPL-SCNC: 104 MMOL/L (ref 98–107)
CHLORIDE SERPL-SCNC: 105 MMOL/L (ref 98–107)
CHLORIDE SERPL-SCNC: 106 MMOL/L (ref 98–107)
COLOR UR AUTO: YELLOW
CREAT SERPL-MCNC: 0.8 MG/DL (ref 0.67–1.17)
CREAT SERPL-MCNC: 0.85 MG/DL (ref 0.67–1.17)
CREAT SERPL-MCNC: 0.96 MG/DL (ref 0.67–1.17)
CREAT SERPL-MCNC: 0.98 MG/DL (ref 0.67–1.17)
CREAT SERPL-MCNC: 1.3 MG/DL (ref 0.67–1.17)
DEPRECATED HCO3 PLAS-SCNC: 20 MMOL/L (ref 22–29)
DEPRECATED HCO3 PLAS-SCNC: 21 MMOL/L (ref 22–29)
DEPRECATED HCO3 PLAS-SCNC: 23 MMOL/L (ref 22–29)
DEPRECATED HCO3 PLAS-SCNC: 23 MMOL/L (ref 22–29)
DEPRECATED HCO3 PLAS-SCNC: 24 MMOL/L (ref 22–29)
EOSINOPHIL # BLD AUTO: 0 10E3/UL (ref 0–0.7)
EOSINOPHIL NFR BLD AUTO: 0 %
ERYTHROCYTE [DISTWIDTH] IN BLOOD BY AUTOMATED COUNT: 12.9 % (ref 10–15)
ERYTHROCYTE [DISTWIDTH] IN BLOOD BY AUTOMATED COUNT: 12.9 % (ref 10–15)
ERYTHROCYTE [DISTWIDTH] IN BLOOD BY AUTOMATED COUNT: 13 % (ref 10–15)
ERYTHROCYTE [DISTWIDTH] IN BLOOD BY AUTOMATED COUNT: 13 % (ref 10–15)
ERYTHROCYTE [DISTWIDTH] IN BLOOD BY AUTOMATED COUNT: 13.1 % (ref 10–15)
ETHANOL SERPL-MCNC: <0.01 G/DL
FLUAV RNA SPEC QL NAA+PROBE: NEGATIVE
FLUBV RNA RESP QL NAA+PROBE: NEGATIVE
GFR SERPL CREATININE-BSD FRML MDRD: 60 ML/MIN/1.73M2
GFR SERPL CREATININE-BSD FRML MDRD: 85 ML/MIN/1.73M2
GFR SERPL CREATININE-BSD FRML MDRD: 87 ML/MIN/1.73M2
GFR SERPL CREATININE-BSD FRML MDRD: >90 ML/MIN/1.73M2
GFR SERPL CREATININE-BSD FRML MDRD: >90 ML/MIN/1.73M2
GLUCOSE BLDC GLUCOMTR-MCNC: 121 MG/DL (ref 70–99)
GLUCOSE SERPL-MCNC: 119 MG/DL (ref 70–99)
GLUCOSE SERPL-MCNC: 124 MG/DL (ref 70–99)
GLUCOSE SERPL-MCNC: 127 MG/DL (ref 70–99)
GLUCOSE SERPL-MCNC: 131 MG/DL (ref 70–99)
GLUCOSE SERPL-MCNC: 150 MG/DL (ref 70–99)
GLUCOSE UR STRIP-MCNC: NEGATIVE MG/DL
HCT VFR BLD AUTO: 38.5 % (ref 40–53)
HCT VFR BLD AUTO: 39 % (ref 40–53)
HCT VFR BLD AUTO: 39 % (ref 40–53)
HCT VFR BLD AUTO: 40.6 % (ref 40–53)
HCT VFR BLD AUTO: 41.1 % (ref 40–53)
HGB BLD-MCNC: 13.1 G/DL (ref 13.3–17.7)
HGB BLD-MCNC: 13.3 G/DL (ref 13.3–17.7)
HGB BLD-MCNC: 13.3 G/DL (ref 13.3–17.7)
HGB BLD-MCNC: 13.7 G/DL (ref 13.3–17.7)
HGB BLD-MCNC: 14.3 G/DL (ref 13.3–17.7)
HGB UR QL STRIP: NEGATIVE
HOLD SPECIMEN: NORMAL
HYALINE CASTS: 10 /LPF
IMM GRANULOCYTES # BLD: 0.1 10E3/UL
IMM GRANULOCYTES NFR BLD: 0 %
INR PPP: 1.18 (ref 0.85–1.15)
KETONES UR STRIP-MCNC: NEGATIVE MG/DL
LEUKOCYTE ESTERASE UR QL STRIP: NEGATIVE
LYMPHOCYTES # BLD AUTO: 1 10E3/UL (ref 0.8–5.3)
LYMPHOCYTES NFR BLD AUTO: 5 %
MAGNESIUM SERPL-MCNC: 2 MG/DL (ref 1.7–2.3)
MCH RBC QN AUTO: 30.5 PG (ref 26.5–33)
MCH RBC QN AUTO: 30.8 PG (ref 26.5–33)
MCH RBC QN AUTO: 30.9 PG (ref 26.5–33)
MCH RBC QN AUTO: 31.1 PG (ref 26.5–33)
MCH RBC QN AUTO: 31.1 PG (ref 26.5–33)
MCHC RBC AUTO-ENTMCNC: 33.7 G/DL (ref 31.5–36.5)
MCHC RBC AUTO-ENTMCNC: 34 G/DL (ref 31.5–36.5)
MCHC RBC AUTO-ENTMCNC: 34.1 G/DL (ref 31.5–36.5)
MCHC RBC AUTO-ENTMCNC: 34.1 G/DL (ref 31.5–36.5)
MCHC RBC AUTO-ENTMCNC: 34.8 G/DL (ref 31.5–36.5)
MCV RBC AUTO: 89 FL (ref 78–100)
MCV RBC AUTO: 90 FL (ref 78–100)
MCV RBC AUTO: 91 FL (ref 78–100)
MCV RBC AUTO: 91 FL (ref 78–100)
MCV RBC AUTO: 92 FL (ref 78–100)
MONOCYTES # BLD AUTO: 1.6 10E3/UL (ref 0–1.3)
MONOCYTES NFR BLD AUTO: 8 %
MUCOUS THREADS #/AREA URNS LPF: PRESENT /LPF
NEUTROPHILS # BLD AUTO: 17.3 10E3/UL (ref 1.6–8.3)
NEUTROPHILS NFR BLD AUTO: 87 %
NITRATE UR QL: NEGATIVE
NRBC # BLD AUTO: 0 10E3/UL
NRBC BLD AUTO-RTO: 0 /100
NT-PROBNP SERPL-MCNC: 923 PG/ML (ref 0–900)
PH UR STRIP: 5.5 [PH] (ref 4.7–8)
PLATELET # BLD AUTO: 319 10E3/UL (ref 150–450)
PLATELET # BLD AUTO: 358 10E3/UL (ref 150–450)
PLATELET # BLD AUTO: 363 10E3/UL (ref 150–450)
PLATELET # BLD AUTO: 367 10E3/UL (ref 150–450)
PLATELET # BLD AUTO: 405 10E3/UL (ref 150–450)
POTASSIUM SERPL-SCNC: 3.6 MMOL/L (ref 3.4–5.3)
POTASSIUM SERPL-SCNC: 4 MMOL/L (ref 3.4–5.3)
POTASSIUM SERPL-SCNC: 4.1 MMOL/L (ref 3.4–5.3)
POTASSIUM SERPL-SCNC: 4.3 MMOL/L (ref 3.4–5.3)
POTASSIUM SERPL-SCNC: 4.4 MMOL/L (ref 3.4–5.3)
PROT SERPL-MCNC: 6.8 G/DL (ref 6.4–8.3)
PROT SERPL-MCNC: 7.1 G/DL (ref 6.4–8.3)
PROT SERPL-MCNC: 7.2 G/DL (ref 6.4–8.3)
PROT SERPL-MCNC: 8 G/DL (ref 6.4–8.3)
RBC # BLD AUTO: 4.27 10E6/UL (ref 4.4–5.9)
RBC # BLD AUTO: 4.28 10E6/UL (ref 4.4–5.9)
RBC # BLD AUTO: 4.3 10E6/UL (ref 4.4–5.9)
RBC # BLD AUTO: 4.43 10E6/UL (ref 4.4–5.9)
RBC # BLD AUTO: 4.64 10E6/UL (ref 4.4–5.9)
RBC URINE: 0 /HPF
RSV RNA SPEC NAA+PROBE: NEGATIVE
SARS-COV-2 RNA RESP QL NAA+PROBE: NEGATIVE
SARS-COV-2 RNA RESP QL NAA+PROBE: NEGATIVE
SODIUM SERPL-SCNC: 135 MMOL/L (ref 136–145)
SODIUM SERPL-SCNC: 136 MMOL/L (ref 136–145)
SODIUM SERPL-SCNC: 137 MMOL/L (ref 136–145)
SODIUM SERPL-SCNC: 138 MMOL/L (ref 136–145)
SODIUM SERPL-SCNC: 138 MMOL/L (ref 136–145)
SP GR UR STRIP: 1.02 (ref 1–1.03)
SQUAMOUS EPITHELIAL: <1 /HPF
TROPONIN T SERPL HS-MCNC: 25 NG/L
UROBILINOGEN UR STRIP-MCNC: 2 MG/DL
WBC # BLD AUTO: 15.6 10E3/UL (ref 4–11)
WBC # BLD AUTO: 16.9 10E3/UL (ref 4–11)
WBC # BLD AUTO: 17.3 10E3/UL (ref 4–11)
WBC # BLD AUTO: 20.1 10E3/UL (ref 4–11)
WBC # BLD AUTO: 9.9 10E3/UL (ref 4–11)
WBC URINE: <1 /HPF

## 2022-01-01 PROCEDURE — 0QSH04Z REPOSITION LEFT TIBIA WITH INTERNAL FIXATION DEVICE, OPEN APPROACH: ICD-10-PCS | Performed by: PODIATRIST

## 2022-01-01 PROCEDURE — 250N000013 HC RX MED GY IP 250 OP 250 PS 637: Performed by: INTERNAL MEDICINE

## 2022-01-01 PROCEDURE — 250N000011 HC RX IP 250 OP 636: Performed by: INTERNAL MEDICINE

## 2022-01-01 PROCEDURE — 120N000001 HC R&B MED SURG/OB

## 2022-01-01 PROCEDURE — 272N000001 HC OR GENERAL SUPPLY STERILE: Performed by: PODIATRIST

## 2022-01-01 PROCEDURE — 96374 THER/PROPH/DIAG INJ IV PUSH: CPT

## 2022-01-01 PROCEDURE — 99239 HOSP IP/OBS DSCHRG MGMT >30: CPT | Mod: 24 | Performed by: HOSPITALIST

## 2022-01-01 PROCEDURE — 99232 SBSQ HOSP IP/OBS MODERATE 35: CPT | Performed by: INTERNAL MEDICINE

## 2022-01-01 PROCEDURE — 999N000141 HC STATISTIC PRE-PROCEDURE NURSING ASSESSMENT: Performed by: PODIATRIST

## 2022-01-01 PROCEDURE — 250N000025 HC SEVOFLURANE, PER MIN: Performed by: PODIATRIST

## 2022-01-01 PROCEDURE — 87637 SARSCOV2&INF A&B&RSV AMP PRB: CPT | Performed by: EMERGENCY MEDICINE

## 2022-01-01 PROCEDURE — 27829 TREAT LOWER LEG JOINT: CPT | Mod: LT | Performed by: PODIATRIST

## 2022-01-01 PROCEDURE — 84484 ASSAY OF TROPONIN QUANT: CPT | Performed by: EMERGENCY MEDICINE

## 2022-01-01 PROCEDURE — 96375 TX/PRO/DX INJ NEW DRUG ADDON: CPT

## 2022-01-01 PROCEDURE — C1713 ANCHOR/SCREW BN/BN,TIS/BN: HCPCS | Performed by: PODIATRIST

## 2022-01-01 PROCEDURE — 27822 TREATMENT OF ANKLE FRACTURE: CPT | Mod: LT | Performed by: PODIATRIST

## 2022-01-01 PROCEDURE — 99232 SBSQ HOSP IP/OBS MODERATE 35: CPT | Performed by: HOSPITALIST

## 2022-01-01 PROCEDURE — 84132 ASSAY OF SERUM POTASSIUM: CPT | Performed by: INTERNAL MEDICINE

## 2022-01-01 PROCEDURE — U0003 INFECTIOUS AGENT DETECTION BY NUCLEIC ACID (DNA OR RNA); SEVERE ACUTE RESPIRATORY SYNDROME CORONAVIRUS 2 (SARS-COV-2) (CORONAVIRUS DISEASE [COVID-19]), AMPLIFIED PROBE TECHNIQUE, MAKING USE OF HIGH THROUGHPUT TECHNOLOGIES AS DESCRIBED BY CMS-2020-01-R: HCPCS | Performed by: HOSPITALIST

## 2022-01-01 PROCEDURE — C9803 HOPD COVID-19 SPEC COLLECT: HCPCS

## 2022-01-01 PROCEDURE — 258N000003 HC RX IP 258 OP 636: Performed by: NURSE ANESTHETIST, CERTIFIED REGISTERED

## 2022-01-01 PROCEDURE — 85027 COMPLETE CBC AUTOMATED: CPT | Performed by: INTERNAL MEDICINE

## 2022-01-01 PROCEDURE — 36415 COLL VENOUS BLD VENIPUNCTURE: CPT | Performed by: EMERGENCY MEDICINE

## 2022-01-01 PROCEDURE — 82077 ASSAY SPEC XCP UR&BREATH IA: CPT | Performed by: INTERNAL MEDICINE

## 2022-01-01 PROCEDURE — 93005 ELECTROCARDIOGRAM TRACING: CPT

## 2022-01-01 PROCEDURE — 80053 COMPREHEN METABOLIC PANEL: CPT | Performed by: HOSPITALIST

## 2022-01-01 PROCEDURE — 36415 COLL VENOUS BLD VENIPUNCTURE: CPT | Performed by: INTERNAL MEDICINE

## 2022-01-01 PROCEDURE — 250N000011 HC RX IP 250 OP 636: Performed by: NURSE ANESTHETIST, CERTIFIED REGISTERED

## 2022-01-01 PROCEDURE — 73610 X-RAY EXAM OF ANKLE: CPT | Mod: LT

## 2022-01-01 PROCEDURE — 97162 PT EVAL MOD COMPLEX 30 MIN: CPT | Mod: GP

## 2022-01-01 PROCEDURE — 999N000179 XR SURGERY CARM FLUORO LESS THAN 5 MIN W STILLS: Mod: TC

## 2022-01-01 PROCEDURE — 258N000003 HC RX IP 258 OP 636: Performed by: INTERNAL MEDICINE

## 2022-01-01 PROCEDURE — 271N000006 HC CAST/SPLINT FIBERGLASS

## 2022-01-01 PROCEDURE — 250N000011 HC RX IP 250 OP 636: Performed by: PODIATRIST

## 2022-01-01 PROCEDURE — 99024 POSTOP FOLLOW-UP VISIT: CPT | Performed by: PODIATRIST

## 2022-01-01 PROCEDURE — 80048 BASIC METABOLIC PNL TOTAL CA: CPT | Performed by: INTERNAL MEDICINE

## 2022-01-01 PROCEDURE — 99223 1ST HOSP IP/OBS HIGH 75: CPT | Mod: AI | Performed by: INTERNAL MEDICINE

## 2022-01-01 PROCEDURE — 85027 COMPLETE CBC AUTOMATED: CPT | Performed by: HOSPITALIST

## 2022-01-01 PROCEDURE — 80053 COMPREHEN METABOLIC PANEL: CPT | Performed by: EMERGENCY MEDICINE

## 2022-01-01 PROCEDURE — 710N000010 HC RECOVERY PHASE 1, LEVEL 2, PER MIN: Performed by: PODIATRIST

## 2022-01-01 PROCEDURE — 85610 PROTHROMBIN TIME: CPT | Performed by: EMERGENCY MEDICINE

## 2022-01-01 PROCEDURE — 93010 ELECTROCARDIOGRAM REPORT: CPT | Performed by: INTERNAL MEDICINE

## 2022-01-01 PROCEDURE — G1010 CDSM STANSON: HCPCS

## 2022-01-01 PROCEDURE — G0008 ADMIN INFLUENZA VIRUS VAC: HCPCS | Performed by: INTERNAL MEDICINE

## 2022-01-01 PROCEDURE — 370N000017 HC ANESTHESIA TECHNICAL FEE, PER MIN: Performed by: PODIATRIST

## 2022-01-01 PROCEDURE — 27822 TREATMENT OF ANKLE FRACTURE: CPT | Performed by: NURSE ANESTHETIST, CERTIFIED REGISTERED

## 2022-01-01 PROCEDURE — 90662 IIV NO PRSV INCREASED AG IM: CPT | Performed by: INTERNAL MEDICINE

## 2022-01-01 PROCEDURE — 29515 APPLICATION SHORT LEG SPLINT: CPT | Mod: LT

## 2022-01-01 PROCEDURE — 83880 ASSAY OF NATRIURETIC PEPTIDE: CPT | Performed by: EMERGENCY MEDICINE

## 2022-01-01 PROCEDURE — 99305 1ST NF CARE MODERATE MDM 35: CPT | Performed by: FAMILY MEDICINE

## 2022-01-01 PROCEDURE — 83735 ASSAY OF MAGNESIUM: CPT | Performed by: EMERGENCY MEDICINE

## 2022-01-01 PROCEDURE — G0463 HOSPITAL OUTPT CLINIC VISIT: HCPCS

## 2022-01-01 PROCEDURE — 97110 THERAPEUTIC EXERCISES: CPT | Mod: GP

## 2022-01-01 PROCEDURE — 0QSK04Z REPOSITION LEFT FIBULA WITH INTERNAL FIXATION DEVICE, OPEN APPROACH: ICD-10-PCS | Performed by: PODIATRIST

## 2022-01-01 PROCEDURE — 36415 COLL VENOUS BLD VENIPUNCTURE: CPT | Performed by: HOSPITALIST

## 2022-01-01 PROCEDURE — 99232 SBSQ HOSP IP/OBS MODERATE 35: CPT | Mod: 24 | Performed by: HOSPITALIST

## 2022-01-01 PROCEDURE — 85025 COMPLETE CBC W/AUTO DIFF WBC: CPT | Performed by: EMERGENCY MEDICINE

## 2022-01-01 PROCEDURE — 99285 EMERGENCY DEPT VISIT HI MDM: CPT | Mod: 25 | Performed by: EMERGENCY MEDICINE

## 2022-01-01 PROCEDURE — 99285 EMERGENCY DEPT VISIT HI MDM: CPT | Mod: 25

## 2022-01-01 PROCEDURE — C1769 GUIDE WIRE: HCPCS | Performed by: PODIATRIST

## 2022-01-01 PROCEDURE — 29515 APPLICATION SHORT LEG SPLINT: CPT | Mod: LT | Performed by: EMERGENCY MEDICINE

## 2022-01-01 PROCEDURE — 250N000009 HC RX 250: Performed by: NURSE ANESTHETIST, CERTIFIED REGISTERED

## 2022-01-01 PROCEDURE — 80053 COMPREHEN METABOLIC PANEL: CPT | Performed by: INTERNAL MEDICINE

## 2022-01-01 PROCEDURE — 81001 URINALYSIS AUTO W/SCOPE: CPT | Performed by: EMERGENCY MEDICINE

## 2022-01-01 PROCEDURE — 360N000084 HC SURGERY LEVEL 4 W/ FLUORO, PER MIN: Performed by: PODIATRIST

## 2022-01-01 DEVICE — IMPLANTABLE DEVICE: Type: IMPLANTABLE DEVICE | Site: ANKLE | Status: FUNCTIONAL

## 2022-01-01 RX ORDER — OXYCODONE HYDROCHLORIDE 5 MG/1
5 TABLET ORAL EVERY 6 HOURS PRN
Qty: 16 TABLET | Refills: 0 | Status: SHIPPED | OUTPATIENT
Start: 2022-01-01 | End: 2022-01-01

## 2022-01-01 RX ORDER — ONDANSETRON 4 MG/1
4 TABLET, ORALLY DISINTEGRATING ORAL EVERY 30 MIN PRN
Status: DISCONTINUED | OUTPATIENT
Start: 2022-01-01 | End: 2022-01-01 | Stop reason: HOSPADM

## 2022-01-01 RX ORDER — LISINOPRIL 10 MG/1
10 TABLET ORAL DAILY
Qty: 90 TABLET | Refills: 2 | Status: SHIPPED | OUTPATIENT
Start: 2022-01-01

## 2022-01-01 RX ORDER — KETAMINE HYDROCHLORIDE 10 MG/ML
INJECTION INTRAMUSCULAR; INTRAVENOUS PRN
Status: DISCONTINUED | OUTPATIENT
Start: 2022-01-01 | End: 2022-01-01

## 2022-01-01 RX ORDER — BUPIVACAINE HYDROCHLORIDE 2.5 MG/ML
INJECTION, SOLUTION EPIDURAL; INFILTRATION; INTRACAUDAL
Status: COMPLETED | OUTPATIENT
Start: 2022-01-01 | End: 2022-01-01

## 2022-01-01 RX ORDER — FUROSEMIDE 20 MG
TABLET ORAL
Qty: 540 TABLET | Refills: 0 | OUTPATIENT
Start: 2022-01-01

## 2022-01-01 RX ORDER — FENTANYL CITRATE 50 UG/ML
50 INJECTION, SOLUTION INTRAMUSCULAR; INTRAVENOUS EVERY 5 MIN PRN
Status: DISCONTINUED | OUTPATIENT
Start: 2022-01-01 | End: 2022-01-01 | Stop reason: HOSPADM

## 2022-01-01 RX ORDER — DEXAMETHASONE SODIUM PHOSPHATE 10 MG/ML
6 INJECTION, SOLUTION INTRAMUSCULAR; INTRAVENOUS EVERY 6 HOURS
Status: DISCONTINUED | OUTPATIENT
Start: 2022-01-01 | End: 2022-01-01

## 2022-01-01 RX ORDER — METOPROLOL TARTRATE 50 MG
TABLET ORAL
Qty: 180 TABLET | Refills: 3 | Status: SHIPPED | OUTPATIENT
Start: 2022-01-01 | End: 2022-01-01

## 2022-01-01 RX ORDER — LABETALOL 20 MG/4 ML (5 MG/ML) INTRAVENOUS SYRINGE
20 EVERY 5 MIN PRN
Status: DISCONTINUED | OUTPATIENT
Start: 2022-01-01 | End: 2022-01-01 | Stop reason: HOSPADM

## 2022-01-01 RX ORDER — OXYCODONE HYDROCHLORIDE 5 MG/1
5 TABLET ORAL EVERY 4 HOURS PRN
Status: DISCONTINUED | OUTPATIENT
Start: 2022-01-01 | End: 2022-01-01 | Stop reason: HOSPADM

## 2022-01-01 RX ORDER — SPIRONOLACTONE 50 MG/1
TABLET, FILM COATED ORAL
Qty: 90 TABLET | Refills: 0 | OUTPATIENT
Start: 2022-01-01

## 2022-01-01 RX ORDER — LISINOPRIL 10 MG/1
TABLET ORAL
Qty: 90 TABLET | Refills: 3 | Status: SHIPPED | OUTPATIENT
Start: 2022-01-01 | End: 2022-01-01

## 2022-01-01 RX ORDER — BUPIVACAINE HYDROCHLORIDE 5 MG/ML
INJECTION, SOLUTION EPIDURAL; INTRACAUDAL
Status: DISCONTINUED
Start: 2022-01-01 | End: 2022-01-01 | Stop reason: WASHOUT

## 2022-01-01 RX ORDER — METOPROLOL TARTRATE 50 MG
50 TABLET ORAL 2 TIMES DAILY
Status: DISCONTINUED | OUTPATIENT
Start: 2022-01-01 | End: 2022-01-01 | Stop reason: HOSPADM

## 2022-01-01 RX ORDER — ONDANSETRON 2 MG/ML
4 INJECTION INTRAMUSCULAR; INTRAVENOUS EVERY 30 MIN PRN
Status: DISCONTINUED | OUTPATIENT
Start: 2022-01-01 | End: 2022-01-01 | Stop reason: HOSPADM

## 2022-01-01 RX ORDER — LABETALOL 20 MG/4 ML (5 MG/ML) INTRAVENOUS SYRINGE
10
Status: DISCONTINUED | OUTPATIENT
Start: 2022-01-01 | End: 2022-01-01 | Stop reason: HOSPADM

## 2022-01-01 RX ORDER — LIDOCAINE 40 MG/G
CREAM TOPICAL
Status: DISCONTINUED | OUTPATIENT
Start: 2022-01-01 | End: 2022-01-01 | Stop reason: HOSPADM

## 2022-01-01 RX ORDER — LIDOCAINE HYDROCHLORIDE 10 MG/ML
INJECTION, SOLUTION EPIDURAL; INFILTRATION; INTRACAUDAL; PERINEURAL
Status: DISCONTINUED
Start: 2022-01-01 | End: 2022-01-01 | Stop reason: WASHOUT

## 2022-01-01 RX ORDER — DEXAMETHASONE SODIUM PHOSPHATE 10 MG/ML
INJECTION, SOLUTION INTRAMUSCULAR; INTRAVENOUS PRN
Status: DISCONTINUED | OUTPATIENT
Start: 2022-01-01 | End: 2022-01-01

## 2022-01-01 RX ORDER — NALOXONE HYDROCHLORIDE 0.4 MG/ML
0.4 INJECTION, SOLUTION INTRAMUSCULAR; INTRAVENOUS; SUBCUTANEOUS
Status: DISCONTINUED | OUTPATIENT
Start: 2022-01-01 | End: 2022-01-01 | Stop reason: HOSPADM

## 2022-01-01 RX ORDER — TRAMADOL HYDROCHLORIDE 50 MG/1
50 TABLET ORAL EVERY 6 HOURS PRN
Status: DISCONTINUED | OUTPATIENT
Start: 2022-01-01 | End: 2022-01-01

## 2022-01-01 RX ORDER — DEXAMETHASONE 4 MG/1
4 TABLET ORAL EVERY 12 HOURS SCHEDULED
Status: DISCONTINUED | OUTPATIENT
Start: 2022-01-01 | End: 2022-01-01 | Stop reason: HOSPADM

## 2022-01-01 RX ORDER — NALOXONE HYDROCHLORIDE 0.4 MG/ML
0.2 INJECTION, SOLUTION INTRAMUSCULAR; INTRAVENOUS; SUBCUTANEOUS
Status: DISCONTINUED | OUTPATIENT
Start: 2022-01-01 | End: 2022-01-01 | Stop reason: HOSPADM

## 2022-01-01 RX ORDER — IBUPROFEN 600 MG/1
600 TABLET, FILM COATED ORAL EVERY 6 HOURS
Qty: 12 TABLET | Refills: 0 | Status: SHIPPED | OUTPATIENT
Start: 2022-01-01 | End: 2022-01-01

## 2022-01-01 RX ORDER — SODIUM CHLORIDE 9 MG/ML
INJECTION, SOLUTION INTRAVENOUS CONTINUOUS
Status: ACTIVE | OUTPATIENT
Start: 2022-01-01 | End: 2022-01-01

## 2022-01-01 RX ORDER — FUROSEMIDE 20 MG
TABLET ORAL
Qty: 540 TABLET | Refills: 1 | Status: SHIPPED | OUTPATIENT
Start: 2022-01-01 | End: 2022-01-01

## 2022-01-01 RX ORDER — SPIRONOLACTONE 25 MG/1
50 TABLET ORAL DAILY
Status: DISCONTINUED | OUTPATIENT
Start: 2022-01-01 | End: 2022-01-01 | Stop reason: HOSPADM

## 2022-01-01 RX ORDER — SPIRONOLACTONE 50 MG/1
50 TABLET, FILM COATED ORAL DAILY
Qty: 90 TABLET | Refills: 0 | Status: SHIPPED | OUTPATIENT
Start: 2022-01-01 | End: 2022-01-01

## 2022-01-01 RX ORDER — PROPOFOL 10 MG/ML
INJECTION, EMULSION INTRAVENOUS PRN
Status: DISCONTINUED | OUTPATIENT
Start: 2022-01-01 | End: 2022-01-01

## 2022-01-01 RX ORDER — SPIRONOLACTONE 50 MG/1
TABLET, FILM COATED ORAL
Qty: 90 TABLET | Refills: 0 | Status: SHIPPED | OUTPATIENT
Start: 2022-01-01

## 2022-01-01 RX ORDER — FUROSEMIDE 20 MG
TABLET ORAL
Qty: 540 TABLET | Refills: 0 | Status: SHIPPED | OUTPATIENT
Start: 2022-01-01 | End: 2022-01-01

## 2022-01-01 RX ORDER — FUROSEMIDE 40 MG
40 TABLET ORAL DAILY
Status: DISCONTINUED | OUTPATIENT
Start: 2022-01-01 | End: 2022-01-01 | Stop reason: HOSPADM

## 2022-01-01 RX ORDER — DEXAMETHASONE SODIUM PHOSPHATE 10 MG/ML
INJECTION, SOLUTION INTRAMUSCULAR; INTRAVENOUS
Status: COMPLETED | OUTPATIENT
Start: 2022-01-01 | End: 2022-01-01

## 2022-01-01 RX ORDER — ONDANSETRON 4 MG/1
4 TABLET, ORALLY DISINTEGRATING ORAL EVERY 6 HOURS PRN
Status: DISCONTINUED | OUTPATIENT
Start: 2022-01-01 | End: 2022-01-01 | Stop reason: HOSPADM

## 2022-01-01 RX ORDER — PANTOPRAZOLE SODIUM 40 MG/1
40 TABLET, DELAYED RELEASE ORAL
Qty: 90 TABLET | Refills: 3 | Status: SHIPPED | OUTPATIENT
Start: 2022-01-01

## 2022-01-01 RX ORDER — LABETALOL 20 MG/4 ML (5 MG/ML) INTRAVENOUS SYRINGE
10 ONCE
Status: COMPLETED | OUTPATIENT
Start: 2022-01-01 | End: 2022-01-01

## 2022-01-01 RX ORDER — CEFAZOLIN SODIUM/WATER 2 G/20 ML
2 SYRINGE (ML) INTRAVENOUS
Status: COMPLETED | OUTPATIENT
Start: 2022-01-01 | End: 2022-01-01

## 2022-01-01 RX ORDER — FENTANYL CITRATE 50 UG/ML
INJECTION, SOLUTION INTRAMUSCULAR; INTRAVENOUS PRN
Status: DISCONTINUED | OUTPATIENT
Start: 2022-01-01 | End: 2022-01-01

## 2022-01-01 RX ORDER — HYDROMORPHONE HYDROCHLORIDE 1 MG/ML
0.5 INJECTION, SOLUTION INTRAMUSCULAR; INTRAVENOUS; SUBCUTANEOUS EVERY 5 MIN PRN
Status: DISCONTINUED | OUTPATIENT
Start: 2022-01-01 | End: 2022-01-01 | Stop reason: HOSPADM

## 2022-01-01 RX ORDER — SODIUM CHLORIDE, SODIUM LACTATE, POTASSIUM CHLORIDE, CALCIUM CHLORIDE 600; 310; 30; 20 MG/100ML; MG/100ML; MG/100ML; MG/100ML
INJECTION, SOLUTION INTRAVENOUS CONTINUOUS
Status: DISCONTINUED | OUTPATIENT
Start: 2022-01-01 | End: 2022-01-01 | Stop reason: HOSPADM

## 2022-01-01 RX ORDER — AMINO ACIDS/PROTEIN HYDROLYS 15G-100/30
LIQUID (ML) ORAL DAILY
COMMUNITY

## 2022-01-01 RX ORDER — ALBUTEROL SULFATE 0.83 MG/ML
2.5 SOLUTION RESPIRATORY (INHALATION) EVERY 4 HOURS PRN
Status: DISCONTINUED | OUTPATIENT
Start: 2022-01-01 | End: 2022-01-01 | Stop reason: HOSPADM

## 2022-01-01 RX ORDER — LIDOCAINE HYDROCHLORIDE 20 MG/ML
INJECTION, SOLUTION INFILTRATION; PERINEURAL PRN
Status: DISCONTINUED | OUTPATIENT
Start: 2022-01-01 | End: 2022-01-01

## 2022-01-01 RX ORDER — OXYCODONE HYDROCHLORIDE 5 MG/1
5 TABLET ORAL
Status: CANCELLED | OUTPATIENT
Start: 2022-01-01

## 2022-01-01 RX ORDER — CEFAZOLIN SODIUM/WATER 2 G/20 ML
2 SYRINGE (ML) INTRAVENOUS SEE ADMIN INSTRUCTIONS
Status: DISCONTINUED | OUTPATIENT
Start: 2022-01-01 | End: 2022-01-01 | Stop reason: HOSPADM

## 2022-01-01 RX ORDER — PANTOPRAZOLE SODIUM 40 MG/1
40 TABLET, DELAYED RELEASE ORAL
Status: DISCONTINUED | OUTPATIENT
Start: 2022-01-01 | End: 2022-01-01 | Stop reason: HOSPADM

## 2022-01-01 RX ORDER — DEXAMETHASONE SODIUM PHOSPHATE 10 MG/ML
10 INJECTION, SOLUTION INTRAMUSCULAR; INTRAVENOUS ONCE
Status: COMPLETED | OUTPATIENT
Start: 2022-01-01 | End: 2022-01-01

## 2022-01-01 RX ORDER — FUROSEMIDE 20 MG
TABLET ORAL
Qty: 540 TABLET | Refills: 0 | Status: SHIPPED | OUTPATIENT
Start: 2022-01-01

## 2022-01-01 RX ORDER — ACETAMINOPHEN 650 MG/1
650 SUPPOSITORY RECTAL EVERY 6 HOURS PRN
Status: DISCONTINUED | OUTPATIENT
Start: 2022-01-01 | End: 2022-01-01 | Stop reason: HOSPADM

## 2022-01-01 RX ORDER — ONDANSETRON 2 MG/ML
4 INJECTION INTRAMUSCULAR; INTRAVENOUS EVERY 6 HOURS PRN
Status: DISCONTINUED | OUTPATIENT
Start: 2022-01-01 | End: 2022-01-01 | Stop reason: HOSPADM

## 2022-01-01 RX ORDER — METOPROLOL TARTRATE 50 MG
TABLET ORAL
Qty: 180 TABLET | Refills: 2 | Status: SHIPPED | OUTPATIENT
Start: 2022-01-01

## 2022-01-01 RX ORDER — METOPROLOL TARTRATE 50 MG
100 TABLET ORAL ONCE
Status: COMPLETED | OUTPATIENT
Start: 2022-01-01 | End: 2022-01-01

## 2022-01-01 RX ORDER — HYDRALAZINE HYDROCHLORIDE 20 MG/ML
2.5-5 INJECTION INTRAMUSCULAR; INTRAVENOUS EVERY 10 MIN PRN
Status: DISCONTINUED | OUTPATIENT
Start: 2022-01-01 | End: 2022-01-01 | Stop reason: HOSPADM

## 2022-01-01 RX ORDER — LISINOPRIL 10 MG/1
10 TABLET ORAL DAILY
Status: DISCONTINUED | OUTPATIENT
Start: 2022-01-01 | End: 2022-01-01 | Stop reason: HOSPADM

## 2022-01-01 RX ORDER — FUROSEMIDE 20 MG
60 TABLET ORAL 2 TIMES DAILY
COMMUNITY
Start: 2022-01-01

## 2022-01-01 RX ORDER — OXYCODONE HYDROCHLORIDE 5 MG/1
5 TABLET ORAL EVERY 6 HOURS PRN
Qty: 16 TABLET | Refills: 0 | Status: SHIPPED | OUTPATIENT
Start: 2022-01-01

## 2022-01-01 RX ORDER — ACETAMINOPHEN 325 MG/1
650 TABLET ORAL EVERY 6 HOURS PRN
Status: DISCONTINUED | OUTPATIENT
Start: 2022-01-01 | End: 2022-01-01 | Stop reason: HOSPADM

## 2022-01-01 RX ORDER — DILTIAZEM HYDROCHLORIDE 5 MG/ML
20 INJECTION INTRAVENOUS ONCE
Status: COMPLETED | OUTPATIENT
Start: 2022-01-01 | End: 2022-01-01

## 2022-01-01 RX ORDER — ONDANSETRON 2 MG/ML
INJECTION INTRAMUSCULAR; INTRAVENOUS PRN
Status: DISCONTINUED | OUTPATIENT
Start: 2022-01-01 | End: 2022-01-01

## 2022-01-01 RX ORDER — SPIRONOLACTONE 50 MG/1
50 TABLET, FILM COATED ORAL DAILY
Qty: 90 TABLET | Refills: 1 | Status: SHIPPED | OUTPATIENT
Start: 2022-01-01 | End: 2022-01-01

## 2022-01-01 RX ADMIN — METOPROLOL TARTRATE 50 MG: 50 TABLET, FILM COATED ORAL at 19:50

## 2022-01-01 RX ADMIN — BUPIVACAINE HYDROCHLORIDE 20 ML: 2.5 INJECTION, SOLUTION EPIDURAL; INFILTRATION; INTRACAUDAL at 07:00

## 2022-01-01 RX ADMIN — OXYCODONE HYDROCHLORIDE 5 MG: 5 TABLET ORAL at 01:50

## 2022-01-01 RX ADMIN — OXYCODONE HYDROCHLORIDE 5 MG: 5 TABLET ORAL at 08:29

## 2022-01-01 RX ADMIN — DEXAMETHASONE 4 MG: 4 TABLET ORAL at 20:14

## 2022-01-01 RX ADMIN — METOPROLOL TARTRATE 50 MG: 50 TABLET, FILM COATED ORAL at 20:17

## 2022-01-01 RX ADMIN — PANTOPRAZOLE SODIUM 40 MG: 40 TABLET, DELAYED RELEASE ORAL at 06:45

## 2022-01-01 RX ADMIN — PANTOPRAZOLE SODIUM 40 MG: 40 TABLET, DELAYED RELEASE ORAL at 06:39

## 2022-01-01 RX ADMIN — OXYCODONE HYDROCHLORIDE 5 MG: 5 TABLET ORAL at 19:50

## 2022-01-01 RX ADMIN — OXYCODONE HYDROCHLORIDE 5 MG: 5 TABLET ORAL at 10:45

## 2022-01-01 RX ADMIN — LISINOPRIL 10 MG: 10 TABLET ORAL at 09:46

## 2022-01-01 RX ADMIN — DEXAMETHASONE SODIUM PHOSPHATE 6 MG: 10 INJECTION, SOLUTION INTRAMUSCULAR; INTRAVENOUS at 09:41

## 2022-01-01 RX ADMIN — OXYCODONE HYDROCHLORIDE 5 MG: 5 TABLET ORAL at 20:04

## 2022-01-01 RX ADMIN — OXYCODONE HYDROCHLORIDE 5 MG: 5 TABLET ORAL at 06:42

## 2022-01-01 RX ADMIN — METOPROLOL TARTRATE 50 MG: 50 TABLET, FILM COATED ORAL at 09:41

## 2022-01-01 RX ADMIN — PANTOPRAZOLE SODIUM 40 MG: 40 TABLET, DELAYED RELEASE ORAL at 06:32

## 2022-01-01 RX ADMIN — ACETAMINOPHEN 650 MG: 325 TABLET ORAL at 06:32

## 2022-01-01 RX ADMIN — LABETALOL HYDROCHLORIDE 10 MG: 5 INJECTION, SOLUTION INTRAVENOUS at 10:10

## 2022-01-01 RX ADMIN — OXYCODONE HYDROCHLORIDE 5 MG: 5 TABLET ORAL at 13:06

## 2022-01-01 RX ADMIN — ACETAMINOPHEN 650 MG: 325 TABLET ORAL at 10:45

## 2022-01-01 RX ADMIN — OXYCODONE HYDROCHLORIDE 5 MG: 5 TABLET ORAL at 01:58

## 2022-01-01 RX ADMIN — MIDAZOLAM 2 MG: 1 INJECTION INTRAMUSCULAR; INTRAVENOUS at 07:26

## 2022-01-01 RX ADMIN — PANTOPRAZOLE SODIUM 40 MG: 40 TABLET, DELAYED RELEASE ORAL at 06:55

## 2022-01-01 RX ADMIN — TRAMADOL HYDROCHLORIDE 50 MG: 50 TABLET ORAL at 02:31

## 2022-01-01 RX ADMIN — DEXAMETHASONE 4 MG: 4 TABLET ORAL at 08:29

## 2022-01-01 RX ADMIN — DEXAMETHASONE SODIUM PHOSPHATE 4 MG: 10 INJECTION, SOLUTION INTRAMUSCULAR; INTRAVENOUS at 07:43

## 2022-01-01 RX ADMIN — ONDANSETRON 4 MG: 2 INJECTION INTRAMUSCULAR; INTRAVENOUS at 08:56

## 2022-01-01 RX ADMIN — METOPROLOL TARTRATE 50 MG: 50 TABLET, FILM COATED ORAL at 08:35

## 2022-01-01 RX ADMIN — METOPROLOL TARTRATE 50 MG: 50 TABLET, FILM COATED ORAL at 12:12

## 2022-01-01 RX ADMIN — SODIUM CHLORIDE, POTASSIUM CHLORIDE, SODIUM LACTATE AND CALCIUM CHLORIDE: 600; 310; 30; 20 INJECTION, SOLUTION INTRAVENOUS at 07:00

## 2022-01-01 RX ADMIN — DEXAMETHASONE 4 MG: 4 TABLET ORAL at 09:12

## 2022-01-01 RX ADMIN — DEXAMETHASONE SODIUM PHOSPHATE 5 MG: 10 INJECTION, SOLUTION INTRAMUSCULAR; INTRAVENOUS at 07:00

## 2022-01-01 RX ADMIN — ACETAMINOPHEN 650 MG: 325 TABLET ORAL at 19:50

## 2022-01-01 RX ADMIN — ACETAMINOPHEN 650 MG: 325 TABLET ORAL at 11:37

## 2022-01-01 RX ADMIN — PROPOFOL 150 MG: 10 INJECTION, EMULSION INTRAVENOUS at 07:37

## 2022-01-01 RX ADMIN — DILTIAZEM HYDROCHLORIDE 20 MG: 5 INJECTION, SOLUTION INTRAVENOUS at 20:18

## 2022-01-01 RX ADMIN — METOPROLOL TARTRATE 50 MG: 50 TABLET, FILM COATED ORAL at 21:09

## 2022-01-01 RX ADMIN — SODIUM CHLORIDE, PRESERVATIVE FREE: 5 INJECTION INTRAVENOUS at 23:56

## 2022-01-01 RX ADMIN — METOPROLOL TARTRATE 100 MG: 50 TABLET, FILM COATED ORAL at 20:28

## 2022-01-01 RX ADMIN — ACETAMINOPHEN 650 MG: 325 TABLET ORAL at 02:30

## 2022-01-01 RX ADMIN — FENTANYL CITRATE 50 MCG: 50 INJECTION, SOLUTION INTRAMUSCULAR; INTRAVENOUS at 08:54

## 2022-01-01 RX ADMIN — METOPROLOL TARTRATE 50 MG: 50 TABLET, FILM COATED ORAL at 09:12

## 2022-01-01 RX ADMIN — OXYCODONE HYDROCHLORIDE 5 MG: 5 TABLET ORAL at 13:16

## 2022-01-01 RX ADMIN — LIDOCAINE HYDROCHLORIDE 100 MG: 20 INJECTION, SOLUTION INFILTRATION; PERINEURAL at 07:37

## 2022-01-01 RX ADMIN — Medication 2 G: at 07:26

## 2022-01-01 RX ADMIN — METOPROLOL TARTRATE 50 MG: 50 TABLET, FILM COATED ORAL at 08:30

## 2022-01-01 RX ADMIN — ACETAMINOPHEN 650 MG: 325 TABLET ORAL at 09:15

## 2022-01-01 RX ADMIN — ACETAMINOPHEN 650 MG: 325 TABLET ORAL at 23:51

## 2022-01-01 RX ADMIN — OXYCODONE HYDROCHLORIDE 5 MG: 5 TABLET ORAL at 17:36

## 2022-01-01 RX ADMIN — DEXAMETHASONE 4 MG: 4 TABLET ORAL at 20:13

## 2022-01-01 RX ADMIN — OXYCODONE HYDROCHLORIDE 5 MG: 5 TABLET ORAL at 06:07

## 2022-01-01 RX ADMIN — OXYCODONE HYDROCHLORIDE 5 MG: 5 TABLET ORAL at 11:37

## 2022-01-01 RX ADMIN — DEXAMETHASONE SODIUM PHOSPHATE 6 MG: 10 INJECTION, SOLUTION INTRAMUSCULAR; INTRAVENOUS at 02:41

## 2022-01-01 RX ADMIN — DEXAMETHASONE SODIUM PHOSPHATE 6 MG: 10 INJECTION, SOLUTION INTRAMUSCULAR; INTRAVENOUS at 07:43

## 2022-01-01 RX ADMIN — TRAMADOL HYDROCHLORIDE 50 MG: 50 TABLET ORAL at 16:48

## 2022-01-01 RX ADMIN — ACETAMINOPHEN 650 MG: 325 TABLET ORAL at 18:24

## 2022-01-01 RX ADMIN — DEXAMETHASONE SODIUM PHOSPHATE 6 MG: 10 INJECTION, SOLUTION INTRAMUSCULAR; INTRAVENOUS at 16:22

## 2022-01-01 RX ADMIN — OXYCODONE HYDROCHLORIDE 5 MG: 5 TABLET ORAL at 18:24

## 2022-01-01 RX ADMIN — DEXAMETHASONE 4 MG: 4 TABLET ORAL at 20:04

## 2022-01-01 RX ADMIN — DEXAMETHASONE SODIUM PHOSPHATE 6 MG: 10 INJECTION, SOLUTION INTRAMUSCULAR; INTRAVENOUS at 02:10

## 2022-01-01 RX ADMIN — DEXAMETHASONE 4 MG: 4 TABLET ORAL at 09:46

## 2022-01-01 RX ADMIN — DEXAMETHASONE SODIUM PHOSPHATE 6 MG: 10 INJECTION, SOLUTION INTRAMUSCULAR; INTRAVENOUS at 19:38

## 2022-01-01 RX ADMIN — ACETAMINOPHEN 650 MG: 325 TABLET ORAL at 13:06

## 2022-01-01 RX ADMIN — METOPROLOL TARTRATE 50 MG: 50 TABLET, FILM COATED ORAL at 07:43

## 2022-01-01 RX ADMIN — Medication 20 MG: at 08:04

## 2022-01-01 RX ADMIN — METOPROLOL TARTRATE 50 MG: 50 TABLET, FILM COATED ORAL at 20:14

## 2022-01-01 RX ADMIN — DEXAMETHASONE 4 MG: 4 TABLET ORAL at 20:51

## 2022-01-01 RX ADMIN — FENTANYL CITRATE 50 MCG: 50 INJECTION INTRAMUSCULAR; INTRAVENOUS at 09:45

## 2022-01-01 RX ADMIN — METOPROLOL TARTRATE 50 MG: 50 TABLET, FILM COATED ORAL at 20:16

## 2022-01-01 RX ADMIN — OXYCODONE HYDROCHLORIDE 5 MG: 5 TABLET ORAL at 08:19

## 2022-01-01 RX ADMIN — OXYCODONE HYDROCHLORIDE 5 MG: 5 TABLET ORAL at 10:47

## 2022-01-01 RX ADMIN — METOPROLOL TARTRATE 50 MG: 50 TABLET, FILM COATED ORAL at 09:46

## 2022-01-01 RX ADMIN — LISINOPRIL 10 MG: 10 TABLET ORAL at 08:35

## 2022-01-01 RX ADMIN — ACETAMINOPHEN 650 MG: 325 TABLET ORAL at 19:32

## 2022-01-01 RX ADMIN — LISINOPRIL 10 MG: 10 TABLET ORAL at 09:12

## 2022-01-01 RX ADMIN — ACETAMINOPHEN 650 MG: 325 TABLET ORAL at 00:26

## 2022-01-01 RX ADMIN — OXYCODONE HYDROCHLORIDE 5 MG: 5 TABLET ORAL at 17:07

## 2022-01-01 RX ADMIN — TRAMADOL HYDROCHLORIDE 50 MG: 50 TABLET ORAL at 07:43

## 2022-01-01 RX ADMIN — OXYCODONE HYDROCHLORIDE 5 MG: 5 TABLET ORAL at 01:47

## 2022-01-01 RX ADMIN — DEXAMETHASONE SODIUM PHOSPHATE 10 MG: 10 INJECTION, SOLUTION INTRAMUSCULAR; INTRAVENOUS at 19:34

## 2022-01-01 RX ADMIN — ACETAMINOPHEN 650 MG: 325 TABLET ORAL at 08:20

## 2022-01-01 RX ADMIN — FENTANYL CITRATE 50 MCG: 50 INJECTION, SOLUTION INTRAMUSCULAR; INTRAVENOUS at 08:04

## 2022-01-01 RX ADMIN — PANTOPRAZOLE SODIUM 40 MG: 40 TABLET, DELAYED RELEASE ORAL at 06:08

## 2022-01-01 RX ADMIN — ACETAMINOPHEN 650 MG: 325 TABLET ORAL at 01:50

## 2022-01-01 RX ADMIN — Medication 10 MG: at 08:02

## 2022-01-01 RX ADMIN — TRAMADOL HYDROCHLORIDE 50 MG: 50 TABLET ORAL at 23:29

## 2022-01-01 RX ADMIN — FENTANYL CITRATE 25 MCG: 50 INJECTION, SOLUTION INTRAMUSCULAR; INTRAVENOUS at 08:00

## 2022-01-01 RX ADMIN — DEXAMETHASONE 4 MG: 4 TABLET ORAL at 08:35

## 2022-01-01 RX ADMIN — PANTOPRAZOLE SODIUM 40 MG: 40 TABLET, DELAYED RELEASE ORAL at 06:17

## 2022-01-01 RX ADMIN — LISINOPRIL 10 MG: 10 TABLET ORAL at 08:30

## 2022-01-01 RX ADMIN — METOPROLOL TARTRATE 50 MG: 50 TABLET, FILM COATED ORAL at 20:51

## 2022-01-01 RX ADMIN — METOPROLOL TARTRATE 50 MG: 50 TABLET, FILM COATED ORAL at 20:04

## 2022-01-01 RX ADMIN — Medication 2 G: at 07:21

## 2022-01-01 RX ADMIN — Medication 20 MG: at 07:37

## 2022-01-01 RX ADMIN — DEXAMETHASONE 4 MG: 4 TABLET ORAL at 19:50

## 2022-01-01 RX ADMIN — METOPROLOL TARTRATE 50 MG: 50 TABLET, FILM COATED ORAL at 08:19

## 2022-01-01 RX ADMIN — INFLUENZA A VIRUS A/VICTORIA/2570/2019 IVR-215 (H1N1) ANTIGEN (FORMALDEHYDE INACTIVATED), INFLUENZA A VIRUS A/DARWIN/9/2021 SAN-010 (H3N2) ANTIGEN (FORMALDEHYDE INACTIVATED), INFLUENZA B VIRUS B/PHUKET/3073/2013 ANTIGEN (FORMALDEHYDE INACTIVATED), AND INFLUENZA B VIRUS B/MICHIGAN/01/2021 ANTIGEN (FORMALDEHYDE INACTIVATED) 0.7 ML: 60; 60; 60; 60 INJECTION, SUSPENSION INTRAMUSCULAR at 10:32

## 2022-01-01 RX ADMIN — FENTANYL CITRATE 50 MCG: 50 INJECTION, SOLUTION INTRAMUSCULAR; INTRAVENOUS at 08:07

## 2022-01-01 RX ADMIN — DEXAMETHASONE 4 MG: 4 TABLET ORAL at 08:19

## 2022-01-01 RX ADMIN — LISINOPRIL 10 MG: 10 TABLET ORAL at 08:19

## 2022-01-01 RX ADMIN — ACETAMINOPHEN 650 MG: 325 TABLET ORAL at 01:16

## 2022-01-01 RX ADMIN — ACETAMINOPHEN 650 MG: 325 TABLET ORAL at 04:21

## 2022-01-01 RX ADMIN — FENTANYL CITRATE 25 MCG: 50 INJECTION, SOLUTION INTRAMUSCULAR; INTRAVENOUS at 07:44

## 2022-01-01 RX ADMIN — BUPIVACAINE HYDROCHLORIDE 10 ML: 2.5 INJECTION, SOLUTION EPIDURAL; INFILTRATION; INTRACAUDAL at 07:00

## 2022-01-01 RX ADMIN — PANTOPRAZOLE SODIUM 40 MG: 40 TABLET, DELAYED RELEASE ORAL at 08:18

## 2022-01-01 RX ADMIN — OXYCODONE HYDROCHLORIDE 5 MG: 5 TABLET ORAL at 22:43

## 2022-01-01 RX ADMIN — OXYCODONE HYDROCHLORIDE 5 MG: 5 TABLET ORAL at 00:20

## 2022-01-01 ASSESSMENT — ENCOUNTER SYMPTOMS
ENDOCRINE NEGATIVE: 1
DYSRHYTHMIAS: 1
RESPIRATORY NEGATIVE: 1
CARDIOVASCULAR NEGATIVE: 1
EYES NEGATIVE: 1
GASTROINTESTINAL NEGATIVE: 1
FATIGUE: 1
NEUROLOGICAL NEGATIVE: 1

## 2022-01-01 ASSESSMENT — ACTIVITIES OF DAILY LIVING (ADL)
ADLS_ACUITY_SCORE: 39
ADLS_ACUITY_SCORE: 35
ADLS_ACUITY_SCORE: 37
ADLS_ACUITY_SCORE: 38
ADLS_ACUITY_SCORE: 39
ADLS_ACUITY_SCORE: 37
ADLS_ACUITY_SCORE: 39
ADLS_ACUITY_SCORE: 37
ADLS_ACUITY_SCORE: 35
ADLS_ACUITY_SCORE: 39
ADLS_ACUITY_SCORE: 39
ADLS_ACUITY_SCORE: 37
ADLS_ACUITY_SCORE: 37
ADLS_ACUITY_SCORE: 39
ADLS_ACUITY_SCORE: 37
ADLS_ACUITY_SCORE: 39
ADLS_ACUITY_SCORE: 39
ADLS_ACUITY_SCORE: 37
ADLS_ACUITY_SCORE: 39
ADLS_ACUITY_SCORE: 39
ADLS_ACUITY_SCORE: 37
ADLS_ACUITY_SCORE: 39
ADLS_ACUITY_SCORE: 39
ADLS_ACUITY_SCORE: 37
ADLS_ACUITY_SCORE: 43
ADLS_ACUITY_SCORE: 39
ADLS_ACUITY_SCORE: 41
ADLS_ACUITY_SCORE: 37
ADLS_ACUITY_SCORE: 43
ADLS_ACUITY_SCORE: 35
ADLS_ACUITY_SCORE: 39
ADLS_ACUITY_SCORE: 35
ADLS_ACUITY_SCORE: 38
ADLS_ACUITY_SCORE: 39
ADLS_ACUITY_SCORE: 37
ADLS_ACUITY_SCORE: 41
ADLS_ACUITY_SCORE: 37
ADLS_ACUITY_SCORE: 39
ADLS_ACUITY_SCORE: 35
ADLS_ACUITY_SCORE: 39
ADLS_ACUITY_SCORE: 39
ADLS_ACUITY_SCORE: 37
ADLS_ACUITY_SCORE: 37
ADLS_ACUITY_SCORE: 39
ADLS_ACUITY_SCORE: 35
ADLS_ACUITY_SCORE: 37
ADLS_ACUITY_SCORE: 43
ADLS_ACUITY_SCORE: 41
ADLS_ACUITY_SCORE: 35
ADLS_ACUITY_SCORE: 37
ADLS_ACUITY_SCORE: 39
ADLS_ACUITY_SCORE: 37
ADLS_ACUITY_SCORE: 39
ADLS_ACUITY_SCORE: 39
ADLS_ACUITY_SCORE: 40
ADLS_ACUITY_SCORE: 39
ADLS_ACUITY_SCORE: 37
ADLS_ACUITY_SCORE: 40
ADLS_ACUITY_SCORE: 37
ADLS_ACUITY_SCORE: 39
ADLS_ACUITY_SCORE: 43
ADLS_ACUITY_SCORE: 37
ADLS_ACUITY_SCORE: 39
ADLS_ACUITY_SCORE: 39
ADLS_ACUITY_SCORE: 37
ADLS_ACUITY_SCORE: 35
ADLS_ACUITY_SCORE: 39
ADLS_ACUITY_SCORE: 39
ADLS_ACUITY_SCORE: 37
ADLS_ACUITY_SCORE: 43
ADLS_ACUITY_SCORE: 39
ADLS_ACUITY_SCORE: 43
ADLS_ACUITY_SCORE: 41
ADLS_ACUITY_SCORE: 43
ADLS_ACUITY_SCORE: 41
ADLS_ACUITY_SCORE: 43
ADLS_ACUITY_SCORE: 39
ADLS_ACUITY_SCORE: 39
ADLS_ACUITY_SCORE: 37
ADLS_ACUITY_SCORE: 37
ADLS_ACUITY_SCORE: 39

## 2022-01-01 ASSESSMENT — LIFESTYLE VARIABLES: TOBACCO_USE: 1

## 2022-01-26 NOTE — TELEPHONE ENCOUNTER
"Requested Prescriptions   Pending Prescriptions Disp Refills     furosemide (LASIX) 20 MG tablet [Pharmacy Med Name: FUROSEMIDE 20MG TABLETS] 540 tablet 0     Sig: TAKE 3 TABLETS(60 MG) BY MOUTH TWICE DAILY       Diuretics (Including Combos) Protocol Failed - 1/26/2022 12:16 PM        Failed - Recent (12 mo) or future (30 days) visit within the authorizing provider's specialty     Patient has had an office visit with the authorizing provider or a provider within the authorizing providers department within the previous 12 mos or has a future within next 30 days. See \"Patient Info\" tab in inbasket, or \"Choose Columns\" in Meds & Orders section of the refill encounter.              Passed - Blood pressure under 140/90 in past 12 months     BP Readings from Last 3 Encounters:   11/04/21 120/68   10/29/21 (!) 146/69   10/24/21 147/100                 Passed - Medication is active on med list        Passed - Patient is age 18 or older        Passed - Normal serum creatinine on file in past 12 months     Recent Labs   Lab Test 10/29/21  0559   CR 0.76              Passed - Normal serum potassium on file in past 12 months     Recent Labs   Lab Test 10/29/21  0559   POTASSIUM 3.6                    Passed - Normal serum sodium on file in past 12 months     Recent Labs   Lab Test 10/29/21  0559                    spironolactone (ALDACTONE) 50 MG tablet [Pharmacy Med Name: SPIRONOLACTONE 50MG TABLETS] 90 tablet 0     Sig: TAKE 1 TABLET(50 MG) BY MOUTH DAILY       Diuretics (Including Combos) Protocol Failed - 1/26/2022 12:16 PM        Failed - Recent (12 mo) or future (30 days) visit within the authorizing provider's specialty     Patient has had an office visit with the authorizing provider or a provider within the authorizing providers department within the previous 12 mos or has a future within next 30 days. See \"Patient Info\" tab in inbasket, or \"Choose Columns\" in Meds & Orders section of the refill encounter.       "        Passed - Blood pressure under 140/90 in past 12 months     BP Readings from Last 3 Encounters:   11/04/21 120/68   10/29/21 (!) 146/69   10/24/21 147/100                 Passed - Medication is active on med list        Passed - Patient is age 18 or older        Passed - Normal serum creatinine on file in past 12 months     Recent Labs   Lab Test 10/29/21  0559   CR 0.76              Passed - Normal serum potassium on file in past 12 months     Recent Labs   Lab Test 10/29/21  0559   POTASSIUM 3.6                    Passed - Normal serum sodium on file in past 12 months     Recent Labs   Lab Test 10/29/21  0559                    No longer under provider care, Dr. Nance. Notation made to requesting pharmacy. Unable to complete prescription refill per RN Medication Refill Policy.................... Alondra Sifuentes RN ....................  1/26/2022   2:52 PM

## 2022-01-31 NOTE — TELEPHONE ENCOUNTER
Patients brother calling and for Furosemide Asking for patients water pill.States he has asked for this four times.He states the pharmacy told him they do not have approval for the water pills. Updated can take request.Do not see consent to discuss further.    Please see other refill request for same meds today.Different doses.    Lasix  Last Written Prescription Date:  10.29.2021 with end date of 1.27.2022  Last Fill Quantity: 540,   # refills: 0  Last Office Visit: 11.4.2021  Future Office visit:       Routing refill request to provider for review/approval because:    Please advise.    Thalia Pina RN

## 2022-02-01 NOTE — TELEPHONE ENCOUNTER
Pt brother calling and wants to change patients pharmacy to [facility].     He states they are not going to use [facility] in [place].     He is asking for Lasix and Aldactone.    Lasix was filled yesterday and sent to [facility].    Updated brother will take request but cannot discuss information. Need consent to communicate and pt needs to sign this.Can get at .He verbalized understanding.    Called patient and he is agreement for above.Updated him to have consent to communicate, if brother will be helping etc. He verbalized understanding,    Aldacatone    Last Written Prescription Date:  10.29.2021 with end date of 1.27.2022  Last Fill Quantity: 90,   # refills: 0  Last Office Visit: 11.4.2021  Future Office visit:       Routing refill request to provider for review/approval because:    Pt states he needs this by tomorrow because he will be out.Pended.      Thalia Pina RN

## 2022-04-27 NOTE — TELEPHONE ENCOUNTER
Patient is requesting refill to different pharmacy due to [facility] being closed. Unable to approve refill due to being signed by outside provider. Please advise, thank you.     pantoprazole (PROTONIX) 40 MG EC tablet      Last Written Prescription Date:  10/30/12-10/25/22  Last Fill Quantity: 90,   # refills: 3  Last Office Visit: 11/04/21  Future Office visit:       Routing refill request to provider for review/approval because:  Medication last signed by provider at a different facility    spironolactone (ALDACTONE) 50 MG tablet      Last Written Prescription Date:  02/01/22  Last Fill Quantity: 90,   # refills: 0    metoprolol tartrate (LOPRESSOR) 50 MG tablet      Last Written Prescription Date:  01/26/22  Last Fill Quantity: 180,   # refills: 3    lisinopril (ZESTRIL) 10 MG tablet      Last Written Prescription Date:  01/26/22  Last Fill Quantity: 90,   # refills: 3    pantoprazole (PROTONIX) 40 MG EC tablet      Last Written Prescription Date:  10/30/12-10/25/22  Last Fill Quantity: 90,   # refills: 3    furosemide (LASIX) 20 MG tablet      Last Written Prescription Date:  02/01/22  Last Fill Quantity: 240,   # refills: 0

## 2022-05-18 NOTE — TELEPHONE ENCOUNTER
Ramírez Hernández, was discharged to home on 12/8/17   from [facility]. I spoke today with him regarding his discharge.   He indicates he has receive(d) sufficient information upon discharge. Medications were reviewed in full on discharge, including: Medications to be started and any side effects. Prescriptions were received at discharge and were able to be filled. Medications are being taken as prescribed.   He indicates they have an appointment scheduled for Dec 11, Dec 14, Dec 18  and has  transportation available. He was  able to confirm his  appointment times and has  it written down.   He did not have any questions regarding discharge instructions or condition.  Per their request, the following employee (s) can be recognized for their outstanding services delivered:  Care was fantastic.  Suggestions to improve service: nothing indicated   He was informed he  may receive a survey in the mail from the hospital. Asked if he  would kindly complete the survey in order for [facility] to know if services fully met patient needs.     Patient
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
## 2022-11-02 PROBLEM — S82.892A ANKLE FRACTURE, LEFT, CLOSED, INITIAL ENCOUNTER: Status: ACTIVE | Noted: 2022-01-01

## 2022-11-02 PROBLEM — N17.9 ACUTE KIDNEY INJURY (H): Status: ACTIVE | Noted: 2022-01-01

## 2022-11-02 PROBLEM — I48.91 ATRIAL FIBRILLATION WITH RAPID VENTRICULAR RESPONSE (H): Status: ACTIVE | Noted: 2022-01-01

## 2022-11-02 NOTE — ED NOTES
Patient reports that his left leg gave out and he fell, twisting ankle. Patient uncertain if he hit his head. No loss of consciousness, head/neck/back pain. Patient reports that he was on the ground for 1/2 hours.

## 2022-11-02 NOTE — ED TRIAGE NOTES
Patient fell at home. Left leg gave out and he fell around 1030. Left ankle pain. Patient in afib, SOB

## 2022-11-02 NOTE — ED PROVIDER NOTES
"  History   No chief complaint on file.    HPI  Ramírez Hernández is a 67 year old male who is transported to the emergency department by EMS after having fallen today.  Patient states that his left leg occasionally \"gives out\" and that happened earlier today.  Patient fell to the floor.  He believes he may have \"bumped\" his head but he did not sustain loss of consciousness.  He had no symptoms of syncope either.  He does have quite a bit of pain in his left ankle.  Patient states he was on the floor for about \"an hour and a half\".  His brother lives nearby and came to check on the patient and summoned EMS to the scene.  Patient denies headache.  He denies any pain in his neck.  He is not having chest pain and does not admit to palpitations.  He denies any abdominal discomfort.  He said no nausea or vomiting.    Allergies:  No Known Allergies    Problem List:    Patient Active Problem List    Diagnosis Date Noted     Brain metastasis (H) 11/02/2022     Priority: Medium     Atrial fibrillation with rapid ventricular response (H) 11/02/2022     Priority: Medium     Fall, initial encounter 11/02/2022     Priority: Medium     Ankle fracture, left, closed, initial encounter 11/02/2022     Priority: Medium     Acute kidney injury (H) 11/02/2022     Priority: Medium     Severe protein-calorie malnutrition (H) 10/26/2021     Priority: Medium     Cirrhosis of liver (H) 10/25/2021     Priority: Medium     Lung mass 10/25/2021     Priority: Medium     Long-term (current) use of anticoagulants [Z79.01] 12/11/2017     Priority: Medium     Persistent atrial fibrillation (H) 12/08/2017     Priority: Medium     Acute systolic congestive heart failure (H) 12/08/2017     Priority: Medium     Alcohol abuse 12/08/2017     Priority: Medium     Tobacco abuse 12/08/2017     Priority: Medium        Past Medical History:    History reviewed. No pertinent past medical history.    Past Surgical History:    Past Surgical History:   Procedure " "Laterality Date     BACK SURGERY       COLONOSCOPY N/A 9/27/2019    Procedure: SCREENING COLONOSCOPY WITH POLYPECTOMY;  Surgeon: Alfredo La MD;  Location: [facility] OR     OPEN REDUCTION INTERNAL FIXATION ANKLE Left 11/9/2022    Procedure: Open Reduction Internal Fixation Left Trimal Ankle;  Surgeon: Silvio Ulrich DPM;  Location: [facility] OR       Family History:    Family History   Problem Relation Age of Onset     No Known Problems Other        Social History:  Marital Status:   [5]  Social History     Tobacco Use     Smoking status: Every Day     Packs/day: 1.00     Years: 40.00     Pack years: 40.00     Types: Cigarettes     Start date: 1/1/1975     Smokeless tobacco: Never   Substance Use Topics     Alcohol use: Yes     Comment: none for 5 weeks     Drug use: No        Medications:    furosemide (LASIX) 20 MG tablet  lisinopril (ZESTRIL) 10 MG tablet  metoprolol tartrate (LOPRESSOR) 50 MG tablet  pantoprazole (PROTONIX) 40 MG EC tablet  spironolactone (ALDACTONE) 50 MG tablet  oxyCODONE (ROXICODONE) 5 MG tablet          Review of Systems   Constitutional: Positive for fatigue.   HENT: Negative.    Eyes: Negative.    Respiratory: Negative.    Cardiovascular: Negative.    Gastrointestinal: Negative.    Endocrine: Negative.    Genitourinary: Negative.    Musculoskeletal:        Please see history of chief complaint   Skin: Negative.    Neurological: Negative.    all other systems reviewed and found unremarkable    Physical Exam   BP: (!) 175/104  Pulse: (!) 126  Temp: 98.7  F (37.1  C)  Resp: (!) 34  Height: 172.7 cm (5' 8\")  Weight: 95.8 kg (211 lb 3.2 oz)  SpO2: 99 %      Physical Exam 67-year-old gentleman who is awake alert oriented person place and time.  Very pleasant and cooperative with my exam.  HEENT normocephalic atraumatic extraocular muscles intact and pupils equally round and reactive to light.  Tongue midline palate intact oropharynx clear.  No hemotympanums no quinteros sign.  Neck is supple is full " range of motion of pain or palpable tenderness.  Lungs are slightly diminished bilaterally.  No wheezing.  Heart rate is tachycardic and the rhythm is irregularly irregular.  S1 and S2 sounds are appreciated.  Abdomen is soft nontender no mass organomegaly rebound.  Extremity exam patient has mild soft tissue swelling of his left ankle and tenderness to palpation.  Patient has brisk peripheral pulses and brisk capillary refill in his extremities.  Neurologic exam no focal cranial nerve deficit.  Dermatologic exam no diffuse skin rashes or lesions are noted.    ED Course              ED Course as of 11/18/22 0708 Wed Nov 02, 2022   1859 Patient has a bimalleolar fracture of his left ankle.  I applied a posterior splint to the left lower extremity.  I was assisted by Nessa our excellent ER tech.  Patient tolerated splinting very well.   2057 Spoke to Dr Owens from OA  He recommended well padded posterior leg splint that already was placed by DR underwood  Follow-up with ortho in 1 wk for possible surgery if admitted to hospital can be followed at AM by ortho on called  Spoke to Dr Bang , accepted for admission          Results for orders placed during the hospital encounter of 11/02/22    POC US GUIDANCE NEEDLE PLACEMENT    Narrative  Left saphaneous and popliteal block       {EKG done shows atrial fibrillation with a ventricular response rate of 121 bpm.  There is an occasional PVC.  There is no ST segment elevation or depression.  No inappropriate T wave inversion    Discussed the case with Dr. Belle who will assume care         No results found for this or any previous visit (from the past 24 hour(s)).    Medications   sodium chloride 0.9% infusion ( Intravenous New Bag 11/2/22 7136)   dexamethasone PF (DECADRON) injection 10 mg (10 mg Intravenous Given 11/2/22 1934)   diltiazem (CARDIZEM) injection 20 mg (20 mg Intravenous Given 11/2/22 2018)   metoprolol tartrate (LOPRESSOR) tablet 100 mg (100 mg Oral Given  11/2/22 2028)   influenza vac high-dose quad (FLUZONE HD) injection LC 0.7 mL (0.7 mLs Intramuscular Given 11/5/22 1032)   ceFAZolin Sodium (ANCEF) injection 2 g (2 g Intravenous Given 11/9/22 0721)   labetalol (NORMODYNE/TRANDATE) syringe 10 mg (10 mg Intravenous Given 11/9/22 1010)       Assessments & Plan (with Medical Decision Making)     I have reviewed the nursing notes.    I have reviewed the findings, diagnosis, plan and need for follow up with the patient.      Discharge Medication List as of 11/10/2022 10:15 AM          Final diagnoses:   Ankle fracture, left, closed, initial encounter   Fall, initial encounter   Atrial fibrillation with rapid ventricular response (H)   Brain metastasis (H)       11/2/2022   [facility]     Kenneth Beasley, DO  11/02/22 1904       Kenneth Beasley, DO  11/04/22 0745       Kenneth Beasley, DO  11/18/22 0708

## 2022-11-03 NOTE — PLAN OF CARE
"Goal Outcome Evaluation: Progressing     Reason for hospital stay:  Left closed ankle fracture  [place] situation [place]   Most recent vitals: /58 (BP Location: Right arm, Patient Position: Supine, Cuff Size: Adult Regular)   Pulse 98   Temp 98.4  F (36.9  C) (Tympanic)   Resp 18   Ht 1.727 m (5' 8\")   Wt 95.8 kg (211 lb 3.2 oz)   SpO2 96%   BMI 32.11 kg/m      Pain Management:  Pt reports 7/10 pain. PO Tramadol x 1. PO Tylenol x 1. Pain is in his left ankle and is generalized. Pain radiates to his left knee. Pt was not pleased that he could only get his tramadol q 6 hr. I explained to the pt that his pain will not be 100% gone. The pain medication was to take the edge off of the pain and be tolerable. He still was not happy with that explanation. The pt was not in severe distress or ravaging pain. He was inconvenienced and slightly uncomfortable. He was re-positioned and pillows were utilized.   LOC:  Alert and orientated x  4.   Cardiac:  Apical pulse irregular. Afib.   Respiratory:  All fields clear, equal bilateral. Infrequent, dry, non-productive cough.   GI:  All quadrants audible and normoactive.   :  Voids spontaneously without difficulty.   Skin Issues:  Skin is warm, dry, pale, and alonso. BLE: Alonso. Skin barrier applied for dry skin. Put ABD pad behind knee to cushion the sharp edge of the splint.     IV access: Left upper forearm. IV SL.   ABX:  None at this time.     Nutrition: Adequate.  Regular diet. Pt had a great appetite for supper.   Ambulation: Strict bedrest.   Safety:  bed in the low position,call light within reach, ID band in place, personal items within reach, use of call light appropriately, and makes needs known.     Comments: VSS. Afebrile. Pt did have a good scrub this afternoon. He will continue to get scrubbed up daily as he was pretty dirty when he arrived at our facility. Face to face report given with opportunity to observe " patient.    Report given to Rah LAGUNAS RN.     Qing Soria RN   11/3/2022  11:50 PM

## 2022-11-03 NOTE — PROGRESS NOTES
Assessment completed with Ramírez.    LOC: alert     Dx: L ankle fx  Chronic Disease Management:  cirrhosis of liver, lung mass, brain mass, tobacco abuse    Lives with: alone  Living at:  [place]  Transportation: YES, brother Scott transports    Primary PCP: Aron Delvalle  Insurance:  Medicare  Medicare INPT letter reviewed with Ramírez.    Support System:  family  Homecare/PCA: no  /County Services:   no  [place]: NO      How was the [facility] notification completed: n/a    Health Care Directive: no  Guardian: no  POA: no    Pharmacy: [facility]  Meds management: manages own    Adequate Resources for needs (housing, utilities, food/med): YES  Household chores: does own, brother shops for him  Work/community/social activity: YES, gets out as desired    ADLs: independent  Ambulation:independent, does have a cane and a walker available.  Falls: yesterday fx ankle  Nutrition: no concern  Sleep: sleeps well    Equipment used: grab bars      Oxygen supplier: no      Does the supplier have valid oxygen orders: n/a    Mental health: denies  Substance abuse: tobacco  Exposure to violence/abuse: denies  Stressors: denies    Able to Return to Prior Living Arrangements: no    Choice of Vendor: [employer] or [employer]    Barriers: none identified    SILVANO: low    Plan: SNF short term until surgery.    Olivia Baron CM

## 2022-11-03 NOTE — CARE PLAN
Prior to Admission Medication Reconciliation:     Medications added:   [x] None  [] As listed below:    Medications deleted:   [x] None  [] As listed below:    Medications marked for review/removal by attending:  [x] None  [] As listed below:    Changes made to existing medications:   [x] None  [] Updated strengths and frequencies to most current.   [] As listed below:    Pertinent notes/medications patient takes different than prescribed:     Takes his meds in the morning and the second dose (of BID meds) at 6 pm.    Pt does not know his medication names. He reports that he gets them from [facility], are prescribed by Aron Delvalle, takes exactly as prescribed and is 100% compliant. Unable to confirm names of medications with pt.     Last times/dates taken verified with patient:  [x] Yes- completed myself: last took 2 days ago  [] Prepared PTA medlist for review only. (will not be available to review personally)  [] Did not review with patient. Rx verification only. Review completed by nursing.    [] Nurse completed no changes made (double checked entries)  [] Unable to review with patient at this time:    Allergies listed at another location:  [x]Allergies match allergies listed in Epic  []Other allergies listed:    Allergy review:    []Did not review: reviewed by nursing  []Did not review: pt unable at this time  [x]Verified current existing allergies or NKDA: no changes made   []Patient confirmed current existing allergies and new allergies added:    Medication reconciliation sources:   [x]Patient  []Patient family member/emergency contact: **  [x][facility] Report Review  [x]Epic Chart Review  [x]Care Everywhere review  []Pharmacy med list/phone call: **  [x]Outside meds dispense report: see below  []Nursing home or Assisted Living MAR:  []Other: **    Pharmacy desired at discharge: [facility]    Is patient on coumadin?   [x]No  []Yes    Requests for consultation by provider or pharmacist:   [x] Patient understands  why all of their meds were prescribed and how to take them. No questions.   [] Managing party has no questions.   [] Patient/ managing party has questions about the following:  [] Did not review with patient. Cannot assess.     Fill dates and reported compliancy:  [x] Fill dates coincide with reported compliancy for all/most maintenance meds.   [] Not applicable. Patient is not taking any maintenance medications at this time.   [] Fill dates do not coincide with compliancy with maintenance meds. See notes in PTA medlist and in comments.    [] Fill dates do not coincide with the following medications but pt reports compliancy:  [] Did not review with patient. Cannot assess.     Historian accuracy:  [] Excellent- alert and oriented, understands why meds were prescribed and how to take, able to answer specifics  [] Good- alert and oriented, understands why meds were prescribed and how to take, some confusion   [x] Fair- alert and oriented, doesn't know medications without list, cannot answer specifics about medications, but has a decent process for which to take at home  [] Poor- does not know medications, may not have a process to take at home, may be cognitively unable to review at this time  []Medication management done by family member or facility, no concerns about historian accuracy.   [] Did not review with patient. Cannot assess.     Medication Management:  [x] Manages meds independently  [] Family member/ other party manages meds/assists:  [] Meds managed by staff at facility  [] Meds set up by home care, family/other party helps administer  [] Meds set up by home care, self administers  [] Did not review with patient. Cannot assess.     Other medications aside from PTA:  [x] Denies taking any medications aside from those listed in PTA meds  [] Reports taking another medication(s) but cannot recall the name(s)  [] Refuses to say.  [] Did not review with patient. Cannot assess.     Comments: none    Shameka  Bridgette on 11/3/2022 at 9:31 AM       Notifying appropriate party of changes/additions/discrepancies:  [x]No pertinent changes made, notification not necessary.   [] Notified attending provider via text page/phone call  [] Notified attending provider in person  [] Notified pharmacy  [] Notified nurse  [] Medications have not been reconciled by a provider yet, notification not necessary  [] Pt is not admitted to floor yet, PTA meds completed before admission.        Medication Dispense History (from 11/3/2021 to 11/2/2022)  Expand All  Collapse All  Furosemide   Dispensed Days Supply Quantity Provider Pharmacy   furosemide 20 mg tablet 11/02/2022 90 540 tablet [facility]   furosemide 20 mg tablet 07/26/2022 90 540 tablet [facility]   furosemide 20 mg tablet 04/27/2022 90 540 tablet Aaron Gunn MD [facility]...   furosemide 20 mg tablet 02/02/2022 90 540 tablet Aaron Gunn MD [facility]...   furosemide 20 mg tablet 01/31/2022 90 [facility]      Lisinopril   Dispensed Days Supply Quantity Provider Pharmacy   lisinopril 10 mg tablet 11/01/2022 90 90 tablet [facility]   lisinopril 10 mg tablet 07/26/2022 90 90 tablet [facility]   lisinopril 10 mg tablet 04/27/2022 90 90 tablet [facility]   LISINOPRIL 10MG TABLETS 01/31/2022 90 90 Units [facility]   LISINOPRIL 10MG TABLETS 12/10/2021 90 90 Units [facility]      Metoprolol Tartrate   Dispensed Days Supply Quantity Provider Pharmacy   metoprolol tartrate 50 mg tablet 11/01/2022 90 180 tablet [facility]   metoprolol tartrate 50 mg tablet 07/26/2022 90 180 tablet Aaron Gunn MD [facility]  Pharm...   metoprolol tartrate 50 mg tablet 04/27/2022 90 180 tablet [facility]   METOPROLOL TARTRATE 50MG TABLETS 01/31/2022 90 180 Units Aron Delvalle MD [facility] #...   METOPROLOL TARTRATE 50MG TABLETS 12/10/2021 90 180 Units Aron Delvalle MD [facility] #...      Pantoprazole Sodium   Dispensed Days Supply Quantity Provider Pharmacy   pantoprazole 40 mg tablet,delayed release 11/01/2022 90 90 tablet [facility]   pantoprazole 40 mg tablet,delayed release 07/26/2022 90 90 tablet [facility]   pantoprazole 40 mg tablet,delayed release 04/28/2022 90 90 tablet [facility]   pantoprazole 40 mg tablet,delayed release 04/24/2022 90 90 tablet JANET TRAN [facility] #...   PANTOPRAZOLE 40MG TABLETS 01/27/2022 90 90 Units Janet Tran MD [facility] #...      Spironolactone   Dispensed Days Supply Quantity Provider Pharmacy   spironolactone 50 mg tablet 11/02/2022 90 90 tablet [facility]   spironolactone 50 mg tablet 07/26/2022 90 90 tablet A[facility]   spironolactone 50 mg tablet 04/27/2022 90 90 tablet [facility]   spironolactone 50 mg tablet 02/01/2022 90 90 tablet Aron [facility]        Disclaimer    Certain dispenses may not be available or accurate in this report, including over-the-counter medications, low cost prescriptions, prescriptions paid for by the patient or non-participating sources, or errors in insurance claims information. The provider should independently verify medication history with the patient.  External Sources

## 2022-11-03 NOTE — PROGRESS NOTES
"   11/03/22 1000   Appointment Info   Signing Clinician's Name / Credentials (PT) Nette Celestin, SPT   Student Supervision Direct supervision provided   Living Environment   People in Home alone   Current Living Arrangements house   Home Accessibility stairs to enter home   Number of Stairs, Main Entrance 2   Transportation Anticipated family or friend will provide   Living Environment Comments Pt lives alone in a home with 2 stairs to enter and stairs to the basement.   Self-Care   Usual Activity Tolerance fair   Current Activity Tolerance poor   Regular Exercise No   Equipment Currently Used at Home walker, rolling   Fall history within last six months yes   Number of times patient has fallen within last six months 2   Activity/Exercise/Self-Care Comment Pt reports he does not use his walker regularly but \"probably should\". Last time he used it was \"about 5-6 months ago\".   General Information   Onset of Illness/Injury or Date of Surgery 11/02/22   Referring Physician Charis Bang MD   Patient/Family Therapy Goals Statement (PT) Did not report.   Pertinent History of Current Problem (include personal factors and/or comorbidities that impact the POC) Pt fell at home and has L ankle fx (NWB); complications include A-fib and Brain Mets   Existing Precautions/Restrictions fall;weight bearing   Weight-Bearing Status - LUE full weight-bearing   Weight-Bearing Status - RUE full weight-bearing   Weight-Bearing Status - LLE nonweight-bearing   Weight-Bearing Status - RLE full weight-bearing   Heart Disease Risk Factors Medical history;Lack of physical activity   General Observations Strict bed-rest   Cognition   Affect/Mental Status (Cognition) WFL   Orientation Status (Cognition) oriented x 4   Follows Commands (Cognition) WNL   Cognitive Status Comments May or may not be influenced by brain mets. Pt laughed frequently throughout conversation - undetermined if it is cognitive or personality in nature.   Pain " "Assessment   Patient Currently in Pain No   Integumentary/Edema   Integumentary/Edema Comments Pt's LE have significant odor and darkened skin bilaterally. This may have influenced his fall at home in addition to LE weakness due to poor circulation and subsequent sensation.   Strength (Manual Muscle Testing)   Strength Comments Pt was able to demonstrate SLR bilaterally indicating good hip strength. Pt was also able to demonstrate quad sets with fair contraction bilaterally.   Bed Mobility   Comment, (Bed Mobility) Unable to assess due to strict bed rest.   Transfers   Comment, (Transfers) Unable to assess due to strict bed rest. Would need briana lift   Gait/Stairs (Locomotion)   Comment, (Gait/Stairs) Unable to assess due to strict bed rest and NWB status   Clinical Impression   Criteria for Skilled Therapeutic Intervention Evaluation only   PT Diagnosis (PT) Mobility deficits   Influenced by the following impairments Ankle fx, NWB status, cancer and a-fib   Functional limitations due to impairments ADLs, mobility   Clinical Presentation (PT Evaluation Complexity) Evolving/Changing   Clinical Presentation Rationale Clinical judgment.   Clinical Decision Making (Complexity) moderate complexity   Planned Therapy Interventions (PT) home program guidelines   Risk & Benefits of therapy have been explained patient   Clinical Impression Comments Pt is a 66 y/o male referred to inpatient physical therapy following a fall at home and subsequent ankle fx. Complicating factors include hx of a-fib and cancer with brain mets. He lives alone in a single-level home with 2 steps to enter and has a brother in town who often helps him. Pt is lying supine in bed upon arrival to room and agreeable to answering PT's eval questions. Pt reports he fell after coming out of his bathroom and his legs \"giving out'. He fractured his L ankle in the fall and is on strict bed rest and is NWB. He reports he had another fall earlier this year when " "he was on different medications that made him dizzy. He has not used his walker regularly for about 5-6 months. He was able to perform ankle pumps on the R foot and SLRs and quad sets bilaterally. Recommending LTC placmement until pt is able to get ankle surgery   PT Total Evaluation Time   PT Eval, Low Complexity Minutes (72626) 15   Physical Therapy Goals   PT Frequency One time eval and treatment only   PT Predicted Duration/Target Date for Goal Attainment 11/03/22   Interventions   Interventions Quick Adds Therapeutic Procedure   Therapeutic Procedure/Exercise   Ther. Procedure: strength, endurance, ROM, flexibillity Minutes (43863) 8   Symptoms Noted During/After Treatment none   Treatment Detail/Skilled Intervention SLRs, ankle pumps, quad sets 1 x 10 each; instruction on doing them upon d/c while healing NWB side   PT Discharge Planning   PT Plan PT plans to meet with him one more time to provide LE exercises to continue doing while on bedrest/NWB status. Recommend d/c to LTC.   PT Discharge Recommendation (DC Rec) Long term care facility   PT Rationale for DC Rec Clinical judgment.   PT Brief overview of current status Pt is a 68 y/o male referred to inpatient physical therapy following a fall at home and subsequent ankle fx. Complicating factors include hx of a-fib and cancer with brain mets. He lives alone in a single-level home with 2 steps to enter and has a brother in town who often helps him. Pt is lying supine in bed upon arrival to room and agreeable to answering PT's eval questions. Pt reports he fell after coming out of his bathroom and his legs \"giving out'. He fractured his L ankle in the fall and is on strict bed rest and is NWB. He reports he had another fall earlier this year when he was on different medications that made him dizzy. He has not used his walker regularly for about 5-6 months. He was able to perform ankle pumps on the R foot and SLRs and quad sets bilaterally. Recommending LTC " placmement until pt is able to get ankle surgery

## 2022-11-03 NOTE — PLAN OF CARE
Goal Outcome Evaluation:         [facility] Inpatient Admission Note:    Patient admitted to 3114/3114-1 at approximately 2220 via unknown  accompanied by other:staff from emergency room . Report received from Vero charge nurse  in SBAR format at shift change via face to face Patient unkonwn to bed via unknown . Patient is alert and oriented X 3, reports pain; rates at 5 on 0-10 scale.  Patient oriented to room, unit, hourly rounding, and plan of care. Explained admission packet and patient handbook with patient bill of rights brochure. Will continue to monitor and document as needed.     Inpatient Nursing criteria listed below was met:    Health care directives status obtained and documented: ACP not scanned     Patient identifies a surrogate decision maker: No If yes,      If initial lactic acid greater than 2.0, repeat lactic acid drawn within one hour of arrival to unit: NA. If no, state reason:     Clergy visit ordered if patient requests: No    Skin issues/needs documented: Yes    Isolation Patient: no Education given, correct sign in place and documentation row added to PCS:  No    Fall Prevention Yes: Care plan updated, education given and documented, sticker and magnet in place: Yes    Care Plan initiated: Yes    Education Documented (including assessment): Yes    Patient has discharge needs : Yes If yes, please explain: possible nursing home

## 2022-11-03 NOTE — PLAN OF CARE
"Goal Outcome Evaluation:       Reason for hospital stay:  ankle fracture   Living situation PTA: home alone   Most recent vitals: /60 (BP Location: Right arm, Patient Position: Supine, Cuff Size: Adult Regular)   Pulse 89   Temp 98.9  F (37.2  C) (Tympanic)   Resp 18   Ht 1.727 m (5' 8\")   Wt 95.8 kg (211 lb 3.2 oz)   SpO2 95%   BMI 32.11 kg/m      Pain Management:  tylenol 650mg, tramadol 50mg   LOC:  A&O   Cardiac:  afib per ICU tele, apical irregular   Respiratory:  room air, lungs clear  GI:  [facility]  : frequency   Skin Issues:  extremely dry skin that is black/brown, cracked and peeling to skin on calves and feet, 1+ edema to legs     IVF:  NS 75ml/hr  ABX:  none    Nutrition: regular   ADL's:  assist 1  Ambulation:bed rest   Safety:  call light in place, bed in low position with wheels locked, bed alarm on     Comments:      Face to face report given with opportunity to observe patient.    Report given to Carol Ellis RN   11/3/2022  7:18 AM        "

## 2022-11-03 NOTE — H&P
"[facility]    History and Physical - Hospitalist Service       Date of Admission:  11/2/2022    Assessment & Plan      Ramírez Hernández is a 67 year old male admitted on 11/2/2022. He presents with fall and fall related L trimoleolar fracture. Diagnosed with brain mass which is a new diagnosis (most likely mets due to known pulmonary malignancy).       1. Trimaleolar fracture left ankle  - eventually will need  Surgery  - in the mean time, he will need placement and OP ortho f/u    2. 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 Pulmonary malignancy  - patient declined further w/u    4. Alcoholic liver cirrhosis  - quit drinking a few months ago    5. Chronic afib  - presents tachycardic, responded to bolus cardizem  - continue oral metoprolol  - not on warfarin anymore (stopped during last admission due to risk of fall)    6. H/o of diastolic heart failure   - monitor fluid balance    7. Acute kidney injury  - present on admission  - will hydrate and recheck  - will also check CPK    Diet: Combination Diet Regular Diet Adult    DVT Prophylaxis: Pneumatic Compression Devices  Reyes Catheter: Not present  Central Lines: None  Cardiac Monitoring: None  Code Status: Full Code      Clinically Significant Risk Factors Present on Admission               # Coagulation Defect: INR = 1.18 (Ref range: 0.85 - 1.15) and/or PTT = N/A, will monitor for bleeding    # Hypertension: home medication list includes antihypertensive(s)     # Obesity: Estimated body mass index is 32.11 kg/m  as calculated from the following:    Height as of this encounter: 1.727 m (5' 8\").    Weight as of this encounter: 95.8 kg (211 lb 3.2 oz).           Disposition Plan      Expected Discharge Date: 11/04/2022      Destination: home with family          The patient's care was discussed with the Bedside Nurse.    Charis Bang, " "MD  Hospitalist Service  [facility]  Securely message with the [employer] Web Console (learn more here)  Text page via [facility] Paging/Directory         ______________________________________________________________________    Chief Complaint   Left ankle pain    History is obtained from the patient, electronic health record and emergency department physician    History of Present Illness   Ramírez Hernández is a 67 year old male who has h/o lung mass, afib, alcoholic liver cirrhosis who came to ED after having falled today. Patient stated that his left knee gave out and he fell. Denies chest pain, dyspnea, palpitation. He believes he may have \"bumped\" his head but did not loss the consciousness. Denies drinking and being dehydrated (decreased oral intake or diarrhea). His  Brother, who lives nearby came to check on him and brother summoned EMS to the scene.   ED w/u revealed trimaleolar left ankle fracture. Orthopedic surgery consulted by phone. No acute need for surgery indicated. F/u OP was recommended.   He presented to ED tachycardic, afib, but normotensive and afebrile.   ED course: XR revealed trimaleolar fracture. Cr 1.3 (last creatinine 10.29.2021 was 0.76   Glucose 119  WBC 20.1   Urine - no abnormalities to suspect UTI.   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 Differential, less likely, could include metastasis or lung abscess.  L2 compression fracture of mild severity. Age likely late subacute or older. It is uncertain if this is sequela of osteoporosis/insufficiency or pathologic fracture secondary to metastasis. There is no compromise of the central canal  this time. No other concerning osteosclerotic or osteolytic bony  lesions are evident.  No acute intra-abdominal findings otherwise.  Review of Systems    12 points of ROS " obtained. See HPI for pertinent positives and negatives. The rest is negative.     Past Medical History    I have reviewed this patient's medical history and updated it with pertinent information if needed.   As per HPI    Past Surgical History   I have reviewed this patient's surgical history and updated it with pertinent information if needed.  Past Surgical History:   Procedure Laterality Date     BACK SURGERY       COLONOSCOPY N/A 9/27/2019    Procedure: SCREENING COLONOSCOPY WITH POLYPECTOMY;  Surgeon: Alfredo La MD;  Location: [facility] OR       Social History   I have reviewed this patient's social history and updated it with pertinent information if needed.  Social History     Tobacco Use     Smoking status: Every Day     Packs/day: 1.00     Years: 40.00     Pack years: 40.00     Types: Cigarettes     Start date: 1/1/1975     Smokeless tobacco: Never   Substance Use Topics     Alcohol use: Yes     Comment: none for 5 weeks     Drug use: No       Family History   I have reviewed this patient's family history and updated it with pertinent information if needed.  Family History   Problem Relation Age of Onset     No Known Problems Other        Prior to Admission Medications   Prior to Admission Medications   Prescriptions Last Dose Informant Patient Reported? Taking?   furosemide (LASIX) 20 MG tablet 11/1/2022  No Yes   Sig: Take 3 Tablets by mouth 2 (two) times a day.   lisinopril (ZESTRIL) 10 MG tablet 11/1/2022  No Yes   Sig: Take 1 tablet (10 mg) by mouth daily   metoprolol tartrate (LOPRESSOR) 50 MG tablet 11/1/2022  No Yes   Sig: TAKE 1 TABLET(50 MG) BY MOUTH TWICE DAILY   pantoprazole (PROTONIX) 40 MG EC tablet 11/1/2022  No Yes   Sig: Take 1 tablet (40 mg) by mouth every morning (before breakfast)   spironolactone (ALDACTONE) 50 MG tablet 11/1/2022  No Yes   Sig: Take 1 Tablet by mouth one time a day.      Facility-Administered Medications: None     Allergies   No Known Allergies    Physical Exam   Vital  Signs: Temp: 97.2  F (36.2  C) Temp src: Tympanic BP: 112/83 Pulse: 86   Resp: 20 SpO2: 97 % O2 Device: None (Room air)    Weight: 211 lbs 3.21 oz    General Appearance: Disheveled, not in acute distress  Eyes: no icterus  HEENT: MMM, OP clear  Respiratory: Coarse BS, no wheezing  Cardiovascular: irregularly irregular, normocardic  GI: soft, not tender  Lymph/Hematologic: no LAD  Genitourinary: Bladder not palpable, no Reyes  Skin: dark pigmented  Musculoskeletal: cast LLE. No Edema. Sarcopenia  Neurologic: non-focal exam.  Psychiatric: Awake, alert, flat affect.     Data   Data reviewed today: I reviewed all medications, new labs and imaging results over the last 24 hours. I personally reviewed the EKG tracing showing afib with RVR.    Recent Labs   Lab 11/02/22  1756   WBC 20.1*   HGB 14.3   MCV 89      INR 1.18*      POTASSIUM 3.6   CHLORIDE 101   CO2 21*   BUN 36.3*   CR 1.30*   ANIONGAP 14   HECTOR 10.1   *   ALBUMIN 4.0   PROTTOTAL 8.0   BILITOTAL 0.8   ALKPHOS 123   ALT 17   AST 22     20.1 (H)    \    14.3    /    367   N 87    L N/A    136    101    36.3 (H) /   ------------------------------------ 119 (H)   ALT 17   AST 22      ALB 4.0   Ca 10.1  3.6    21 (L)    1.30 (H) \    % RETIC N/A    LDH N/A  Troponin N/A    BNP N/A    CK N/A  INR 1.18 (H)   PTT N/A    D-dimer N/A    Fibrinogen N/A    Antithrombin N/A  Ferritin N/A  CRP N/A    IL-6 N/A  Recent Results (from the past 24 hour(s))   XR Ankle Left G/E 3 Views    Narrative    XR ANKLE LEFT G/E 3 VIEWS    HISTORY: 67 years Male fall    COMPARISON: None    TECHNIQUE: 3 views left ankle    FINDINGS: There is a mildly to moderately comminuted medial malleolar  fracture. There is an oblique fracture of the distal fibula. There is  slight lateral displacement of the fracture fragments as well as the  talus in relation to the tibial plafond. There is diffuse soft tissue  edema.    Suspect presence of a nondisplaced posterior  malleolar fracture.      Impression    IMPRESSION: Left ankle fracture, likely trimalleolar. There is lateral  displacement of the talus in relation to the tibial plafond of  slightly less than 1 cm. There is diffuse soft tissue edema.    INA KHAN MD         SYSTEM ID:  [id number]   CT Head w/o Contrast    Narrative    Exam: CT HEAD W/O CONTRAST    Clinical history:67 years Male fall    Comparisons: 10/24/2021    Technique: Axial CT imaging of the head was performed Without  intervenous contrast.  This exam was performed using one or more of the following dose  reduction techniques:  Automated exposure control, adjustment of the ISSAC and/or KV according  to patient's size, and/or use of iterative reconstruction technique.    FINDINGS: Encephalomalacia is again seen in the left frontal lobe and  right temporal lobe.    There is a mass in the mary measuring 1.3 x 1.3 x 1.4 cm in dimension.  Density is similar to that of cortical gray matter. There is an  extensive amount of adjacent edema is seen throughout the mary  extending into both cerebral peduncles. The mass is to the right of  midline.    Ventricles and sulci are symmetric. There is no evidence of  intracranial hemorrhage.       Impression    IMPRESSION: Round right paramidline pontine mass with extensive  vasogenic edema. Metastatic disease would be primary consideration.    Unchanged encephalomalacia in the left frontal and right temporal  lobes.    INA KHAN MD         SYSTEM ID:  [id number]   CT Chest Abdomen Pelvis w/o Contrast    Narrative    CT CHEST ABDOMEN PELVIS W/O CONTRAST    HISTORY: 67 yearsMale fall, weakness.    TECHNIQUE: Axial CT imaging of the chest abdomen and pelvis was  performed without contrast. Coronal and sagittal reconstructions were  obtained.  This exam was performed using one or more of the following dose  reduction techniques:  Automated exposure control, adjustment of the ISSAC and/or KV according  to patient's size,  and/or use of iterative reconstruction technique.    COMPARISON: None    FINDINGS:    CT CHEST:  There is no mediastinal or hilar or axillary lymphadenopathy.      There is a left upper lobe mass with irregular margins and a small  amount of central cavitation measuring 3.6 x 3.4 x 2.9 cm. Findings  suggestive of a pulmonary neoplasm.    There is a small subpleural nodule or nodular opacity in the right  lower lobe measuring 5 mm, series 4 image 180.         There is osteopenia. There are old healed right-sided rib fractures.  One or 2 old appearing healed left-sided rib fractures are also seen.  Alignment of the thoracic spine is normal without evidence of  subluxation or fracture. No concerning osteolytic changes are evident.    IMPRESSION CHEST: Sizable left upper lobe pulmonary mass with central  cavitation concerning for primary lung malignancy. Differential, less  likely, could include metastasis or lung abscess.      CT ABDOMEN AND PELVIS: Solid organ evaluation is limited due to lack  of intravenous contrast.    Liver: Uniform density.    Gallbladder: A small calcified gallstone is visible. There is no  biliary dilatation.    Spleen: Unremarkable    Pancreas: Unremarkable    Adrenals: Unremarkable    Kidneys: Unremarkable    Bowel: No abnormally distended or thickened loops of bowel present    Lymph nodes: There is no evidence of mass or lymphadenopathy.    PELVIS: There is no evidence of mass lymphadenopathy or abnormal fluid  collection.    There is a compression fracture deformity of L2. There is loss of  anterior height and there is depression of the superior endplate.  There is associated osteosclerosis in the upper L2 vertebral body. The  posterior endplate is intact.    IMPRESSION ABDOMEN AND PELVIS: L2 compression fracture of mild  severity. Age likely late subacute or older. It is uncertain if this  is sequela of osteoporosis/insufficiency or pathologic fracture  secondary to metastasis. There is no  compromise of the central canal  this time. No other concerning osteosclerotic or osteolytic bony  lesions are evident.    No acute intra-abdominal findings otherwise.    INA KHAN MD         SYSTEM ID:  [id number]

## 2022-11-03 NOTE — PLAN OF CARE
A&O, VSS, afebrile. Denies pain. Left ankle remains in splint. CMS intact. Continues with neuros q4h, intact. IV SL. Pt voiding adequately. Bed alarm active. Call light within reach, makes needs known.     Face to face report given with opportunity to observe patient.    Report given to Payton Vargas RN   11/3/2022  3:15 PM

## 2022-11-03 NOTE — PROGRESS NOTES
"[facility]    Hospitalist Progress Note      Assessment & Plan   Ramírez Hernández is a 67 year old male who was admitted on 11/2/2022.         Ramírez Hernández is a 67 year old male who has h/o lung mass, afib, alcoholic liver cirrhosis who came to ED after having falled today. Patient stated that his left knee gave out and he fell. Denies chest pain, dyspnea, palpitation. He believes he may have \"bumped\" his head but did not loss the consciousness. Denies drinking and being dehydrated (decreased oral intake or diarrhea). His  Brother, who lives nearby came to check on him and brother summoned EMS to the scene.   ED w/u revealed trimaleolar left ankle fracture. Orthopedic surgery consulted by phone. No acute need for surgery indicated. F/u OP was recommended.   He presented to ED tachycardic, afib, but normotensive and afebrile.   ED course: XR revealed trimaleolar fracture. Cr 1.3 (last creatinine 10.29.2021 was 0.76   Glucose 119  WBC 20.1   Urine - no abnormalities to suspect UTI.   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 Differential, less likely, could include metastasis or lung abscess.  L2 compression fracture of mild severity. Age likely late subacute or older. It is uncertain if this is sequela of osteoporosis/insufficiency or pathologic fracture secondary to metastasis. There is no compromise of the central canal  this time. No other concerning osteosclerotic or osteolytic bony  lesions are evident.  No acute intra-abdominal findings otherwise.     1. Trimaleolar fracture left ankle  - eventually will need  Surgery  - in the mean time, he will need placement and OP ortho f/u  -11/3: Patient has a splint in place.  It is counter rubbing on his upper calf.  Looking at it there is no real padding on that splint area nurses will " place some padding there to avoid further abrasions.  Pain control is fair.  He is getting some tramadol and Tylenol.  Tylenol really does not much.  We will give him some oxycodone as needed use.  Given his multiple issues regarding his probable metastatic lung cancer will have him see orthopedic surgery next week in the clinic regarding potential repair of this.     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  He is currently on some dexamethasone.  We will continue with this for now.  I did briefly touch base with oncology and is a curbside.  Given the fact he has metastatic disease any chemotherapy would be completely palliative as would radiation therapy.  Some might do it just empirically others would want tissue diagnosis.  I did discuss it with the patient and he is known about this for over a year at this point wants nothing done in terms of work-up or intervention other than our steroids.  I did explain to him that this is bad that in all likelihood he will die from this.  He understands realizes will need placement.  Will have palliative care see him.    3. Pulmonary malignancy  - patient declined further w/u  -11/3: Patient had this mass noted over a year ago declined any work-up or evaluation of this knowing full well and I will like that it was malignancy.  Certainly has increased in size is now has obvious metastatic disease.  He as above states not interested in further work-up or treatment.  Does have history of some tobacco abuse.  History of some underlying COPD currently his O2 sats are stable lungs have really no wheezes currently.     4. Alcoholic liver cirrhosis  - quit drinking a few months ago  -11/3: He quit drinking quite a while ago.  Previously had significant ascites and issues lower extremity edema  that has all resolved he has really no obvious evidence of ascites.  Lower extremities have without any edema.  Does have a lot of chronic skin changes due to previous venous stasis.  Her     5. Chronic afib  - presents tachycardic, responded to bolus cardizem  - continue oral metoprolol  - not on warfarin anymore (stopped during last admission due to risk of fall)  -11/3: Chronic A. fib.  Not on any anticoagulation which is probably appropriate given his multiple falls.  He is on metoprolol 50 twice daily and his rate control seems to be better at this point we will just continue with that.  No signs of heart failure issues.     6. H/o of diastolic heart failure   - monitor fluid balance  -11/3: 1 year ago he came in with the cirrhosis ascites A. fib systolic heart failure.  He has not been drinking and his heart failure basically is markedly improved.  He did have an echocardiogram done a year ago which showed an EF around 60 to 65%.  He had A. fib at that time really could not assess his diastolic function due to flattened septum consistent with RV volume overload.     7. Acute kidney injury  - present on admission  - will hydrate and recheck  - will also check CPK  -11/3: Has improved to get some fluid overnight.  We will probably give him a little more fluid also.  His BUN is still rather elevated.    Diet: Combination Diet Regular Diet Adult  Fluids: Saline 1 L    DVT Prophylaxis: VTE Prophylaxis contraindicated due to acute fracture of his ankle and brain metastasis  Code Status: No CPR- Do NOT Intubate    Disposition: Expected discharge when acceptable disposition site is found.    Hiro Garcia MD    Interval History   Patient was seen is alert very pleasant no obvious distress.  The only complaint he has is his cast/splint is rubbing on his leg.  And did not sleep well due to that also some underlying pain.  No breathing issues no chest pains at all has had no bleeding troubles at all.  Has noted that  his left side has been a little weaker and that is basically how he fell.  Did explain to him that the mass that he had on his CT scan 1 year ago is enlarged and now does have a tumor in his brain that likely came from that source.  He is not interested in work-up at this point or any other interventions.  I did explain to him that likelihood he will die from this as it will increase.  He understands he would like to have his ankle fixed if they will do that.  Knows he needs placement and is agreeable.  We will have case management see him try again to the nursing home and then to see orthopedic surgery for potential repair of his ankle otherwise we will also have palliative care do a consultation on him.    -Data reviewed today: I reviewed all new labs and imaging results over the last 24 hours. I personally reviewed no images or EKG's today.    Physical Exam   Temp: 98.6  F (37  C) Temp src: Tympanic BP: 132/61 Pulse: 94   Resp: 18 SpO2: 95 % O2 Device: None (Room air)    Vitals:    11/02/22 2227   Weight: 95.8 kg (211 lb 3.2 oz)     Vital Signs with Ranges  Temp:  [97.2  F (36.2  C)-98.9  F (37.2  C)] 98.6  F (37  C)  Pulse:  [] 94  Resp:  [18-37] 18  BP: (112-199)/() 132/61  SpO2:  [95 %-100 %] 95 %  I/O last 3 completed shifts:  In: 492 [I.V.:492]  Out: 800 [Urine:800]    Peripheral IV 11/02/22 Anterior;Left Upper forearm (Active)   Site Assessment WDL 11/03/22 1143   Line Status Saline locked 11/03/22 1143   Phlebitis Scale 0-->no symptoms 11/03/22 1143   Infiltration Scale 0 11/03/22 0002   Number of days: 1       Wound Coccyx Skin tear (Active)   Number of days: 375       Wound Thigh Other (comment) (Active)   Number of days: 375       Wound Elbow Skin tear (Active)   Number of days: 375       Wound Elbow Skin tear (Active)   Number of days: 375       Wound Shoulder (Active)   Number of days:        Wound Neck Contusion (Active)   Number of days: 375       Wound Neck Contusion (Active)   Number  of days: 375       Incision/Surgical Site 10/26/21 Lateral;Lower;Right Abdomen (Active)   Number of days: 373     No line/device    Constitutional: Alert and oriented x3. No distress    HEENT: Normocephalic/atraumatic, PERRL, EOMI, mouth moist, neck supple, no LN.     Cardiovascular:IRRR. no Murmur, no  rubs, or gallops.  JVD no.  Bruits no.  Pulses 2    Respiratory:  Basically Clear to auscultation bilaterally    Abdomen: Soft, nontender, nondistended, no organomegaly. Bowel sounds present    Extremities: Warm/dry.  Left leg is in a splint.  Really no edema.  A lot of chronic venous stasis skin changes bilaterally.  But no significant edema edema    Neuro:   Non focal, cranial nerves intact, Moves all extremities.  Medications       dexamethasone  6 mg Intravenous Q6H     [Held by provider] furosemide  40 mg Oral Daily     [Held by provider] lisinopril  10 mg Oral Daily     metoprolol tartrate  50 mg Oral BID     pantoprazole  40 mg Oral QAM AC     sodium chloride (PF)  3 mL Intracatheter Q8H     [Held by provider] spironolactone  50 mg Oral Daily       Data   Recent Labs   Lab 11/03/22  0530 11/02/22  1756   WBC 9.9 20.1*   HGB 13.1* 14.3   MCV 90 89    367   INR  --  1.18*   * 136   POTASSIUM 4.0 3.6   CHLORIDE 103 101   CO2 20* 21*   BUN 30.0* 36.3*   CR 0.96 1.30*   ANIONGAP 12 14   HECTOR 9.6 10.1   * 119*   ALBUMIN  --  4.0   PROTTOTAL  --  8.0   BILITOTAL  --  0.8   ALKPHOS  --  123   ALT  --  17   AST  --  22       Recent Results (from the past 24 hour(s))   XR Ankle Left G/E 3 Views    Narrative    XR ANKLE LEFT G/E 3 VIEWS    HISTORY: 67 years Male fall    COMPARISON: None    TECHNIQUE: 3 views left ankle    FINDINGS: There is a mildly to moderately comminuted medial malleolar  fracture. There is an oblique fracture of the distal fibula. There is  slight lateral displacement of the fracture fragments as well as the  talus in relation to the tibial plafond. There is diffuse soft  tissue  edema.    Suspect presence of a nondisplaced posterior malleolar fracture.      Impression    IMPRESSION: Left ankle fracture, likely trimalleolar. There is lateral  displacement of the talus in relation to the tibial plafond of  slightly less than 1 cm. There is diffuse soft tissue edema.    INA KHAN MD         SYSTEM ID:  [id number]   CT Head w/o Contrast    Narrative    Exam: CT HEAD W/O CONTRAST    Clinical history:67 years Male fall    Comparisons: 10/24/2021    Technique: Axial CT imaging of the head was performed Without  intervenous contrast.  This exam was performed using one or more of the following dose  reduction techniques:  Automated exposure control, adjustment of the ISSAC and/or KV according  to patient's size, and/or use of iterative reconstruction technique.    FINDINGS: Encephalomalacia is again seen in the left frontal lobe and  right temporal lobe.    There is a mass in the mary measuring 1.3 x 1.3 x 1.4 cm in dimension.  Density is similar to that of cortical gray matter. There is an  extensive amount of adjacent edema is seen throughout the mary  extending into both cerebral peduncles. The mass is to the right of  midline.    Ventricles and sulci are symmetric. There is no evidence of  intracranial hemorrhage.       Impression    IMPRESSION: Round right paramidline pontine mass with extensive  vasogenic edema. Metastatic disease would be primary consideration.    Unchanged encephalomalacia in the left frontal and right temporal  lobes.    INA KHAN MD         SYSTEM ID:  [id number]   CT Chest Abdomen Pelvis w/o Contrast    Narrative    CT CHEST ABDOMEN PELVIS W/O CONTRAST    HISTORY: 67 yearsMale fall, weakness.    TECHNIQUE: Axial CT imaging of the chest abdomen and pelvis was  performed without contrast. Coronal and sagittal reconstructions were  obtained.  This exam was performed using one or more of the following dose  reduction techniques:  Automated exposure control,  adjustment of the ISSAC and/or KV according  to patient's size, and/or use of iterative reconstruction technique.    COMPARISON: None    FINDINGS:    CT CHEST:  There is no mediastinal or hilar or axillary lymphadenopathy.      There is a left upper lobe mass with irregular margins and a small  amount of central cavitation measuring 3.6 x 3.4 x 2.9 cm. Findings  suggestive of a pulmonary neoplasm.    There is a small subpleural nodule or nodular opacity in the right  lower lobe measuring 5 mm, series 4 image 180.         There is osteopenia. There are old healed right-sided rib fractures.  One or 2 old appearing healed left-sided rib fractures are also seen.  Alignment of the thoracic spine is normal without evidence of  subluxation or fracture. No concerning osteolytic changes are evident.    IMPRESSION CHEST: Sizable left upper lobe pulmonary mass with central  cavitation concerning for primary lung malignancy. Differential, less  likely, could include metastasis or lung abscess.      CT ABDOMEN AND PELVIS: Solid organ evaluation is limited due to lack  of intravenous contrast.    Liver: Uniform density.    Gallbladder: A small calcified gallstone is visible. There is no  biliary dilatation.    Spleen: Unremarkable    Pancreas: Unremarkable    Adrenals: Unremarkable    Kidneys: Unremarkable    Bowel: No abnormally distended or thickened loops of bowel present    Lymph nodes: There is no evidence of mass or lymphadenopathy.    PELVIS: There is no evidence of mass lymphadenopathy or abnormal fluid  collection.    There is a compression fracture deformity of L2. There is loss of  anterior height and there is depression of the superior endplate.  There is associated osteosclerosis in the upper L2 vertebral body. The  posterior endplate is intact.    IMPRESSION ABDOMEN AND PELVIS: L2 compression fracture of mild  severity. Age likely late subacute or older. It is uncertain if this  is sequela of  osteoporosis/insufficiency or pathologic fracture  secondary to metastasis. There is no compromise of the central canal  this time. No other concerning osteosclerotic or osteolytic bony  lesions are evident.    No acute intra-abdominal findings otherwise.    INA KHAN MD         SYSTEM ID:  [id number]

## 2022-11-04 NOTE — PROGRESS NOTES
Care Transitions focused note:      Referrals to [facility], screening at [facility].  Referral sent to [facility] in [place].    Olivia Baron CM

## 2022-11-04 NOTE — PLAN OF CARE
"Goal Outcome Evaluation:       Reason for hospital stay:  L ankle fracture   Living situation PTA: home alone   Most recent vitals: /61 (BP Location: Right arm, Patient Position: Supine, Cuff Size: Adult Regular)   Pulse 91   Temp 97.7  F (36.5  C) (Tympanic)   Resp 22   Ht 1.727 m (5' 8\")   Wt 95.8 kg (211 lb 3.2 oz)   SpO2 96%   BMI 32.11 kg/m      Pain Management:  Tylenol 650mg and tramadol 50mg  LOC:  A&O  Cardiac:  Apical irregular  Respiratory:  WNL - room air  GI:  WNL  :  Frequency   Skin Issues:  Alonso skin to lower R leg, peeling, cracked R ankle and foot along with L toes, 1+ edema to RLE, scattered bruising and scabs    IVF:  Saline locked   ABX:  none    Nutrition: regular   ADL's:  Assist 1   Ambulation:bed rest   Safety:  Call light in place, bed in low position with wheels locked, bed alarm on     Comments: VSS, afebrile       Face to face report given with opportunity to observe patient.    Report given to Arielle Ellis RN   11/4/2022  7:22 AM                       "

## 2022-11-04 NOTE — PLAN OF CARE
Patient is alert and oriented, makes needs known. Patient in bed all day but can get up just NWB on affected leg. Pillowcase in place for padding to protect back of knee. Pain controlled with PRN pain medications, see MAR, no complaints of pain at this time. Brother visiting, updated on plan of care.     Face to face report given with opportunity to observe patient.    Report given to LAURY Goss RN   11/4/2022  7:02 PM

## 2022-11-04 NOTE — PROGRESS NOTES
"[facility]    Hospitalist Progress Note       Ramírez Hernández is a 67 year old male who has h/o lung mass, afib, alcoholic liver cirrhosis who came to ED after having falled today. Patient stated that his left knee gave out and he fell. Denies chest pain, dyspnea, palpitation. He believes he may have \"bumped\" his head but did not loss the consciousness. Denies drinking and being dehydrated (decreased oral intake or diarrhea). His  Brother, who lives nearby came to check on him and brother summoned EMS to the scene.   ED w/u revealed trimaleolar left ankle fracture. Orthopedic surgery consulted by phone. No acute need for surgery indicated. F/u OP was recommended.   He presented to ED tachycardic, afib, but normotensive and afebrile.   ED course: XR revealed trimaleolar fracture. Cr 1.3 (last creatinine 10.29.2021 was 0.76   Glucose 119  WBC 20.1   Urine - no abnormalities to suspect UTI.   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 Differential, less likely, could include metastasis or lung abscess.  L2 compression fracture of mild severity. Age likely late subacute or older. It is uncertain if this is sequela of osteoporosis/insufficiency or pathologic fracture secondary to metastasis. There is no compromise of the central canal  this time. No other concerning osteosclerotic or osteolytic bony  lesions are evident.  No acute intra-abdominal findings otherwise.     1. Trimaleolar fracture left ankle  - eventually will need  Surgery  - in the mean time, he will need placement and OP ortho f/u  -11/3: Patient has a splint in place.  It is counter rubbing on his upper calf.  Looking at it there is no real padding on that splint area nurses will place some padding there to avoid further abrasions.  Pain control is fair.  He is getting some " tramadol and Tylenol.  Tylenol really does not much.  We will give him some oxycodone as needed use.  Given his multiple issues regarding his probable metastatic lung cancer will have him see orthopedic surgery next week in the clinic regarding potential repair of this.  -11/4: States pain is better with the use of the oxycodone.  He is nonweightbearing on this.  We will get him into see orthopedic surgery next week.  There are some issues with the top of the splint also still there is no padding there does need to have this padded otherwise this can ripple in his calf.     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  He is currently on some dexamethasone.  We will continue with this for now.  I did briefly touch base with oncology and is a curbside.  Given the fact he has metastatic disease any chemotherapy would be completely palliative as would radiation therapy.  Some might do it just empirically others would want tissue diagnosis.  I did discuss it with the patient and he is known about this for over a year at this point wants nothing done in terms of work-up or intervention other than our steroids.  I did explain to him that this is bad that in all likelihood he will die from this.  He understands realizes will need placement.  Will have palliative care see him.  -11/4: He was on relatively high doses of dexamethasone 6 mg every 6 hours not sure he truly needs that at all we will cut back to 4 mg oral twice a day at this point really had no significant symptoms other than you having occasional trouble walking.       3. Pulmonary malignancy  - patient declined further w/u  -11/3: Patient had this mass noted over a year ago declined any work-up or evaluation of this knowing full well and I will like that it was malignancy.   Certainly has increased in size is now has obvious metastatic disease.  He as above states not interested in further work-up or treatment.  Does have history of some tobacco abuse.  History of some underlying COPD currently his O2 sats are stable lungs have really no wheezes currently.  -11/4: Patient declines any further work-up evaluation.  Understands the terminal nature of his disease.  Agrees to being seen by palliative care.     4. Alcoholic liver cirrhosis  - quit drinking a few months ago  -11/3: He quit drinking quite a while ago.  Previously had significant ascites and issues lower extremity edema that has all resolved he has really no obvious evidence of ascites.  Lower extremities have without any edema.  Does have a lot of chronic skin changes due to previous venous stasis.  -11/4: No current issues.    5. Chronic afib  - presents tachycardic, responded to bolus cardizem  - continue oral metoprolol  - not on warfarin anymore (stopped during last admission due to risk of fall)  -11/3: Chronic A. fib.  Not on any anticoagulation which is probably appropriate given his multiple falls.  He is on metoprolol 50 twice daily and his rate control seems to be better at this point we will just continue with that.  No signs of heart failure issues.  -11/4: Rate is fairly under control.     6. H/o of diastolic heart failure   - monitor fluid balance  -11/3: 1 year ago he came in with the cirrhosis ascites A. fib systolic heart failure.  He has not been drinking and his heart failure basically is markedly improved.  He did have an echocardiogram done a year ago which showed an EF around 60 to 65%.  He had A. fib at that time really could not assess his diastolic function due to flattened septum consistent with RV volume overload.  -11/4: No obvious signs of heart failure at this point     7. Acute kidney injury  - present on admission  - will hydrate and recheck  - will also check CPK  -11/3: Has improved to get some  fluid overnight.  We will probably give him a little more fluid also.  His BUN is still rather elevated.  -11/4: He is voiding without difficulty we will check his labs tomorrow.       Diet: Combination Diet Regular Diet Adult  Fluids: None    DVT Prophylaxis: Pneumatic Compression Devices  Code Status: No CPR- Do NOT Intubate    Disposition: Expected discharge when accepting facility is found  Hiro Garcia MD    Interval History   Patient alert pleasant no distress says feels relatively good no chest pain shortness of breath nausea vomiting.  His fractured ankle is feeling better with the oxycodone rather than tramadol.  Still complaining bitterly about the time of his splint cutting into his calf we will continue to try and get this padded appropriately form    -Data reviewed today: I reviewed all new labs and imaging results over the last 24 hours. I personally reviewed no images or EKG's today.    Physical Exam   Temp: 98  F (36.7  C) Temp src: Tympanic BP: 162/81 Pulse: 109   Resp: 22 SpO2: 95 % O2 Device: None (Room air)    Vitals:    11/02/22 2227   Weight: 95.8 kg (211 lb 3.2 oz)     Vital Signs with Ranges  Temp:  [97.7  F (36.5  C)-98.4  F (36.9  C)] 98  F (36.7  C)  Pulse:  [] 109  Resp:  [18-22] 22  BP: (146-162)/(58-81) 162/81  SpO2:  [95 %-96 %] 95 %  I/O last 3 completed shifts:  In: 480 [P.O.:480]  Out: 450 [Urine:450]    Peripheral IV 11/02/22 Anterior;Left Upper forearm (Active)   Site Assessment WDL 11/04/22 0700   Line Status Saline locked 11/04/22 0700   Phlebitis Scale 0-->no symptoms 11/04/22 0700   Infiltration Scale 0 11/04/22 0700   Infiltration Site Treatment Method  None 11/03/22 1608   If infiltrated, was a vesicant infusing? No 11/03/22 1608   Number of days: 2       Wound Coccyx Skin tear (Active)   Number of days: 376       Wound Thigh Other (comment) (Active)   Number of days: 376       Wound Elbow Skin tear (Active)   Number of days: 376       Wound Elbow Skin tear  (Active)   Number of days: 376       Wound Shoulder (Active)   Number of days:        Wound Neck Contusion (Active)   Number of days: 376       Wound Neck Contusion (Active)   Number of days: 376       Incision/Surgical Site 10/26/21 Lateral;Lower;Right Abdomen (Active)   Number of days: 374     No line/device    Constitutional: Alert and oriented x3. No distress    HEENT: Normocephalic/atraumatic, PERRL, EOMI, mouth moist, neck supple, no LN.     Cardiovascular: Regular RRR. no Murmur, no  rubs, or gallops.  JVD no.  Bruits no.  Pulses 2    Respiratory: Decreased bilaterally clear to auscultation bilaterally    Abdomen: Soft, nontender, nondistended, no organomegaly. Bowel sounds present    Extremities: Warm/dry.  Left leg splint CMS intact of toes.  Right leg with significant chronic venous stasis dermatitis changes.  Minimal edema edema    Neuro:   Non focal, cranial nerves intact, Moves all extremities.  Medications       dexamethasone  4 mg Oral Q12H SAMIA (08/20)     [Held by provider] furosemide  40 mg Oral Daily     [START ON 11/5/2022] influenza vac high-dose quad  0.7 mL Intramuscular Prior to discharge     [Held by provider] lisinopril  10 mg Oral Daily     metoprolol tartrate  50 mg Oral BID     pantoprazole  40 mg Oral QAM AC     sodium chloride (PF)  3 mL Intracatheter Q8H     [Held by provider] spironolactone  50 mg Oral Daily       Data   Recent Labs   Lab 11/04/22  0823 11/03/22  0530 11/02/22  1756   WBC  --  9.9 20.1*   HGB  --  13.1* 14.3   MCV  --  90 89   PLT  --  319 367   INR  --   --  1.18*   NA  --  135* 136   POTASSIUM 4.1 4.0 3.6   CHLORIDE  --  103 101   CO2  --  20* 21*   BUN  --  30.0* 36.3*   CR  --  0.96 1.30*   ANIONGAP  --  12 14   HECTOR  --  9.6 10.1   GLC  --  150* 119*   ALBUMIN  --   --  4.0   PROTTOTAL  --   --  8.0   BILITOTAL  --   --  0.8   ALKPHOS  --   --  123   ALT  --   --  17   AST  --   --  22       No results found for this or any previous visit (from the past 24  hour(s)).

## 2022-11-05 NOTE — PROGRESS NOTES
"[facility]    Hospitalist Progress Note      Ramírez Hernández is a 67 year old male who has h/o lung mass, afib, alcoholic liver cirrhosis who came to ED after having falled today. Patient stated that his left knee gave out and he fell. Denies chest pain, dyspnea, palpitation. He believes he may have \"bumped\" his head but did not loss the consciousness. Denies drinking and being dehydrated (decreased oral intake or diarrhea). His  Brother, who lives nearby came to check on him and brother summoned EMS to the scene.   ED w/u revealed trimaleolar left ankle fracture. Orthopedic surgery consulted by phone. No acute need for surgery indicated. F/u OP was recommended.   He presented to ED tachycardic, afib, but normotensive and afebrile.   ED course: XR revealed trimaleolar fracture. Cr 1.3 (last creatinine 10.29.2021 was 0.76   Glucose 119  WBC 20.1   Urine - no abnormalities to suspect UTI.   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 Differential, less likely, could include metastasis or lung abscess.  L2 compression fracture of mild severity. Age likely late subacute or older. It is uncertain if this is sequela of osteoporosis/insufficiency or pathologic fracture secondary to metastasis. There is no compromise of the central canal  this time. No other concerning osteosclerotic or osteolytic bony  lesions are evident.  No acute intra-abdominal findings otherwise.     1. Trimaleolar fracture left ankle  - eventually will need  Surgery  - in the mean time, he will need placement and OP ortho f/u  -11/3: Patient has a splint in place.  It is counter rubbing on his upper calf.  Looking at it there is no real padding on that splint area nurses will place some padding there to avoid further abrasions.  Pain control is fair.  He is getting some " tramadol and Tylenol.  Tylenol really does not much.  We will give him some oxycodone as needed use.  Given his multiple issues regarding his probable metastatic lung cancer will have him see orthopedic surgery next week in the clinic regarding potential repair of this.  -11/4: States pain is better with the use of the oxycodone.  He is nonweightbearing on this.  We will get him into see orthopedic surgery next week.  There are some issues with the top of the splint also still there is no padding there does need to have this padded otherwise this can rip a hole in his calf  -11/5: Pain control is adequate CMS intact of his toes been padding that upper portion of his splint as it is a bit too long and is kind of irritating his lower thigh actually.  Looking for placement options we will try and run it by Dr. Staples on Monday as to his recommendations.  Despite his metastatic lung cancer patient truly would like to have this repaired as not really sure how long he has but has been doing well for quite some time.     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  He is currently on some dexamethasone.  We will continue with this for now.  I did briefly touch base with oncology and is a curbside.  Given the fact he has metastatic disease any chemotherapy would be completely palliative as would radiation therapy.  Some might do it just empirically others would want tissue diagnosis.  I did discuss it with the patient and he is known about this for over a year at this point wants nothing done in terms of work-up or intervention other than our steroids.  I did explain to him that this is bad that in all likelihood he will die from this.  He understands realizes will need placement.  Will have palliative care see him.  -11/4: He was on  relatively high doses of dexamethasone 6 mg every 6 hours not sure he truly needs that at all we will cut back to 4 mg oral twice a day at this point really had no significant symptoms other than you having occasional trouble walking.  11/5: Have decreased his dexamethasone dose.  No obvious neurological issues.        3. Pulmonary malignancy  - patient declined further w/u  -11/3: Patient had this mass noted over a year ago declined any work-up or evaluation of this knowing full well and I will like that it was malignancy.  Certainly has increased in size is now has obvious metastatic disease.  He as above states not interested in further work-up or treatment.  Does have history of some tobacco abuse.  History of some underlying COPD currently his O2 sats are stable lungs have really no wheezes currently.  -11/4: Patient declines any further work-up evaluation.  Understands the terminal nature of his disease.  Agrees to being seen by palliative care.  -11/5: Patient decided upon no intervention will enroll him in palliative care     4. Alcoholic liver cirrhosis  - quit drinking a few months ago  -11/3: He quit drinking quite a while ago.  Previously had significant ascites and issues lower extremity edema that has all resolved he has really no obvious evidence of ascites.  Lower extremities have without any edema.  Does have a lot of chronic skin changes due to previous venous stasis.  -11/4: No current issues.  -11/5 no issues       5. Chronic afib  - presents tachycardic, responded to bolus cardizem  - continue oral metoprolol  - not on warfarin anymore (stopped during last admission due to risk of fall)  -11/3: Chronic A. fib.  Not on any anticoagulation which is probably appropriate given his multiple falls.  He is on metoprolol 50 twice daily and his rate control seems to be better at this point we will just continue with that.  No signs of heart failure issues.  -11/4: Rate is fairly under control.  -1/5:  Rates stable hemodynamically without problem no signs of heart failure     6. H/o of diastolic heart failure   - monitor fluid balance  -11/3: 1 year ago he came in with the cirrhosis ascites A. fib systolic heart failure.  He has not been drinking and his heart failure basically is markedly improved.  He did have an echocardiogram done a year ago which showed an EF around 60 to 65%.  He had A. fib at that time really could not assess his diastolic function due to flattened septum consistent with RV volume overload.  -11/4: No obvious signs of heart failure at this point     7. Acute kidney injury  - present on admission  - will hydrate and recheck  - will also check CPK  -11/3: Has improved to get some fluid overnight.  We will probably give him a little more fluid also.  His BUN is still rather elevated.  -11/4: He is voiding without difficulty we will check his labs tomorrow.  -11/5: Labs pending    Diet: Combination Diet Regular Diet Adult  Fluids: NonePneumatic Compression Devices    DVT Prophylaxis: Pneumatic Compression Devices  Code Status: No CPR- Do NOT Intubate    Disposition: Expected discharge as soon as disposition site is found    Hiro Garcia MD    Interval History   Alert pleasant no distress trying to pad the top without splint.  Does need to be shortened and were completely changed.  Pain control is fine appetite is fine no shortness of breath no chest pain no nausea peeing okay.  Hospital stay pending his placement and surgical repair of his ankle hopefully Monday will have a more definitive plan in place.  He is unable to bear weight unable to return home at this time    -Data reviewed today: I reviewed all new labs and imaging results over the last 24 hours. I personally reviewed no images or EKG's today.    Physical Exam   Temp: 98.7  F (37.1  C) Temp src: Tympanic BP: 140/61 Pulse: 89   Resp: 18 SpO2: 97 % O2 Device: None (Room air)    Vitals:    11/02/22 2227   Weight: 95.8 kg (211 lb  3.2 oz)     Vital Signs with Ranges  Temp:  [97  F (36.1  C)-98.7  F (37.1  C)] 98.7  F (37.1  C)  Pulse:  [89-93] 89  Resp:  [18-20] 18  BP: (140-172)/(61-79) 140/61  SpO2:  [95 %-97 %] 97 %  I/O last 3 completed shifts:  In: 1083 [P.O.:1080; I.V.:3]  Out: 1300 [Urine:1300]    Peripheral IV 11/02/22 Anterior;Left Upper forearm (Active)   Site Assessment WDL 11/04/22 2342   Line Status Saline locked 11/04/22 2342   Phlebitis Scale 0-->no symptoms 11/04/22 2342   Infiltration Scale 0 11/04/22 2342   Infiltration Site Treatment Method  None 11/03/22 1608   If infiltrated, was a vesicant infusing? No 11/03/22 1608   Number of days: 3       Wound Coccyx Skin tear (Active)   Number of days: 377       Wound Thigh Other (comment) (Active)   Number of days: 377       Wound Elbow Skin tear (Active)   Number of days: 377       Wound Elbow Skin tear (Active)   Number of days: 377       Wound Shoulder (Active)   Number of days:        Wound Neck Contusion (Active)   Number of days: 377       Wound Neck Contusion (Active)   Number of days: 377       Incision/Surgical Site 10/26/21 Lateral;Lower;Right Abdomen (Active)   Number of days: 375     No line/device    Constitutional: Alert and oriented x3. No distress    HEENT: Normocephalic/atraumatic, PERRL, EOMI, mouth mouth, neck supple, no LN.     Cardiovascular: RRR. no Murmur, no  rubs, or gallops.  JVD no.  Bruits no.  Pulses 2    Respiratory: Clear to auscultation bilaterally    Abdomen: Soft, nontender, nondistended, no organomegaly. Bowel sounds present    Extremities: Warm/dry. No edema chronic venous stasis skin changes bilaterally    Neuro:   Non focal, cranial nerves intact, Moves all extremities.  Medications       dexamethasone  4 mg Oral Q12H SAMIA (08/20)     [Held by provider] furosemide  40 mg Oral Daily     lisinopril  10 mg Oral Daily     metoprolol tartrate  50 mg Oral BID     pantoprazole  40 mg Oral QAM AC     sodium chloride (PF)  3 mL Intracatheter Q8H      [Held by provider] spironolactone  50 mg Oral Daily       Data   Recent Labs   Lab 11/05/22  0619 11/04/22  0823 11/03/22  0530 11/02/22  1756   WBC  --   --  9.9 20.1*   HGB  --   --  13.1* 14.3   MCV  --   --  90 89   PLT  --   --  319 367   INR  --   --   --  1.18*   NA  --   --  135* 136   POTASSIUM 4.4 4.1 4.0 3.6   CHLORIDE  --   --  103 101   CO2  --   --  20* 21*   BUN  --   --  30.0* 36.3*   CR  --   --  0.96 1.30*   ANIONGAP  --   --  12 14   HECTOR  --   --  9.6 10.1   GLC  --   --  150* 119*   ALBUMIN  --   --   --  4.0   PROTTOTAL  --   --   --  8.0   BILITOTAL  --   --   --  0.8   ALKPHOS  --   --   --  123   ALT  --   --   --  17   AST  --   --   --  22       No results found for this or any previous visit (from the past 24 hour(s)).

## 2022-11-05 NOTE — PLAN OF CARE
"Goal Outcome Evaluation:       Reason for hospital stay:  L ankle fracture   Living situation PTA: home alone   Most recent vitals: /79 (BP Location: Right arm, Patient Position: Supine, Cuff Size: Adult Regular)   Pulse 89   Temp 97.6  F (36.4  C) (Tympanic)   Resp 20   Ht 1.727 m (5' 8\")   Wt 95.8 kg (211 lb 3.2 oz)   SpO2 97%   BMI 32.11 kg/m      Pain Management:  tylenol 650mg and luis alberto 5mg  LOC:  A&O x4  Cardiac:  apical irregular   Respiratory:  WNL - room air   GI:  WNL  :  frequency   Skin Issues:  please see charting     IVF:  saline locked   ABX:  none    Nutrition: regular diet   ADL's:  assist 1  Ambulation:non weight baring to L ankle   Safety:  call light in place, bed alarm on, bed in low position with wheels locked       Comments:      Face to face report given with opportunity to observe patient.    Report given to Ale Ellis RN   11/5/2022  7:26 AM                       "

## 2022-11-05 NOTE — PLAN OF CARE
Goal Outcome Evaluation:    Pt alert, orientated x 4 this shift. Pt afebrile. Apical pulse irregular.  Pt remains on room air this shift. Pt receives PRN Roxicodone 5 mg x 1, and tylenol x 1 for L ankle pain. L ankle remains in splint with ace wrap. CMS intact to LLE. Unable to assess pulse to LLE. Bilateral lower extremities are dry, flaky, with scabbing to feet. Pt encouraged to elevate lower extremities this shift.   Pt remains non weight bearing to LLE.   Pt assist of 2 with walker, and gait belt to pivot to bed from to chair this shift.     Pt receives Influenza vaccine this shift in R deltoid, as charted in MAR.        Face to face report given with opportunity to observe patient.    Report given to LAURY Lubin.     Ale Bahena RN   11/5/2022  3:24 PM

## 2022-11-05 NOTE — PLAN OF CARE
Pt A&O, VSS, afebrile. Complains of L ankle pain 3-7/10 during shift. PRN luis alberto x1 given with some relief of pain. Up to chair during dinner, tolerated well. Remains non weight bearing to LLE. Transfers with assistance of 2 with a walker and gait belt. IV saline locked. Call light within reach, alarms in place.    Face to face report given with opportunity to observe patient.    Report given to LAURY Monreal RN   11/5/2022  11:16 PM

## 2022-11-06 NOTE — PROGRESS NOTES
"[facility]    Hospitalist Progress Note  Ramírez Hernández is a 67 year old male who has h/o lung mass, afib, alcoholic liver cirrhosis who came to ED after having falled today. Patient stated that his left knee gave out and he fell. Denies chest pain, dyspnea, palpitation. He believes he may have \"bumped\" his head but did not loss the consciousness. Denies drinking and being dehydrated (decreased oral intake or diarrhea). His  Brother, who lives nearby came to check on him and brother summoned EMS to the scene.   ED w/u revealed trimaleolar left ankle fracture. Orthopedic surgery consulted by phone. No acute need for surgery indicated. F/u OP was recommended.   He presented to ED tachycardic, afib, but normotensive and afebrile.   ED course: XR revealed trimaleolar fracture. Cr 1.3 (last creatinine 10.29.2021 was 0.76   Glucose 119  WBC 20.1   Urine - no abnormalities to suspect UTI.   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 Differential, less likely, could include metastasis or lung abscess.  L2 compression fracture of mild severity. Age likely late subacute or older. It is uncertain if this is sequela of osteoporosis/insufficiency or pathologic fracture secondary to metastasis. There is no compromise of the central canal  this time. No other concerning osteosclerotic or osteolytic bony  lesions are evident.  No acute intra-abdominal findings otherwise.     1. Trimaleolar fracture left ankle  - eventually will need  Surgery  - in the mean time, he will need placement and OP ortho f/u  -11/3: Patient has a splint in place.  It is counter rubbing on his upper calf.  Looking at it there is no real padding on that splint area nurses will place some padding there to avoid further abrasions.  Pain control is fair.  He is getting some tramadol " and Tylenol.  Tylenol really does not much.  We will give him some oxycodone as needed use.  Given his multiple issues regarding his probable metastatic lung cancer will have him see orthopedic surgery next week in the clinic regarding potential repair of this.  -11/4: States pain is better with the use of the oxycodone.  He is nonweightbearing on this.  We will get him into see orthopedic surgery next week.  There are some issues with the top of the splint also still there is no padding there does need to have this padded otherwise this can rip a hole in his calf  -11/5: Pain control is adequate CMS intact of his toes been padding that upper portion of his splint as it is a bit too long and is kind of irritating his lower thigh actually.  Looking for placement options we will try and run it by Dr. Staples on Monday as to his recommendations.  Despite his metastatic lung cancer patient truly would like to have this repaired as not really sure how long he has but has been doing well for quite some time.  -11/6: Pain is adequately controlled the oxycodone.  CMS is intact we have the splint padded much better now since not  Into his thigh.  Will try and have Dr. Staples at least look at the patient's x-rays and help us sort out where when he could have this repaired.  Despite his metastatic carcinoma he is going to be around for at least a while and would like to have it repaired.     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  He is currently on some dexamethasone.  We will continue with this for now.  I did briefly touch base with oncology and is a curbside.  Given the fact he has metastatic disease any chemotherapy would be completely palliative as would radiation therapy.  Some might do it just empirically others would want tissue  diagnosis.  I did discuss it with the patient and he is known about this for over a year at this point wants nothing done in terms of work-up or intervention other than our steroids.  I did explain to him that this is bad that in all likelihood he will die from this.  He understands realizes will need placement.  Will have palliative care see him.  -11/4: He was on relatively high doses of dexamethasone 6 mg every 6 hours not sure he truly needs that at all we will cut back to 4 mg oral twice a day at this point really had no significant symptoms other than you having occasional trouble walking.  11/5: Have decreased his dexamethasone dose.  No obvious neurological issues.  -11/6: Still on some dexamethasone no other obvious neurological issues.        3. Pulmonary malignancy  - patient declined further w/u  -11/3: Patient had this mass noted over a year ago declined any work-up or evaluation of this knowing full well and I will like that it was malignancy.  Certainly has increased in size is now has obvious metastatic disease.  He as above states not interested in further work-up or treatment.  Does have history of some tobacco abuse.  History of some underlying COPD currently his O2 sats are stable lungs have really no wheezes currently.  -11/4: Patient declines any further work-up evaluation.  Understands the terminal nature of his disease.  Agrees to being seen by palliative care.  -11/5: Patient decided upon no intervention will enroll him in palliative care  -11/6: Hemodynamically stable sats are fine no obvious dyspnea or complaints.     4. Alcoholic liver cirrhosis  - quit drinking a few months ago  -11/3: He quit drinking quite a while ago.  Previously had significant ascites and issues lower extremity edema that has all resolved he has really no obvious evidence of ascites.  Lower extremities have without any edema.  Does have a lot of chronic skin changes due to previous venous stasis.  -11/4: No current  issues.  -11/5 no issues  -11/6 no issues        5. Chronic afib  - presents tachycardic, responded to bolus cardizem  - continue oral metoprolol  - not on warfarin anymore (stopped during last admission due to risk of fall)  -11/3: Chronic A. fib.  Not on any anticoagulation which is probably appropriate given his multiple falls.  He is on metoprolol 50 twice daily and his rate control seems to be better at this point we will just continue with that.  No signs of heart failure issues.  -11/4: Rate is fairly under control.  -1/5: Rates stable hemodynamically without problem no signs of heart failure  11/6: Rate stable hemodynamically fine no signs of heart failure.      6. H/o of diastolic heart failure   - monitor fluid balance  -11/3: 1 year ago he came in with the cirrhosis ascites A. fib systolic heart failure.  He has not been drinking and his heart failure basically is markedly improved.  He did have an echocardiogram done a year ago which showed an EF around 60 to 65%.  He had A. fib at that time really could not assess his diastolic function due to flattened septum consistent with RV volume overload.  -11/4: No obvious signs of heart failure at this point  -11/6 stable     7. Acute kidney injury  - present on admission  - will hydrate and recheck  - will also check CPK  -11/3: Has improved to get some fluid overnight.  We will probably give him a little more fluid also.  His BUN is still rather elevated.  -11/4: He is voiding without difficulty we will check his labs tomorrow.  -11/5: Labs pending  -11/6:.  Creatinine normal range 24/0.85.  Electrolytes stable    Diet: Combination Diet Regular Diet Adult  Fluids: None    DVT Prophylaxis: Pneumatic Compression Devices  Code Status: No CPR- Do NOT Intubate    Disposition: Expected discharge in as soon as accepting facility is found      Hiro Garcia MD    Interval History   No issues pain is adequately controlled no nausea vomiting chest pain shortness of  breath fevers at all.  Splint is padded nicely pain is under control.  Voiding without difficulty stooling.  Need to have evaluation by orthopedic surgery to set up surgery of his left ankle.  We will try and have Dr. Staples do that tomorrow.  Otherwise stable ready for discharge to a structured nursing facility once 1 is found    -Data reviewed today: I reviewed all new labs and imaging results over the last 24 hours. I personally reviewed no images or EKG's today.    Physical Exam   Temp: 98.5  F (36.9  C) Temp src: Tympanic BP: 138/64 Pulse: 87   Resp: 16 SpO2: 96 % O2 Device: None (Room air)    Vitals:    11/02/22 2227   Weight: 95.8 kg (211 lb 3.2 oz)     Vital Signs with Ranges  Temp:  [96.6  F (35.9  C)-98.6  F (37  C)] 98.5  F (36.9  C)  Pulse:  [] 87  Resp:  [16-20] 16  BP: (138-159)/(64-80) 138/64  SpO2:  [93 %-97 %] 96 %  I/O last 3 completed shifts:  In: 240 [P.O.:240]  Out: 1350 [Urine:1350]    Peripheral IV 11/02/22 Anterior;Left Upper forearm (Active)   Site Assessment WDL 11/06/22 0114   Line Status Saline locked 11/06/22 0114   Phlebitis Scale 0-->no symptoms 11/06/22 0114   Infiltration Scale 0 11/06/22 0114   Infiltration Site Treatment Method  None 11/03/22 1608   If infiltrated, was a vesicant infusing? No 11/03/22 1608   Number of days: 4       Wound Coccyx Skin tear (Active)   Number of days: 378       Wound Thigh Other (comment) (Active)   Number of days: 378       Wound Elbow Skin tear (Active)   Number of days: 378       Wound Elbow Skin tear (Active)   Number of days: 378       Wound Shoulder (Active)   Number of days:        Wound Neck Contusion (Active)   Number of days: 378       Wound Neck Contusion (Active)   Number of days: 378       Incision/Surgical Site 10/26/21 Lateral;Lower;Right Abdomen (Active)   Number of days: 376     No line/device    Constitutional: Alert and oriented x3. No distress    HEENT: Normocephalic/atraumatic, PERRL, EOMI, mouth moist, neck supple, no  LN.     Cardiovascular: Irregular RRR. no Murmur, no  rubs, or gallops.  JVD no.  Bruits no.  Pulses      Respiratory: Clear to auscultation bilaterally    Abdomen: Soft, nontender, nondistended, no organomegaly. Bowel sounds present    Extremities: Warm/dry. No edema splint left lower leg CMS intact of his toes no edema chronic venous stasis changes of skin's bilateral    Neuro:   Non focal, cranial nerves intact, Moves all extremities.  Medications       dexamethasone  4 mg Oral Q12H [facility] (08/20)     [Held by provider] furosemide  40 mg Oral Daily     lisinopril  10 mg Oral Daily     metoprolol tartrate  50 mg Oral BID     pantoprazole  40 mg Oral QAM AC     sodium chloride (PF)  3 mL Intracatheter Q8H     [Held by provider] spironolactone  50 mg Oral Daily       Data   Recent Labs   Lab 11/06/22  0508 11/05/22  1226 11/05/22  0619 11/04/22  0823 11/03/22  0530 11/02/22  1756   WBC 15.6* 17.3*  --   --  9.9 20.1*   HGB 13.3 13.3  --   --  13.1* 14.3   MCV 91 91  --   --  90 89    363  --   --  319 367   INR  --   --   --   --   --  1.18*    138  --   --  135* 136   POTASSIUM 4.4 4.3 4.4   < > 4.0 3.6   CHLORIDE 104 105  --   --  103 101   CO2 24 23  --   --  20* 21*   BUN 23.7* 23.6*  --   --  30.0* 36.3*   CR 0.85 0.98  --   --  0.96 1.30*   ANIONGAP 9 10  --   --  12 14   HECTOR 9.2 9.0  --   --  9.6 10.1   * 131*  --   --  150* 119*   ALBUMIN 3.6 3.6  --   --   --  4.0   PROTTOTAL 7.2 7.1  --   --   --  8.0   BILITOTAL 0.5 0.5  --   --   --  0.8   ALKPHOS 92 100  --   --   --  123   ALT 25 21  --   --   --  17   AST 22 22  --   --   --  22    < > = values in this interval not displayed.       No results found for this or any previous visit (from the past 24 hour(s)).

## 2022-11-06 NOTE — PLAN OF CARE
Goal Outcome Evaluation:       pt set off bed alarm nurse found him on the side of the bed sitting up trying to stand to use the urinal. Nurse assisted in the use of the urinal and helped patient back into bed. Chair alarm placed and on, bed alarm on, side rails x3, bed in low position and wheels locked.   Nurse reinforced teaching with patient that he needs to call before getting up and patient stated understanding and repeated it back to nurse

## 2022-11-06 NOTE — PLAN OF CARE
Pt set bed alarm off, nurse found patient sitting on edge of bed using the urinal. Nurse assisted patient back into bed and turned alarm back on and put call light in reach. Nurse did reinforce to patient that he needs to call before sitting at bed side and patient stated understanding

## 2022-11-06 NOTE — PLAN OF CARE
Goal Outcome Evaluation:      Pt alert, orientated x 4 this shift. Pt afebrile. Apical pulse irregular.  Pt remains on room air this shift.  Pt receives PRN Roxicodone 5 mg x 1 for L ankle pain. L ankle remains in splint with ace wrap. CMS intact to LLE. Bilateral lower extremities are dry, flaky, with scabbing to feet. Pt encouraged to elevate lower extremities this shift.   Pt remains non weight bearing to LLE.   Pt assist of 2 with walker, and gait belt to pivot to bed from to chair this shift.   Alarms activated and audile this shift, as pt attempts to get out of bed by self once this shift.     Face to face report given with opportunity to observe patient.    Report given to LAURY Lubin.     Ale Bahena RN   11/6/2022  3:00 PM

## 2022-11-07 NOTE — PLAN OF CARE
"Goal Outcome Evaluation:       Reason for hospital stay:  L ankle fracture   Living situation PTA: home alone   Most recent vitals: /69 (BP Location: Right arm, Patient Position: Supine, Cuff Size: Adult Regular)   Pulse 86   Temp 97.6  F (36.4  C) (Tympanic)   Resp 16   Ht 1.727 m (5' 8\")   Wt 95.8 kg (211 lb 3.2 oz)   SpO2 96%   BMI 32.11 kg/m      Pain Management:  tylenol 650mg and luis alberto 5mg   LOC:  A&O  Cardiac:  apical irregular   Respiratory:  WNL - room air and lungs clear   GI:  WNL  :  dribbling   Skin Issues:  please see charting     IVF:  IV access lost - messaged provider to see if new IV is wanted   ABX:  none    Nutrition: regular diet   ADL's:  assist 1  Ambulation:assist with gait belt and walker   Safety:  call light in place, bed in low position with wheels locked, upper side rails raised and 1 lower one, bed alarm on, urinal in reach     Comments: cast remains padded       Face to face report given with opportunity to observe patient.    Report given to Ale Ellis RN   11/7/2022  7:14 AM                     "

## 2022-11-07 NOTE — PLAN OF CARE
Goal Outcome Evaluation:    Pt alert, orientated x 4 this shift. Pt afebrile. Apical pulse irregular.  Pt remains on room air this shift.  Pt receives PRN Roxicodone 5 mg x 2 and tylenol x 1 this shift for L ankle pain. L ankle remains in splint with ace wrap. CMS intact to LLE. Bilateral lower extremities are dry, flaky, with scabbing to feet. Pt encouraged to elevate lower extremities this shift.   Pt remains non weight bearing to LLE.   Pt assist of 2 with walker, and gait belt to pivot to bed from to chair this shift. Pt uses urinal this shift, output is odorous leighton. Pt does have dribbling at times.   Alarms activated and audile this shift. Pt free from falls and/or injuries this shift.      Face to face report given with opportunity to observe patient.    Report given to LAURY Lubin.     Ale Bahena RN   11/7/2022  2:50 PM

## 2022-11-07 NOTE — PROGRESS NOTES
[facility]    Medicine Progress Note - Hospitalist Service    Date of Admission:  11/2/2022    Assessment & Plan     #Left ankle fracture  - presented to the hospital after he had a fall and pain in his left ankle.  -On admission  , lower extremity x-ray reported Left ankle fracture, likely trimalleolar. There is lateral displacement of the talus in.  Fall risk.  To the tibial plafond of slightly less than 1 cm. There is diffuse soft tissue edema.    - orthopedic surgery was consulted from the ER  by phone. No acute need for surgery indicated. F/u OP was recommended.   -today 11/7 consult orthopedic surgery. Awaiting for call back      #Lung cancer  #Brain mass  -Regarding lung mass and brain mass patient declined any further work-up.  -  Patient was started on dexamethasone for vasogenic edema in the brain  -today 11/7 declines further treatment    #Acute kidney injury  -resolved      #alcoholic liver cirrhosis  -quit drinking few months ago      #Chronic atrial fibrillation  -Patient is off anticoagulation given Fall risk  -on metoprolol      #Chronic diastolic heart failure\  -Stable.  Patient appears to be euvolemic    dispo  Placement after evaluation by orthopedic surgery       Diet: Combination Diet Regular Diet Adult    Reyes Catheter: Not present  Central Lines: None  Cardiac Monitoring: None  Code Status: No CPR- Do NOT Intubate      Disposition Plan   { TIP  Expected discharge date has passed or is blank! Click here to update expected discharge date and refresh note (tipsheet)         The patient's care was discussed with the Bedside Nurse.    Titi Mcgrath MD  Hospitalist Service  [facility]  Securely message with the Vocera Web Console (learn more here)  Text page via [employer] Paging/Directory         Clinically Significant Risk Factors                       # Obesity: Estimated body mass index is 32.11 kg/m  as calculated from the following:    Height as of this encounter: 1.727  "m (5' 8\").    Weight as of this encounter: 95.8 kg (211 lb 3.2 oz).          ______________________________________________________________________    Interval History     Patient is a very pleasant 67-year-old male with ongoing medical history of lung mass, alcoholic   #, atrial fibrillation who presented to the hospital after he had a fall and pain in his left ankle.    On admission creatinine elevated at 1.3, WBC elevated at 20.1, UA negative for UTI, lower extremity x-ray reported Left ankle fracture, likely trimalleolar. There is lateral displacement of the talus in relation to the tibial plafond of slightly less than 1 cm. There is diffuse soft tissue edema.  CT of the chest reported Sizable left upper lobe pulmonary mass with central  cavitation concerning for primary lung malignancy. CT abdomen and pelvis  reported L2 compression fracture of mild severity. Age likely late subacute or older. It is uncertain if this is sequela of osteoporosis/insufficiency or pathologic fracture secondary to metastasis.  CT of the brain reported Round right paramidline pontine mass with extensive vasogenic edema. Metastatic disease would be primary consideration.      Patient was assessed as having left ankle fracture, acute kidney injury, lung cancer with metastasis(which is not new for the patient and patient does not want anything done with work-up or follow-up)    Regarding left ankle fracture, orthopedic surgery was consulted from the ER  by phone. No acute need for surgery indicated. F/u OP was recommended.     Regarding lung mass and brain mass patient declined any further work-up.  Patient was started on dexamethasone for vasogenic edema in the brain    Today 11/7 patient examined.  Patient denies any fever or chills.  Pain is controlled in the left ankle which is cast.  Continue pain control.  Orthopedic surgery consulted awaiting for callback. Regarding lung mass and brain mass patient declined any further " work-up.      Data reviewed today: I reviewed all medications, new labs and imaging results over the last 24 hours.     Physical Exam   Vital Signs: Temp: 97.6  F (36.4  C) Temp src: Tympanic BP: 140/69 Pulse: 86   Resp: 16 SpO2: 96 % O2 Device: None (Room air)    Weight: 211 lbs 3.21 oz      Constitutional: Alert and oriented x3. No distress     HEENT: Normocephalic/atraumatic, PERRL, EOMI, mouth moist, neck supple, no LN.               Cardiovascular: Irregular RRR. no Murmur, no  rubs, or gallops.  JVD no.  Bruits no.  Pulses       Respiratory: Clear to auscultation bilaterally     Abdomen: Soft, nontender, nondistended, no organomegaly. Bowel sounds present      Extremities: Warm/dry. No edema splint left lower leg CMS intact of his toes no edema chronic venous stasis changes of skin's bilateral     Neuro:   Non focal, cranial nerves intact, Moves all extremities.      ROS  10 POINTS REVIEW OF SYSTEM NEGATIVE EXCEPT        Data

## 2022-11-07 NOTE — PLAN OF CARE
A&O, able to make his needs known. VSS, afebrile. Complains of pain 3-7/10 during shift. PRN luis alberto x1 given with some relief of pain. Up to chair during shift with assistance of 2 with a walker and gait belt. IV saline locked. Call light within reach, alarms in place.    Face to face report given with opportunity to observe patient.    Report given to LAURY Monreal RN   11/6/2022  11:03 PM

## 2022-11-07 NOTE — PROGRESS NOTES
S:   S/P Fall 1 week ago.  Diagnosed with a left ankle fracture.  Admitted to the hospital for pain control.  Lives alone independently.  No other injuries sustained in the fall.      O:   AV/VSS  NAD  A&O x 3  Normal CMS  Splint c/d/i  Tenderness to med/lat ankle    X-ray: Displaced Left trimall ankle fracture    A/P  1) Displaced Left Ankle Fracture  -Will require operative fixation  -Surgical scheduling will occur soon; likely surgery this week as an outpatient  -Ok to discharge to SNF prior to surgery if bed available  -Continue TDWB Left LE   -Elevate Left LE  -Keep splint intact; clean; dry.

## 2022-11-08 NOTE — PROGRESS NOTES
Care Transitions focused note:      Plan is ankle repair at 0730 on 11-9-22. On 11-10-22 will transition to [facility] in [place], ride is set @ 1100 with [employer]. Family is aware of plan.    Olivia Baron CM

## 2022-11-08 NOTE — PLAN OF CARE
Pt is A&O x4. Medicated with PRN oxycodone and tylenol x1 with effectiveness. CMS intact to LLE. Pt continues to c/o splint being too high posteriorly and rubbing on back of leg, okay while resting in bed. Urinal voiding without difficulty. No IV access. Call light in reach and bed alarm active.     Face to face report given with opportunity to observe patient.    Report given to LAURY Aguilera RN   11/8/2022  6:58 AM

## 2022-11-08 NOTE — PLAN OF CARE
A&O, able to make needs known. VSS, afebrile, complains of L ankle pain 4-7/10 during shift. PRN tylenol x1 and luis alberto x1 given with some relief of pain. Up to chair for dinner, tolerated well. Assistance of 2 with walker and gait belt for transfers, continues to be non weight bearing to LLE. No IV access. Call light within reach, alarms in place.    Face to face report given with opportunity to observe patient.    Report given to LAURY Lorenzo RN   11/7/2022  11:16 PM

## 2022-11-08 NOTE — PROGRESS NOTES
[facility]    Medicine Progress Note - Hospitalist Service    Date of Admission:  11/2/2022    Assessment & Plan       #Left ankle fracture  - presented to the hospital after he had a fall and pain in his left ankle.  -On admission  , lower extremity x-ray reported Left ankle fracture, likely trimalleolar. There is lateral displacement of the talus in.  Fall risk.  To the tibial plafond of slightly less than 1 cm. There is diffuse soft tissue edema.    - orthopedic surgery was consulted from the ER  by phone. No acute need for surgery indicated. F/u OP was recommended.   -on  11/7 consult orthopedic surgery and patient will have outpatient surgery with Dr. Staples.   -today 11/8 pain controlled  -surgery now planned tomorrow       #Lung cancer  #Brain mass  -Regarding lung mass and brain mass patient declined any further work-up.  -  Patient was started on dexamethasone for vasogenic edema in the brain  -today 11/8 declines further treatment     #Acute kidney injury  -resolved        #alcoholic liver cirrhosis  -quit drinking few months ago        #Chronic atrial fibrillation  -Patient is off anticoagulation given Fall risk  -on metoprolol        #Chronic diastolic heart failure\  -Stable.  Patient appears to be euvolemic     Disposition  Surgery planned tomorrow and possible discharge thursday            Diet: Combination Diet Regular Diet Adult    Reyes Catheter: Not present  Central Lines: None  Cardiac Monitoring: None  Code Status: No CPR- Do NOT Intubate      Disposition Plan   { TIP  Expected discharge date has passed or is blank! Click here to update expected discharge date and refresh note (tipsheet)          The patient's care was discussed with the Bedside Nurse.    Titi Mcgrath MD  Hospitalist Service  [facility]  Securely message with the Vocera Web Console (learn more here)  Text page via [employer] Paging/Directory         Clinically Significant Risk Factors                       #  "Obesity: Estimated body mass index is 32.11 kg/m  as calculated from the following:    Height as of this encounter: 1.727 m (5' 8\").    Weight as of this encounter: 95.8 kg (211 lb 3.2 oz).          ______________________________________________________________________    Interval History       Patient is a very pleasant 67-year-old male with ongoing medical history of lung mass, alcoholic   #, atrial fibrillation who presented to the hospital after he had a fall and pain in his left ankle.     On admission creatinine elevated at 1.3, WBC elevated at 20.1, UA negative for UTI, lower extremity x-ray reported Left ankle fracture, likely trimalleolar. There is lateral displacement of the talus in relation to the tibial plafond of slightly less than 1 cm. There is diffuse soft tissue edema.  CT of the chest reported Sizable left upper lobe pulmonary mass with central  cavitation concerning for primary lung malignancy. CT abdomen and pelvis  reported L2 compression fracture of mild severity. Age likely late subacute or older. It is uncertain if this is sequela of osteoporosis/insufficiency or pathologic fracture secondary to metastasis.  CT of the brain reported Round right paramidline pontine mass with extensive vasogenic edema. Metastatic disease would be primary consideration.        Patient was assessed as having left ankle fracture, acute kidney injury, lung cancer with metastasis(which is not new for the patient and patient does not want anything done with work-up or follow-up)     Regarding left ankle fracture, orthopedic surgery was consulted from the ER  by phone. No acute need for surgery indicated. F/u OP was recommended.      Regarding lung mass and brain mass patient declined any further work-up.  Patient was started on dexamethasone for vasogenic edema in the brain     On 11/7 patient examined.  Patient denies any fever or chills.  Pain is controlled in the left ankle which is cast.  Continue pain control.  " Orthopedic surgery consulted awaiting for callback. Regarding lung mass and brain mass patient declined any further work-up.    Today 11/8 patient seen and examined. Pain is well controlled. Regarding ankle fracture, surgery is planned tomorrow.            Data reviewed today: I reviewed all medications, new labs and imaging results over the last 24 hours.         Physical Exam   Vital Signs: Temp: 98.7  F (37.1  C) Temp src: Tympanic BP: 149/66 Pulse: 70   Resp: 20 SpO2: 97 % O2 Device: None (Room air)    Weight: 211 lbs 3.21 oz    Constitutional: Alert and oriented x3. No distress     HEENT: Normocephalic/atraumatic, PERRL, EOMI, mouth moist, neck supple, no LN.               Cardiovascular: Irregular RRR. no Murmur, no  rubs, or gallops.  JVD no.  Bruits no.  Pulses       Respiratory: Clear to auscultation bilaterally     Abdomen: Soft, nontender, nondistended, no organomegaly. Bowel sounds present      Extremities: Warm/dry. No edema splint left lower leg CMS intact of his toes no edema chronic venous stasis changes of skin's bilateral     Neuro:   Non focal, cranial nerves intact, Moves all extremities.         Data

## 2022-11-08 NOTE — PLAN OF CARE
Goal Outcome Evaluation:    Pt alert, orientated x 4 this shift. Pt afebrile. Apical pulse irregular.  Pt remains on room air this shift.  Pt receives PRN Roxicodone 5 mg x 1 and tylenol x 1 this shift for L ankle pain. L ankle remains in splint with ace wrap. CMS intact to LLE. Bilateral lower extremities are dry, flaky, with scabbing to feet. Pt encouraged to elevate lower extremities this shift.   Pt remains non weight bearing to LLE.   Pt assist of 2 with walker, and gait belt to pivot to bed from to chair this shift. Pt uses urinal this shift, output is odorous leighton. Pt does have dribbling at times. Pt refuses hygiene cares after several attempts this shift.  Alarms activated and audile this shift. Pt free from falls and/or injuries this shift.    Face to face report given with opportunity to observe patient.    Report given to LAURY Myles.     Ale Bahena RN   11/8/2022  3:28 PM

## 2022-11-08 NOTE — ANESTHESIA PREPROCEDURE EVALUATION
Anesthesia Pre-Procedure Evaluation    Patient: Ramírez Hernández   MRN: [id number] : 1955        Procedure : Procedure(s):  Open Reduction Interal Fixation Left Trimal Ankle          History reviewed. No pertinent past medical history.   Past Surgical History:   Procedure Laterality Date     BACK SURGERY       COLONOSCOPY N/A 2019    Procedure: SCREENING COLONOSCOPY WITH POLYPECTOMY;  Surgeon: Alfredo La MD;  Location: [facility]      No Known Allergies   Social History     Tobacco Use     Smoking status: Every Day     Packs/day: 1.00     Years: 40.00     Pack years: 40.00     Types: Cigarettes     Start date: 1975     Smokeless tobacco: Never   Substance Use Topics     Alcohol use: Yes     Comment: none for 5 weeks      Wt Readings from Last 1 Encounters:   22 95.8 kg (211 lb 3.2 oz)        Anesthesia Evaluation   Pt has had prior anesthetic. Type: MAC and General.        ROS/MED HX  ENT/Pulmonary: Comment: Hx of lung mass    (+) tobacco use, Current use, 1 packs/day,     Neurologic:  - neg neurologic ROS     Cardiovascular:     (+) -----Taking blood thinners CHF dysrhythmias, a-fib, Previous cardiac testing   Echo: Date:  Results:  The patient's rhythm is atrial fibrillation.  Severe functional tricuspid insufficiency is present. TV leaflets do not  coapt.  Moderate right ventricular dilation is present.Global right ventricular  function is moderately reduced.Regional wall motion abnormalities involving  mid free wall..  Global and regional left ventricular function is normal with an EF of 60-65%.  Flattened septum is consistent with right ventricular volume overload.  IVC diameter >2.1 cm collapsing <50% with sniff suggests a high RA pressure  estimated at 15 mmHg or greater.  No pericardial effusion is present.  There has been no change comapred to study from .  ______________________________________________________________________________  Left Ventricle  Global and regional left  ventricular function is normal with an EF of 60-65%.  Left ventricular size is normal. Left ventricular wall thickness is normal.  Diastolic function not assessed due to atrial fibrillation. Flattened septum  is consistent with right ventricular volume overload.     Right Ventricle  Moderate right ventricular dilation is present. Global right ventricular  function is moderately reduced. Regional wall motion abnormalities involving  mid free wall..     Atria  Moderate left atrial enlargement is present. Severe right atrial enlargement  is present.     Mitral Valve  Moderate mitral annular calcification is present. Trace mitral insufficiency  is present.     Aortic Valve  The aortic valve is tricuspid. Moderate aortic valve calcification is present.  Trace to mild aortic insufficiency is present.     Tricuspid Valve  Severe tricuspid insufficiency is present. The right ventricular systolic  pressure is approximated at 35.0 mmHg plus the right atrial pressure.     Pulmonic Valve  Trace pulmonic insufficiency is present.     Vessels  The thoracic aorta is normal. IVC diameter >2.1 cm collapsing <50% with sniff  suggests a high RA pressure estimated at 15 mmHg or greater.     Pericardium  No pericardial effusion is present.     Compared to Previous Study  There has been no change.  Stress Test: Date: Results:    ECG Reviewed: Date: 11/2/22 Results:    Cath: Date: Results:      METS/Exercise Tolerance: 3 - Able to walk 1-2 blocks without stopping    Hematologic:       Musculoskeletal:   (+) fracture, Fracture location: RLE,     GI/Hepatic: Comment: Alcoholic cirrhosis     (+) liver disease,     Renal/Genitourinary:     (+) renal disease, type: ARF,     Endo:     (+) Obesity,     Psychiatric/Substance Use: Comment: 6 weeks sober    (+) alcohol abuse     Infectious Disease:  - neg infectious disease ROS     Malignancy: Comment: Presumed lung cancer with probable mets but patient declines any referrals to further investigate  and/or treat   (+) Malignancy, History of Lung.  Lung CA Active status post.        Other:  - neg other ROS          Physical Exam    Airway  airway exam normal      Mallampati: III   TM distance: > 3 FB   Neck ROM: full   Mouth opening: > 3 cm    Respiratory Devices and Support         Dental  no notable dental history     (+) upper dentures      Cardiovascular          Rhythm and rate: irregular and normal     Pulmonary   pulmonary exam normal        breath sounds clear to auscultation           OUTSIDE LABS:  CBC:   Lab Results   Component Value Date    WBC 15.6 (H) 11/06/2022    WBC 17.3 (H) 11/05/2022    HGB 13.3 11/06/2022    HGB 13.3 11/05/2022    HCT 39.0 (L) 11/06/2022    HCT 39.0 (L) 11/05/2022     11/06/2022     11/05/2022     BMP:   Lab Results   Component Value Date     11/06/2022     11/05/2022    POTASSIUM 4.4 11/06/2022    POTASSIUM 4.3 11/05/2022    CHLORIDE 104 11/06/2022    CHLORIDE 105 11/05/2022    CO2 24 11/06/2022    CO2 23 11/05/2022    BUN 23.7 (H) 11/06/2022    BUN 23.6 (H) 11/05/2022    CR 0.85 11/06/2022    CR 0.98 11/05/2022     (H) 11/06/2022     (H) 11/05/2022     COAGS:   Lab Results   Component Value Date    INR 1.18 (H) 11/02/2022     POC: No results found for: BGM, HCG, HCGS  HEPATIC:   Lab Results   Component Value Date    ALBUMIN 3.6 11/06/2022    PROTTOTAL 7.2 11/06/2022    ALT 25 11/06/2022    AST 22 11/06/2022    ALKPHOS 92 11/06/2022    BILITOTAL 0.5 11/06/2022    VEE 32 10/24/2021     OTHER:   Lab Results   Component Value Date    PH 7.50 (H) 10/24/2021    LACT 1.0 10/27/2021    HECTOR 9.2 11/06/2022    PHOS 2.8 10/29/2021    MAG 2.0 11/02/2022    LIPASE 45 (L) 10/24/2021    AMYLASE 28 (L) 12/03/2017    TSH 1.45 12/03/2017    CRP 17.7 (H) 12/03/2017    SED 10 12/03/2017       Anesthesia Plan    ASA Status:  4   NPO Status:  NPO Appropriate    Anesthesia Type: General.     - Airway: LMA   Induction: Intravenous.   Maintenance: Balanced.         Consents    Anesthesia Plan(s) and associated risks, benefits, and realistic alternatives discussed. Questions answered and patient/representative(s) expressed understanding.     - Discussed: Risks, Benefits and Alternatives for BOTH SEDATION and the PROCEDURE were discussed     - Discussed with:  Patient      - Extended Intubation/Ventilatory Support Discussed: No.      - Patient is DNR/DNI Status: No    Use of blood products discussed: No .     Postoperative Care    Pain management: Peripheral nerve block (Single Shot).        Comments:                DIANE Heaton CRNA

## 2022-11-08 NOTE — PROGRESS NOTES
[facility]    Medicine Progress Note - Hospitalist Service    Date of Admission:  11/2/2022    Assessment & Plan     #Left ankle fracture  - presented to the hospital after he had a fall and pain in his left ankle.  -On admission  , lower extremity x-ray reported Left ankle fracture, likely trimalleolar. There is lateral displacement of the talus in.  Fall risk.  To the tibial plafond of slightly less than 1 cm. There is diffuse soft tissue edema.    - orthopedic surgery was consulted from the ER  by phone. No acute need for surgery indicated. F/u OP was recommended.   -on  11/7 consult orthopedic surgery and patient will have outpatient surgery with Dr. Staples. Patient could be discharged to rehab prior to surgery if placement available.        #Lung cancer  #Brain mass  -Regarding lung mass and brain mass patient declined any further work-up.  -  Patient was started on dexamethasone for vasogenic edema in the brain  -today 11/8 declines further treatment     #Acute kidney injury  -resolved        #alcoholic liver cirrhosis  -quit drinking few months ago        #Chronic atrial fibrillation  -Patient is off anticoagulation given Fall risk  -on metoprolol        #Chronic diastolic heart failure\  -Stable.  Patient appears to be euvolemic     Disposition  Stable for discharge to rehab and will undergo surgery in outpatient setting.       Diet: Combination Diet Regular Diet Adult    Reyes Catheter: Not present  Central Lines: None  Cardiac Monitoring: None  Code Status: No CPR- Do NOT Intubate      Disposition Plan   { TIP  Expected discharge date has passed or is blank! Click here to update expected discharge date and refresh note (tipsheet)          The patient's care was discussed with the Bedside Nurse.    Titi Mcgrath MD  Hospitalist Service  [facility]  Securely message with the Vocera Web Console (learn more here)  Text page via [employer] Paging/Directory         Clinically Significant Risk  "Factors                       # Obesity: Estimated body mass index is 32.11 kg/m  as calculated from the following:    Height as of this encounter: 1.727 m (5' 8\").    Weight as of this encounter: 95.8 kg (211 lb 3.2 oz).          ______________________________________________________________________    Interval History       Patient is a very pleasant 67-year-old male with ongoing medical history of lung mass, alcoholic   #, atrial fibrillation who presented to the hospital after he had a fall and pain in his left ankle.     On admission creatinine elevated at 1.3, WBC elevated at 20.1, UA negative for UTI, lower extremity x-ray reported Left ankle fracture, likely trimalleolar. There is lateral displacement of the talus in relation to the tibial plafond of slightly less than 1 cm. There is diffuse soft tissue edema.  CT of the chest reported Sizable left upper lobe pulmonary mass with central  cavitation concerning for primary lung malignancy. CT abdomen and pelvis  reported L2 compression fracture of mild severity. Age likely late subacute or older. It is uncertain if this is sequela of osteoporosis/insufficiency or pathologic fracture secondary to metastasis.  CT of the brain reported Round right paramidline pontine mass with extensive vasogenic edema. Metastatic disease would be primary consideration.        Patient was assessed as having left ankle fracture, acute kidney injury, lung cancer with metastasis(which is not new for the patient and patient does not want anything done with work-up or follow-up)     Regarding left ankle fracture, orthopedic surgery was consulted from the ER  by phone. No acute need for surgery indicated. F/u OP was recommended.      Regarding lung mass and brain mass patient declined any further work-up.  Patient was started on dexamethasone for vasogenic edema in the brain     On  11/7 patient examined.  Patient denies any fever or chills.  Pain is controlled in the left ankle which " is cast.  Continue pain control.  Orthopedic surgery consulted awaiting for callback. Regarding lung mass and brain mass patient declined any further work-up.    Today 11/8 patient seen and examined.  His pain is a little better controlled today than yesterday.  No fever or chills.  Patient was evaluated by orthopedic surgery the day prior and recommendation was to do outpatient surgery.  Patient does not want me to wait in the hospital to get surgery.  If placement follow for rehab patient could be discharged.  Dr. Staples will arrange outpatient orthopedic surgery follow-up.         Data reviewed today: I reviewed all medications, new labs and imaging results over the last 24 hours.   Physical Exam   Vital Signs: Temp: (!) 96.7  F (35.9  C) Temp src: Tympanic BP: 150/62 Pulse: 70   Resp: 18 SpO2: 99 % O2 Device: None (Room air)    Weight: 211 lbs 3.21 oz    Constitutional: Alert and oriented x3. No distress     HEENT: Normocephalic/atraumatic, PERRL, EOMI, mouth moist, neck supple, no LN.               Cardiovascular: Irregular RRR. no Murmur, no  rubs, or gallops.  JVD no.  Bruits no.  Pulses       Respiratory: Clear to auscultation bilaterally     Abdomen: Soft, nontender, nondistended, no organomegaly. Bowel sounds present      Extremities: Warm/dry. No edema splint left lower leg CMS intact of his toes no edema chronic venous stasis changes of skin's bilateral     Neuro:   Non focal, cranial nerves intact, Moves all extremities.     10 POINTS REVIEW OF SYSTEM NEGATIVE EXCEPT    Data

## 2022-11-09 NOTE — PLAN OF CARE
Telephone order received to advance diet as tolerated, and non weight bearing to LLE. Splint to remain in place.

## 2022-11-09 NOTE — PLAN OF CARE
"Most recent vitals: /60 (BP Location: Right arm, Patient Position: Semi-Neal's)   Pulse 86   Temp 98.6  F (37  C) (Tympanic)   Resp 18   Ht 1.727 m (5' 8\")   Wt 95.8 kg (211 lb 3.2 oz)   SpO2 96%   BMI 32.11 kg/m      Patient A&O. Ax2, up with C/o Left lower extremity pain, PRN tyenol and oxy given with good relief. Brace in place. IV placed to the left hand, saline locked. Turn and repo q2hr, patient is able to reposition self independently at times. Denies nausea or SOB. Surgery came to get the patient for surgery first thing in the A.M. Able to make needs known at times, has been calling appropriately this shift.    Face to face report given with opportunity to observe patient.    Report given to Ale Garcia RN   11/9/2022  7:31 PM     (Date and time is when note was signed, Writer forgot to complete note. End of shift was 11/9/2022 0730)  "

## 2022-11-09 NOTE — ANESTHESIA PROCEDURE NOTES
Airway       Patient location during procedure: OR       Procedure Start/Stop Times: 11/9/2022 7:37 AM and 11/9/2022 7:40 AM  Staff -        CRNA: Varun Smith APRN CRNA       Performed By: CRNAIndications and Patient Condition       Indications for airway management: yinka-procedural       Induction type:intravenous       Mask difficulty assessment: 0 - not attempted    Final Airway Details       Final airway type: supraglottic airway    Supraglottic Airway Details        Type: LMA       Brand: I-Gel       LMA size: 5    Post intubation assessment        Placement verified by: capnometry and chest rise        Number of attempts at approach: 1       Number of other approaches attempted: 0       Secured with: plastic tape       Ease of procedure: easy       Dentition: Intact and Unchanged    Medication(s) Administered   Medication Administration Time: 11/9/2022 7:37 AM

## 2022-11-09 NOTE — PROGRESS NOTES
"CLINICAL NUTRITION SERVICES  -  ASSESSMENT NOTE    Ramírez Hernández : LOS    67 yom admitted for ankle fracture. Past medical hx includes alcoholic liver cirrhosis, atrial fibrillation,  lung cancer, brain mass (has declined further work-up). Weight is down about 20-30lbs from a year ago. Adequate intake, good appetite this admission. SNF on discharge.     Diet Order: Regular   Intake: 18 meals with % intake documented    Height: 5' 8\"  Weight: 211 lbs 3.21 oz  Body mass index is 32.11 kg/m .  Weight Status:  Obesity Grade I BMI 30-34.9  Weight History: weight loss does not meet criteria for malnutrition.   Wt Readings from Last 10 Encounters:   11/02/22 95.8 kg (211 lb 3.2 oz)   11/04/21 92.6 kg (204 lb 1.6 oz)   10/29/21 104.9 kg (231 lb 4.2 oz)   10/24/21 111.6 kg (246 lb 0.5 oz)   09/16/19 108.9 kg (240 lb)   07/29/19 107.5 kg (237 lb)   06/11/18 98.9 kg (218 lb)   01/10/18 98 kg (216 lb)   12/15/17 113.4 kg (250 lb)   12/08/17 113.4 kg (250 lb)        Malnutrition Diagnosis: Patient does not meet two of the criteria necessary for diagnosing malnutrition    NUTRITION RECOMMENDATIONS  - Encourage intake as needed    MONITORING AND EVALUATION:  RD will monitor intake, weight, labs        "

## 2022-11-09 NOTE — PLAN OF CARE
Pt returns from surgery at 11:00.  Report received from LAURY Dunn. Pt alert, orientated x 4. Pt afebrile. Apical pulse irregular 90's -110's Pt remains on room air this shift.  Pt receives PRN Roxicodone 5 mg x 1 and tylenol x 1 this shift for L ankle pain. L ankle remains in splint with ace wrap. CMS intact to LLE. Bilateral lower extremities are dry, flaky, with scabbing to feet. Pt encouraged to elevate lower extremities this shift. Ice applied to area for comfort.   Pt remains non weight bearing to LLE.  SCD's applied to R leg, as unable to L ankle with LLE in splint.    Pt uses urinal this shift, output is odorous leighton. Pt does have dribbling at times. Pt refuses hygiene cares after several attempts this shift.  Alarms activated and audile this shift. Pt free from falls and/or injuries this shift.    Face to face report given with opportunity to observe patient.    Report given to LAURY Gary.     Ale Bahena RN   11/9/2022  3:37 PM

## 2022-11-09 NOTE — OR NURSING
Face to face report given with opportunity to observe patient.    Report given to Anel SCHAEFFER and Lauren Tavarez RN   11/9/2022  9:58 AM

## 2022-11-09 NOTE — PROGRESS NOTES
PAS-RR    Per [employer] regulation, CTS team completed and submitted PAS-RR to [place] [employer] via the [employer]. CTS team advised SNF and they are aware a PAS-RR has been submitted.     CTS team reviewed with pt or health care agent that they may be contacted for a follow up appointment within 10 days of hospital discharge if SNF stay is less than 30 days. Contact information for [employer] was also provided.     Pt or health care agent verbalized understanding.     PAS-RR # [id number]    Patients only request for placement is where he can go outside to smoke.    Olivia Baron CM

## 2022-11-09 NOTE — OP NOTE
Procedure Date: 11/09/2022    SURGEON:  Silvio Ulrich MD    PREOPERATIVE DIAGNOSES:  Left trimalleolar ankle fracture with instability, complicated with significant past medical history, likely peripheral vascular disease.    POSTOPERATIVE DIAGNOSIS:  Left trimalleolar ankle fracture with instability, complicated with significant past medical history, likely peripheral vascular disease.    PROCEDURE:    1.  ORIF trimalleolar ankle fracture.  2.  ORIF syndesmosis.    ANESTHESIA:  MAC with local.    HEMOSTASIS:  None.    ESTIMATED BLOOD LOSS:  50 mL    MATERIALS:  Arthrex fibular plate with 3.5 and 2.7 screws, 4-0 cannulated screws x3.    INDICATIONS FOR PROCEDURE:  The patient sustained an ankle fracture slipped on the floor at home.  He is admitted to [facility] and we were consulted and discussing surgery with this patient this morning elected to proceed after detailed discussion of surgery, recovery, risks, potential complications, alternatives and benefits.  This patient has dystrophic skin slight peripheral vascular disease and it is evident, although does have palpable pulses increased risk with this, which we discussed in terms of infection and healing potential and certainly has a very unstable ankle needs to be fixed.    DESCRIPTION OF PROCEDURE:  In the supine position, utilized general anesthesia, popliteal saphenous block performed.  Left lower extremity prepped and draped in the usual sterile fashion.  No exsanguination or tourniquet was utilized.    Attention directed laterally.  I did attempt to use a smaller incision to avoid expansile incision just due to healing potential.  The fracture was exposed, reduced and held with a point-to-point forceps.  A lateral plate distal 2.7 screw was positioned after we got anatomic reduction, fixed distally with screws proximally with 3.5 screws through 2 syndesmosis screws, a -pro-tibia type fixation. Due to syndesmotic instability and just a general concern for  this ankle holding up, so we did perform a syndesmotic fixation in addition to the ORIF syndesmosis trimalleolar ankle fracture.  Once the fibula was fixed and reduced appropriately, the syndesmosis was fixed and reduced appropriately.  This was flushed with copious amounts of normal saline.  Deep tissue repaired with 2-0 Vicryl and skin with nonabsorbable suture.  Attention was then directed medially.  Again, we were able to achieve a good anatomic reduction kind of manually.  A small incision made, double checked the reduction. There was a small anterior portion of this fragment, which was fixed from it with a 4-0 cannulated anterior to posterior screw and then 2 medial malleolar screws from distal medial proximal lateral with good compression were thrown, had excellent alignment, good fixation.  Flushed with normal saline.  Again, minimal incision medially to avoid expansile approach and avoid healing problems.  Placed a bulky sterile posterior sugar tong splint.  He will be nonweightbearing.  He has a discharge set up to a nursing facility, which will be appropriate.  He will be nonweightbearing.  Follow up as scheduled.    Silvio Ulrich DPM        D: 2022   T: 2022   MT: [id number]    Name:     SHAYANNATALIE CLINE SHELBY  MRN:      [id number]        Account:        [id number]   :      1955           Procedure Date: 2022     Document: [id number]

## 2022-11-09 NOTE — OR NURSING
Contacted Dr. Ulrich to clarify if pt is to be discharged or return back to floor.  Pt to return to med surg.  Per MD no further xrays needed.

## 2022-11-09 NOTE — ANESTHESIA PROCEDURE NOTES
Popliteal Procedure Note    Pre-Procedure   Staff -        CRNA: Gregorio Aguilar APRN CRNA       Other Anesthesia Staff: Thalia Comer APRN CRNA       Performed By: CRNA and FLACO       Location: pre-op       Procedure Start/Stop Times: 11/9/2022 7:06 AM and 11/9/2022 7:12 AM       Pre-Anesthestic Checklist: patient identified, IV checked, site marked, risks and benefits discussed, informed consent, monitors and equipment checked, pre-op evaluation, at physician/surgeon's request and post-op pain management  Timeout:       Correct Patient: Yes        Correct Procedure: Yes        Correct Site: Yes        Correct Position: Yes        Correct Laterality: Yes        Site Marked: Yes  Procedure Documentation  Procedure: Popliteal       Diagnosis: POST OP PAIN CONTROL       Laterality: left       Patient Position: supine       Skin prep: Chloraprep       Needle Type: insulated and short bevel       Needle Gauge: 20.        Needle Length (Inches): 4        Ultrasound guided       1. Ultrasound was used to identify targeted nerve, plexus, vascular marker, or fascial plane and place a needle adjacent to it in real-time.       2. Ultrasound was used to visualize the spread of anesthetic in close proximity to the above referenced structure.       3. A permanent image is entered into the patient's record.       4. The visualized anatomic structures appeared normal.       5. There were no apparent abnormal pathologic findings.    Assessment/Narrative         The placement was negative for: blood aspirated, painful injection and site bleeding       Bolus given via needle..        Secured via.        Insertion/Infusion Method: Single Shot       Complications: none       Injection made incrementally with aspirations every 5 mL.    Medication(s) Administered   Bupivacaine 0.25% PF (Infiltration) - Infiltration   20 mL - 11/9/2022 7:00:00 AM  Dexamethasone 10 mg/mL PF (Perineural) - Perineural   5 mg - 11/9/2022 7:00:00  "AM  Medication Administration Time: 11/9/2022 7:06 AM     Comments:  Risks for regional anesthesia include but not limited to: infection, seizures, continued weakness or numbness, pain at injection site, injury to blood vessels      FOR [facility] ([facility]) ONLY:   Pain Team Contact information: please page the Pain Team Via [employer]. Search \"Pain\". During daytime hours, please page the attending first. At night please page the resident first.    "

## 2022-11-09 NOTE — PROGRESS NOTES
"[facility]    Medicine Progress Note - Hospitalist Service    Date of Admission:  11/2/2022    Assessment & Plan       #Left ankle fracture  - presented to the hospital after he had a fall and pain in his left ankle.  -On admission  , lower extremity x-ray reported Left ankle fracture, likely trimalleolar. There is lateral displacement of the talus in.  Fall risk.  To the tibial plafond of slightly less than 1 cm. There is diffuse soft tissue edema.    - orthopedic surgery was consulted from the ER  by phone. No acute need for surgery indicated. F/u OP was recommended.   -on  11/7 consult orthopedic surgery and patient will have outpatient surgery with Dr. Staples.   -today 11/9 pain controlled  -surgery is being done at this time        #Lung cancer  #Brain mass  -Regarding lung mass and brain mass patient declined any further work-up.  -  Patient was started on dexamethasone for vasogenic edema in the brain  -today 11/9 declines further treatment     #Acute kidney injury  -resolved        #alcoholic liver cirrhosis  -quit drinking few months ago        #Chronic atrial fibrillation  -Patient is off anticoagulation given Fall risk  -on metoprolol        #Chronic diastolic heart failure\  -Stable.  Patient appears to be euvolemic     dispo  Discharge tomorrow to rehab     Diet:    Reyes Catheter: Not present  Central Lines: None  Cardiac Monitoring: None  Code Status: No CPR- Do NOT Intubate      Disposition Plan      Expected Discharge Date: 11/09/2022      Destination: home          The patient's care was discussed with the Bedside Nurse.    Titi Mcgrath MD  Hospitalist Service  [facility]  Securely message with the Vocera Web Console (learn more here)  Text page via [employer] Paging/Directory         Clinically Significant Risk Factors                       # Obesity: Estimated body mass index is 32.11 kg/m  as calculated from the following:    Height as of this encounter: 1.727 m (5' 8\").    " Weight as of this encounter: 95.8 kg (211 lb 3.2 oz).          ______________________________________________________________________    Interval History     Patient is a very pleasant 67-year-old male with ongoing medical history of lung mass, alcoholic   #, atrial fibrillation who presented to the hospital after he had a fall and pain in his left ankle.     On admission creatinine elevated at 1.3, WBC elevated at 20.1, UA negative for UTI, lower extremity x-ray reported Left ankle fracture, likely trimalleolar. There is lateral displacement of the talus in relation to the tibial plafond of slightly less than 1 cm. There is diffuse soft tissue edema.  CT of the chest reported Sizable left upper lobe pulmonary mass with central  cavitation concerning for primary lung malignancy. CT abdomen and pelvis  reported L2 compression fracture of mild severity. Age likely late subacute or older. It is uncertain if this is sequela of osteoporosis/insufficiency or pathologic fracture secondary to metastasis.  CT of the brain reported Round right paramidline pontine mass with extensive vasogenic edema. Metastatic disease would be primary consideration.        Patient was assessed as having left ankle fracture, acute kidney injury, lung cancer with metastasis(which is not new for the patient and patient does not want anything done with work-up or follow-up)     Regarding left ankle fracture, orthopedic surgery was consulted from the ER  by phone. No acute need for surgery indicated. F/u OP was recommended.      Regarding lung mass and brain mass patient declined any further work-up.  Patient was started on dexamethasone for vasogenic edema in the brain     On 11/7 patient examined.  Patient denies any fever or chills.  Pain is controlled in the left ankle which is cast.  Continue pain control.  Orthopedic surgery consulted awaiting for callback. Regarding lung mass and brain mass patient declined any further work-up.     Today  11/9 patient seen and examined. Pain is well controlled. Regarding ankle fracture, surgery is being done at this time. Plan is to discontinue tomorrow . Continue PT, OT           Data reviewed today: I reviewed all medications, new labs and imaging results over the last 24 hours.     Physical Exam   Vital Signs: Temp: 99.3  F (37.4  C) Temp src: Tympanic BP: 147/78 Pulse: 105   Resp: 14 SpO2: 96 % O2 Device: None (Room air) Oxygen Delivery: 4 LPM  Weight: 211 lbs 3.21 oz      Constitutional: Alert and oriented x3. No distress     HEENT: Normocephalic/atraumatic, PERRL, EOMI, mouth moist, neck supple, no LN.               Cardiovascular: Irregular RRR. no Murmur, no  rubs, or gallops.  JVD no.  Bruits no.  Pulses       Respiratory: Clear to auscultation bilaterally     Abdomen: Soft, nontender, nondistended, no organomegaly. Bowel sounds present      Extremities: Warm/dry. No edema splint left lower leg CMS intact of his toes no edema chronic venous stasis changes of skin's bilateral     Neuro:   Non focal, cranial nerves intact, Moves all extremities.         Data

## 2022-11-09 NOTE — CARE PLAN
"/68 (BP Location: Right arm, Patient Position: Semi-Neal's, Cuff Size: Adult Regular)   Pulse 113   Temp 98.8  F (37.1  C) (Tympanic)   Resp 20   Ht 1.727 m (5' 8\")   Wt 95.8 kg (211 lb 3.2 oz)   SpO2 97%   BMI 32.11 kg/m      A&O, VSS, and afebrile. Rating pain in LLE at a 3-4/10, PRN tylenol and oxy given x1 for pain see MAR. Lungs clear, HR irreg, and bowels active. Brace remains in place to L leg with barrier between brace and pt's leg. CMS intact to BLE, legs kaitlynn purple in color. Rest of assessment as charted. No IV access at this time. Remains free of injury, alarms on, and call light within reach. Face to face report given with opportunity to observe patient.    Report given to LAURY Martinez RN   11/8/2022  7:00 PM    "

## 2022-11-09 NOTE — ANESTHESIA PROCEDURE NOTES
Saphenous Procedure Note    Pre-Procedure   Staff -        CRNA: Gregorio Aguilar APRN CRNA       Other Anesthesia Staff: Thalia Comer APRN CRNA       Performed By: CRNA and FLACO       Location: pre-op       Procedure Start/Stop Times: 11/9/2022 7:00 AM and 11/9/2022 7:05 AM       Pre-Anesthestic Checklist: patient identified, IV checked, site marked, risks and benefits discussed, informed consent, monitors and equipment checked, pre-op evaluation, at physician/surgeon's request and post-op pain management  Timeout:       Correct Patient: Yes        Correct Procedure: Yes        Correct Site: Yes        Correct Position: Yes        Correct Laterality: Yes        Site Marked: Yes  Procedure Documentation  Procedure: Saphenous       Diagnosis: POST OP PAIN CONTROL       Laterality: left       Patient Position: supine       Skin prep: Chloraprep       Needle Type: insulated and short bevel       Needle Gauge: 20.        Needle Length (Inches): 4        Ultrasound guided       1. Ultrasound was used to identify targeted nerve, plexus, vascular marker, or fascial plane and place a needle adjacent to it in real-time.       2. Ultrasound was used to visualize the spread of anesthetic in close proximity to the above referenced structure.       3. A permanent image is entered into the patient's record.       4. The visualized anatomic structures appeared normal.       5. There were no apparent abnormal pathologic findings.    Assessment/Narrative         The placement was negative for: blood aspirated, painful injection and site bleeding       Bolus given via needle..        Secured via.        Insertion/Infusion Method: Single Shot       Complications: none       Injection made incrementally with aspirations every 5 mL.    Medication(s) Administered   Bupivacaine 0.25% PF (Infiltration) - Infiltration   10 mL - 11/9/2022 7:00:00 AM  Dexamethasone 10 mg/mL PF (Perineural) - Perineural   5 mg - 11/9/2022 7:00:00  "AM  Medication Administration Time: 11/9/2022 7:00 AM     Comments:  Risks for regional anesthesia include but not limited to: infection, seizures, continued weakness or numbness, pain at injection site, injury to blood vessels      FOR [facility] ([facility]) ONLY:   Pain Team Contact information: please page the Pain Team Via [employer]. Search \"Pain\". During daytime hours, please page the attending first. At night please page the resident first.    "

## 2022-11-09 NOTE — ANESTHESIA POSTPROCEDURE EVALUATION
Patient: Ramírez Hernández    Procedure: Procedure(s):  Open Reduction Internal Fixation Left Trimal Ankle       Anesthesia Type:  General    Note:  Disposition: Inpatient   Postop Pain Control: Uneventful            Sign Out: Well controlled pain   PONV: No   Neuro/Psych: Uneventful            Sign Out: Acceptable/Baseline neuro status   Airway/Respiratory: Uneventful            Sign Out: Acceptable/Baseline resp. status   CV/Hemodynamics: Uneventful            Sign Out: Acceptable CV status; No obvious hypovolemia; No obvious fluid overload   Other NRE: NONE   DID A NON-ROUTINE EVENT OCCUR? No           Last vitals:  Vitals Value Taken Time   /73 11/09/22 1040   Temp 97.3  F (36.3  C) 11/09/22 1040   Pulse 93 11/09/22 1044   Resp 23 11/09/22 1044   SpO2 97 % 11/09/22 1044   Vitals shown include unvalidated device data.    Electronically Signed By: DIANE Velez CRNA  November 9, 2022  11:09 AM

## 2022-11-09 NOTE — OR NURSING
1015-Pt's heat rate fluctuates from 120's-high 140's.  BP fluctuating between 151/118-140/80's.  Varun BERG updated.  Telephone verbal order from MORENA Bond to give labatelol 10mg IV.  Current heart rate in 90's and /84.  Pt has no reported adverse reactions.

## 2022-11-09 NOTE — ANESTHESIA CARE TRANSFER NOTE
Patient: Ramírez Hernández    Procedure: Procedure(s):  Open Reduction Internal Fixation Left Trimal Ankle       Diagnosis: Ankle fracture [S82.891Z]  Diagnosis Additional Information: No value filed.    Anesthesia Type:   General     Note:    Oropharynx: oropharynx clear of all foreign objects and spontaneously breathing  Level of Consciousness: drowsy  Oxygen Supplementation: nasal cannula  Level of Supplemental Oxygen (L/min / FiO2): 3  Independent Airway: airway patency satisfactory and stable  Dentition: dentition unchanged  Vital Signs Stable: post-procedure vital signs reviewed and stable  Report to RN Given: handoff report given  Patient transferred to: PACU    Handoff Report: Identifed the Patient, Identified the Reponsible Provider, Reviewed the pertinent medical history, Discussed the surgical course, Reviewed Intra-OP anesthesia mangement and issues during anesthesia, Set expectations for post-procedure period and Allowed opportunity for questions and acknowledgement of understanding      Vitals:  Vitals Value Taken Time   BP     Temp     Pulse     Resp     SpO2 98 % 11/09/22 0919   Vitals shown include unvalidated device data.    Electronically Signed By: DIANE Cochran CRNA  November 9, 2022  9:20 AM

## 2022-11-09 NOTE — PLAN OF CARE
Telephone order received to hold lisinopril, but give metoprolol at this time after pt returned to floor from surgery.

## 2022-11-09 NOTE — PROGRESS NOTES
Care Transitions focused note:      Received VM on 11-8-22 @9728, Bruner declined, no reason given.    Olivia Baron CM

## 2022-11-10 NOTE — DISCHARGE SUMMARY
[facility]  Hospitalist Discharge Summary      Date of Admission:  11/2/2022  Date of Discharge:  11/10/2022  Discharging Provider: Titi Mcgrath MD  Discharge Service: Hospitalist Service    Discharge Diagnoses     #Left ankle fracture   #Lung cancer  #Brain mass   #Acute kidney injury  #alcoholic liver cirrhosis  #Chronic atrial fibrillation   #Chronic diastolic heart failure    Follow-ups Needed After Discharge   Follow-up Appointments     Follow Up and recommended labs and tests      Follow up with specialist, Orthopedic surgery , in 1 week.  No follow up   labs or test are needed.         {Additional follow-up instructions/to-do's for PCP    :    Unresulted Labs Ordered in the Past 30 Days of this Admission     No orders found from 10/3/2022 to 11/3/2022.      These results will be followed up by pcp    Discharge Disposition   Discharged to short-term care facility  Condition at discharge: Stable      Hospital Course     Patient is a very pleasant 67-year-old male with ongoing medical history of lung mass, alcoholic   Liver cirrhosis, atrial fibrillation who presented to the hospital after he had a fall and pain in his left ankle.     On admission creatinine elevated at 1.3, WBC elevated at 20.1, UA negative for UTI, lower extremity x-ray reported Left ankle fracture, likely trimalleolar. There is lateral displacement of the talus in relation to the tibial plafond of slightly less than 1 cm. There is diffuse soft tissue edema.  CT of the chest reported Sizable left upper lobe pulmonary mass with central  cavitation concerning for primary lung malignancy. CT abdomen and pelvis  reported L2 compression fracture of mild severity. Age likely late subacute or older. It is uncertain if this is sequela of osteoporosis/insufficiency or pathologic fracture secondary to metastasis.  CT of the brain reported Round right paramidline pontine mass with extensive vasogenic edema. Metastatic disease would be  primary consideration.        Patient was assessed as having left ankle fracture, acute kidney injury, lung cancer with metastasis(which is NOT  new for the patient and patient does NOT  want anything done with work-up or follow-up). He was nevertheless given dexamethasone for the swelling IN THE BRAIN during hospital stay     Regarding left ankle fracture, orthopedic surgery was consulted and patient underwent ORIF of trimalleolar ankle fracture and ORIF of syndesmosis.  Patient will follow up with orthopedic surgery in 1 week.  Dressing to remain intact and patient will stay nonweightbearing on the left lower extremity.      Regarding lung mass and brain mass patient declined any further work-up.  Patient was started on dexamethasone for vasogenic edema in the brain. Of note patient did not have symptoms of the edema so dexamethason not prescribed upon discharge.     Regarding acute kidney injury patient received IV fluid with normalization of creatinine.        Patient is alert awake oriented, hemodynamically stable and ready for discharge to the short-term rehab      Consultations This Hospital Stay   PHYSICAL THERAPY ADULT IP CONSULT  CARE MANAGEMENT / SOCIAL WORK IP CONSULT  PALLIATIVE CARE ADULT IP CONSULT  ORTHOPEDIC SURGERY IP CONSULT  PHYSICAL THERAPY ADULT IP CONSULT  OCCUPATIONAL THERAPY ADULT IP CONSULT    Code Status   No CPR- Do NOT Intubate    Time Spent on this Encounter   I, Titi Mcgrath MD, personally saw the patient today and spent greater than 30 minutes discharging this patient.       Titi Mcgrath MD  [facility]  [address] [place] [id number]  Phone: [phone]  Fax: [phone]  ______________________________________________________________________    Physical Exam   Vital Signs: Temp: 98  F (36.7  C) Temp src: Tympanic BP: 125/59 Pulse: 99   Resp: 20 SpO2: 96 % O2 Device: None (Room air) Oxygen Delivery: 4 LPM  Weight: 211 lbs 3.21 oz       HEENT:  Normocephalic/atraumatic, PERRL, EOMI, mouth moist, neck supple, no LN.               Cardiovascular: Irregular RRR. no Murmur, no  rubs, or gallops.  JVD no.  Bruits no.  Pulses       Respiratory: Clear to auscultation bilaterally     Abdomen: Soft, nontender, nondistended, no organomegaly. Bowel sounds present      Extremities: Warm/dry. No edema splint left lower leg CMS intact of his toes no edema chronic venous stasis changes of skin's bilateral     Neuro:   Non focal, cranial nerves intact, Moves all extremities.    Primary Care Physician   Aron Delvalle    Discharge Orders      Discharge Instructions    Review discharge instructions as directed by Provider.     Ice to affected area    Ice pack to affected area PRN (as needed).     Elevate affected extremity    Elevate surgical foot/ankle above the level of the heart for 23 of 24 hrs for first 72 hours after surgery     No dressing change    Keep dressing clean dry and intact until follow up clinic appointment.     No weight bearing     Discharge Instructions    Patient to arrange for return to clinic appointment in one week.     General info for SNF    Length of Stay Estimate: Short Term Care: Estimated # of Days 31-90  Condition at Discharge: Stable  Level of care:skilled   Rehabilitation Potential: Good  Admission H&P remains valid and up-to-date: Yes  Recent Chemotherapy: N/A  Use Nursing Home Standing Orders: Yes     Mantoux instructions    Give two-step Mantoux (PPD) Per Facility Policy Yes     Follow Up and recommended labs and tests    Follow up with specialist, Orthopedic surgery , in 1 week.  No follow up labs or test are needed.     Reason for your hospital stay    LEFT ANKLE FRACTURE, S/P ORIF     Activity - Up with assistive device    Can use crutches     Activity - Up with nursing assistance     Weight bearing status    Non weight bearing on left lower extremity     Wound care (specify)    Site:   LEFT ANKLE:   Instructions:  KEEP DRESSING IN  PLACE UNTIL FOLLOW UP WITH ORTHOPEDIC SURGERY IN 1 WEEK.   ROUTINE ICE PACK TO AFFECTED AREA AS NEEDED  ELEVATE LEFT ANKLE TO THE LEVEL OF THE HEART FOR 23 OF 24 HOUR FOR THE FIRST 72 HOURS AFTER SURGERY     Physical Therapy Adult Consult    Evaluate and treat as clinically indicated.    Reason:  left ankle fracture, s/p ORIF     Occupational Therapy Adult Consult    Evaluate and treat as clinically indicated.    Reason:  LEFT ANKLE FRACTURE, S./P ORIF     Fall precautions     Diet    Follow this diet upon discharge: Orders Placed This Encounter      Advance Diet as Tolerated: Regular Diet Adult       Significant Results and Procedures   Results for orders placed or performed during the hospital encounter of 11/02/22   CT Head w/o Contrast    Narrative    Exam: CT HEAD W/O CONTRAST    Clinical history:67 years Male fall    Comparisons: 10/24/2021    Technique: Axial CT imaging of the head was performed Without  intervenous contrast.  This exam was performed using one or more of the following dose  reduction techniques:  Automated exposure control, adjustment of the ISSAC and/or KV according  to patient's size, and/or use of iterative reconstruction technique.    FINDINGS: Encephalomalacia is again seen in the left frontal lobe and  right temporal lobe.    There is a mass in the mary measuring 1.3 x 1.3 x 1.4 cm in dimension.  Density is similar to that of cortical gray matter. There is an  extensive amount of adjacent edema is seen throughout the mary  extending into both cerebral peduncles. The mass is to the right of  midline.    Ventricles and sulci are symmetric. There is no evidence of  intracranial hemorrhage.       Impression    IMPRESSION: Round right paramidline pontine mass with extensive  vasogenic edema. Metastatic disease would be primary consideration.    Unchanged encephalomalacia in the left frontal and right temporal  lobes.    INA KHAN MD         SYSTEM ID:  [id number]   CT Chest Abdomen  Pelvis w/o Contrast    Narrative    CT CHEST ABDOMEN PELVIS W/O CONTRAST    HISTORY: 67 yearsMale fall, weakness.    TECHNIQUE: Axial CT imaging of the chest abdomen and pelvis was  performed without contrast. Coronal and sagittal reconstructions were  obtained.  This exam was performed using one or more of the following dose  reduction techniques:  Automated exposure control, adjustment of the ISSAC and/or KV according  to patient's size, and/or use of iterative reconstruction technique.    COMPARISON: None    FINDINGS:    CT CHEST:  There is no mediastinal or hilar or axillary lymphadenopathy.      There is a left upper lobe mass with irregular margins and a small  amount of central cavitation measuring 3.6 x 3.4 x 2.9 cm. Findings  suggestive of a pulmonary neoplasm.    There is a small subpleural nodule or nodular opacity in the right  lower lobe measuring 5 mm, series 4 image 180.         There is osteopenia. There are old healed right-sided rib fractures.  One or 2 old appearing healed left-sided rib fractures are also seen.  Alignment of the thoracic spine is normal without evidence of  subluxation or fracture. No concerning osteolytic changes are evident.    IMPRESSION CHEST: Sizable left upper lobe pulmonary mass with central  cavitation concerning for primary lung malignancy. Differential, less  likely, could include metastasis or lung abscess.      CT ABDOMEN AND PELVIS: Solid organ evaluation is limited due to lack  of intravenous contrast.    Liver: Uniform density.    Gallbladder: A small calcified gallstone is visible. There is no  biliary dilatation.    Spleen: Unremarkable    Pancreas: Unremarkable    Adrenals: Unremarkable    Kidneys: Unremarkable    Bowel: No abnormally distended or thickened loops of bowel present    Lymph nodes: There is no evidence of mass or lymphadenopathy.    PELVIS: There is no evidence of mass lymphadenopathy or abnormal fluid  collection.    There is a compression fracture  deformity of L2. There is loss of  anterior height and there is depression of the superior endplate.  There is associated osteosclerosis in the upper L2 vertebral body. The  posterior endplate is intact.    IMPRESSION ABDOMEN AND PELVIS: L2 compression fracture of mild  severity. Age likely late subacute or older. It is uncertain if this  is sequela of osteoporosis/insufficiency or pathologic fracture  secondary to metastasis. There is no compromise of the central canal  this time. No other concerning osteosclerotic or osteolytic bony  lesions are evident.    No acute intra-abdominal findings otherwise.    INA KHAN MD         SYSTEM ID:  [id number]   XR Ankle Left G/E 3 Views    Narrative    XR ANKLE LEFT G/E 3 VIEWS    HISTORY: 67 years Male fall    COMPARISON: None    TECHNIQUE: 3 views left ankle    FINDINGS: There is a mildly to moderately comminuted medial malleolar  fracture. There is an oblique fracture of the distal fibula. There is  slight lateral displacement of the fracture fragments as well as the  talus in relation to the tibial plafond. There is diffuse soft tissue  edema.    Suspect presence of a nondisplaced posterior malleolar fracture.      Impression    IMPRESSION: Left ankle fracture, likely trimalleolar. There is lateral  displacement of the talus in relation to the tibial plafond of  slightly less than 1 cm. There is diffuse soft tissue edema.    INA KHAN MD         SYSTEM ID:  [id number]   POC US GUIDANCE NEEDLE PLACEMENT    Narrative    Left saphaneous and popliteal block   XR Surgery ANGEL Fluoro L/T 5 Min w Stills    Narrative    This exam was marked as non-reportable because it will not be read by a   radiologist or a [place] non-radiologist provider.               Discharge Medications   Current Discharge Medication List      START taking these medications    Details   ibuprofen (ADVIL/MOTRIN) 600 MG tablet Take 1 tablet (600 mg) by mouth every 6 hours for 3 days  Qty: 12  tablet, Refills: 0    Associated Diagnoses: Post-op pain      oxyCODONE (ROXICODONE) 5 MG tablet Take 1 tablet (5 mg) by mouth every 6 hours as needed for moderate to severe pain  Qty: 16 tablet, Refills: 0    Associated Diagnoses: Post-op pain         CONTINUE these medications which have NOT CHANGED    Details   furosemide (LASIX) 20 MG tablet Take 3 Tablets by mouth 2 (two) times a day.  Qty: 540 tablet, Refills: 0    Comments: DUE FOR OFFICE VISIT AND LABS BEFORE NEXT REFILL  Associated Diagnoses: Alcoholic cirrhosis of liver with ascites (H)      lisinopril (ZESTRIL) 10 MG tablet Take 1 tablet (10 mg) by mouth daily  Qty: 90 tablet, Refills: 2    Associated Diagnoses: Acute systolic congestive heart failure (H)      metoprolol tartrate (LOPRESSOR) 50 MG tablet TAKE 1 TABLET(50 MG) BY MOUTH TWICE DAILY  Qty: 180 tablet, Refills: 2    Associated Diagnoses: Atrial fibrillation with rapid ventricular response (H); Acute systolic congestive heart failure (H)      pantoprazole (PROTONIX) 40 MG EC tablet Take 1 tablet (40 mg) by mouth every morning (before breakfast)  Qty: 90 tablet, Refills: 3    Associated Diagnoses: Alcoholic cirrhosis of liver with ascites (H)      spironolactone (ALDACTONE) 50 MG tablet Take 1 Tablet by mouth one time a day.  Qty: 90 tablet, Refills: 0    Comments: DUE FOR OFFICE VISIT AND LABS BEFORE NEXT REFILL  Associated Diagnoses: Alcoholic cirrhosis of liver with ascites (H)           Allergies   No Known Allergies

## 2022-11-10 NOTE — DISCHARGE INSTRUCTIONS
You have a follow up appointment with Dr Ulrich on Thursday November 17th at 1 PM at [facility].

## 2022-11-10 NOTE — PLAN OF CARE
"Reason for hospital stay:  Left ankle fx with ORIF    Most recent vitals: /64 (BP Location: Right arm, Patient Position: Supine)   Pulse 110   Temp 98.7  F (37.1  C) (Tympanic)   Resp 20   Ht 1.727 m (5' 8\")   Wt 95.8 kg (211 lb 3.2 oz)   SpO2 96%   BMI 32.11 kg/m      Patient A+O x4 - calls appropriately and makes needs known. VS and assessment as charted in flow sheets. Gave PRN oxy for complains of LLE pain with effective pain management. CMS remains intact to LLE. BLEs elevated as much as patient tolerates. NWB to LLE. Uses urinal at bedside. IV saline locked. AP irregular and tachy - continues on scheduled PO metoprolol. Alarms on.      Face to face report given with opportunity to observe patient.    Report given to Severino Loja RN   11/9/2022  11:45 PM    "

## 2022-11-10 NOTE — PLAN OF CARE
"/61 (BP Location: Right arm, Patient Position: Semi-Neal's, Cuff Size: Adult Regular)   Pulse 102   Temp 98.3  F (36.8  C) (Tympanic)   Resp 20   Ht 1.727 m (5' 8\")   Wt 95.8 kg (211 lb 3.2 oz)   SpO2 97%   BMI 32.11 kg/m      Patient discharged at 11:06 AM via wheel chair accompanied by family and transport tech. Prescriptions sent to nursing home's preferred pharmacy. All belongings sent with patient.     Discharge instructions reviewed with nurse from [facility]. Listed belongings gathered and returned to patient. Yes; see flowsheets.    Patient discharged to [facility] in [place], [place].  Report called to Nursing Home: Nurse to nurse report given at 11:39 AM.    Surgical Patient   Surgical Procedures during stay: Yes  Did patient receive discharge instruction on wound care and recognition of infection symptoms? Instructions reviewed with nurse from [facility] during nurse to nurse report.    [facility]  Follow up appointment made:  Yes  Home medications returned to patient: N/A  Patient reports pain was well managed at discharge: Yes  "

## 2022-11-10 NOTE — PLAN OF CARE
"Reason for hospital stay:  Fracture to the Left Ankle  Living situation PTA: Home- going [facility] in [place] 11/10/22  Most recent vitals: /59 (BP Location: Left arm, Patient Position: Supine, Cuff Size: Adult Regular)   Pulse 99   Temp 98  F (36.7  C) (Tympanic)   Resp 20   Ht 1.727 m (5' 8\")   Wt 95.8 kg (211 lb 3.2 oz)   SpO2 96%   BMI 32.11 kg/m      Pain Management:  5/10 pain, Angela administered before bed. Effective and rated pain at 3/10.  LOC:  A&O x 4  Cardiac:  HRR irregular, HX of Afib, Tachycardic  Respiratory:  Expiratory wheezes throughout.  GI:  Active BS x 4  :  Voids spontaneously without difficulty, uses bedside urinal. Dark orange, odorous urine.  Skin Issues:  Dry, patchy, peeling skin on legs and feet. Surgical incisions on LLE, UTV due to cast in place.CMS is WDL.    IVF:  SL  ABX:  N/A    Nutrition: Regular diet, fair appetite   Activity: Patient is NWB on LLE, will be using walker/crutches. Assist x 1 w/ gait belt   Safety:  Bed in lowest position, wheels locked and bed alarm in place. Call light and personal items within reach and makes needs known.  Face to face report given with opportunity to observe patient.    Report given to ADRIAN Ramirez RN Jamie Wright, RN   11/10/2022  7:06 AM            "

## 2022-11-11 NOTE — PLAN OF CARE
Entered patient's chart in response to receiving phone call from [facility] regarding script for Oxycodone.  Dr. Hebert updated and new RX [facility] at [phone].  Staff at NH updated.

## 2022-11-17 NOTE — PROGRESS NOTES
Patient is here for follow up on his left ankle ORIF.   Kasia German LPN .....................11/17/2022 1:08 PM

## 2022-11-17 NOTE — PROGRESS NOTES
Visit Date: 11/17/2022    HISTORY OF PRESENT ILLNESS:  Ramírez is 8 days out from ORIF left trimalleolar ankle fracture.  He is at the [facility].  He is seen with his brother.  Ramírez has a brain tumor.  It sounds like he has multiple metastases as well and an unstable ankle fracture that needs to be fixed.  He is here today for postop, doing well, no acute concern.    HISTORY REVIEW:  I have reviewed this patient's past medical history, family history, social history as well as medications and allergies.  Any changes/additions were appropriately charted in the patient's electronic medical record.      PHYSICAL EXAMINATION:   CONSTITUTIONAL:  The patient is alert and oriented x 3, well appearing and in no apparent distress.  Affect is pleasant and answers questions appropriately.  VASCULAR:  Circulation is intact with palpable pedal pulses and adequate capillary fill time to all digits.  Hair growth is present and appropriate to mid foot and digits.  NEUROLOGIC:  Light touch sensation is intact to digits.  There is a negative Tinel sign.  There are no signs of apparent nerve entrapment of superficial peroneal or common peroneal nerves.  INTEGUMENT:  No abnormal dermatologic lesions are noted.  Skin has normal texture and turgor.    MUSCULOSKELETAL:  Surgery site well healed.  I did not remove sutures as tissue actually looks fairly good today, but we will put him in a cast and remove sutures in a couple weeks.  No clinical concerns.  No signs of infection or blood clot.    IMAGING:  Three views of the ankle demonstrate intact, well-positioned hardware, both fixed ankle.    ASSESSMENT:  Status post ORIF, left ankle.    PLAN:  I discussed progression and treatment.  The patient is at the [facility].  His brother is concerned about them releasing him.  They were told that he could only stay for 30 days.  Certainly, the patient is not going to be safe in condition that he is currently to return to his home independently in my  opinion, and they will pursue discussions with social work to see what else can be done.  The patient was put in a cast today.  Postoperatively, he should be anticoagulated, which I believe he is.  I did put an order in for Xarelto for the [facility] but should not be increased if he is already on anticoagulant.  I will see him back in a couple of weeks for a cast change, likely another cast from there.    Silvio Ulrich DPM        D: 2022   T: 2022   MT: REJI    Name:     NATALIE DOWNEY  MRN:      [id number]        Account:    [id number]   :      1955           Visit Date: 2022     Document: [id number]

## 2022-12-01 PROBLEM — K21.9 GASTROESOPHAGEAL REFLUX DISEASE WITHOUT ESOPHAGITIS: Status: ACTIVE | Noted: 2022-01-01

## 2022-12-01 PROBLEM — I10 ESSENTIAL HYPERTENSION: Status: ACTIVE | Noted: 2022-01-01

## 2022-12-01 NOTE — PROGRESS NOTES
Ramírez Hernández is a 67 year old male being seen today for regulatory visit at [facility].    Code Status: DNR / DNI.   Health Care Power of : Extended Emergency Contact Information  Primary Emergency Contact: CECELIA HERNÁNDEZ   [facility]  Home Phone: [phone]  Mobile Phone: [phone]  Relation: Brother  Secondary Emergency Contact: Clifton Hernández  Mobile Phone: [phone]  Relation: Son     Allergies: Patient has no known allergies.     Assessment and Plan:    ICD-10-CM    1. Brain metastasis (H)  C79.31       2. Lung mass  R91.8       3. Alcoholic cirrhosis of liver with ascites (H)  K70.31       4. Persistent atrial fibrillation (H)  I48.19       5. Ankle fracture, left, closed, initial encounter  S82.982O         Patient with recent orthopedic follow-up, status post 3 weeks ORIF ankle.  He has a boot in place and is able to bear weight with walker for transfers only.  Her orthopedic surgery, patient is not safe for independent living.    Persistent atrial fibrillation.  On Xarelto.    Lung mass, has declined further work-up, notations in chart of probable brain mets.    End-stage liver disease, currently stable on Lasix and spironolactone.  History of needing paracentesis in the past.    Hypertension.  Stable on current dose of lisinopril plus diuretics.    GERD.  Stable on current PPI.    1. Plan of care reviewed and signed.  No changes made.  2. May use standing orders.  3. Continue with disposition planning.  Patient hopes to ultimately return home, but would benefit from ongoing nursing home care at this point due to safety concerns if living independently.    Electronically signed by:  Pamela Griffiths MD  on December 1, 2022   [facility]     Past Medical, Surgical, Family and Social History reviewed: YES.     HPI     Patient is seen today at [facility].  He was admitted after a fall that resulted in a ankle fracture.  His medical history is complicated by presumed lung  cancer with brain mets, based on brief review of previous notes.  He also has end-stage liver disease.  Did have a paracentesis at some point, with symptomatic relief.,  Cyst currently stable on spironolactone and Lasix.  He has no specific concerns today.  Ultimately, hopeful that he can return home.  But currently understand that he needs nursing care to meet his daily needs.    Current Outpatient Medications   Medication Sig Dispense Refill     furosemide (LASIX) 20 MG tablet Take 60 mg by mouth 2 times daily       furosemide (LASIX) 20 MG tablet Take 3 Tablets by mouth 2 (two) times a day. 540 tablet 0     lisinopril (ZESTRIL) 10 MG tablet Take 1 tablet (10 mg) by mouth daily 90 tablet 2     metoprolol tartrate (LOPRESSOR) 50 MG tablet TAKE 1 TABLET(50 MG) BY MOUTH TWICE DAILY 180 tablet 2     oxyCODONE (ROXICODONE) 5 MG tablet Take 1 tablet (5 mg) by mouth every 6 hours as needed for moderate to severe pain 16 tablet 0     pantoprazole (PROTONIX) 40 MG EC tablet Take 1 tablet (40 mg) by mouth every morning (before breakfast) 90 tablet 3     protein modular (PRO-STAT) daily 30 mLs in 120 mLs of water. Between meals.       rivaroxaban ANTICOAGULANT (XARELTO) 10 MG TABS tablet Take 10 mg by mouth daily In the evening       spironolactone (ALDACTONE) 50 MG tablet Take 1 Tablet by mouth one time a day. 90 tablet 0       Recent Labs   Lab Test 11/09/22  0511 11/06/22  0508 11/05/22  1226 09/16/19  0822 07/29/19  0843 12/05/17  0441 12/04/17  0430 12/03/17  0926   LDL  --   --   --   --  131*  --  53  --    HDL  --   --   --   --  48  --  53  --    TRIG  --   --   --   --  85  --  73  --    ALT 35 25 21   < > 22   < >  --   --    CR 0.80 0.85 0.98   < > 0.77   < > 0.96  --    GFRESTIMATED >90 >90 85   < > >90   < > 79  --    POTASSIUM 4.4 4.4 4.3   < > 4.0   < > 3.6  --    TSH  --   --   --   --   --   --   --  1.45   WBC 16.9* 15.6* 17.3*   < > 10.3   < > 8.3  --    HGB 13.7 13.3 13.3   < > 15.2   < > 13.9  --    PLT  405 358 363   < > 275   < > 249  --    ALBUMIN 3.5 3.6 3.6   < > 3.6   < >  --   --     < > = values in this interval not displayed.        Vital signs reviewed.   Physical Exam  Constitutional:       Appearance: He is well-developed.   Eyes:      Conjunctiva/sclera: Conjunctivae normal.   Neck:      Thyroid: No thyromegaly.   Cardiovascular:      Rate and Rhythm: Normal rate.      Heart sounds: Normal heart sounds. No murmur heard.  Pulmonary:      Effort: Pulmonary effort is normal. No respiratory distress.      Breath sounds: Normal breath sounds.   Abdominal:      Palpations: Abdomen is soft.   Lymphadenopathy:      Cervical: No cervical adenopathy.   Skin:     Findings: No rash.   Neurological:      Mental Status: He is alert and oriented to person, place, and time.

## 2022-12-01 NOTE — PROGRESS NOTES
Patient is here for follow up on his left ankle.  Kasia German LPN .....................12/1/2022 10:09 AM

## 2022-12-01 NOTE — PROGRESS NOTES
Patient seen 3 weeks s/p ORIF ankle.   He may have to return home as apparently they are unable to keep him at nursing home.  This is a difficult situation for this patient and I do worry about him at home independently.  His brother will apparently stay with him for a bit if he is dicharged.    Exam;  Tissue looks good. Sutures removed steri strips applied    Assessment:  S/p ORIF ankle    Plan;  patient is not safe for independent living.  I will allow weight bearing with walker for transfers only which will help some.  F/u in 4 weeks

## 2022-12-07 NOTE — TELEPHONE ENCOUNTER
Care Transitions focused note:      Spoke with patient's brother, Scott.  He is inquiring about a hospice referral for Ramírez.  Writer explained that referral needs to come from either primary care or the nursing home provider taking care of Ramírez.  Scott was under the impression that it needed to be the doctor that diagnosed him.  Writer advised writer will reach out to the  at [facility] and request that they reach out to Scott.  Scott was appreciative of the information.  Message left with  at [facility].

## 2023-01-01 DIAGNOSIS — Z98.890 S/P ORIF (OPEN REDUCTION INTERNAL FIXATION) FRACTURE: Primary | ICD-10-CM

## 2023-01-01 DIAGNOSIS — Z87.81 S/P ORIF (OPEN REDUCTION INTERNAL FIXATION) FRACTURE: Primary | ICD-10-CM

## 2023-04-03 PROBLEM — C79.31 MALIGNANT NEOPLASM METASTATIC TO BRAIN (H): Status: ACTIVE | Noted: 2022-01-01

## (undated) DEVICE — BNDG ELASTIC 4" DBL LENGTH UNSTERILE 6611-14

## (undated) DEVICE — SLEEVE SCD EXPRESS KNEE LENGTH MED 9529

## (undated) DEVICE — DRAPE-C-ARM

## (undated) DEVICE — GAUZE SPONGE CURITY 4X4 12PL

## (undated) DEVICE — PADDING CAST 4IN WEBRIL STRL 2502

## (undated) DEVICE — DRAPE C-ARMOR 5 SIDED 5523

## (undated) DEVICE — CONNECTOR-ERBEFLO 2 PORT

## (undated) DEVICE — COVER LT HANDLE 2/PK 5160-2FG

## (undated) DEVICE — NDL 22GA 1.5"

## (undated) DEVICE — DRAPE SHEET REV FOLD 3/4 9349

## (undated) DEVICE — TOURNIQUET SGL  BLADDER 30"X4" BLUE 5921030135

## (undated) DEVICE — FORCEP-COLON BIOPSY LARGE W/NEEDLE 240CM

## (undated) DEVICE — DRAPE STERI U 1015

## (undated) DEVICE — ESU GROUND PAD ADULT W/CORD E7507

## (undated) DEVICE — IRRIGATION-H2O 1000ML

## (undated) DEVICE — CAST PADDING 6IN COTTON WEBRIL STERILE 2554

## (undated) DEVICE — BLADE CLIPPER SGL USE 9680

## (undated) DEVICE — TUBING-SUCTION 20FT

## (undated) DEVICE — PACK BASIN SET UP SUTCNBSBBA

## (undated) DEVICE — SU PROLENE 3-0 PS-1 18" 8663G

## (undated) DEVICE — PREP CHLORAPREP 26ML TINTED HI-LITE ORANGE 930815

## (undated) DEVICE — Device

## (undated) DEVICE — DRILL BIT-2.5MM GRADUATED

## (undated) DEVICE — GUIDEWIRE W/TROCAR TIP 1.35MM

## (undated) DEVICE — GLV-8.0 BIOGEL PF/LF BLUE INDICATOR UNDERGLOVE

## (undated) DEVICE — DRSG ABDOMINAL PAD UNSTERILE 5X9" 9190

## (undated) DEVICE — DRAPE-EXTREMITY SHEET

## (undated) DEVICE — DRSG XEROFORM GAUZE 1X8" LP 8884433301

## (undated) DEVICE — SU VICRYL 2-0 SH 27" UND J417H

## (undated) DEVICE — TUBING SUCTION MEDI-VAC 1/4"X20' N620A

## (undated) DEVICE — PACK LAPAROTOMY CUSTOM SBA32LPMBG

## (undated) RX ORDER — DEXAMETHASONE SODIUM PHOSPHATE 10 MG/ML
INJECTION, SOLUTION INTRAMUSCULAR; INTRAVENOUS
Status: DISPENSED
Start: 2022-01-01

## (undated) RX ORDER — LIDOCAINE HYDROCHLORIDE 20 MG/ML
INJECTION, SOLUTION EPIDURAL; INFILTRATION; INTRACAUDAL; PERINEURAL
Status: DISPENSED
Start: 2019-09-27

## (undated) RX ORDER — PROPOFOL 10 MG/ML
INJECTION, EMULSION INTRAVENOUS
Status: DISPENSED
Start: 2022-01-01

## (undated) RX ORDER — LIDOCAINE HYDROCHLORIDE 20 MG/ML
INJECTION, SOLUTION EPIDURAL; INFILTRATION; INTRACAUDAL; PERINEURAL
Status: DISPENSED
Start: 2022-01-01

## (undated) RX ORDER — PROPOFOL 10 MG/ML
INJECTION, EMULSION INTRAVENOUS
Status: DISPENSED
Start: 2019-09-27

## (undated) RX ORDER — LABETALOL 20 MG/4 ML (5 MG/ML) INTRAVENOUS SYRINGE
Status: DISPENSED
Start: 2019-09-27

## (undated) RX ORDER — ONDANSETRON 2 MG/ML
INJECTION INTRAMUSCULAR; INTRAVENOUS
Status: DISPENSED
Start: 2022-01-01

## (undated) RX ORDER — FENTANYL CITRATE 50 UG/ML
INJECTION, SOLUTION INTRAMUSCULAR; INTRAVENOUS
Status: DISPENSED
Start: 2022-01-01

## (undated) RX ORDER — METOPROLOL TARTRATE 1 MG/ML
INJECTION, SOLUTION INTRAVENOUS
Status: DISPENSED
Start: 2019-09-27